# Patient Record
Sex: MALE | Race: WHITE | NOT HISPANIC OR LATINO | Employment: OTHER | URBAN - NONMETROPOLITAN AREA
[De-identification: names, ages, dates, MRNs, and addresses within clinical notes are randomized per-mention and may not be internally consistent; named-entity substitution may affect disease eponyms.]

---

## 2018-10-06 ENCOUNTER — HOSPITAL ENCOUNTER (EMERGENCY)
Facility: HOSPITAL | Age: 68
Discharge: HOME | End: 2018-10-06
Attending: EMERGENCY MEDICINE | Admitting: EMERGENCY MEDICINE

## 2018-10-06 VITALS
BODY MASS INDEX: 20.89 KG/M2 | OXYGEN SATURATION: 99 % | DIASTOLIC BLOOD PRESSURE: 93 MMHG | WEIGHT: 130 LBS | HEART RATE: 65 BPM | RESPIRATION RATE: 18 BRPM | HEIGHT: 66 IN | SYSTOLIC BLOOD PRESSURE: 195 MMHG | TEMPERATURE: 97.5 F

## 2018-10-06 DIAGNOSIS — K59.00 CONSTIPATION, UNSPECIFIED CONSTIPATION TYPE: ICD-10-CM

## 2018-10-06 DIAGNOSIS — K62.3 INCOMPLETE RECTAL PROLAPSE: Primary | ICD-10-CM

## 2018-10-06 LAB
ALBUMIN SERPL-MCNC: 4.3 G/DL (ref 3.4–4.8)
ALBUMIN/GLOB SERPL: 1.3 G/DL (ref 1.1–1.8)
ALP SERPL-CCNC: 80 U/L (ref 38–126)
ALT SERPL W P-5'-P-CCNC: 35 U/L (ref 21–72)
ANION GAP SERPL CALCULATED.3IONS-SCNC: 9 MMOL/L (ref 5–15)
AST SERPL-CCNC: 31 U/L (ref 17–59)
BASOPHILS # BLD AUTO: 0.01 10*3/MM3 (ref 0–0.2)
BASOPHILS NFR BLD AUTO: 0.1 % (ref 0–2)
BILIRUB SERPL-MCNC: 0.6 MG/DL (ref 0.2–1.3)
BUN BLD-MCNC: 23 MG/DL (ref 7–21)
BUN/CREAT SERPL: 17.4 (ref 7–25)
CALCIUM SPEC-SCNC: 9.1 MG/DL (ref 8.4–10.2)
CHLORIDE SERPL-SCNC: 104 MMOL/L (ref 95–110)
CO2 SERPL-SCNC: 25 MMOL/L (ref 22–31)
CREAT BLD-MCNC: 1.32 MG/DL (ref 0.7–1.3)
DEPRECATED RDW RBC AUTO: 48.9 FL (ref 35.1–43.9)
EOSINOPHIL # BLD AUTO: 0.1 10*3/MM3 (ref 0–0.7)
EOSINOPHIL NFR BLD AUTO: 1.3 % (ref 0–7)
ERYTHROCYTE [DISTWIDTH] IN BLOOD BY AUTOMATED COUNT: 14.7 % (ref 11.5–14.5)
GFR SERPL CREATININE-BSD FRML MDRD: 54 ML/MIN/1.73 (ref 42–98)
GLOBULIN UR ELPH-MCNC: 3.4 GM/DL (ref 2.3–3.5)
GLUCOSE BLD-MCNC: 64 MG/DL (ref 60–100)
HCT VFR BLD AUTO: 37.6 % (ref 39–49)
HGB BLD-MCNC: 13.3 G/DL (ref 13.7–17.3)
HOLD SPECIMEN: NORMAL
HOLD SPECIMEN: NORMAL
IMM GRANULOCYTES # BLD: 0.02 10*3/MM3 (ref 0–0.02)
IMM GRANULOCYTES NFR BLD: 0.3 % (ref 0–0.5)
INR PPP: 0.96 (ref 0.8–1.2)
LYMPHOCYTES # BLD AUTO: 1.3 10*3/MM3 (ref 0.6–4.2)
LYMPHOCYTES NFR BLD AUTO: 16.7 % (ref 10–50)
MCH RBC QN AUTO: 32.5 PG (ref 26.5–34)
MCHC RBC AUTO-ENTMCNC: 35.4 G/DL (ref 31.5–36.3)
MCV RBC AUTO: 91.9 FL (ref 80–98)
MONOCYTES # BLD AUTO: 0.63 10*3/MM3 (ref 0–0.9)
MONOCYTES NFR BLD AUTO: 8.1 % (ref 0–12)
NEUTROPHILS # BLD AUTO: 5.71 10*3/MM3 (ref 2–8.6)
NEUTROPHILS NFR BLD AUTO: 73.5 % (ref 37–80)
PLATELET # BLD AUTO: 175 10*3/MM3 (ref 150–450)
PMV BLD AUTO: 9.3 FL (ref 8–12)
POTASSIUM BLD-SCNC: 3.7 MMOL/L (ref 3.5–5.1)
PROT SERPL-MCNC: 7.7 G/DL (ref 6.3–8.6)
PROTHROMBIN TIME: 12.6 SECONDS (ref 11.1–15.3)
RBC # BLD AUTO: 4.09 10*6/MM3 (ref 4.37–5.74)
SODIUM BLD-SCNC: 138 MMOL/L (ref 137–145)
WBC NRBC COR # BLD: 7.77 10*3/MM3 (ref 3.2–9.8)
WHOLE BLOOD HOLD SPECIMEN: NORMAL
WHOLE BLOOD HOLD SPECIMEN: NORMAL

## 2018-10-06 PROCEDURE — 85610 PROTHROMBIN TIME: CPT | Performed by: PHYSICIAN ASSISTANT

## 2018-10-06 PROCEDURE — 80053 COMPREHEN METABOLIC PANEL: CPT | Performed by: PHYSICIAN ASSISTANT

## 2018-10-06 PROCEDURE — 85025 COMPLETE CBC W/AUTO DIFF WBC: CPT | Performed by: EMERGENCY MEDICINE

## 2018-10-06 PROCEDURE — 99284 EMERGENCY DEPT VISIT MOD MDM: CPT

## 2018-10-06 RX ORDER — PANTOPRAZOLE SODIUM 40 MG/10ML
80 INJECTION, POWDER, LYOPHILIZED, FOR SOLUTION INTRAVENOUS ONCE
Status: DISCONTINUED | OUTPATIENT
Start: 2018-10-06 | End: 2018-10-06

## 2018-10-06 RX ORDER — CHOLECALCIFEROL (VITAMIN D3) 125 MCG
500 CAPSULE ORAL DAILY
COMMUNITY

## 2018-10-06 RX ORDER — SODIUM CHLORIDE 0.9 % (FLUSH) 0.9 %
10 SYRINGE (ML) INJECTION AS NEEDED
Status: DISCONTINUED | OUTPATIENT
Start: 2018-10-06 | End: 2018-10-06 | Stop reason: HOSPADM

## 2018-10-06 RX ORDER — CLOPIDOGREL BISULFATE 75 MG/1
75 TABLET ORAL DAILY
COMMUNITY
End: 2021-01-11

## 2018-10-06 RX ORDER — ASPIRIN 81 MG/1
81 TABLET, CHEWABLE ORAL DAILY
Status: ON HOLD | COMMUNITY
End: 2020-09-30

## 2018-10-06 RX ORDER — TAMSULOSIN HYDROCHLORIDE 0.4 MG/1
1 CAPSULE ORAL DAILY
COMMUNITY
End: 2021-04-14 | Stop reason: SDUPTHER

## 2018-10-06 RX ORDER — AMLODIPINE BESYLATE 10 MG/1
10 TABLET ORAL DAILY
Status: ON HOLD | COMMUNITY
End: 2020-09-30

## 2018-10-06 RX ORDER — DOCUSATE SODIUM 100 MG/1
100 CAPSULE, LIQUID FILLED ORAL 2 TIMES DAILY
Qty: 60 CAPSULE | Refills: 0 | Status: SHIPPED | OUTPATIENT
Start: 2018-10-06 | End: 2018-11-05

## 2018-10-06 NOTE — ED PROVIDER NOTES
Subjective   Patient presents to emergency department for rectal bleeding starting this morning.  States blood is bright red and has noticed a large mass coming out of his anus.  Endorses constipation and recent straining more.  Denies any other symptoms.  He is taking plavix and aspirin.          History provided by:  Patient   used: No    Rectal Bleeding   Quality:  Bright red  Amount:  Moderate  Duration:  3 hours  Timing:  Constant  Chronicity:  New  Context: constipation and spontaneously    Context: not hemorrhoids    Associated symptoms: no abdominal pain, no dizziness, no epistaxis, no fever and no vomiting    Risk factors: no anticoagulant use, no hx of colorectal cancer, no hx of colorectal surgery and no NSAID use        Review of Systems   Constitutional: Negative for chills and fever.   HENT: Negative for nosebleeds.    Respiratory: Negative for shortness of breath.    Cardiovascular: Negative for chest pain.   Gastrointestinal: Positive for anal bleeding, constipation, hematochezia and rectal pain. Negative for abdominal pain, diarrhea, nausea and vomiting.   Genitourinary: Negative for dysuria and flank pain.   Skin: Negative for color change.   Allergic/Immunologic: Negative for immunocompromised state.   Neurological: Negative for dizziness and syncope.   Hematological: Does not bruise/bleed easily.       Past Medical History:   Diagnosis Date   • Hyperlipidemia    • Hypertension    • Stroke (CMS/HCC)        No Known Allergies    Past Surgical History:   Procedure Laterality Date   • KNEE SURGERY         History reviewed. No pertinent family history.    Social History     Social History   • Marital status:      Social History Main Topics   • Smoking status: Former Smoker   • Alcohol use No   • Drug use: No     Other Topics Concern   • Not on file           Objective      /98 (BP Location: Left arm, Patient Position: Standing)   Pulse 77   Temp 97.5 °F (36.4 °C)  "(Oral)   Resp 18   Ht 167.6 cm (66\")   Wt 59 kg (130 lb)   SpO2 98%   BMI 20.98 kg/m²     Physical Exam   Constitutional: He appears well-developed and well-nourished. No distress.   HENT:   Head: Normocephalic and atraumatic.   Eyes: Conjunctivae are normal.   Cardiovascular: Normal rate, regular rhythm and normal heart sounds.    Pulmonary/Chest: Effort normal and breath sounds normal. No respiratory distress. He has no wheezes.   Abdominal: Soft. Bowel sounds are normal. He exhibits no distension. There is no tenderness.   Genitourinary: Rectal exam shows external hemorrhoid, mass (rectal prolapse) and tenderness. Rectal exam shows no internal hemorrhoid, no fissure and anal tone normal.   Musculoskeletal: He exhibits no edema.   Neurological: He is alert.   Skin: Skin is warm. Capillary refill takes less than 2 seconds.   Psychiatric: He has a normal mood and affect. His behavior is normal. Thought content normal.   Nursing note and vitals reviewed.      Procedures           ED Course  ED Course as of Oct 06 1121   Sat Oct 06, 2018   1117 Examined patient with RN. Applied sugar and gentle pressure to rectal prolapse with tech in room.  Rectal prolapse spontaneously reduced. Advised patient to reduce straining and increase water/fiber intake.  Will give rx for colace.    [KODAK]      ED Course User Index  [KODAK] Martín Mujica PA-C      Results for orders placed or performed during the hospital encounter of 10/06/18   CBC Auto Differential   Result Value Ref Range    WBC 7.77 3.20 - 9.80 10*3/mm3    RBC 4.09 (L) 4.37 - 5.74 10*6/mm3    Hemoglobin 13.3 (L) 13.7 - 17.3 g/dL    Hematocrit 37.6 (L) 39.0 - 49.0 %    MCV 91.9 80.0 - 98.0 fL    MCH 32.5 26.5 - 34.0 pg    MCHC 35.4 31.5 - 36.3 g/dL    RDW 14.7 (H) 11.5 - 14.5 %    RDW-SD 48.9 (H) 35.1 - 43.9 fl    MPV 9.3 8.0 - 12.0 fL    Platelets 175 150 - 450 10*3/mm3    Neutrophil % 73.5 37.0 - 80.0 %    Lymphocyte % 16.7 10.0 - 50.0 %    Monocyte % 8.1 0.0 - " 12.0 %    Eosinophil % 1.3 0.0 - 7.0 %    Basophil % 0.1 0.0 - 2.0 %    Immature Grans % 0.3 0.0 - 0.5 %    Neutrophils, Absolute 5.71 2.00 - 8.60 10*3/mm3    Lymphocytes, Absolute 1.30 0.60 - 4.20 10*3/mm3    Monocytes, Absolute 0.63 0.00 - 0.90 10*3/mm3    Eosinophils, Absolute 0.10 0.00 - 0.70 10*3/mm3    Basophils, Absolute 0.01 0.00 - 0.20 10*3/mm3    Immature Grans, Absolute 0.02 0.00 - 0.02 10*3/mm3   Protime-INR   Result Value Ref Range    Protime 12.6 11.1 - 15.3 Seconds    INR 0.96 0.80 - 1.20   Comprehensive Metabolic Panel   Result Value Ref Range    Glucose 64 60 - 100 mg/dL    BUN 23 (H) 7 - 21 mg/dL    Creatinine 1.32 (H) 0.70 - 1.30 mg/dL    Sodium 138 137 - 145 mmol/L    Potassium 3.7 3.5 - 5.1 mmol/L    Chloride 104 95 - 110 mmol/L    CO2 25.0 22.0 - 31.0 mmol/L    Calcium 9.1 8.4 - 10.2 mg/dL    Total Protein 7.7 6.3 - 8.6 g/dL    Albumin 4.30 3.40 - 4.80 g/dL    ALT (SGPT) 35 21 - 72 U/L    AST (SGOT) 31 17 - 59 U/L    Alkaline Phosphatase 80 38 - 126 U/L    Total Bilirubin 0.6 0.2 - 1.3 mg/dL    eGFR Non African Amer 54 42 - 98 mL/min/1.73    Globulin 3.4 2.3 - 3.5 gm/dL    A/G Ratio 1.3 1.1 - 1.8 g/dL    BUN/Creatinine Ratio 17.4 7.0 - 25.0    Anion Gap 9.0 5.0 - 15.0 mmol/L                 MDM      Final diagnoses:   Incomplete rectal prolapse   Constipation, unspecified constipation type            Martín Mujica PA-C  10/06/18 1122

## 2018-12-07 RX ORDER — CARVEDILOL 3.12 MG/1
3.12 TABLET ORAL 2 TIMES DAILY WITH MEALS
Status: ON HOLD | COMMUNITY
End: 2020-09-30

## 2018-12-10 ENCOUNTER — HOSPITAL ENCOUNTER (OUTPATIENT)
Facility: HOSPITAL | Age: 68
Setting detail: HOSPITAL OUTPATIENT SURGERY
Discharge: HOME | End: 2018-12-10
Attending: INTERNAL MEDICINE | Admitting: INTERNAL MEDICINE

## 2018-12-10 ENCOUNTER — ANESTHESIA (OUTPATIENT)
Dept: GASTROENTEROLOGY | Facility: HOSPITAL | Age: 68
End: 2018-12-10

## 2018-12-10 ENCOUNTER — ANESTHESIA EVENT (OUTPATIENT)
Dept: GASTROENTEROLOGY | Facility: HOSPITAL | Age: 68
End: 2018-12-10

## 2018-12-10 VITALS
HEART RATE: 66 BPM | SYSTOLIC BLOOD PRESSURE: 126 MMHG | WEIGHT: 131 LBS | RESPIRATION RATE: 18 BRPM | BODY MASS INDEX: 21.05 KG/M2 | DIASTOLIC BLOOD PRESSURE: 75 MMHG | HEIGHT: 66 IN | TEMPERATURE: 98.7 F | OXYGEN SATURATION: 98 %

## 2018-12-10 PROCEDURE — 25010000002 PROPOFOL 10 MG/ML EMULSION: Performed by: NURSE ANESTHETIST, CERTIFIED REGISTERED

## 2018-12-10 RX ORDER — PROPOFOL 10 MG/ML
VIAL (ML) INTRAVENOUS AS NEEDED
Status: DISCONTINUED | OUTPATIENT
Start: 2018-12-10 | End: 2018-12-10 | Stop reason: SURG

## 2018-12-10 RX ORDER — LIDOCAINE HYDROCHLORIDE 20 MG/ML
INJECTION, SOLUTION INFILTRATION; PERINEURAL AS NEEDED
Status: DISCONTINUED | OUTPATIENT
Start: 2018-12-10 | End: 2018-12-10 | Stop reason: SURG

## 2018-12-10 RX ORDER — DEXTROSE AND SODIUM CHLORIDE 5; .45 G/100ML; G/100ML
30 INJECTION, SOLUTION INTRAVENOUS CONTINUOUS
Status: DISCONTINUED | OUTPATIENT
Start: 2018-12-10 | End: 2018-12-10 | Stop reason: HOSPADM

## 2018-12-10 RX ADMIN — PROPOFOL 800 MG: 10 INJECTION, EMULSION INTRAVENOUS at 13:20

## 2018-12-10 RX ADMIN — LIDOCAINE HYDROCHLORIDE 80 MG: 20 INJECTION, SOLUTION INFILTRATION; PERINEURAL at 13:20

## 2018-12-10 RX ADMIN — LIDOCAINE HYDROCHLORIDE 20 MG: 20 INJECTION, SOLUTION INFILTRATION; PERINEURAL at 13:25

## 2018-12-10 RX ADMIN — DEXTROSE AND SODIUM CHLORIDE 30 ML/HR: 5; 450 INJECTION, SOLUTION INTRAVENOUS at 12:36

## 2018-12-10 NOTE — ANESTHESIA PREPROCEDURE EVALUATION
Anesthesia Evaluation     NPO Solid Status: > 8 hours  NPO Liquid Status: > 6 hours           Airway   Mallampati: II  TM distance: >3 FB  Neck ROM: full  No difficulty expected  Dental    (+) lower dentures and upper dentures        Pulmonary - normal exam   (+) a smoker Former,   Cardiovascular     PT is on anticoagulation therapy  Patient on routine beta blocker and Beta blocker given within 24 hours of surgery  Rate: normal    (+) hypertension poorly controlled 2 medications or greater, hyperlipidemia,       Neuro/Psych  (+) CVA,     GI/Hepatic/Renal/Endo - negative ROS     Musculoskeletal (-) negative ROS    Abdominal  - normal exam   Substance History - negative use     OB/GYN negative ob/gyn ROS         Other                        Anesthesia Plan    ASA 3     MAC     intravenous induction   Anesthetic plan, all risks, benefits, and alternatives have been provided, discussed and informed consent has been obtained with: patient.

## 2018-12-10 NOTE — ANESTHESIA POSTPROCEDURE EVALUATION
Patient: Bar Crockett    Procedure Summary     Date:  12/10/18 Room / Location:  [facility] ENDOSCOPY 2 / [facility]    Anesthesia Start:  1308 Anesthesia Stop:  1333    Procedure:  COLONOSCOPY (N/A ) Diagnosis:       Change in bowel habits      Diverticulosis large intestine w/o perforation or abscess w/o bleeding      (Change in bowel habits [R19.4])    Surgeon:  Jay Wilson DO Provider:  Dorinda Agarwal CRNA    Anesthesia Type:  MAC ASA Status:  3          Anesthesia Type: MAC  Last vitals  BP   161/89 (12/10/18 1227)   Temp   97.9 °F (36.6 °C) (12/10/18 1227)   Pulse   73 (12/10/18 1227)   Resp   18 (12/10/18 1227)     SpO2   93 % (12/10/18 1227)     Post Anesthesia Care and Evaluation    Patient location during evaluation: bedside  Patient participation: complete - patient cannot participate  Level of consciousness: obtunded/minimal responses  Pain score: 0  Pain management: adequate  Airway patency: patent  Anesthetic complications: No anesthetic complications  PONV Status: none  Cardiovascular status: acceptable  Respiratory status: acceptable  Hydration status: acceptable

## 2018-12-10 NOTE — H&P
Era Valdivia DO,Psychiatric  Gastroenterology  Hepatology  Endoscopy  Board Certified in Internal Medicine and gastroenterology  [address], [address]  [place]. [address]  - [phone]   F - [phone]     GASTROENTEROLOGY HISTORY AND PHYSICAL  NOTE   ERA VALDIVIA DO.         SUBJECTIVE:   12/10/2018    Name: Bar Crockett  DOD: 1950      Chief Complaint:       Subjective : Rectal prolapse, changes in bowel habits, rectal bleeding    Patient is 68 y.o. male presents with desire for elective colonoscopy.      ROS/HISTORY/ CURRENT MEDICATIONS/OBJECTIVE/VS/PE:   Review of Systems:  All systems unremarkable unless specified below.  Constitutional   HENT  Eyes   Respiratory    Cardiovascular  Gastrointestinal   Endocrine  Genitourinary    Musculoskeletal   Skin  Allergic/Immunologic    Neurological    Hematological  Psychiatric/Behavioral    History:     Past Medical History:   Diagnosis Date   • Hyperlipidemia    • Hypertension    • Rectal abnormality     Rectum came out - er put back in.  9/18   • Stroke (CMS/HCC)      Past Surgical History:   Procedure Laterality Date   • KNEE SURGERY     • OTHER SURGICAL HISTORY      Loop recorder     History reviewed. No pertinent family history.  Social History     Tobacco Use   • Smoking status: Former Smoker   • Smokeless tobacco: Former User   Substance Use Topics   • Alcohol use: No   • Drug use: No     Medications Prior to Admission   Medication Sig Dispense Refill Last Dose   • amLODIPine (NORVASC) 10 MG tablet Take 10 mg by mouth Daily.   12/10/2018 at Unknown time   • carvedilol (COREG) 3.125 MG tablet Take 3.125 mg by mouth 2 (Two) Times a Day With Meals. 1/2 tab AM and 1/2 in PM.   12/10/2018 at Unknown time   • Cholecalciferol (VITAMIN D3) 5000 units capsule capsule Take 5,000 Units by mouth Daily.   12/9/2018 at Unknown time   • tamsulosin (FLOMAX) 0.4 MG capsule 24 hr capsule Take 1 capsule by mouth Every Night.   12/9/2018 at Unknown  time   • vitamin B-12 (CYANOCOBALAMIN) 500 MCG tablet Take 500 mcg by mouth Daily.   12/9/2018 at Unknown time   • aspirin 81 MG chewable tablet Chew 81 mg Daily.   12/6/2018   • clopidogrel (PLAVIX) 75 MG tablet Take 75 mg by mouth Daily.   12/6/2018     Allergies:  Patient has no known allergies.    I have reviewed the patients medical history, surgical history and family history in the available medical record system.     Current Medications:     Current Facility-Administered Medications   Medication Dose Route Frequency Provider Last Rate Last Dose   • dextrose 5 % and sodium chloride 0.45 % infusion  30 mL/hr Intravenous Continuous Jay Wilson DO 30 mL/hr at 12/10/18 1236 30 mL/hr at 12/10/18 1236       Objective     Physical Exam:   Temp:  [97.9 °F (36.6 °C)] 97.9 °F (36.6 °C)  Heart Rate:  [73] 73  Resp:  [18] 18  BP: (161)/(89) 161/89    Physical Exam:  General Appearance:    Alert, cooperative, in no acute distress   Head:    Normocephalic, without obvious abnormality, atraumatic   Eyes:            Lids and lashes normal, conjunctivae and sclerae normal, no   icterus, no pallor, corneas clear, PERRLA   Ears:    Ears appear intact with no abnormalities noted   Throat:   No oral lesions, no thrush, oral mucosa moist   Neck:   No adenopathy, supple, trachea midline, no thyromegaly, no     carotid bruit, no JVD   Back:     No kyphosis present, no scoliosis present, no skin lesions,       erythema or scars, no tenderness to percussion or                   palpation,   range of motion normal   Lungs:     Clear to auscultation,respirations regular, even and                   unlabored    Heart:    Regular rhythm and normal rate, normal S1 and S2, no            murmur, no gallop, no rub, no click   Breast Exam:    Deferred   Abdomen:     Normal bowel sounds, no masses, no organomegaly, soft        non-tender, non-distended, no guarding, no rebound                 tenderness   Genitalia:    Deferred    Extremities:   Moves all extremities well, no edema, no cyanosis, no              redness   Pulses:   Pulses palpable and equal bilaterally   Skin:   No bleeding, bruising or rash   Lymph nodes:   No palpable adenopathy   Neurologic:   Cranial nerves 2 - 12 grossly intact, sensation intact, DTR        present and equal bilaterally      Results Review:     Lab Results   Component Value Date    WBC 7.77 10/06/2018    HGB 13.3 (L) 10/06/2018    HCT 37.6 (L) 10/06/2018     10/06/2018             No results found for: LIPASE  Lab Results   Component Value Date    INR 0.96 10/06/2018          Radiology Review:  Imaging Results (last 72 hours)     ** No results found for the last 72 hours. **           I reviewed the patient's new clinical results.  I reviewed the patient's new imaging results and agree with the interpretation.     ASSESSMENT/PLAN:   ASSESSMENT:   1.  Changes in bowel habits  2.  Rectal prolapse, consider leading edge colorectal cancer    PLAN:   1.  Colonoscopy    Risk and benefits associated with the procedure are reviewed with the patient.  The patient wishes to proceed      Jay Wilson DO  12/10/18  1:16 PM

## 2020-09-30 ENCOUNTER — HOSPITAL ENCOUNTER (INPATIENT)
Facility: HOSPITAL | Age: 70
LOS: 5 days | Discharge: HOME | End: 2020-10-05
Attending: EMERGENCY MEDICINE | Admitting: HOSPITALIST

## 2020-09-30 ENCOUNTER — APPOINTMENT (OUTPATIENT)
Dept: CT IMAGING | Facility: HOSPITAL | Age: 70
End: 2020-09-30

## 2020-09-30 DIAGNOSIS — Z78.9 IMPAIRED MOBILITY AND ADLS: ICD-10-CM

## 2020-09-30 DIAGNOSIS — K57.32 SIGMOID DIVERTICULITIS: Primary | ICD-10-CM

## 2020-09-30 DIAGNOSIS — Z74.09 IMPAIRED MOBILITY AND ADLS: ICD-10-CM

## 2020-09-30 DIAGNOSIS — N17.9 ACUTE RENAL FAILURE, UNSPECIFIED ACUTE RENAL FAILURE TYPE (HCC): ICD-10-CM

## 2020-09-30 LAB
ALBUMIN SERPL-MCNC: 3.5 G/DL (ref 3.5–5.2)
ALBUMIN/GLOB SERPL: 1.1 G/DL
ALP SERPL-CCNC: 76 U/L (ref 39–117)
ALT SERPL W P-5'-P-CCNC: 15 U/L (ref 1–41)
ANION GAP SERPL CALCULATED.3IONS-SCNC: 16 MMOL/L (ref 5–15)
ANISOCYTOSIS BLD QL: ABNORMAL
AST SERPL-CCNC: 19 U/L (ref 1–40)
BACTERIA UR QL AUTO: ABNORMAL /HPF
BILIRUB SERPL-MCNC: 0.8 MG/DL (ref 0–1.2)
BILIRUB UR QL STRIP: NEGATIVE
BUN SERPL-MCNC: 46 MG/DL (ref 8–23)
BUN/CREAT SERPL: 23.4 (ref 7–25)
CALCIUM SPEC-SCNC: 8.7 MG/DL (ref 8.6–10.5)
CHLORIDE SERPL-SCNC: 96 MMOL/L (ref 98–107)
CLARITY UR: CLEAR
CO2 SERPL-SCNC: 22 MMOL/L (ref 22–29)
COLOR UR: YELLOW
CREAT SERPL-MCNC: 1.97 MG/DL (ref 0.76–1.27)
D-LACTATE SERPL-SCNC: 2 MMOL/L (ref 0.5–2)
DEPRECATED RDW RBC AUTO: 41.3 FL (ref 37–54)
ERYTHROCYTE [DISTWIDTH] IN BLOOD BY AUTOMATED COUNT: 12.3 % (ref 12.3–15.4)
GFR SERPL CREATININE-BSD FRML MDRD: 34 ML/MIN/1.73
GLOBULIN UR ELPH-MCNC: 3.2 GM/DL
GLUCOSE SERPL-MCNC: 127 MG/DL (ref 65–99)
GLUCOSE UR STRIP-MCNC: NEGATIVE MG/DL
HCT VFR BLD AUTO: 49.3 % (ref 37.5–51)
HGB BLD-MCNC: 17.7 G/DL (ref 13–17.7)
HGB UR QL STRIP.AUTO: NEGATIVE
HOLD SPECIMEN: NORMAL
HOLD SPECIMEN: NORMAL
HYALINE CASTS UR QL AUTO: ABNORMAL /LPF
KETONES UR QL STRIP: NEGATIVE
LEUKOCYTE ESTERASE UR QL STRIP.AUTO: NEGATIVE
LIPASE SERPL-CCNC: 21 U/L (ref 13–60)
LYMPHOCYTES # BLD MANUAL: 0 10*3/MM3 (ref 0.7–3.1)
LYMPHOCYTES NFR BLD MANUAL: 0 % (ref 19.6–45.3)
LYMPHOCYTES NFR BLD MANUAL: 5 % (ref 5–12)
MAGNESIUM SERPL-MCNC: 2.3 MG/DL (ref 1.6–2.4)
MCH RBC QN AUTO: 33 PG (ref 26.6–33)
MCHC RBC AUTO-ENTMCNC: 35.9 G/DL (ref 31.5–35.7)
MCV RBC AUTO: 92 FL (ref 79–97)
MONOCYTES # BLD AUTO: 1.81 10*3/MM3 (ref 0.1–0.9)
NEUTROPHILS # BLD AUTO: 34.3 10*3/MM3 (ref 1.7–7)
NEUTROPHILS NFR BLD MANUAL: 94 % (ref 42.7–76)
NEUTS BAND NFR BLD MANUAL: 1 % (ref 0–5)
NITRITE UR QL STRIP: NEGATIVE
PH UR STRIP.AUTO: 5.5 [PH] (ref 5–9)
PLATELET # BLD AUTO: 215 10*3/MM3 (ref 140–450)
PMV BLD AUTO: 10.4 FL (ref 6–12)
POTASSIUM SERPL-SCNC: 3.1 MMOL/L (ref 3.5–5.2)
PROT SERPL-MCNC: 6.7 G/DL (ref 6–8.5)
PROT UR QL STRIP: ABNORMAL
RBC # BLD AUTO: 5.36 10*6/MM3 (ref 4.14–5.8)
RBC # UR: ABNORMAL /HPF
REF LAB TEST METHOD: ABNORMAL
SMALL PLATELETS BLD QL SMEAR: ADEQUATE
SODIUM SERPL-SCNC: 134 MMOL/L (ref 136–145)
SP GR UR STRIP: 1.02 (ref 1–1.03)
SQUAMOUS #/AREA URNS HPF: ABNORMAL /HPF
UROBILINOGEN UR QL STRIP: ABNORMAL
WBC # BLD AUTO: 36.11 10*3/MM3 (ref 3.4–10.8)
WBC MORPH BLD: NORMAL
WBC UR QL AUTO: ABNORMAL /HPF
WHOLE BLOOD HOLD SPECIMEN: NORMAL
WHOLE BLOOD HOLD SPECIMEN: NORMAL

## 2020-09-30 PROCEDURE — 25010000002 PIPERACILLIN SOD-TAZOBACTAM PER 1 G: Performed by: EMERGENCY MEDICINE

## 2020-09-30 PROCEDURE — 99284 EMERGENCY DEPT VISIT MOD MDM: CPT

## 2020-09-30 PROCEDURE — 87077 CULTURE AEROBIC IDENTIFY: CPT | Performed by: EMERGENCY MEDICINE

## 2020-09-30 PROCEDURE — 87040 BLOOD CULTURE FOR BACTERIA: CPT | Performed by: EMERGENCY MEDICINE

## 2020-09-30 PROCEDURE — 85025 COMPLETE CBC W/AUTO DIFF WBC: CPT

## 2020-09-30 PROCEDURE — 80053 COMPREHEN METABOLIC PANEL: CPT | Performed by: EMERGENCY MEDICINE

## 2020-09-30 PROCEDURE — 81001 URINALYSIS AUTO W/SCOPE: CPT | Performed by: NURSE PRACTITIONER

## 2020-09-30 PROCEDURE — 83690 ASSAY OF LIPASE: CPT | Performed by: EMERGENCY MEDICINE

## 2020-09-30 PROCEDURE — 87186 SC STD MICRODIL/AGAR DIL: CPT | Performed by: EMERGENCY MEDICINE

## 2020-09-30 PROCEDURE — 87150 DNA/RNA AMPLIFIED PROBE: CPT | Performed by: EMERGENCY MEDICINE

## 2020-09-30 PROCEDURE — 74176 CT ABD & PELVIS W/O CONTRAST: CPT

## 2020-09-30 PROCEDURE — 25010000002 PIPERACILLIN SOD-TAZOBACTAM PER 1 G: Performed by: NURSE PRACTITIONER

## 2020-09-30 PROCEDURE — 85007 BL SMEAR W/DIFF WBC COUNT: CPT | Performed by: EMERGENCY MEDICINE

## 2020-09-30 PROCEDURE — 25010000002 HEPARIN (PORCINE) PER 1000 UNITS: Performed by: NURSE PRACTITIONER

## 2020-09-30 PROCEDURE — 83735 ASSAY OF MAGNESIUM: CPT | Performed by: EMERGENCY MEDICINE

## 2020-09-30 PROCEDURE — 94799 UNLISTED PULMONARY SVC/PX: CPT

## 2020-09-30 PROCEDURE — 83605 ASSAY OF LACTIC ACID: CPT | Performed by: EMERGENCY MEDICINE

## 2020-09-30 RX ORDER — ONDANSETRON 2 MG/ML
4 INJECTION INTRAMUSCULAR; INTRAVENOUS EVERY 6 HOURS PRN
Status: DISCONTINUED | OUTPATIENT
Start: 2020-09-30 | End: 2020-10-05 | Stop reason: HOSPADM

## 2020-09-30 RX ORDER — ERGOCALCIFEROL 1.25 MG/1
50000 CAPSULE ORAL WEEKLY
COMMUNITY

## 2020-09-30 RX ORDER — SODIUM CHLORIDE 0.9 % (FLUSH) 0.9 %
10 SYRINGE (ML) INJECTION AS NEEDED
Status: DISCONTINUED | OUTPATIENT
Start: 2020-09-30 | End: 2020-10-05 | Stop reason: HOSPADM

## 2020-09-30 RX ORDER — HEPARIN SODIUM 5000 [USP'U]/ML
5000 INJECTION, SOLUTION INTRAVENOUS; SUBCUTANEOUS EVERY 12 HOURS SCHEDULED
Status: DISCONTINUED | OUTPATIENT
Start: 2020-09-30 | End: 2020-10-05 | Stop reason: HOSPADM

## 2020-09-30 RX ORDER — SODIUM CHLORIDE 9 MG/ML
75 INJECTION, SOLUTION INTRAVENOUS CONTINUOUS
Status: DISCONTINUED | OUTPATIENT
Start: 2020-09-30 | End: 2020-10-05 | Stop reason: HOSPADM

## 2020-09-30 RX ORDER — ASCORBIC ACID 500 MG
500 TABLET ORAL DAILY
Status: DISCONTINUED | OUTPATIENT
Start: 2020-09-30 | End: 2020-10-05 | Stop reason: HOSPADM

## 2020-09-30 RX ORDER — ACETAMINOPHEN 325 MG/1
650 TABLET ORAL EVERY 4 HOURS PRN
Status: DISCONTINUED | OUTPATIENT
Start: 2020-09-30 | End: 2020-10-05 | Stop reason: HOSPADM

## 2020-09-30 RX ORDER — TAMSULOSIN HYDROCHLORIDE 0.4 MG/1
0.4 CAPSULE ORAL NIGHTLY
Status: DISCONTINUED | OUTPATIENT
Start: 2020-09-30 | End: 2020-10-05 | Stop reason: HOSPADM

## 2020-09-30 RX ORDER — HYDRALAZINE HYDROCHLORIDE 25 MG/1
25 TABLET, FILM COATED ORAL 3 TIMES DAILY
Status: DISCONTINUED | OUTPATIENT
Start: 2020-09-30 | End: 2020-10-05 | Stop reason: HOSPADM

## 2020-09-30 RX ORDER — POTASSIUM CHLORIDE 7.45 MG/ML
10 INJECTION INTRAVENOUS
Status: DISCONTINUED | OUTPATIENT
Start: 2020-09-30 | End: 2020-10-05 | Stop reason: HOSPADM

## 2020-09-30 RX ORDER — ACETAMINOPHEN 650 MG/1
650 SUPPOSITORY RECTAL EVERY 4 HOURS PRN
Status: DISCONTINUED | OUTPATIENT
Start: 2020-09-30 | End: 2020-10-05 | Stop reason: HOSPADM

## 2020-09-30 RX ORDER — CHOLECALCIFEROL (VITAMIN D3) 125 MCG
500 CAPSULE ORAL DAILY
Status: DISCONTINUED | OUTPATIENT
Start: 2020-09-30 | End: 2020-10-05 | Stop reason: HOSPADM

## 2020-09-30 RX ORDER — POTASSIUM CHLORIDE 750 MG/1
40 CAPSULE, EXTENDED RELEASE ORAL AS NEEDED
Status: DISCONTINUED | OUTPATIENT
Start: 2020-09-30 | End: 2020-10-05 | Stop reason: HOSPADM

## 2020-09-30 RX ORDER — SODIUM CHLORIDE 0.9 % (FLUSH) 0.9 %
10 SYRINGE (ML) INJECTION EVERY 12 HOURS SCHEDULED
Status: DISCONTINUED | OUTPATIENT
Start: 2020-09-30 | End: 2020-10-05 | Stop reason: HOSPADM

## 2020-09-30 RX ORDER — POTASSIUM CHLORIDE 1.5 G/1.77G
40 POWDER, FOR SOLUTION ORAL AS NEEDED
Status: DISCONTINUED | OUTPATIENT
Start: 2020-09-30 | End: 2020-10-05 | Stop reason: HOSPADM

## 2020-09-30 RX ORDER — ASCORBIC ACID 500 MG
500 TABLET ORAL DAILY
COMMUNITY

## 2020-09-30 RX ORDER — FAMOTIDINE 40 MG/1
40 TABLET, FILM COATED ORAL DAILY
Status: DISCONTINUED | OUTPATIENT
Start: 2020-09-30 | End: 2020-10-05 | Stop reason: HOSPADM

## 2020-09-30 RX ORDER — CHLORTHALIDONE 25 MG/1
50 TABLET ORAL 2 TIMES DAILY
Status: DISCONTINUED | OUTPATIENT
Start: 2020-09-30 | End: 2020-10-01

## 2020-09-30 RX ORDER — HYDRALAZINE HYDROCHLORIDE 25 MG/1
25 TABLET, FILM COATED ORAL 3 TIMES DAILY
COMMUNITY
End: 2021-01-11

## 2020-09-30 RX ORDER — MORPHINE SULFATE 2 MG/ML
1 INJECTION, SOLUTION INTRAMUSCULAR; INTRAVENOUS EVERY 4 HOURS PRN
Status: DISCONTINUED | OUTPATIENT
Start: 2020-09-30 | End: 2020-10-05 | Stop reason: HOSPADM

## 2020-09-30 RX ORDER — CHLORTHALIDONE 25 MG/1
50 TABLET ORAL 2 TIMES DAILY
COMMUNITY
End: 2020-10-05 | Stop reason: HOSPADM

## 2020-09-30 RX ORDER — ACETAMINOPHEN 160 MG/5ML
650 SOLUTION ORAL EVERY 4 HOURS PRN
Status: DISCONTINUED | OUTPATIENT
Start: 2020-09-30 | End: 2020-10-05 | Stop reason: HOSPADM

## 2020-09-30 RX ORDER — CLOPIDOGREL BISULFATE 75 MG/1
75 TABLET ORAL DAILY
Status: DISCONTINUED | OUTPATIENT
Start: 2020-09-30 | End: 2020-10-05 | Stop reason: HOSPADM

## 2020-09-30 RX ADMIN — PIPERACILLIN SODIUM AND TAZOBACTAM SODIUM 3.38 G: 3; .375 INJECTION, POWDER, LYOPHILIZED, FOR SOLUTION INTRAVENOUS at 21:54

## 2020-09-30 RX ADMIN — HEPARIN SODIUM 5000 UNITS: 5000 INJECTION INTRAVENOUS; SUBCUTANEOUS at 21:43

## 2020-09-30 RX ADMIN — CHLORTHALIDONE 50 MG: 25 TABLET ORAL at 21:43

## 2020-09-30 RX ADMIN — CYANOCOBALAMIN TAB 500 MCG 500 MCG: 500 TAB at 18:33

## 2020-09-30 RX ADMIN — OXYCODONE HYDROCHLORIDE AND ACETAMINOPHEN 500 MG: 500 TABLET ORAL at 18:33

## 2020-09-30 RX ADMIN — SODIUM CHLORIDE 125 ML/HR: 9 INJECTION, SOLUTION INTRAVENOUS at 17:19

## 2020-09-30 RX ADMIN — SODIUM CHLORIDE 1000 ML: 900 INJECTION, SOLUTION INTRAVENOUS at 13:23

## 2020-09-30 RX ADMIN — SODIUM CHLORIDE, PRESERVATIVE FREE 10 ML: 5 INJECTION INTRAVENOUS at 21:44

## 2020-09-30 RX ADMIN — HYDRALAZINE HYDROCHLORIDE 25 MG: 25 TABLET, FILM COATED ORAL at 21:43

## 2020-09-30 RX ADMIN — FAMOTIDINE 40 MG: 40 TABLET, FILM COATED ORAL at 18:33

## 2020-09-30 RX ADMIN — POTASSIUM CHLORIDE 40 MEQ: 10 CAPSULE, COATED, EXTENDED RELEASE ORAL at 21:54

## 2020-09-30 RX ADMIN — CLOPIDOGREL BISULFATE 75 MG: 75 TABLET ORAL at 18:33

## 2020-09-30 RX ADMIN — TAMSULOSIN HYDROCHLORIDE 0.4 MG: 0.4 CAPSULE ORAL at 21:44

## 2020-09-30 RX ADMIN — POTASSIUM CHLORIDE 40 MEQ: 10 CAPSULE, COATED, EXTENDED RELEASE ORAL at 18:33

## 2020-10-01 LAB
ADV 40+41 DNA STL QL NAA+NON-PROBE: NOT DETECTED
ANION GAP SERPL CALCULATED.3IONS-SCNC: 11 MMOL/L (ref 5–15)
ASTRO TYP 1-8 RNA STL QL NAA+NON-PROBE: NOT DETECTED
BACTERIA BLD CULT: NORMAL
BASOPHILS # BLD AUTO: 0.03 10*3/MM3 (ref 0–0.2)
BASOPHILS NFR BLD AUTO: 0.1 % (ref 0–1.5)
BUN SERPL-MCNC: 41 MG/DL (ref 8–23)
BUN/CREAT SERPL: 21.9 (ref 7–25)
C CAYETANENSIS DNA STL QL NAA+NON-PROBE: NOT DETECTED
C DIFF TOX GENS STL QL NAA+PROBE: NEGATIVE
CALCIUM SPEC-SCNC: 8 MG/DL (ref 8.6–10.5)
CAMPY SP DNA.DIARRHEA STL QL NAA+PROBE: NOT DETECTED
CHLORIDE SERPL-SCNC: 104 MMOL/L (ref 98–107)
CO2 SERPL-SCNC: 25 MMOL/L (ref 22–29)
CREAT SERPL-MCNC: 1.87 MG/DL (ref 0.76–1.27)
CRYPTOSP STL CULT: NOT DETECTED
DEPRECATED RDW RBC AUTO: 43.7 FL (ref 37–54)
E COLI DNA SPEC QL NAA+PROBE: NOT DETECTED
E HISTOLYT AG STL-ACNC: NOT DETECTED
EAEC PAA PLAS AGGR+AATA ST NAA+NON-PRB: NOT DETECTED
EC STX1 + STX2 GENES STL NAA+PROBE: NOT DETECTED
EOSINOPHIL # BLD AUTO: 0 10*3/MM3 (ref 0–0.4)
EOSINOPHIL NFR BLD AUTO: 0 % (ref 0.3–6.2)
EPEC EAE GENE STL QL NAA+NON-PROBE: NOT DETECTED
ERYTHROCYTE [DISTWIDTH] IN BLOOD BY AUTOMATED COUNT: 12.7 % (ref 12.3–15.4)
ETEC LTA+ST1A+ST1B TOX ST NAA+NON-PROBE: NOT DETECTED
G LAMBLIA DNA SPEC QL NAA+PROBE: NOT DETECTED
GFR SERPL CREATININE-BSD FRML MDRD: 36 ML/MIN/1.73
GLUCOSE SERPL-MCNC: 137 MG/DL (ref 65–99)
HCT VFR BLD AUTO: 44.1 % (ref 37.5–51)
HGB BLD-MCNC: 15.1 G/DL (ref 13–17.7)
IMM GRANULOCYTES # BLD AUTO: 0.11 10*3/MM3 (ref 0–0.05)
IMM GRANULOCYTES NFR BLD AUTO: 0.5 % (ref 0–0.5)
LYMPHOCYTES # BLD AUTO: 0.75 10*3/MM3 (ref 0.7–3.1)
LYMPHOCYTES NFR BLD AUTO: 3.6 % (ref 19.6–45.3)
MCH RBC QN AUTO: 32.5 PG (ref 26.6–33)
MCHC RBC AUTO-ENTMCNC: 34.2 G/DL (ref 31.5–35.7)
MCV RBC AUTO: 94.8 FL (ref 79–97)
MONOCYTES # BLD AUTO: 0.69 10*3/MM3 (ref 0.1–0.9)
MONOCYTES NFR BLD AUTO: 3.3 % (ref 5–12)
NEUTROPHILS NFR BLD AUTO: 19.37 10*3/MM3 (ref 1.7–7)
NEUTROPHILS NFR BLD AUTO: 92.5 % (ref 42.7–76)
NOROVIRUS GI+II RNA STL QL NAA+NON-PROBE: NOT DETECTED
NRBC BLD AUTO-RTO: 0 /100 WBC (ref 0–0.2)
P SHIGELLOIDES DNA STL QL NAA+PROBE: NOT DETECTED
PLATELET # BLD AUTO: 210 10*3/MM3 (ref 140–450)
PMV BLD AUTO: 10.7 FL (ref 6–12)
POTASSIUM SERPL-SCNC: 3.4 MMOL/L (ref 3.5–5.2)
POTASSIUM SERPL-SCNC: 3.5 MMOL/L (ref 3.5–5.2)
RBC # BLD AUTO: 4.65 10*6/MM3 (ref 4.14–5.8)
RV RNA STL NAA+PROBE: NOT DETECTED
SALMONELLA DNA SPEC QL NAA+PROBE: NOT DETECTED
SAPO I+II+IV+V RNA STL QL NAA+NON-PROBE: NOT DETECTED
SHIGELLA SP+EIEC IPAH STL QL NAA+PROBE: NOT DETECTED
SODIUM SERPL-SCNC: 140 MMOL/L (ref 136–145)
V CHOLERAE DNA SPEC QL NAA+PROBE: NOT DETECTED
VIBRIO DNA SPEC NAA+PROBE: NOT DETECTED
WBC # BLD AUTO: 20.95 10*3/MM3 (ref 3.4–10.8)
YERSINIA STL CULT: NOT DETECTED

## 2020-10-01 PROCEDURE — 85025 COMPLETE CBC W/AUTO DIFF WBC: CPT | Performed by: NURSE PRACTITIONER

## 2020-10-01 PROCEDURE — 80048 BASIC METABOLIC PNL TOTAL CA: CPT | Performed by: NURSE PRACTITIONER

## 2020-10-01 PROCEDURE — 25010000002 PIPERACILLIN SOD-TAZOBACTAM PER 1 G: Performed by: NURSE PRACTITIONER

## 2020-10-01 PROCEDURE — 84132 ASSAY OF SERUM POTASSIUM: CPT | Performed by: HOSPITALIST

## 2020-10-01 PROCEDURE — 0097U HC BIOFIRE FILMARRAY GI PANEL: CPT | Performed by: INTERNAL MEDICINE

## 2020-10-01 PROCEDURE — 25010000002 HEPARIN (PORCINE) PER 1000 UNITS: Performed by: NURSE PRACTITIONER

## 2020-10-01 PROCEDURE — 87493 C DIFF AMPLIFIED PROBE: CPT | Performed by: INTERNAL MEDICINE

## 2020-10-01 PROCEDURE — 97165 OT EVAL LOW COMPLEX 30 MIN: CPT

## 2020-10-01 PROCEDURE — 97161 PT EVAL LOW COMPLEX 20 MIN: CPT

## 2020-10-01 RX ADMIN — OXYCODONE HYDROCHLORIDE AND ACETAMINOPHEN 500 MG: 500 TABLET ORAL at 08:33

## 2020-10-01 RX ADMIN — TAMSULOSIN HYDROCHLORIDE 0.4 MG: 0.4 CAPSULE ORAL at 20:15

## 2020-10-01 RX ADMIN — HYDRALAZINE HYDROCHLORIDE 25 MG: 25 TABLET, FILM COATED ORAL at 20:15

## 2020-10-01 RX ADMIN — POTASSIUM CHLORIDE 40 MEQ: 1.5 POWDER, FOR SOLUTION ORAL at 20:15

## 2020-10-01 RX ADMIN — FAMOTIDINE 40 MG: 40 TABLET, FILM COATED ORAL at 08:33

## 2020-10-01 RX ADMIN — HEPARIN SODIUM 5000 UNITS: 5000 INJECTION INTRAVENOUS; SUBCUTANEOUS at 08:33

## 2020-10-01 RX ADMIN — PIPERACILLIN SODIUM AND TAZOBACTAM SODIUM 3.38 G: 3; .375 INJECTION, POWDER, LYOPHILIZED, FOR SOLUTION INTRAVENOUS at 21:18

## 2020-10-01 RX ADMIN — SODIUM CHLORIDE 125 ML/HR: 9 INJECTION, SOLUTION INTRAVENOUS at 05:15

## 2020-10-01 RX ADMIN — HYDRALAZINE HYDROCHLORIDE 25 MG: 25 TABLET, FILM COATED ORAL at 08:33

## 2020-10-01 RX ADMIN — CLOPIDOGREL BISULFATE 75 MG: 75 TABLET ORAL at 08:33

## 2020-10-01 RX ADMIN — SODIUM CHLORIDE 125 ML/HR: 9 INJECTION, SOLUTION INTRAVENOUS at 13:39

## 2020-10-01 RX ADMIN — PIPERACILLIN SODIUM AND TAZOBACTAM SODIUM 3.38 G: 3; .375 INJECTION, POWDER, LYOPHILIZED, FOR SOLUTION INTRAVENOUS at 05:10

## 2020-10-01 RX ADMIN — SODIUM CHLORIDE, PRESERVATIVE FREE 10 ML: 5 INJECTION INTRAVENOUS at 20:17

## 2020-10-01 RX ADMIN — CYANOCOBALAMIN TAB 500 MCG 500 MCG: 500 TAB at 08:33

## 2020-10-01 RX ADMIN — HYDRALAZINE HYDROCHLORIDE 25 MG: 25 TABLET, FILM COATED ORAL at 17:22

## 2020-10-01 RX ADMIN — PIPERACILLIN SODIUM AND TAZOBACTAM SODIUM 3.38 G: 3; .375 INJECTION, POWDER, LYOPHILIZED, FOR SOLUTION INTRAVENOUS at 13:20

## 2020-10-01 RX ADMIN — POTASSIUM CHLORIDE 40 MEQ: 10 CAPSULE, COATED, EXTENDED RELEASE ORAL at 09:00

## 2020-10-01 RX ADMIN — POTASSIUM CHLORIDE 40 MEQ: 1.5 POWDER, FOR SOLUTION ORAL at 02:32

## 2020-10-01 RX ADMIN — HEPARIN SODIUM 5000 UNITS: 5000 INJECTION INTRAVENOUS; SUBCUTANEOUS at 20:16

## 2020-10-01 RX ADMIN — POTASSIUM CHLORIDE 40 MEQ: 10 CAPSULE, COATED, EXTENDED RELEASE ORAL at 13:20

## 2020-10-01 RX ADMIN — SODIUM CHLORIDE 125 ML/HR: 9 INJECTION, SOLUTION INTRAVENOUS at 22:01

## 2020-10-01 NOTE — PLAN OF CARE
Problem: Adult Inpatient Plan of Care  Goal: Plan of Care Review  Flowsheets (Taken 10/1/2020 1040)  Plan of Care Reviewed With:   patient   spouse  Outcome Summary: OT disha completed this date, co-eval with PT. Pt was alert and fairly cooperative. Pt mod I with bed mobility and Indepenent with sit to stand t/f and mobility in room. Pt setup and independent with don and doff socks. No LOB with balance testing. Pt educated on benefit of using shower chair and somoene home for bathing. Pt declined the need for further skilled OT stating at PLOF and no current pain. OT will d/c at this time and RN notified.

## 2020-10-01 NOTE — PLAN OF CARE
Problem: Adult Inpatient Plan of Care  Goal: Plan of Care Review  Outcome: Ongoing, Progressing  Flowsheets  Taken 10/1/2020 1146  Plan of Care Reviewed With: patient  Outcome Summary:  Initial PT evaluation complete; co-evaluation with OT.  Patient is alert, fairly cooperative.  He demonstrates (I) with bed mobility, transfers and gait without an AD in his room.  Tinetti assessed: 28/28 or low fall risk; FWW not necessary.  Patient is at PLOF, low risk for falls and pain free.  No further PT indicated.  Skilled PT discharged, nurse notified.  Taken 10/1/2020 1039  Plan of Care Reviewed With:   patient   spouse

## 2020-10-01 NOTE — THERAPY DISCHARGE NOTE
Patient Name: Bar Crockett  : 1950    MRN: [id number]                              Today's Date: 10/1/2020       Admit Date: 2020    Visit Dx:     ICD-10-CM ICD-9-CM   1. Sigmoid diverticulitis  K57.32 562.11   2. Acute renal failure, unspecified acute renal failure type (CMS/HCC)  N17.9 584.9     Patient Active Problem List   Diagnosis   • Sigmoid diverticulitis     Past Medical History:   Diagnosis Date   • Hyperlipidemia    • Hypertension    • Rectal abnormality     Rectum came out - er put back in.     • Stroke (CMS/HCC)      Past Surgical History:   Procedure Laterality Date   • COLONOSCOPY N/A 12/10/2018    Procedure: COLONOSCOPY;  Surgeon: Jay Wilson DO;  Location: [facility] ENDOSCOPY;  Service: Gastroenterology   • KNEE SURGERY     • OTHER SURGICAL HISTORY      Loop recorder     General Information     Row Name 10/01/20 1039          Physical Therapy Time and Intention    Document Type  evaluation  -CZ     Mode of Treatment  co-treatment;physical therapy;occupational therapy  -CZ     Row Name 10/01/20 1039          General Information    Patient Profile Reviewed  yes  -CZ     Prior Level of Function  independent:;all household mobility;community mobility  -CZ     Existing Precautions/Restrictions  no known precautions/restrictions  -CZ     Barriers to Rehab  none identified  -CZ     Row Name 10/01/20 1039          Living Environment    Lives With  spouse;child(ramu), adult  -CZ     Row Name 10/01/20 1039          Home Main Entrance    Number of Stairs, Main Entrance  one  -CZ     Stair Railings, Main Entrance  none  -CZ     Row Name 10/01/20 1039          Stairs Within Home, Primary    Number of Stairs, Within Home, Primary  none  -CZ     Row Name 10/01/20 1039          Cognition    Orientation Status (Cognition)  oriented x 4  -CZ     Row Name 10/01/20 1039          Safety Issues, Functional Mobility    Comment, Safety Issues/Impairments (Mobility)  Glasses full time, hearing  deficit. Has tub/shower and walk-in, has tub bench, tall toilet, no grab bars, (I) without an AD, has SW. Wife cooks and cleans.  Patient (I) with dressin, grooming, bathing.  -CZ       User Key  (r) = Recorded By, (t) = Taken By, (c) = Cosigned By    Initials Name Provider Type    Ismael Ridley, PT Physical Therapist        Mobility     Row Name 10/01/20 1039          Bed Mobility    Bed Mobility  supine-sit-supine  -CZ     Supine-Sit-Supine [place] (Bed Mobility)  independent  -CZ     Row Name 10/01/20 1039          Sit-Stand Transfer    Sit-Stand [place] (Transfers)  independent  -     Row Name 10/01/20 1039          Gait/Stairs (Locomotion)    [place] Level (Gait)  independent  -     Distance in Feet (Gait)  10'x2 in room.  -CZ     Comment (Gait/Stairs)  Good gait mechanics, no LOB, no AD.  -CZ       User Key  (r) = Recorded By, (t) = Taken By, (c) = Cosigned By    Initials Name Provider Type    Ismael Ridley, PT Physical Therapist        Obj/Interventions     Row Name 10/01/20 1039          Range of Motion Comprehensive    General Range of Motion  bilateral lower extremity ROM WNL  -     Row Name 10/01/20 1039          Strength Comprehensive (MMT)    Comment, General Manual Muscle Testing (MMT) Assessment  BLEs: 4/5 grossly.  -     Row Name 10/01/20 1039          Sensory Assessment (Somatosensory)    Sensory Assessment (Somatosensory)  LE sensation intact  -       User Key  (r) = Recorded By, (t) = Taken By, (c) = Cosigned By    Initials Name Provider Type    Ismael Ridley, PT Physical Therapist        Goals/Plan    No documentation.       Clinical Impression     Row Name 10/01/20 1039          Pain    Additional Documentation  Pain Scale: Numbers Pre/Post-Treatment (Group)  -     Row Name 10/01/20 1039          Pain Scale: Numbers Pre/Post-Treatment    Pretreatment Pain Rating  0/10 - no pain  -     Posttreatment Pain Rating  0/10 - no pain  -     Row Name  10/01/20 1039          Plan of Care Review    Plan of Care Reviewed With  patient;spouse  -CZ     Row Name 10/01/20 1039          Therapy Assessment/Plan (PT)    Criteria for Skilled Interventions Met (PT)  no;no problems identified which require skilled intervention  -CZ     Row Name 10/01/20 1039          Vital Signs    Pre Systolic BP Rehab  145  -CZ     Pre Treatment Diastolic BP  88  -CZ     Pretreatment Heart Rate (beats/min)  63  -CZ     Posttreatment Heart Rate (beats/min)  --  -CZ     Pre SpO2 (%)  96  -CZ     O2 Delivery Pre Treatment  room air  -CZ     Pre Patient Position  Supine  -CZ     Row Name 10/01/20 1039          Positioning and Restraints    Pre-Treatment Position  in bed  -CZ     Post Treatment Position  bed  -CZ     In Bed  supine;call light within reach;with family/caregiver  -CZ       User Key  (r) = Recorded By, (t) = Taken By, (c) = Cosigned By    Initials Name Provider Type    CZ Ismael Morillo, PT Physical Therapist        Outcome Measures     Row Name 10/01/20 1039          How much help from another person do you currently need...    Turning from your back to your side while in flat bed without using bedrails?  4  -CZ     Moving from lying on back to sitting on the side of a flat bed without bedrails?  4  -CZ     Moving to and from a bed to a chair (including a wheelchair)?  4  -CZ     Standing up from a chair using your arms (e.g., wheelchair, bedside chair)?  4  -CZ     Climbing 3-5 steps with a railing?  4  -CZ     To walk in hospital room?  4  -CZ     AM-PAC 6 Clicks Score (PT)  24  -CZ     Row Name 10/01/20 1039          Tinetti Assessment    Tinetti Assessment  yes  -CZ     Sitting Balance  1  -CZ     Arises  2  -CZ     Attempts to Rise  2  -CZ     Immediate Standing Balance (first 5 sec)  2  -CZ     Standing Balance  2  -CZ     Sternal Nudge (feet close together)  2  -CZ     Eyes Closed (feet close together)  1  -CZ     Turning 360 Degrees- Steps  1  -CZ     Turning 360  "Degrees- Steadiness  1  -CZ     Sitting Down  2  -CZ     Tinetti Balance Score  16  -CZ     Gait Initiation (immediate after told \"go\")  1  -CZ     Step Length- Right Swing  1  -CZ     Step Length- Left Swing  1  -CZ     Foot Clearance- Right Foot  1  -CZ     Foot Clearance- Left Foot  1  -CZ     Step Symmetry  1  -CZ     Step Continuity  1  -CZ     Path (excursion)  2  -CZ     Trunk  2  -CZ     Base of Support  1  -CZ     Gait Score  12  -CZ     Tinetti Total Score  28  -CZ     Row Name 10/01/20 1039          Functional Assessment    Outcome Measure Options  AM-PAC 6 Clicks Basic Mobility (PT);Tinetti  -CZ       User Key  (r) = Recorded By, (t) = Taken By, (c) = Cosigned By    Initials Name Provider Type    CZ Ismael Morillo, PT Physical Therapist        Physical Therapy Education                 Title: PT OT SLP Therapies (Resolved)     Topic: Physical Therapy (Resolved)     Point: Precautions (Resolved)     Learning Progress Summary           Patient Acceptance, E, VU by  at 10/1/2020 1144    Comment: Tinetti assessed: 28/28 or low fall risk; FWW not necessary.                               User Key     Initials Effective Dates Name Provider Type Discipline     04/03/18 -  Ismael Morillo, PT Physical Therapist PT              PT Recommendation and Plan     Plan of Care Reviewed With: patient  Outcome Summary: Initial PT evaluation complete; co-evaluation with OT.  Patient is alert, fairly cooperative.  He demonstrates (I) with bed mobility, transfers and gait without an AD in his room.  Tinetti assessed: 28/28 or low fall risk; FWW not necessary.  Patient is at PLOF, low risk for falls and pain free.  No further PT indicated.  Skilled PT discharged, nurse notified.     Time Calculation:   PT Charges     Row Name 10/01/20 1149             Time Calculation    Start Time  1039  -CZ      Stop Time  1054  -CZ      Time Calculation (min)  15 min  -CZ      PT Received On  10/01/20  -      PT Goal Re-Cert Due " Date  10/01/20  -PAN        User Key  (r) = Recorded By, (t) = Taken By, (c) = Cosigned By    Initials Name Provider Type    Ismael Ridley, PT Physical Therapist        Therapy Charges for Today     Code Description Service Date Service Provider Modifiers Qty    [id number] HC PT EVAL LOW COMPLEXITY 1 10/1/2020 Ismael Morillo, PT GP 1          PT G-Codes  Outcome Measure Options: AM-PAC 6 Clicks Basic Mobility (PT), Tinetti  AM-PAC 6 Clicks Score (PT): 24  Tinetti Total Score: 28    PT Discharge Summary  Anticipated Discharge Disposition (PT): home  Reason for Discharge: Independent, At baseline function    Ismael Morillo, ISAIAS  10/1/2020

## 2020-10-01 NOTE — PROGRESS NOTES
[facility] Medicine Services  INPATIENT PROGRESS NOTE    Length of Stay: 1  Date of Admission: 9/30/2020  Primary Care Physician: Satnam Troncoso APRN    Subjective   Chief Complaint: No new complaints.    HPI: Patient is seen for follow-up.  He is a 70-year-old male with history of hypertension, dyslipidemia, previous CVA who was admitted for acute diverticulitis and acute kidney injury.  He is doing better, tolerating prescribed diet and denies abdominal pain, nausea or vomiting. Diarrhea persists.    Review of Systems   Constitutional: Positive for activity change and fatigue. Negative for appetite change, chills, diaphoresis and fever.   HENT: Negative for trouble swallowing and voice change.    Eyes: Negative for photophobia and visual disturbance.   Respiratory: Negative for cough, choking, chest tightness, shortness of breath, wheezing and stridor.    Cardiovascular: Negative for chest pain, palpitations and leg swelling.   Gastrointestinal: Positive for diarrhea. Negative for abdominal distention, abdominal pain, blood in stool, constipation, nausea and vomiting.   Endocrine: Negative for cold intolerance, heat intolerance, polydipsia, polyphagia and polyuria.   Genitourinary: Negative for decreased urine volume, difficulty urinating, dysuria, enuresis, flank pain, frequency, hematuria and urgency.   Musculoskeletal: Negative for arthralgias, gait problem, myalgias, neck pain and neck stiffness.   Skin: Negative for pallor, rash and wound.   Neurological: Negative for dizziness, tremors, seizures, syncope, facial asymmetry, speech difficulty, weakness, light-headedness, numbness and headaches.   Hematological: Does not bruise/bleed easily.   Psychiatric/Behavioral: Negative for agitation, behavioral problems and confusion.       Objective    Temp:  [96.2 °F (35.7 °C)-98 °F (36.7 °C)] 97 °F (36.1 °C)  Heart Rate:  [] 68  Resp:  [18-20] 18  BP:  (122-161)/(78-87) 147/78    Physical Exam  Vitals signs and nursing note reviewed.   Constitutional:       General: He is not in acute distress.     Appearance: He is well-developed. He is not diaphoretic.   HENT:      Head: Normocephalic and atraumatic.   Eyes:      General: No scleral icterus.     Pupils: Pupils are equal, round, and reactive to light.   Neck:      Musculoskeletal: Normal range of motion and neck supple.      Thyroid: No thyromegaly.      Vascular: No JVD.   Cardiovascular:      Rate and Rhythm: Normal rate and regular rhythm.      Heart sounds: Normal heart sounds. No murmur. No friction rub. No gallop.    Pulmonary:      Effort: Pulmonary effort is normal.      Breath sounds: Normal breath sounds. No wheezing or rales.   Chest:      Chest wall: No tenderness.   Abdominal:      General: Bowel sounds are normal. There is no distension.      Palpations: Abdomen is soft. There is no mass.      Tenderness: There is no abdominal tenderness. There is no guarding or rebound.   Musculoskeletal:         General: No tenderness or deformity.   Skin:     General: Skin is warm and dry.      Coloration: Skin is not pale.      Findings: No erythema or rash.   Neurological:      General: No focal deficit present.      Mental Status: He is alert and oriented to person, place, and time. Mental status is at baseline.      Cranial Nerves: No cranial nerve deficit.      Sensory: No sensory deficit.      Motor: No abnormal muscle tone.      Coordination: Coordination normal.      Deep Tendon Reflexes: Reflexes normal.   Psychiatric:         Mood and Affect: Mood normal.         Behavior: Behavior normal.         Thought Content: Thought content normal.         Judgment: Judgment normal.           Medication Review:    Current Facility-Administered Medications:   •  acetaminophen (TYLENOL) tablet 650 mg, 650 mg, Oral, Q4H PRN **OR** acetaminophen (TYLENOL) 160 MG/5ML solution 650 mg, 650 mg, Oral, Q4H PRN **OR**  acetaminophen (TYLENOL) suppository 650 mg, 650 mg, Rectal, Q4H PRN, Levill, Suzan G, APRN  •  clopidogrel (PLAVIX) tablet 75 mg, 75 mg, Oral, Daily, Levill, Suzan G, APRN, 75 mg at 10/01/20 0833  •  famotidine (PEPCID) tablet 40 mg, 40 mg, Oral, Daily, Levill, Suzan G, APRN, 40 mg at 10/01/20 0833  •  heparin (porcine) 5000 UNIT/ML injection 5,000 Units, 5,000 Units, Subcutaneous, Q12H, Levill, Suzan G, APRN, 5,000 Units at 10/01/20 0833  •  hydrALAZINE (APRESOLINE) tablet 25 mg, 25 mg, Oral, TID, Levill, Suzan G, APRN, 25 mg at 10/01/20 0833  •  morphine injection 1 mg, 1 mg, Intravenous, Q4H PRN, Levill, Suzan G, APRN  •  ondansetron (ZOFRAN) injection 4 mg, 4 mg, Intravenous, Q6H PRN, Levill, Suzan G, APRN  •  piperacillin-tazobactam (ZOSYN) 3.375 g/100 mL 0.9% NS IVPB (mbp), 3.375 g, Intravenous, Q8H, Levill, Suzan G, APRN, 3.375 g at 10/01/20 0510  •  potassium chloride (MICRO-K) CR capsule 40 mEq, 40 mEq, Oral, PRN, 40 mEq at 10/01/20 0900 **OR** potassium chloride (KLOR-CON) packet 40 mEq, 40 mEq, Oral, PRN, 40 mEq at 10/01/20 0232 **OR** potassium chloride 10 mEq in 100 mL IVPB, 10 mEq, Intravenous, Q1H PRN, Levill, Suzan G, APRN  •  sodium chloride 0.9 % flush 10 mL, 10 mL, Intravenous, Q12H, Levill, Suzan G, APRN, 10 mL at 09/30/20 1521  •  sodium chloride 0.9 % flush 10 mL, 10 mL, Intravenous, PRN, Levill, Suzan G, APRN  •  sodium chloride 0.9 % infusion, 125 mL/hr, Intravenous, Continuous, Levill, Suzan G, APRN, Last Rate: 125 mL/hr at 10/01/20 0515, 125 mL/hr at 10/01/20 0515  •  tamsulosin (FLOMAX) 24 hr capsule 0.4 mg, 0.4 mg, Oral, Nightly, Levill, Suzan G, APRN, 0.4 mg at 09/30/20 2144  •  vitamin B-12 (CYANOCOBALAMIN) tablet 500 mcg, 500 mcg, Oral, Daily, Levill, Suzan G, APRN, 500 mcg at 10/01/20 0833  •  vitamin C (ASCORBIC ACID) tablet 500 mg, 500 mg, Oral, Daily, Levill, Suzan G, APRN, 500 mg at 10/01/20 0833    Results Review:  I have reviewed the labs, radiology results, and diagnostic  studies.    Laboratory Data:   Results from last 7 days   Lab Units 10/01/20  0808 09/30/20  1240   SODIUM mmol/L 140 134*   POTASSIUM mmol/L 3.4* 3.1*   CHLORIDE mmol/L 104 96*   CO2 mmol/L 25.0 22.0   BUN mg/dL 41* 46*   CREATININE mg/dL 1.87* 1.97*   GLUCOSE mg/dL 137* 127*   CALCIUM mg/dL 8.0* 8.7   BILIRUBIN mg/dL  --  0.8   ALK PHOS U/L  --  76   ALT (SGPT) U/L  --  15   AST (SGOT) U/L  --  19   ANION GAP mmol/L 11.0 16.0*     Estimated Creatinine Clearance: 29.5 mL/min (A) (by C-G formula based on SCr of 1.87 mg/dL (H)).  Results from last 7 days   Lab Units 09/30/20  1240   MAGNESIUM mg/dL 2.3         Results from last 7 days   Lab Units 10/01/20  0808 09/30/20  1240   WBC 10*3/mm3 20.95* 36.11*   HEMOGLOBIN g/dL 15.1 17.7   HEMATOCRIT % 44.1 49.3   PLATELETS 10*3/mm3 210 215           Culture Data:   Blood Culture   Date Value Ref Range Status   09/30/2020 Abnormal Stain (C)  Preliminary     No results found for: URINECX  No results found for: RESPCX  No results found for: WOUNDCX  No results found for: STOOLCX  No components found for: BODYFLD    Radiology Data:   Imaging Results (Last 24 Hours)     Procedure Component Value Units Date/Time    CT Abdomen Pelvis Without Contrast [[id number]] Collected: 09/30/20 1320     Updated: 09/30/20 1413    Narrative:      EXAM: CT ABDOMEN PELVIS WITHOUT IV CONTRAST    COMPARISONS: None    INDICATION: LLQ PAIN    TECHNIQUE: Noncontrast CT images were obtained through the  abdomen and pelvis.  Coronal and sagittal reformats were  provided.      FINDINGS:  Note, the lack of intravenous contrast limits the evaluation of  the vasculature and viscera in the abdomen and pelvis.    Liver: Liver is normal in size and contour. No intrahepatic  ductal dilatation.  Spleen: Splenic microcalcifications consistent with prior  granulomatous disease.  Pancreas: Within normal limits.  Gallbladder: Within normal limits.    Adrenals: Within normal limits.  Kidneys: There is a 2 mm right  nephrolith. No hydronephrosis.  Ureters: Visualized portions are within normal limits.   Bladder: Within normal limits.   : Prostate and seminal vesicles are unremarkable.    Stomach: Small hiatal hernia.  Bowel: Small bowel is unremarkable. There are distal and sigmoid  diverticula with fat stranding seen about the descending and  sigmoid colon.  Appendix: Within normal limits.    Vasculature: There is infrarenal abdominal aorta measuring 3.2 x  3.1 cm (TV by AP). There is aortoiliac atherosclerosis. There is  hypodense crescentic soft atherosclerosis seen in the infrarenal  abdominal aorta.    Miscellaneous: No significant free fluid, focal fluid collection,  pneumoperitoneum, or bulky lymphadenopathy.    Musculoskeletal: No acute fracture or suspicious osseous lesion.  Minimal spondylosis and degenerative disc disease of the thoracic  and lumbosacral spine.    Lungs: There is bibasilar scarring and subsegmental atelectasis.  Appearance of emphysematous changes at the lung bases.      Impression:      Distal and sigmoid diverticulitis, uncomplicated.    Infrarenal abdominal aorta aneurysm measuring 3.2 x 3.1 cm with  extensive aortoiliac atherosclerosis. Recommend continued  surveillance by criteria.    Small hiatal hernia.    Nonobstructing right nephrolith.    Bibasilar emphysema.    Electronically signed by:  Kimberlee Schmitt MD  9/30/2020  2:12 PM CDT Workstation: [id number]          I have reviewed the patient's current medications.     Assessment/Plan     Hospital Problem List:  Active Problems:    Sigmoid diverticulitis    Acute diverticulitis: Improving.  Continue IV hydration, pain control and IV antibiotics.  1 out of 4 blood culture bottles is positive for gram-positive cocci in chains.  Sensitivities are pending.  Reactive leukocytosis is improving.  Diarrhea could be reactive.  However will check GI panel and C. difficile assay.    Hypokalemia: Continue potassium repletion protocol.    Acute  on chronic kidney disease stage II (likely prerenal): Patient's baseline creatinine is 1.3.  Creatinine is down to 1.87 today from a high of 1.97 on admission.  Continue IV hydration, discontinue chlorthalidone, monitor renal function and consult nephrologist if the need arises.    Hyponatremia: Resolved.    History of CVA with no residual deficits: Continue statin and antiplatelet therapy.    BPH: Continue Flomax.    Continue GI and DVT prophylaxis.    Deconditioning: Consult PT and OT.    Discharge Planning: In progress.    Michael Huston MD   10/01/20   11:10 CDT

## 2020-10-01 NOTE — PLAN OF CARE
Goal Outcome Evaluation:  Plan of Care Reviewed With: patient  Progress: improving  Outcome Summary: VSS, sleeping well, no new complaints, will cont to monitor

## 2020-10-01 NOTE — THERAPY DISCHARGE NOTE
Acute Care - Occupational Therapy Initial Evaluation/Discharge  [facility]     Patient Name: Bar Crockett  : 1950  MRN: [id number]  Today's Date: 10/1/2020     Date of Referral to OT: 20         Admit Date: 2020       ICD-10-CM ICD-9-CM   1. Sigmoid diverticulitis  K57.32 562.11   2. Acute renal failure, unspecified acute renal failure type (CMS/HCC)  N17.9 584.9   3. Impaired mobility and ADLs  Z74.09 V49.89    Z78.9      Patient Active Problem List   Diagnosis   • Sigmoid diverticulitis     Past Medical History:   Diagnosis Date   • Hyperlipidemia    • Hypertension    • Rectal abnormality     Rectum came out - er put back in.     • Stroke (CMS/HCC)      Past Surgical History:   Procedure Laterality Date   • COLONOSCOPY N/A 12/10/2018    Procedure: COLONOSCOPY;  Surgeon: Jay Wilson DO;  Location: [facility] ENDOSCOPY;  Service: Gastroenterology   • KNEE SURGERY     • OTHER SURGICAL HISTORY      Loop recorder          OT ASSESSMENT FLOWSHEET (last 12 hours)      OT Evaluation and Treatment     Row Name 10/01/20 1040                   OT Time and Intention    Document Type  evaluation  -        Mode of Treatment  co-treatment;occupational therapy;physical therapy  -        Total Minutes, Occupational Therapy  15  -        Patient Effort  fair  -           General Information    Patient Profile Reviewed  yes  -        General Observations of Patient  spouse came in during eval   -        Prior Level of Function  independent:;all household mobility;transfer;bed mobility;ADL's  -        Equipment Currently Used at Home  -- has built in shower chair   -        Barriers to Rehab  none identified  -           Living Environment    Current Living Arrangements  home/apartment/condo  -        Home Accessibility  stairs to enter home  -        Lives With  spouse;child(ramu), adult  -           Home Main Entrance    Number of Stairs, Main Entrance  one  -         Stair Railings, Main Entrance  none  -           Sensory    Additional Documentation  Sensory Assessment (Somatosensory) (Group);Sensory Interventions (Group);Vision Assessment/Intervention (Group);Hearing Assessment (Group)  -           Vision Assessment/Intervention    Visual Impairment/Limitations  corrective lenses full-time  -           Sensory Interventions    Comment, Sensory Intervention  BUE light touch appears [facility]           Cognition    Orientation Status (Cognition)  oriented x 4  -           Pain Scale: Numbers Pre/Post-Treatment    Pretreatment Pain Rating  0/10 - no pain  -        Posttreatment Pain Rating  0/10 - no pain  -           Range of Motion Comprehensive    Comment, General Range of Motion  BUE [facility]   -           Strength Comprehensive (MMT)    Comment, General Manual Muscle Testing (MMT) Assessment  E  grossly 4+/5; BUE grossly 4+/5   -           Bed Mobility    Bed Mobility  supine-sit-supine  -        Supine-Sit-Supine [place] (Bed Mobility)  modified independence  -           Functional Mobility    Functional Mobility- Comment  appears [facility]   -           Transfer Assessment/Treatment    Transfers  stand-sit transfer;sit-stand transfer  -           Transfers    Sit-Stand [place] (Transfers)  independent  -        Stand-Sit [place] (Transfers)  independent  -           Safety Issues, Functional Mobility    Comment, Safety Issues/Impairments (Mobility)  Glasses full time, hearing deficit. Has tub/shower and walk-in, has tub bench, tall toilet, no grab bars, (I) without an AD, has SW. Wife cooks and cleans. Patient (I) with dressin, grooming, bathing.  -           Balance    Comment, Balance  no LOB did well with balance setting.   -           Activities of Daily Living    BADL Assessment/Intervention  lower body dressing;bathing  -           Bathing Assessment/Intervention    Comment (Bathing)  educated on benefit of using shower  chair and having someone present for 1st shower.   -           Lower Body Dressing Assessment/Training    [place] Level (Lower Body Dressing)  doff;don;socks;set up;independent  -           Plan of Care Review    Plan of Care Reviewed With  patient;spouse  -           Vital Signs    Pre Systolic BP Rehab  145  -BH        Pre Treatment Diastolic BP  88  -        Pretreatment Heart Rate (beats/min)  63  -BH        Pre SpO2 (%)  96  -        O2 Delivery Pre Treatment  room air  -        Pre Patient Position  Supine  -           Positioning and Restraints    Pre-Treatment Position  in bed  -        Post Treatment Position  bed  -        In Bed  supine;call light within reach;with family/caregiver  -           Therapy Assessment/Plan (OT)    Date of Referral to OT  09/30/20  -        Patient/Family Therapy Goal Statement (OT)  to go home  -        Functional Level at Time of Evaluation (OT)  pt engaged with some tasks stated he is at PLOF and does not need therapy   -        Criteria for Skilled Therapeutic Interventions Met (OT)  patient/family refuse skilled intervention at this time;no problems identified which require skilled intervention  -        Therapy Frequency (OT)  evaluation only  -           Evaluation Complexity (OT)    Review Occupational Profile/Medical/Therapy History Complexity  moderate complexity  -        Assessment, Occupational Performance/Identification of Deficit Complexity  low complexity  -        Clinical Decision Making Complexity (OT)  low complexity  -        Overall Complexity of Evaluation (OT)  low complexity  -           Therapy Plan Review/Discharge Plan (OT)    Therapy Plan Review (OT)  evaluation/treatment results reviewed  -        Anticipated Discharge Disposition (OT)  home with assist  -          User Key  (r) = Recorded By, (t) = Taken By, (c) = Cosigned By    Initials Name Effective Dates     Johanna Holley, OTR/L 06/08/18 -            Occupational Therapy Education                 Title: PT OT SLP Therapies (Resolved)     Topic: Occupational Therapy (Resolved)     Point: ADL training (Resolved)     Description:   Instruct learner(s) on proper safety adaptation and remediation techniques during self care or transfers.   Instruct in proper use of assistive devices.              Learning Progress Summary           Patient Acceptance, MARIELA, VU by  at 10/1/2020 3386    Comment: Educated about OT. Educated on shower t/f safety.                               User Key     Initials Effective Dates Name Provider Type Discipline     06/08/18 -  Johanna Holley, OTR/L Occupational Therapist OT                OT Recommendation and Plan  Therapy Frequency (OT): evaluation only  Plan of Care Review  Plan of Care Reviewed With: patient, spouse  Outcome Summary: OT disha completed this date, co-eval with PT. Pt was alert and fairly cooperative. Pt mod I with bed mobility and Indepenent with sit to stand t/f and mobility in room. Pt setup and independent with don and doff socks. No LOB with balance testing. Pt educated on benefit of using shower chair and somoene home for bathing. Pt declined the need for further skilled OT stating at PLOF and no current pain. OT will d/c at this time and RN notified.  Plan of Care Reviewed With: patient, spouse  Outcome Summary: OT disha completed this date, co-eval with PT. Pt was alert and fairly cooperative. Pt mod I with bed mobility and Indepenent with sit to stand t/f and mobility in room. Pt setup and independent with don and doff socks. No LOB with balance testing. Pt educated on benefit of using shower chair and somoene home for bathing. Pt declined the need for further skilled OT stating at PLOF and no current pain. OT will d/c at this time and RN notified.         Outcome Measures     Row Name 10/01/20 1040             How much help from another is currently needed...    Putting on and taking off regular lower  body clothing?  3  -      Bathing (including washing, rinsing, and drying)  3  -      Toileting (which includes using toilet bed pan or urinal)  4  -      Putting on and taking off regular upper body clothing  4  -      Taking care of personal grooming (such as brushing teeth)  4  -BH      Eating meals  4  -      AM-PAC 6 Clicks Score (OT)  22  -         Functional Assessment    Outcome Measure Options  AM-PAC 6 Clicks Daily Activity (OT)  -        User Key  (r) = Recorded By, (t) = Taken By, (c) = Cosigned By    Initials Name Provider Type     Johanna Holley OTR/L Occupational Therapist          Time Calculation:   Time Calculation- OT     Row Name 10/01/20 1335             Time Calculation-     OT Start Time  1038  -      OT Stop Time  1054  -      OT Time Calculation (min)  16 min  -      OT Received On  10/01/20  -        User Key  (r) = Recorded By, (t) = Taken By, (c) = Cosigned By    Initials Name Provider Type     Johanna Holley OTR/L Occupational Therapist          Therapy Charges for Today     Code Description Service Date Service Provider Modifiers Qty    [id number]  OT EVAL LOW COMPLEXITY 1 10/1/2020 Johanna Holley OTR/L GO 1               OT Discharge Summary  Anticipated Discharge Disposition (OT): home with assist  Reason for Discharge: Patient/Caregiver request, At baseline function  Discharge Destination: other (comment)(currently still a patient)    NICHOLAS Sagastume/MARTELL  10/1/2020

## 2020-10-02 LAB
ANION GAP SERPL CALCULATED.3IONS-SCNC: 8 MMOL/L (ref 5–15)
BASOPHILS # BLD AUTO: 0.02 10*3/MM3 (ref 0–0.2)
BASOPHILS NFR BLD AUTO: 0.2 % (ref 0–1.5)
BUN SERPL-MCNC: 32 MG/DL (ref 8–23)
BUN/CREAT SERPL: 18.8 (ref 7–25)
CALCIUM SPEC-SCNC: 8.2 MG/DL (ref 8.6–10.5)
CHLORIDE SERPL-SCNC: 110 MMOL/L (ref 98–107)
CO2 SERPL-SCNC: 24 MMOL/L (ref 22–29)
CREAT SERPL-MCNC: 1.7 MG/DL (ref 0.76–1.27)
DEPRECATED RDW RBC AUTO: 43.5 FL (ref 37–54)
EOSINOPHIL # BLD AUTO: 0.01 10*3/MM3 (ref 0–0.4)
EOSINOPHIL NFR BLD AUTO: 0.1 % (ref 0.3–6.2)
ERYTHROCYTE [DISTWIDTH] IN BLOOD BY AUTOMATED COUNT: 12.7 % (ref 12.3–15.4)
GFR SERPL CREATININE-BSD FRML MDRD: 40 ML/MIN/1.73
GLUCOSE SERPL-MCNC: 129 MG/DL (ref 65–99)
HCT VFR BLD AUTO: 40.5 % (ref 37.5–51)
HGB BLD-MCNC: 14 G/DL (ref 13–17.7)
IMM GRANULOCYTES # BLD AUTO: 0.05 10*3/MM3 (ref 0–0.05)
IMM GRANULOCYTES NFR BLD AUTO: 0.5 % (ref 0–0.5)
LYMPHOCYTES # BLD AUTO: 0.66 10*3/MM3 (ref 0.7–3.1)
LYMPHOCYTES NFR BLD AUTO: 6.1 % (ref 19.6–45.3)
MCH RBC QN AUTO: 32.9 PG (ref 26.6–33)
MCHC RBC AUTO-ENTMCNC: 34.6 G/DL (ref 31.5–35.7)
MCV RBC AUTO: 95.3 FL (ref 79–97)
MONOCYTES # BLD AUTO: 0.49 10*3/MM3 (ref 0.1–0.9)
MONOCYTES NFR BLD AUTO: 4.5 % (ref 5–12)
NEUTROPHILS NFR BLD AUTO: 88.6 % (ref 42.7–76)
NEUTROPHILS NFR BLD AUTO: 9.57 10*3/MM3 (ref 1.7–7)
NRBC BLD AUTO-RTO: 0 /100 WBC (ref 0–0.2)
PLATELET # BLD AUTO: 194 10*3/MM3 (ref 140–450)
PMV BLD AUTO: 10.4 FL (ref 6–12)
POTASSIUM SERPL-SCNC: 3.5 MMOL/L (ref 3.5–5.2)
POTASSIUM SERPL-SCNC: 3.5 MMOL/L (ref 3.5–5.2)
POTASSIUM SERPL-SCNC: 4 MMOL/L (ref 3.5–5.2)
RBC # BLD AUTO: 4.25 10*6/MM3 (ref 4.14–5.8)
SODIUM SERPL-SCNC: 142 MMOL/L (ref 136–145)
WBC # BLD AUTO: 10.8 10*3/MM3 (ref 3.4–10.8)

## 2020-10-02 PROCEDURE — 84132 ASSAY OF SERUM POTASSIUM: CPT | Performed by: INTERNAL MEDICINE

## 2020-10-02 PROCEDURE — 80048 BASIC METABOLIC PNL TOTAL CA: CPT | Performed by: NURSE PRACTITIONER

## 2020-10-02 PROCEDURE — 25010000002 HEPARIN (PORCINE) PER 1000 UNITS: Performed by: NURSE PRACTITIONER

## 2020-10-02 PROCEDURE — 85025 COMPLETE CBC W/AUTO DIFF WBC: CPT | Performed by: NURSE PRACTITIONER

## 2020-10-02 PROCEDURE — 25010000002 PIPERACILLIN SOD-TAZOBACTAM PER 1 G: Performed by: NURSE PRACTITIONER

## 2020-10-02 PROCEDURE — 25010000002 PIPERACILLIN SOD-TAZOBACTAM PER 1 G: Performed by: INTERNAL MEDICINE

## 2020-10-02 RX ORDER — LOPERAMIDE HYDROCHLORIDE 2 MG/1
2 CAPSULE ORAL 3 TIMES DAILY PRN
Status: DISCONTINUED | OUTPATIENT
Start: 2020-10-02 | End: 2020-10-04

## 2020-10-02 RX ADMIN — CLOPIDOGREL BISULFATE 75 MG: 75 TABLET ORAL at 09:05

## 2020-10-02 RX ADMIN — SODIUM CHLORIDE 125 ML/HR: 9 INJECTION, SOLUTION INTRAVENOUS at 23:41

## 2020-10-02 RX ADMIN — PIPERACILLIN SODIUM AND TAZOBACTAM SODIUM 3.38 G: 3; .375 INJECTION, POWDER, LYOPHILIZED, FOR SOLUTION INTRAVENOUS at 21:18

## 2020-10-02 RX ADMIN — POTASSIUM CHLORIDE 40 MEQ: 10 CAPSULE, COATED, EXTENDED RELEASE ORAL at 09:08

## 2020-10-02 RX ADMIN — HYDRALAZINE HYDROCHLORIDE 25 MG: 25 TABLET, FILM COATED ORAL at 21:11

## 2020-10-02 RX ADMIN — LOPERAMIDE HYDROCHLORIDE 2 MG: 2 CAPSULE ORAL at 21:18

## 2020-10-02 RX ADMIN — HEPARIN SODIUM 5000 UNITS: 5000 INJECTION INTRAVENOUS; SUBCUTANEOUS at 21:12

## 2020-10-02 RX ADMIN — FAMOTIDINE 40 MG: 40 TABLET, FILM COATED ORAL at 09:05

## 2020-10-02 RX ADMIN — TAMSULOSIN HYDROCHLORIDE 0.4 MG: 0.4 CAPSULE ORAL at 21:11

## 2020-10-02 RX ADMIN — PIPERACILLIN SODIUM AND TAZOBACTAM SODIUM 3.38 G: 3; .375 INJECTION, POWDER, LYOPHILIZED, FOR SOLUTION INTRAVENOUS at 05:43

## 2020-10-02 RX ADMIN — OXYCODONE HYDROCHLORIDE AND ACETAMINOPHEN 500 MG: 500 TABLET ORAL at 09:05

## 2020-10-02 RX ADMIN — HYDRALAZINE HYDROCHLORIDE 25 MG: 25 TABLET, FILM COATED ORAL at 17:32

## 2020-10-02 RX ADMIN — CYANOCOBALAMIN TAB 500 MCG 500 MCG: 500 TAB at 09:05

## 2020-10-02 RX ADMIN — LOPERAMIDE HYDROCHLORIDE 2 MG: 2 CAPSULE ORAL at 10:07

## 2020-10-02 RX ADMIN — HYDRALAZINE HYDROCHLORIDE 25 MG: 25 TABLET, FILM COATED ORAL at 09:05

## 2020-10-02 RX ADMIN — PIPERACILLIN SODIUM AND TAZOBACTAM SODIUM 3.38 G: 3; .375 INJECTION, POWDER, LYOPHILIZED, FOR SOLUTION INTRAVENOUS at 13:25

## 2020-10-02 RX ADMIN — SODIUM CHLORIDE 125 ML/HR: 9 INJECTION, SOLUTION INTRAVENOUS at 13:29

## 2020-10-02 RX ADMIN — POTASSIUM CHLORIDE 40 MEQ: 1.5 POWDER, FOR SOLUTION ORAL at 04:51

## 2020-10-02 RX ADMIN — SODIUM CHLORIDE 125 ML/HR: 9 INJECTION, SOLUTION INTRAVENOUS at 05:58

## 2020-10-02 RX ADMIN — HEPARIN SODIUM 5000 UNITS: 5000 INJECTION INTRAVENOUS; SUBCUTANEOUS at 09:05

## 2020-10-02 NOTE — CONSULTS
"Adult Nutrition  Assessment    Patient Name:  Bar Crockett  YOB: 1950  MRN: [id number]  Admit Date:  9/30/2020    Assessment Date:  10/2/2020    Comments:  69yo male admit with LLQ pain and nausea/diarrhea x4 days- MD notes sigmoid diverticulitis w/ YOBANY. H/o stroke. Alb 3.5 on admit. Pt just advanced to GI soft cardiac diet.Pt states LLQ pain and nausea and profuse diarhea \"almost a week\" with decreased intake. -132#. Discussed fiber and diverticular disease per MD consult as well as increasing protein and calories d/t BMI on low side and acute malnutrition. Handout provided and diverticular disease, will attach information on increasing protein and calories to diet. Wife present for diet ed as well. Current wt 126#/BMI 20.3- noted -3.9% wt loss in past week, significant. Mild to moderate fat loss and muscle wasting present. Meets criteria for severe, acute malnutrition. Pt declined supplements but agreed to whole milk TID. RD to follow hospital course.      Reason for Assessment     Row Name 10/02/20 1216          Reason for Assessment    Reason For Assessment  physician consult     Diagnosis  gastrointestinal disease     Identified At Risk by Screening Criteria  need for education         Nutrition/Diet History     Row Name 10/02/20 1217          Nutrition/Diet History    Typical Food/Fluid Intake  Pt states nkfa, no c/s problems usually but notes he does not have dentures with him- and has only received liquids--does not want texture modification at this time. States he has had LLQ pain and nausea and profuse diarhea \"almost a week\" with decreased intake. -132#. Discussed fiber and diverticular diseas as well as increasing protein and calories d/t BMI on low side and acute malnutrition. Handout privuded and diverticular disease,will attach information on increasing protein and calories to diet. Wife present for diet ed as well.     Food Preferences  NO cottage cheese, likes " whole milk     Supplemental Drinks/Foods/Additives  declined     Factors Affecting Nutritional Intake  abdominal pain;diarrhea         Anthropometrics     Row Name 10/02/20 0500          Anthropometrics    Weight  57.3 kg (126 lb 4.8 oz)         Labs/Tests/Procedures/Meds     Row Name 10/02/20 1221          Labs/Procedures/Meds    Lab Results Reviewed  reviewed     Lab Results Comments  BUN 32H/Cr 1.7H, Alb 3.5        Diagnostic Tests/Procedures    Diagnostic Test/Procedure Reviewed  reviewed     Diagnostic Test/Procedures Comments  GI panel and C Diff pending        Medications    Pertinent Medications Reviewed  reviewed           Estimated/Assessed Needs     Row Name 10/02/20 1222          Calculation Measurements    Weight Used For Calculations  57.2 kg (126 lb)        Estimated/Assessed Needs    Additional Documentation  Calorie Requirements (Group);KCAL/KG (Group);Protein Requirements (Group);Fluid Requirements (Group)        Calorie Requirements    Estimated Calorie Requirement (kcal/day)  1700        KCAL/KG    KCAL/KG  25 Kcal/Kg (kcal);30 Kcal/Kg (kcal)     25 Kcal/Kg (kcal)  1428.825     30 Kcal/Kg (kcal)  1714.59        Protein Requirements    Weight Used For Protein Calculations  58.1 kg (128 lb)     Est Protein Requirement Amount (gms/kg)  1.0 gm protein     Estimated Protein Requirements (gms/day)  58.06        Fluid Requirements    Fluid Requirements (mL/day)  1500     RDA Method (mL)  1500         Nutrition Prescription Ordered     Row Name 10/02/20 1223          Nutrition Prescription PO    Current PO Diet  Regular     Common Modifiers  GI Soft/Nicholas;Cardiac             Malnutrition Severity Assessment     Row Name 10/02/20 1223          Malnutrition Severity Assessment    Malnutrition Type  Acute Disease or Injury - Related Malnutrition        Insufficient Energy Intake     Insufficient Energy Intake Findings  Severe     Insufficient Energy Intake   < or equal to 50% of est. energy requirement for  > or equal to 5d)        Unintentional Weight Loss     Unintentional Weight Loss Findings  Severe -3.9% in <1wk, significant---BMI 20.3     Unintentional Weight Loss   Weight loss greater than 2% in one week        Muscle Loss    Loss of Muscle Mass Findings  Moderate     Congregation Region  -- mild     Clavicle Bone Region  Moderate - some protrusion in females, visible in males     Acromion Bone Region  -- mild     Scapular Bone Region  None     Dorsal Hand Region  Moderate - slight depression     Patellar Region  Moderate - patella more prominent, less muscle definition around patella     Anterior Thigh Region  -- mild     Posterior Calf Region  -- mild        Fat Loss    Subcutaneous Fat Loss Findings  Mild     Orbital Region   -- mild     Upper Arm Region  -- mild     Thoracic & Lumbar Region  -- mild        Criteria Met (Must meet criteria for severity in at least 2 of these categories: M Wasting, Fat Loss, Fluid, Secondary Signs, Wt. Status, Intake)    Patient meets criteria for   Severe Malnutrition           Electronically signed by:  Noelle Koroma RD  10/02/20 12:31 CDT

## 2020-10-02 NOTE — PROGRESS NOTES
[facility] Medicine Services  INPATIENT PROGRESS NOTE    Length of Stay: 2  Date of Admission: 9/30/2020  Primary Care Physician: Satnam Troncoso APRN    Subjective   Chief Complaint: No new complaints.    HPI: Patient is seen for follow-up.  He is a 70-year-old male with history of hypertension, dyslipidemia, previous CVA who was admitted for acute diverticulitis and acute kidney injury.  He is doing better, tolerating prescribed diet and voices no new complaints. Diarrhea persists.    Review of Systems   Constitutional: Positive for activity change and fatigue. Negative for appetite change, chills, diaphoresis and fever.   HENT: Negative for trouble swallowing and voice change.    Eyes: Negative for photophobia and visual disturbance.   Respiratory: Negative for cough, choking, chest tightness, shortness of breath, wheezing and stridor.    Cardiovascular: Negative for chest pain, palpitations and leg swelling.   Gastrointestinal: Positive for diarrhea. Negative for abdominal distention, abdominal pain, blood in stool, constipation, nausea and vomiting.   Endocrine: Negative for cold intolerance, heat intolerance, polydipsia, polyphagia and polyuria.   Genitourinary: Negative for decreased urine volume, difficulty urinating, dysuria, enuresis, flank pain, frequency, hematuria and urgency.   Musculoskeletal: Negative for arthralgias, gait problem, myalgias, neck pain and neck stiffness.   Skin: Negative for pallor, rash and wound.   Neurological: Negative for dizziness, tremors, seizures, syncope, facial asymmetry, speech difficulty, weakness, light-headedness, numbness and headaches.   Hematological: Does not bruise/bleed easily.   Psychiatric/Behavioral: Negative for agitation, behavioral problems and confusion.       Objective    Temp:  [96.2 °F (35.7 °C)-98.6 °F (37 °C)] 98.3 °F (36.8 °C)  Heart Rate:  [70-80] 80  Resp:  [18-20] 20  BP: (126-148)/(69-84)  136/74    Physical Exam  Vitals signs and nursing note reviewed.   Constitutional:       General: He is not in acute distress.     Appearance: He is well-developed. He is not diaphoretic.   HENT:      Head: Normocephalic and atraumatic.   Eyes:      General: No scleral icterus.     Pupils: Pupils are equal, round, and reactive to light.   Neck:      Musculoskeletal: Normal range of motion and neck supple.      Thyroid: No thyromegaly.      Vascular: No JVD.   Cardiovascular:      Rate and Rhythm: Normal rate and regular rhythm.      Heart sounds: Normal heart sounds. No murmur. No friction rub. No gallop.    Pulmonary:      Effort: Pulmonary effort is normal.      Breath sounds: Normal breath sounds. No wheezing or rales.   Chest:      Chest wall: No tenderness.   Abdominal:      General: Bowel sounds are normal. There is no distension.      Palpations: Abdomen is soft. There is no mass.      Tenderness: There is no abdominal tenderness. There is no guarding or rebound.   Musculoskeletal:         General: No tenderness or deformity.   Skin:     General: Skin is warm and dry.      Coloration: Skin is not pale.      Findings: No erythema or rash.   Neurological:      General: No focal deficit present.      Mental Status: He is alert and oriented to person, place, and time. Mental status is at baseline.      Cranial Nerves: No cranial nerve deficit.      Sensory: No sensory deficit.      Motor: No abnormal muscle tone.      Coordination: Coordination normal.      Deep Tendon Reflexes: Reflexes normal.   Psychiatric:         Mood and Affect: Mood normal.         Behavior: Behavior normal.         Thought Content: Thought content normal.         Judgment: Judgment normal.           Medication Review:    Current Facility-Administered Medications:   •  acetaminophen (TYLENOL) tablet 650 mg, 650 mg, Oral, Q4H PRN **OR** acetaminophen (TYLENOL) 160 MG/5ML solution 650 mg, 650 mg, Oral, Q4H PRN **OR** acetaminophen (TYLENOL)  suppository 650 mg, 650 mg, Rectal, Q4H PRN, Levill, Suzan G, APRN  •  clopidogrel (PLAVIX) tablet 75 mg, 75 mg, Oral, Daily, Levill, Suzan G, APRN, 75 mg at 10/02/20 0905  •  famotidine (PEPCID) tablet 40 mg, 40 mg, Oral, Daily, Levill, Suzan G, APRN, 40 mg at 10/02/20 0905  •  heparin (porcine) 5000 UNIT/ML injection 5,000 Units, 5,000 Units, Subcutaneous, Q12H, Levill, Suzan G, APRN, 5,000 Units at 10/02/20 0905  •  hydrALAZINE (APRESOLINE) tablet 25 mg, 25 mg, Oral, TID, Levill, Suzan G, APRN, 25 mg at 10/02/20 0905  •  loperamide (IMODIUM) capsule 2 mg, 2 mg, Oral, TID PRN, iMchael Huston MD, 2 mg at 10/02/20 1007  •  morphine injection 1 mg, 1 mg, Intravenous, Q4H PRN, Levill, Suzan G, APRN  •  ondansetron (ZOFRAN) injection 4 mg, 4 mg, Intravenous, Q6H PRN, Levill, Suzan G, APRN  •  piperacillin-tazobactam (ZOSYN) 3.375 g/100 mL 0.9% NS IVPB (mbp), 3.375 g, Intravenous, Q8H, Michael Huston MD  •  potassium chloride (MICRO-K) CR capsule 40 mEq, 40 mEq, Oral, PRN, 40 mEq at 10/02/20 0908 **OR** potassium chloride (KLOR-CON) packet 40 mEq, 40 mEq, Oral, PRN, 40 mEq at 10/02/20 0451 **OR** potassium chloride 10 mEq in 100 mL IVPB, 10 mEq, Intravenous, Q1H PRN, Levill, Suzan G, APRN  •  sodium chloride 0.9 % flush 10 mL, 10 mL, Intravenous, Q12H, Levill, Suzan G, APRN, 10 mL at 10/01/20 2017  •  sodium chloride 0.9 % flush 10 mL, 10 mL, Intravenous, PRN, Levill, Suzan G, APRN  •  sodium chloride 0.9 % infusion, 125 mL/hr, Intravenous, Continuous, Levill, Suzan G, APRN, Last Rate: 125 mL/hr at 10/02/20 0558, 125 mL/hr at 10/02/20 0558  •  tamsulosin (FLOMAX) 24 hr capsule 0.4 mg, 0.4 mg, Oral, Nightly, Levill, Suzan G, APRN, 0.4 mg at 10/01/20 2015  •  vitamin B-12 (CYANOCOBALAMIN) tablet 500 mcg, 500 mcg, Oral, Daily, Levill, Suzan G, APRN, 500 mcg at 10/02/20 0905  •  vitamin C (ASCORBIC ACID) tablet 500 mg, 500 mg, Oral, Daily, Levill, Suzan G, APRN, 500 mg at 10/02/20 0905    Results Review:  I have  reviewed the labs, radiology results, and diagnostic studies.    Laboratory Data:   Results from last 7 days   Lab Units 10/02/20  0359 10/01/20  1717 10/01/20  0808 09/30/20  1240   SODIUM mmol/L 142  --  140 134*   POTASSIUM mmol/L 3.5  3.5 3.5 3.4* 3.1*   CHLORIDE mmol/L 110*  --  104 96*   CO2 mmol/L 24.0  --  25.0 22.0   BUN mg/dL 32*  --  41* 46*   CREATININE mg/dL 1.70*  --  1.87* 1.97*   GLUCOSE mg/dL 129*  --  137* 127*   CALCIUM mg/dL 8.2*  --  8.0* 8.7   BILIRUBIN mg/dL  --   --   --  0.8   ALK PHOS U/L  --   --   --  76   ALT (SGPT) U/L  --   --   --  15   AST (SGOT) U/L  --   --   --  19   ANION GAP mmol/L 8.0  --  11.0 16.0*     Estimated Creatinine Clearance: 31.8 mL/min (A) (by C-G formula based on SCr of 1.7 mg/dL (H)).  Results from last 7 days   Lab Units 09/30/20  1240   MAGNESIUM mg/dL 2.3         Results from last 7 days   Lab Units 10/02/20  0359 10/01/20  0808 09/30/20  1240   WBC 10*3/mm3 10.80 20.95* 36.11*   HEMOGLOBIN g/dL 14.0 15.1 17.7   HEMATOCRIT % 40.5 44.1 49.3   PLATELETS 10*3/mm3 194 210 215           Culture Data:   Blood Culture   Date Value Ref Range Status   09/30/2020 Gram Positive Cocci (C)  Preliminary   09/30/2020 No growth at 24 hours  Preliminary     No results found for: URINECX  No results found for: RESPCX  No results found for: WOUNDCX  No results found for: STOOLCX  No components found for: BODYFLD    Radiology Data:   Imaging Results (Last 24 Hours)     ** No results found for the last 24 hours. **          I have reviewed the patient's current medications.     Assessment/Plan     Hospital Problem List:  Active Problems:    Sigmoid diverticulitis    Acute diverticulitis: Improving.  Continue IV hydration, pain control and IV Zosyn.  1 out of 4 blood culture bottles is positive for gram-positive cocci in chains.  Reactive leukocytosis has resolved and GI panel/C. difficile assay were unremarkable.  Begin symptomatic treatment for diarrhea.    Hypokalemia: Continue  potassium repletion protocol.    Acute on chronic kidney disease stage II (likely prerenal): Patient's baseline creatinine is 1.3.  Creatinine is down to 1.70 today from a high of 1.97 on admission.  Continue IV hydration,  monitor renal function and consult nephrologist if the need arises.  Chlorthalidone remains on hold.    Hyponatremia: Resolved.    History of CVA with no residual deficits: Continue statin and antiplatelet therapy.    BPH: Continue Flomax.    Continue GI and DVT prophylaxis.    Deconditioning: Continue PT and OT.    Discharge Planning: In progress.    Michael Huston MD   10/02/20   10:17 CDT

## 2020-10-02 NOTE — PLAN OF CARE
Goal Outcome Evaluation:  Plan of Care Reviewed With: patient, spouse  Progress: improving  Outcome Summary: vss, potassium replacement given, recheck labs pending, pt given imodium, resting between care, will continue to monitor

## 2020-10-02 NOTE — PLAN OF CARE
"  Problem: Adult Inpatient Plan of Care  Goal: Plan of Care Review  Outcome: Ongoing, Progressing  Flowsheets (Taken 10/2/2020 1236)  Plan of Care Reviewed With:   patient   spouse    Alb 3.5. Pt just advanced to GI soft cardiac diet. Pt states LLQ pain and nausea and profuse diarhea \"almost a week\" with decreased intake. -132#.  Current wt 126#/BMI 20.3- noted -3.9% wt loss in past week, significant. Mild to moderate fat loss and muscle wasting present. Meets criteria for severe, acute malnutrition. Pt declined supplements but agreed to whole milk TID. RD following.  "

## 2020-10-02 NOTE — PLAN OF CARE
Goal Outcome Evaluation:  Plan of Care Reviewed With: patient  Progress: improving  Outcome Summary: VSS, correcting potassium of 3.5, sleeping well, stool more solid, will cont to monitor

## 2020-10-03 LAB
ANION GAP SERPL CALCULATED.3IONS-SCNC: 7 MMOL/L (ref 5–15)
BUN SERPL-MCNC: 21 MG/DL (ref 8–23)
BUN/CREAT SERPL: 14.6 (ref 7–25)
CALCIUM SPEC-SCNC: 7.7 MG/DL (ref 8.6–10.5)
CHLORIDE SERPL-SCNC: 112 MMOL/L (ref 98–107)
CO2 SERPL-SCNC: 22 MMOL/L (ref 22–29)
CREAT SERPL-MCNC: 1.44 MG/DL (ref 0.76–1.27)
GFR SERPL CREATININE-BSD FRML MDRD: 48 ML/MIN/1.73
GLUCOSE SERPL-MCNC: 99 MG/DL (ref 65–99)
POTASSIUM SERPL-SCNC: 3.3 MMOL/L (ref 3.5–5.2)
POTASSIUM SERPL-SCNC: 3.4 MMOL/L (ref 3.5–5.2)
SODIUM SERPL-SCNC: 141 MMOL/L (ref 136–145)

## 2020-10-03 PROCEDURE — 87040 BLOOD CULTURE FOR BACTERIA: CPT | Performed by: INTERNAL MEDICINE

## 2020-10-03 PROCEDURE — 80048 BASIC METABOLIC PNL TOTAL CA: CPT | Performed by: NURSE PRACTITIONER

## 2020-10-03 PROCEDURE — 25010000002 HEPARIN (PORCINE) PER 1000 UNITS: Performed by: NURSE PRACTITIONER

## 2020-10-03 PROCEDURE — 84132 ASSAY OF SERUM POTASSIUM: CPT | Performed by: INTERNAL MEDICINE

## 2020-10-03 PROCEDURE — 25010000002 PIPERACILLIN SOD-TAZOBACTAM PER 1 G: Performed by: INTERNAL MEDICINE

## 2020-10-03 RX ADMIN — FAMOTIDINE 40 MG: 40 TABLET, FILM COATED ORAL at 09:51

## 2020-10-03 RX ADMIN — HYDRALAZINE HYDROCHLORIDE 25 MG: 25 TABLET, FILM COATED ORAL at 20:29

## 2020-10-03 RX ADMIN — HEPARIN SODIUM 5000 UNITS: 5000 INJECTION INTRAVENOUS; SUBCUTANEOUS at 20:30

## 2020-10-03 RX ADMIN — TAMSULOSIN HYDROCHLORIDE 0.4 MG: 0.4 CAPSULE ORAL at 20:29

## 2020-10-03 RX ADMIN — LOPERAMIDE HYDROCHLORIDE 2 MG: 2 CAPSULE ORAL at 20:30

## 2020-10-03 RX ADMIN — HYDRALAZINE HYDROCHLORIDE 25 MG: 25 TABLET, FILM COATED ORAL at 09:51

## 2020-10-03 RX ADMIN — CLOPIDOGREL BISULFATE 75 MG: 75 TABLET ORAL at 09:51

## 2020-10-03 RX ADMIN — POTASSIUM CHLORIDE 40 MEQ: 10 CAPSULE, COATED, EXTENDED RELEASE ORAL at 21:31

## 2020-10-03 RX ADMIN — OXYCODONE HYDROCHLORIDE AND ACETAMINOPHEN 500 MG: 500 TABLET ORAL at 09:51

## 2020-10-03 RX ADMIN — SODIUM CHLORIDE 75 ML/HR: 9 INJECTION, SOLUTION INTRAVENOUS at 22:28

## 2020-10-03 RX ADMIN — PIPERACILLIN SODIUM AND TAZOBACTAM SODIUM 3.38 G: 3; .375 INJECTION, POWDER, LYOPHILIZED, FOR SOLUTION INTRAVENOUS at 16:52

## 2020-10-03 RX ADMIN — POTASSIUM CHLORIDE 40 MEQ: 10 CAPSULE, COATED, EXTENDED RELEASE ORAL at 10:42

## 2020-10-03 RX ADMIN — HYDRALAZINE HYDROCHLORIDE 25 MG: 25 TABLET, FILM COATED ORAL at 16:52

## 2020-10-03 RX ADMIN — HEPARIN SODIUM 5000 UNITS: 5000 INJECTION INTRAVENOUS; SUBCUTANEOUS at 09:51

## 2020-10-03 RX ADMIN — PIPERACILLIN SODIUM AND TAZOBACTAM SODIUM 3.38 G: 3; .375 INJECTION, POWDER, LYOPHILIZED, FOR SOLUTION INTRAVENOUS at 04:31

## 2020-10-03 RX ADMIN — CYANOCOBALAMIN TAB 500 MCG 500 MCG: 500 TAB at 09:51

## 2020-10-03 NOTE — PLAN OF CARE
Goal Outcome Evaluation:  Plan of Care Reviewed With: patient  Progress: improving  Outcome Summary: vss, no c/o pain, pt has amulated in hallway, possible discharge tomorrow, will continue to monitor

## 2020-10-03 NOTE — PLAN OF CARE
Goal Outcome Evaluation:  Plan of Care Reviewed With: patient  Progress: no change  Outcome Summary: patient has had several bowel movements overnight. imodium given. v/s stable. no complaints of pain. will continue to monitor pt

## 2020-10-03 NOTE — PROGRESS NOTES
[facility] Medicine Services  INPATIENT PROGRESS NOTE    Length of Stay: 3  Date of Admission: 9/30/2020  Primary Care Physician: Satnam Troncoso APRN    Subjective   Chief Complaint: No new complaints.    HPI: Patient is seen for follow-up.  He is a 70-year-old male with history of hypertension, dyslipidemia, previous CVA who was admitted for acute diverticulitis and acute kidney injury.  He is doing better, tolerating prescribed diet and voices no new complaints. Diarrhea has resolved and patient is less deconditioned.      Review of Systems   Constitutional: Positive for fatigue. Negative for activity change, appetite change, chills, diaphoresis and fever.   HENT: Negative for trouble swallowing and voice change.    Eyes: Negative for photophobia and visual disturbance.   Respiratory: Negative for cough, choking, chest tightness, shortness of breath, wheezing and stridor.    Cardiovascular: Negative for chest pain, palpitations and leg swelling.   Gastrointestinal: Negative for abdominal distention, abdominal pain, blood in stool, constipation, diarrhea, nausea and vomiting.   Endocrine: Negative for cold intolerance, heat intolerance, polydipsia, polyphagia and polyuria.   Genitourinary: Negative for decreased urine volume, difficulty urinating, dysuria, enuresis, flank pain, frequency, hematuria and urgency.   Musculoskeletal: Negative for arthralgias, gait problem, myalgias, neck pain and neck stiffness.   Skin: Negative for pallor, rash and wound.   Neurological: Negative for dizziness, tremors, seizures, syncope, facial asymmetry, speech difficulty, weakness, light-headedness, numbness and headaches.   Hematological: Does not bruise/bleed easily.   Psychiatric/Behavioral: Negative for agitation, behavioral problems and confusion.       Objective    Temp:  [96.2 °F (35.7 °C)-97.3 °F (36.3 °C)] 97.1 °F (36.2 °C)  Heart Rate:  [54-64] 61  Resp:  [18] 18  BP:  (115-142)/(66-74) 122/66    Physical Exam  Vitals signs and nursing note reviewed.   Constitutional:       General: He is not in acute distress.     Appearance: He is well-developed. He is not diaphoretic.   HENT:      Head: Normocephalic and atraumatic.   Eyes:      General: No scleral icterus.     Pupils: Pupils are equal, round, and reactive to light.   Neck:      Musculoskeletal: Normal range of motion and neck supple.      Thyroid: No thyromegaly.      Vascular: No JVD.   Cardiovascular:      Rate and Rhythm: Normal rate and regular rhythm.      Heart sounds: Normal heart sounds. No murmur. No friction rub. No gallop.    Pulmonary:      Effort: Pulmonary effort is normal.      Breath sounds: Normal breath sounds. No wheezing or rales.   Chest:      Chest wall: No tenderness.   Abdominal:      General: Bowel sounds are normal. There is no distension.      Palpations: Abdomen is soft. There is no mass.      Tenderness: There is no abdominal tenderness. There is no guarding or rebound.   Musculoskeletal:         General: No tenderness or deformity.   Skin:     General: Skin is warm and dry.      Coloration: Skin is not pale.      Findings: No erythema or rash.   Neurological:      General: No focal deficit present.      Mental Status: He is alert and oriented to person, place, and time. Mental status is at baseline.      Cranial Nerves: No cranial nerve deficit.      Sensory: No sensory deficit.      Motor: No abnormal muscle tone.      Coordination: Coordination normal.      Deep Tendon Reflexes: Reflexes normal.   Psychiatric:         Mood and Affect: Mood normal.         Behavior: Behavior normal.         Thought Content: Thought content normal.         Judgment: Judgment normal.           Medication Review:    Current Facility-Administered Medications:   •  acetaminophen (TYLENOL) tablet 650 mg, 650 mg, Oral, Q4H PRN **OR** acetaminophen (TYLENOL) 160 MG/5ML solution 650 mg, 650 mg, Oral, Q4H PRN **OR**  acetaminophen (TYLENOL) suppository 650 mg, 650 mg, Rectal, Q4H PRN, Levill, Suzan G, APRN  •  clopidogrel (PLAVIX) tablet 75 mg, 75 mg, Oral, Daily, Levill, Suzan G, APRN, 75 mg at 10/03/20 0951  •  famotidine (PEPCID) tablet 40 mg, 40 mg, Oral, Daily, Levill, Suzan G, APRN, 40 mg at 10/03/20 0951  •  heparin (porcine) 5000 UNIT/ML injection 5,000 Units, 5,000 Units, Subcutaneous, Q12H, Levill, Suzan G, APRN, 5,000 Units at 10/03/20 0951  •  hydrALAZINE (APRESOLINE) tablet 25 mg, 25 mg, Oral, TID, Levill, Suzan G, APRN, 25 mg at 10/03/20 0951  •  loperamide (IMODIUM) capsule 2 mg, 2 mg, Oral, TID PRN, Michael Huston MD, 2 mg at 10/02/20 2118  •  morphine injection 1 mg, 1 mg, Intravenous, Q4H PRN, Levill, Suzan G, APRN  •  ondansetron (ZOFRAN) injection 4 mg, 4 mg, Intravenous, Q6H PRN, Levill, Suzan G, APRN  •  piperacillin-tazobactam (ZOSYN) 3.375 g/100 mL 0.9% NS IVPB (mbp), 3.375 g, Intravenous, Q8H, Michael Huston MD, 3.375 g at 10/03/20 0431  •  potassium chloride (MICRO-K) CR capsule 40 mEq, 40 mEq, Oral, PRN, 40 mEq at 10/02/20 0908 **OR** potassium chloride (KLOR-CON) packet 40 mEq, 40 mEq, Oral, PRN, 40 mEq at 10/02/20 0451 **OR** potassium chloride 10 mEq in 100 mL IVPB, 10 mEq, Intravenous, Q1H PRN, Levill, Suzan G, APRN  •  sodium chloride 0.9 % flush 10 mL, 10 mL, Intravenous, Q12H, Levill, Suzan G, APRN, 10 mL at 10/01/20 2017  •  sodium chloride 0.9 % flush 10 mL, 10 mL, Intravenous, PRN, Levill, Suzan G, APRN  •  sodium chloride 0.9 % infusion, 100 mL/hr, Intravenous, Continuous, Michael Huston MD, Last Rate: 100 mL/hr at 10/03/20 0952, 100 mL/hr at 10/03/20 0952  •  tamsulosin (FLOMAX) 24 hr capsule 0.4 mg, 0.4 mg, Oral, Nightly, Levill, Suzan G, APRN, 0.4 mg at 10/02/20 2111  •  vitamin B-12 (CYANOCOBALAMIN) tablet 500 mcg, 500 mcg, Oral, Daily, Levill, Suzan G, APRN, 500 mcg at 10/03/20 0951  •  vitamin C (ASCORBIC ACID) tablet 500 mg, 500 mg, Oral, Daily, Levill, Suzan G, APRN,  500 mg at 10/03/20 0951    Results Review:  I have reviewed the labs, radiology results, and diagnostic studies.    Laboratory Data:   Results from last 7 days   Lab Units 10/03/20  0554 10/02/20  1325 10/02/20  0359  10/01/20  0808 09/30/20  1240   SODIUM mmol/L 141  --  142  --  140 134*   POTASSIUM mmol/L 3.4* 4.0 3.5  3.5   < > 3.4* 3.1*   CHLORIDE mmol/L 112*  --  110*  --  104 96*   CO2 mmol/L 22.0  --  24.0  --  25.0 22.0   BUN mg/dL 21  --  32*  --  41* 46*   CREATININE mg/dL 1.44*  --  1.70*  --  1.87* 1.97*   GLUCOSE mg/dL 99  --  129*  --  137* 127*   CALCIUM mg/dL 7.7*  --  8.2*  --  8.0* 8.7   BILIRUBIN mg/dL  --   --   --   --   --  0.8   ALK PHOS U/L  --   --   --   --   --  76   ALT (SGPT) U/L  --   --   --   --   --  15   AST (SGOT) U/L  --   --   --   --   --  19   ANION GAP mmol/L 7.0  --  8.0  --  11.0 16.0*    < > = values in this interval not displayed.     Estimated Creatinine Clearance: 39.4 mL/min (A) (by C-G formula based on SCr of 1.44 mg/dL (H)).  Results from last 7 days   Lab Units 09/30/20  1240   MAGNESIUM mg/dL 2.3         Results from last 7 days   Lab Units 10/02/20  0359 10/01/20  0808 09/30/20  1240   WBC 10*3/mm3 10.80 20.95* 36.11*   HEMOGLOBIN g/dL 14.0 15.1 17.7   HEMATOCRIT % 40.5 44.1 49.3   PLATELETS 10*3/mm3 194 210 215           Culture Data:   Blood Culture   Date Value Ref Range Status   09/30/2020 Enterococcus raffinosus (C)  Preliminary     Comment:     Infectious disease consultation is highly recommended.   09/30/2020 No growth at 2 days  Preliminary     No results found for: URINECX  No results found for: RESPCX  No results found for: WOUNDCX  No results found for: STOOLCX  No components found for: BODYFLD    Radiology Data:   Imaging Results (Last 24 Hours)     ** No results found for the last 24 hours. **          I have reviewed the patient's current medications.     Assessment/Plan     Hospital Problem List:  Active Problems:    Sigmoid  diverticulitis    Acute diverticulitis: Much improved.  Continue IV hydration, pain control and IV Zosyn.  1 out of 4 blood culture bottles is positive for enterococcus raffinosus and sensitivities are pending.  Reactive leukocytosis has resolved and GI panel/C. difficile assay were unremarkable.  Repeat blood cultures today.      Hypokalemia: Continue potassium repletion protocol.    Acute on chronic kidney disease stage II (likely prerenal): Patient's baseline creatinine is 1.3.  Creatinine is down to 1.44 today from a high of 1.97 on admission.  Continue IV hydration,  monitor renal function and consult nephrologist if the need arises.  Chlorthalidone remains on hold.    Hyponatremia: Resolved.    History of CVA with no residual deficits: Continue statin and antiplatelet therapy.    BPH: Continue Flomax.    Continue GI and DVT prophylaxis.    Deconditioning: Continue PT and OT.    Discharge Planning: In progress.    Michael Huston MD   10/03/20   10:36 CDT

## 2020-10-04 LAB
ANION GAP SERPL CALCULATED.3IONS-SCNC: 10 MMOL/L (ref 5–15)
BACTERIA SPEC AEROBE CULT: ABNORMAL
BUN SERPL-MCNC: 15 MG/DL (ref 8–23)
BUN/CREAT SERPL: 10.9 (ref 7–25)
CALCIUM SPEC-SCNC: 8.1 MG/DL (ref 8.6–10.5)
CHLORIDE SERPL-SCNC: 110 MMOL/L (ref 98–107)
CO2 SERPL-SCNC: 20 MMOL/L (ref 22–29)
CREAT SERPL-MCNC: 1.38 MG/DL (ref 0.76–1.27)
GFR SERPL CREATININE-BSD FRML MDRD: 51 ML/MIN/1.73
GLUCOSE SERPL-MCNC: 130 MG/DL (ref 65–99)
GRAM STN SPEC: ABNORMAL
MAGNESIUM SERPL-MCNC: 1.7 MG/DL (ref 1.6–2.4)
POTASSIUM SERPL-SCNC: 3.9 MMOL/L (ref 3.5–5.2)
POTASSIUM SERPL-SCNC: 3.9 MMOL/L (ref 3.5–5.2)
SODIUM SERPL-SCNC: 140 MMOL/L (ref 136–145)
WHOLE BLOOD HOLD SPECIMEN: NORMAL

## 2020-10-04 PROCEDURE — 80048 BASIC METABOLIC PNL TOTAL CA: CPT | Performed by: NURSE PRACTITIONER

## 2020-10-04 PROCEDURE — 83735 ASSAY OF MAGNESIUM: CPT | Performed by: NURSE PRACTITIONER

## 2020-10-04 PROCEDURE — 84132 ASSAY OF SERUM POTASSIUM: CPT | Performed by: NURSE PRACTITIONER

## 2020-10-04 PROCEDURE — 25010000002 PIPERACILLIN SOD-TAZOBACTAM PER 1 G: Performed by: INTERNAL MEDICINE

## 2020-10-04 PROCEDURE — 25010000002 HEPARIN (PORCINE) PER 1000 UNITS: Performed by: NURSE PRACTITIONER

## 2020-10-04 RX ORDER — DICYCLOMINE HCL 20 MG
20 TABLET ORAL 3 TIMES DAILY
Status: DISCONTINUED | OUTPATIENT
Start: 2020-10-04 | End: 2020-10-05 | Stop reason: HOSPADM

## 2020-10-04 RX ADMIN — FAMOTIDINE 40 MG: 40 TABLET, FILM COATED ORAL at 09:05

## 2020-10-04 RX ADMIN — SODIUM CHLORIDE, PRESERVATIVE FREE 10 ML: 5 INJECTION INTRAVENOUS at 20:17

## 2020-10-04 RX ADMIN — HEPARIN SODIUM 5000 UNITS: 5000 INJECTION INTRAVENOUS; SUBCUTANEOUS at 20:14

## 2020-10-04 RX ADMIN — OXYCODONE HYDROCHLORIDE AND ACETAMINOPHEN 500 MG: 500 TABLET ORAL at 09:05

## 2020-10-04 RX ADMIN — PIPERACILLIN SODIUM AND TAZOBACTAM SODIUM 3.38 G: 3; .375 INJECTION, POWDER, LYOPHILIZED, FOR SOLUTION INTRAVENOUS at 00:54

## 2020-10-04 RX ADMIN — MAGNESIUM HYDROXIDE 10 ML: 2400 SUSPENSION ORAL at 09:05

## 2020-10-04 RX ADMIN — HYDRALAZINE HYDROCHLORIDE 25 MG: 25 TABLET, FILM COATED ORAL at 15:45

## 2020-10-04 RX ADMIN — POTASSIUM CHLORIDE 40 MEQ: 10 CAPSULE, COATED, EXTENDED RELEASE ORAL at 01:43

## 2020-10-04 RX ADMIN — HEPARIN SODIUM 5000 UNITS: 5000 INJECTION INTRAVENOUS; SUBCUTANEOUS at 09:05

## 2020-10-04 RX ADMIN — PIPERACILLIN SODIUM AND TAZOBACTAM SODIUM 3.38 G: 3; .375 INJECTION, POWDER, LYOPHILIZED, FOR SOLUTION INTRAVENOUS at 09:36

## 2020-10-04 RX ADMIN — HYDRALAZINE HYDROCHLORIDE 25 MG: 25 TABLET, FILM COATED ORAL at 20:14

## 2020-10-04 RX ADMIN — CLOPIDOGREL BISULFATE 75 MG: 75 TABLET ORAL at 09:05

## 2020-10-04 RX ADMIN — TAMSULOSIN HYDROCHLORIDE 0.4 MG: 0.4 CAPSULE ORAL at 20:14

## 2020-10-04 RX ADMIN — CYANOCOBALAMIN TAB 500 MCG 500 MCG: 500 TAB at 09:05

## 2020-10-04 RX ADMIN — HYDRALAZINE HYDROCHLORIDE 25 MG: 25 TABLET, FILM COATED ORAL at 09:05

## 2020-10-04 RX ADMIN — DICYCLOMINE HYDROCHLORIDE 20 MG: 20 TABLET ORAL at 15:45

## 2020-10-04 RX ADMIN — PIPERACILLIN SODIUM AND TAZOBACTAM SODIUM 3.38 G: 3; .375 INJECTION, POWDER, LYOPHILIZED, FOR SOLUTION INTRAVENOUS at 15:44

## 2020-10-04 RX ADMIN — DICYCLOMINE HYDROCHLORIDE 20 MG: 20 TABLET ORAL at 21:38

## 2020-10-04 NOTE — PROGRESS NOTES
[facility] Medicine Services  INPATIENT PROGRESS NOTE    Length of Stay: 4  Date of Admission: 9/30/2020  Primary Care Physician: Satnam Troncoso APRN    Subjective   Chief Complaint: Abdominal bloating.    HPI: Patient is seen for follow-up.  He is a 70-year-old male with history of hypertension, dyslipidemia, previous CVA who was admitted for acute diverticulitis and acute kidney injury.  He is doing better, tolerating prescribed diet and complains of indigestion/bloating.  He is requesting Rolaids to help with the bloating and states that Tums does not provide him any relief.      Review of Systems   Constitutional: Negative for activity change, appetite change, chills, diaphoresis, fatigue and fever.   HENT: Negative for trouble swallowing and voice change.    Eyes: Negative for photophobia and visual disturbance.   Respiratory: Negative for cough, choking, chest tightness, shortness of breath, wheezing and stridor.    Cardiovascular: Negative for chest pain, palpitations and leg swelling.   Gastrointestinal: Negative for abdominal distention, abdominal pain, blood in stool, constipation, diarrhea, nausea and vomiting.        Abdominal bloating   Endocrine: Negative for cold intolerance, heat intolerance, polydipsia, polyphagia and polyuria.   Genitourinary: Negative for decreased urine volume, difficulty urinating, dysuria, enuresis, flank pain, frequency, hematuria and urgency.   Musculoskeletal: Negative for arthralgias, gait problem, myalgias, neck pain and neck stiffness.   Skin: Negative for pallor, rash and wound.   Neurological: Negative for dizziness, tremors, seizures, syncope, facial asymmetry, speech difficulty, weakness, light-headedness, numbness and headaches.   Hematological: Does not bruise/bleed easily.   Psychiatric/Behavioral: Negative for agitation, behavioral problems and confusion.       Objective    Temp:  [96.3 °F (35.7 °C)-97.7 °F (36.5 °C)]  97.2 °F (36.2 °C)  Heart Rate:  [56-72] 72  Resp:  [18] 18  BP: (142-170)/(74-86) 170/86    Physical Exam  Vitals signs and nursing note reviewed.   Constitutional:       General: He is not in acute distress.     Appearance: He is well-developed. He is not diaphoretic.   HENT:      Head: Normocephalic and atraumatic.   Eyes:      General: No scleral icterus.     Pupils: Pupils are equal, round, and reactive to light.   Neck:      Musculoskeletal: Normal range of motion and neck supple.      Thyroid: No thyromegaly.      Vascular: No JVD.   Cardiovascular:      Rate and Rhythm: Normal rate and regular rhythm.      Heart sounds: Normal heart sounds. No murmur. No friction rub. No gallop.    Pulmonary:      Effort: Pulmonary effort is normal.      Breath sounds: Normal breath sounds. No wheezing or rales.   Chest:      Chest wall: No tenderness.   Abdominal:      General: Bowel sounds are normal. There is no distension.      Palpations: Abdomen is soft. There is no mass.      Tenderness: There is no abdominal tenderness. There is no guarding or rebound.   Musculoskeletal:         General: No tenderness or deformity.   Skin:     General: Skin is warm and dry.      Coloration: Skin is not pale.      Findings: No erythema or rash.   Neurological:      General: No focal deficit present.      Mental Status: He is alert and oriented to person, place, and time. Mental status is at baseline.      Cranial Nerves: No cranial nerve deficit.      Sensory: No sensory deficit.      Motor: No abnormal muscle tone.      Coordination: Coordination normal.      Deep Tendon Reflexes: Reflexes normal.   Psychiatric:         Mood and Affect: Mood normal.         Behavior: Behavior normal.         Thought Content: Thought content normal.         Judgment: Judgment normal.           Medication Review:    Current Facility-Administered Medications:   •  acetaminophen (TYLENOL) tablet 650 mg, 650 mg, Oral, Q4H PRN **OR** acetaminophen (TYLENOL)  160 MG/5ML solution 650 mg, 650 mg, Oral, Q4H PRN **OR** acetaminophen (TYLENOL) suppository 650 mg, 650 mg, Rectal, Q4H PRN, Levill, Suzan G, APRN  •  clopidogrel (PLAVIX) tablet 75 mg, 75 mg, Oral, Daily, Levill, Suzan G, APRN, 75 mg at 10/04/20 0905  •  famotidine (PEPCID) tablet 40 mg, 40 mg, Oral, Daily, Levill, Suzan G, APRN, 40 mg at 10/04/20 0905  •  heparin (porcine) 5000 UNIT/ML injection 5,000 Units, 5,000 Units, Subcutaneous, Q12H, Levill, Suzan G, APRN, 5,000 Units at 10/04/20 0905  •  hydrALAZINE (APRESOLINE) tablet 25 mg, 25 mg, Oral, TID, Levill, Suzan G, APRN, 25 mg at 10/04/20 0905  •  morphine injection 1 mg, 1 mg, Intravenous, Q4H PRN, Levill, Suzan G, APRN  •  ondansetron (ZOFRAN) injection 4 mg, 4 mg, Intravenous, Q6H PRN, Levill, Suzan G, APRN  •  piperacillin-tazobactam (ZOSYN) 3.375 g/100 mL 0.9% NS IVPB (mbp), 3.375 g, Intravenous, Q8H, Michael Huston MD, 3.375 g at 10/04/20 0936  •  potassium chloride (MICRO-K) CR capsule 40 mEq, 40 mEq, Oral, PRN, 40 mEq at 10/04/20 0143 **OR** potassium chloride (KLOR-CON) packet 40 mEq, 40 mEq, Oral, PRN, 40 mEq at 10/02/20 0451 **OR** potassium chloride 10 mEq in 100 mL IVPB, 10 mEq, Intravenous, Q1H PRN, Levill, Suzan G, APRN  •  sodium chloride 0.9 % flush 10 mL, 10 mL, Intravenous, Q12H, Levill, Suzan G, APRN, 10 mL at 10/01/20 2017  •  sodium chloride 0.9 % flush 10 mL, 10 mL, Intravenous, PRN, Levill, Suzan G, APRN  •  sodium chloride 0.9 % infusion, 75 mL/hr, Intravenous, Continuous, Michael Huston MD, Last Rate: 75 mL/hr at 10/04/20 0054, 75 mL/hr at 10/04/20 0054  •  tamsulosin (FLOMAX) 24 hr capsule 0.4 mg, 0.4 mg, Oral, Nightly, Levill, Suzan G, APRN, 0.4 mg at 10/03/20 2029  •  vitamin B-12 (CYANOCOBALAMIN) tablet 500 mcg, 500 mcg, Oral, Daily, Levill, Suzan G, APRN, 500 mcg at 10/04/20 0905  •  vitamin C (ASCORBIC ACID) tablet 500 mg, 500 mg, Oral, Daily, Levill, Suzan G, APRN, 500 mg at 10/04/20 0905    Results Review:  I have  reviewed the labs, radiology results, and diagnostic studies.    Laboratory Data:   Results from last 7 days   Lab Units 10/04/20  0529 10/03/20  1849 10/03/20  0554  10/02/20  0359  09/30/20  1240   SODIUM mmol/L 140  --  141  --  142   < > 134*   POTASSIUM mmol/L 3.9  3.9 3.3* 3.4*   < > 3.5  3.5   < > 3.1*   CHLORIDE mmol/L 110*  --  112*  --  110*   < > 96*   CO2 mmol/L 20.0*  --  22.0  --  24.0   < > 22.0   BUN mg/dL 15  --  21  --  32*   < > 46*   CREATININE mg/dL 1.38*  --  1.44*  --  1.70*   < > 1.97*   GLUCOSE mg/dL 130*  --  99  --  129*   < > 127*   CALCIUM mg/dL 8.1*  --  7.7*  --  8.2*   < > 8.7   BILIRUBIN mg/dL  --   --   --   --   --   --  0.8   ALK PHOS U/L  --   --   --   --   --   --  76   ALT (SGPT) U/L  --   --   --   --   --   --  15   AST (SGOT) U/L  --   --   --   --   --   --  19   ANION GAP mmol/L 10.0  --  7.0  --  8.0   < > 16.0*    < > = values in this interval not displayed.     Estimated Creatinine Clearance: 40.4 mL/min (A) (by C-G formula based on SCr of 1.38 mg/dL (H)).  Results from last 7 days   Lab Units 10/04/20  0529 09/30/20  1240   MAGNESIUM mg/dL 1.7 2.3         Results from last 7 days   Lab Units 10/02/20  0359 10/01/20  0808 09/30/20  1240   WBC 10*3/mm3 10.80 20.95* 36.11*   HEMOGLOBIN g/dL 14.0 15.1 17.7   HEMATOCRIT % 40.5 44.1 49.3   PLATELETS 10*3/mm3 194 210 215           Culture Data:   No results found for: BLOODCX  No results found for: URINECX  No results found for: RESPCX  No results found for: WOUNDCX  No results found for: STOOLCX  No components found for: BODYFLD    Radiology Data:   Imaging Results (Last 24 Hours)     ** No results found for the last 24 hours. **          I have reviewed the patient's current medications.     Assessment/Plan     Hospital Problem List:  Active Problems:    Sigmoid diverticulitis    Acute diverticulitis: Much improved.  Continue IV hydration, pain control and IV Zosyn.  1 out of 4 blood culture bottles is positive for  enterococcus raffinosus.  Did check with pharmacy and it was thought that this could be a contaminant.  Repeat blood culture results are pending and reactive leukocytosis has resolved. GI panel/C. difficile assay were unremarkable.   He will be treated symptomatically for bloating.    Hypokalemia: Resolved.      Acute on chronic kidney disease stage II (likely prerenal): Patient's baseline creatinine is 1.3.  Creatinine is down to 1.38 today from a high of 1.97 on admission.  Continue IV hydration,  monitor renal function and consult nephrologist if the need arises.  Chlorthalidone remains on hold.    Hyponatremia: Resolved.    History of CVA with no residual deficits: Continue statin and antiplatelet therapy.    BPH: Continue Flomax.    Continue GI and DVT prophylaxis.    Deconditioning: Continue PT and OT.    Discharge Planning: Likely discharge in [facility].      Michael Huston MD   10/04/20   11:29 CDT

## 2020-10-04 NOTE — PLAN OF CARE
Goal Outcome Evaluation:  Plan of Care Reviewed With: patient  Progress: improving  Outcome Summary: vss, no c/o pain, diarrhea this after noon-started on bentyl, anticipate discharge tomorrow, will continue to monitor.

## 2020-10-04 NOTE — PLAN OF CARE
Goal Outcome Evaluation:  Plan of Care Reviewed With: patient  Progress: improving  Outcome Summary: patient is doing better, voices no new complaints. Diarrhea has resolved. replaced potassium per protocol. v/s stable. will continue to monitor pt

## 2020-10-05 VITALS
DIASTOLIC BLOOD PRESSURE: 72 MMHG | HEART RATE: 52 BPM | TEMPERATURE: 97.5 F | RESPIRATION RATE: 18 BRPM | SYSTOLIC BLOOD PRESSURE: 131 MMHG | WEIGHT: 130.3 LBS | HEIGHT: 66 IN | BODY MASS INDEX: 20.94 KG/M2 | OXYGEN SATURATION: 97 %

## 2020-10-05 LAB
ANION GAP SERPL CALCULATED.3IONS-SCNC: 7 MMOL/L (ref 5–15)
BACTERIA SPEC AEROBE CULT: NORMAL
BUN SERPL-MCNC: 13 MG/DL (ref 8–23)
BUN/CREAT SERPL: 8.7 (ref 7–25)
CALCIUM SPEC-SCNC: 7.8 MG/DL (ref 8.6–10.5)
CHLORIDE SERPL-SCNC: 107 MMOL/L (ref 98–107)
CO2 SERPL-SCNC: 23 MMOL/L (ref 22–29)
CREAT SERPL-MCNC: 1.5 MG/DL (ref 0.76–1.27)
GFR SERPL CREATININE-BSD FRML MDRD: 46 ML/MIN/1.73
GLUCOSE SERPL-MCNC: 157 MG/DL (ref 65–99)
POTASSIUM SERPL-SCNC: 3.3 MMOL/L (ref 3.5–5.2)
POTASSIUM SERPL-SCNC: 3.6 MMOL/L (ref 3.5–5.2)
SODIUM SERPL-SCNC: 137 MMOL/L (ref 136–145)

## 2020-10-05 PROCEDURE — 84132 ASSAY OF SERUM POTASSIUM: CPT | Performed by: NURSE PRACTITIONER

## 2020-10-05 PROCEDURE — 25010000002 HEPARIN (PORCINE) PER 1000 UNITS: Performed by: NURSE PRACTITIONER

## 2020-10-05 PROCEDURE — 25010000002 PIPERACILLIN SOD-TAZOBACTAM PER 1 G: Performed by: INTERNAL MEDICINE

## 2020-10-05 PROCEDURE — 80048 BASIC METABOLIC PNL TOTAL CA: CPT | Performed by: NURSE PRACTITIONER

## 2020-10-05 RX ORDER — FAMOTIDINE 40 MG/1
40 TABLET, FILM COATED ORAL DAILY
Qty: 30 TABLET | Refills: 1 | Status: SHIPPED | OUTPATIENT
Start: 2020-10-06 | End: 2020-11-03 | Stop reason: HOSPADM

## 2020-10-05 RX ORDER — AMOXICILLIN AND CLAVULANATE POTASSIUM 875; 125 MG/1; MG/1
1 TABLET, FILM COATED ORAL 2 TIMES DAILY
Qty: 6 TABLET | Refills: 0 | Status: SHIPPED | OUTPATIENT
Start: 2020-10-05 | End: 2020-10-08

## 2020-10-05 RX ADMIN — FAMOTIDINE 40 MG: 40 TABLET, FILM COATED ORAL at 09:02

## 2020-10-05 RX ADMIN — PIPERACILLIN SODIUM AND TAZOBACTAM SODIUM 3.38 G: 3; .375 INJECTION, POWDER, LYOPHILIZED, FOR SOLUTION INTRAVENOUS at 09:08

## 2020-10-05 RX ADMIN — DICYCLOMINE HYDROCHLORIDE 20 MG: 20 TABLET ORAL at 09:05

## 2020-10-05 RX ADMIN — OXYCODONE HYDROCHLORIDE AND ACETAMINOPHEN 500 MG: 500 TABLET ORAL at 09:02

## 2020-10-05 RX ADMIN — POTASSIUM CHLORIDE 40 MEQ: 10 CAPSULE, COATED, EXTENDED RELEASE ORAL at 09:02

## 2020-10-05 RX ADMIN — SODIUM CHLORIDE 75 ML/HR: 9 INJECTION, SOLUTION INTRAVENOUS at 00:06

## 2020-10-05 RX ADMIN — HYDRALAZINE HYDROCHLORIDE 25 MG: 25 TABLET, FILM COATED ORAL at 09:02

## 2020-10-05 RX ADMIN — PIPERACILLIN SODIUM AND TAZOBACTAM SODIUM 3.38 G: 3; .375 INJECTION, POWDER, LYOPHILIZED, FOR SOLUTION INTRAVENOUS at 00:07

## 2020-10-05 RX ADMIN — HEPARIN SODIUM 5000 UNITS: 5000 INJECTION INTRAVENOUS; SUBCUTANEOUS at 09:03

## 2020-10-05 RX ADMIN — CYANOCOBALAMIN TAB 500 MCG 500 MCG: 500 TAB at 09:02

## 2020-10-05 RX ADMIN — CLOPIDOGREL BISULFATE 75 MG: 75 TABLET ORAL at 09:02

## 2020-10-05 NOTE — PLAN OF CARE
Goal Outcome Evaluation:  Plan of Care Reviewed With: patient  Progress: improving  Outcome Summary: patient has had several small bowel movements overnight. v/s has been stable. no complaints of pain. will continue to monitor pt

## 2020-10-05 NOTE — DISCHARGE SUMMARY
[facility] Medicine Services  DISCHARGE SUMMARY       Date of Admission: 9/30/2020  Date of Discharge:  10/5/2020  Primary Care Physician: Satnam Troncoso APRN    Presenting Problem/History of Present Illness:  Sigmoid diverticulitis [K57.32]  Acute renal failure, unspecified acute renal failure type (CMS/[facility]) [N17.9]       Final Discharge Diagnoses:  Active Hospital Problems    Diagnosis   • Sigmoid diverticulitis   Acute on chronic kidney injury  Hyponatremia  Hypokalemia    Consults:   Consults     No orders found from 9/1/2020 to 10/1/2020.          Procedures Performed: None.                Pertinent Test Results:   Lab Results (last 7 days)     Procedure Component Value Units Date/Time    Basic Metabolic Panel [[id number]]  (Abnormal) Collected: 10/05/20 0504    Specimen: Blood Updated: 10/05/20 0618     Glucose 157 mg/dL      BUN 13 mg/dL      Creatinine 1.50 mg/dL      Sodium 137 mmol/L      Potassium 3.3 mmol/L      Chloride 107 mmol/L      CO2 23.0 mmol/L      Calcium 7.8 mg/dL      eGFR Non African Amer 46 mL/min/1.73      BUN/Creatinine Ratio 8.7     Anion Gap 7.0 mmol/L     Narrative:      GFR Normal >60  Chronic Kidney Disease <60  Kidney Failure <15      Blood Culture - Blood, Arm, Right [[id number]] Collected: 09/30/20 1322    Specimen: Blood from Arm, Right Updated: 10/04/20 1345     Blood Culture No growth at 4 days    Blood Culture - Blood, Arm, Left [[id number]] Collected: 10/03/20 1120    Specimen: Blood from Arm, Left Updated: 10/04/20 1145     Blood Culture No growth at 24 hours    Extra Tubes [[id number]] Collected: 10/04/20 0529    Specimen: Blood, Venous Line Updated: 10/04/20 0630    Narrative:      The following orders were created for panel order Extra Tubes.  Procedure                               Abnormality         Status                     ---------                               -----------         ------                     Lavender  Top[[id number]]                                     Final result                 Please view results for these tests on the individual orders.    Lavender Top [[id number]] Collected: 10/04/20 0529    Specimen: Blood Updated: 10/04/20 0630     Extra Tube hold for add-on     Comment: Auto resulted       Magnesium [[id number]]  (Normal) Collected: 10/04/20 0529    Specimen: Blood Updated: 10/04/20 0611     Magnesium 1.7 mg/dL     Basic Metabolic Panel [[id number]]  (Abnormal) Collected: 10/04/20 0529    Specimen: Blood Updated: 10/04/20 0600     Glucose 130 mg/dL      BUN 15 mg/dL      Creatinine 1.38 mg/dL      Sodium 140 mmol/L      Potassium 3.9 mmol/L      Chloride 110 mmol/L      CO2 20.0 mmol/L      Calcium 8.1 mg/dL      eGFR Non African Amer 51 mL/min/1.73      BUN/Creatinine Ratio 10.9     Anion Gap 10.0 mmol/L     Narrative:      GFR Normal >60  Chronic Kidney Disease <60  Kidney Failure <15      Potassium [[id number]]  (Normal) Collected: 10/04/20 0529    Specimen: Blood Updated: 10/04/20 0600     Potassium 3.9 mmol/L     Blood Culture - Blood, Arm, Left [[id number]]  (Abnormal)  (Susceptibility) Collected: 09/30/20 1344    Specimen: Blood from Arm, Left Updated: 10/04/20 0506     Blood Culture Enterococcus raffinosus     Comment: Infectious disease consultation is highly recommended.        Gram Stain Anaerobic Bottle Gram positive cocci in chains     Comment: 1 positive of 4 drawn for gram positive cocci in chains       Narrative:           Susceptibility      Enterococcus raffinosus     CRYSTAL     Ampicillin Resistant     Gentamicin High Level Synergy Susceptible     Vancomycin Susceptible                    Potassium [[id number]]  (Abnormal) Collected: 10/03/20 1849    Specimen: Blood Updated: 10/03/20 1906     Potassium 3.3 mmol/L     Basic Metabolic Panel [[id number]]  (Abnormal) Collected: 10/03/20 0554    Specimen: Blood Updated: 10/03/20 0656     Glucose 99 mg/dL      BUN 21 mg/dL      Creatinine 1.44 mg/dL         Sodium 141 mmol/L      Potassium 3.4 mmol/L      Chloride 112 mmol/L      CO2 22.0 mmol/L      Calcium 7.7 mg/dL      eGFR Non African Amer 48 mL/min/1.73      BUN/Creatinine Ratio 14.6     Anion Gap 7.0 mmol/L     Narrative:      GFR Normal >60  Chronic Kidney Disease <60  Kidney Failure <15      Potassium [[id number]]  (Normal) Collected: 10/02/20 1325    Specimen: Blood Updated: 10/02/20 1345     Potassium 4.0 mmol/L     Basic Metabolic Panel [[id number]]  (Abnormal) Collected: 10/02/20 0359    Specimen: Blood Updated: 10/02/20 0423     Glucose 129 mg/dL      BUN 32 mg/dL      Creatinine 1.70 mg/dL      Sodium 142 mmol/L      Potassium 3.5 mmol/L      Chloride 110 mmol/L      CO2 24.0 mmol/L      Calcium 8.2 mg/dL      eGFR Non African Amer 40 mL/min/1.73      BUN/Creatinine Ratio 18.8     Anion Gap 8.0 mmol/L     Narrative:      GFR Normal >60  Chronic Kidney Disease <60  Kidney Failure <15      Potassium [[id number]]  (Normal) Collected: 10/02/20 0359    Specimen: Blood Updated: 10/02/20 0423     Potassium 3.5 mmol/L     CBC & Differential [[id number]]  (Abnormal) Collected: 10/02/20 0359    Specimen: Blood Updated: 10/02/20 0405    Narrative:      The following orders were created for panel order CBC & Differential.  Procedure                               Abnormality         Status                     ---------                               -----------         ------                     CBC Auto Differential[[id number]]        Abnormal            Final result                 Please view results for these tests on the individual orders.    CBC Auto Differential [[id number]]  (Abnormal) Collected: 10/02/20 0359    Specimen: Blood Updated: 10/02/20 0405     WBC 10.80 10*3/mm3      RBC 4.25 10*6/mm3      Hemoglobin 14.0 g/dL      Hematocrit 40.5 %      MCV 95.3 fL      MCH 32.9 pg      MCHC 34.6 g/dL      RDW 12.7 %      RDW-SD 43.5 fl      MPV 10.4 fL      Platelets 194 10*3/mm3      Neutrophil % 88.6 %       Lymphocyte % 6.1 %      Monocyte % 4.5 %      Eosinophil % 0.1 %      Basophil % 0.2 %      Immature Grans % 0.5 %      Neutrophils, Absolute 9.57 10*3/mm3      Lymphocytes, Absolute 0.66 10*3/mm3      Monocytes, Absolute 0.49 10*3/mm3      Eosinophils, Absolute 0.01 10*3/mm3      Basophils, Absolute 0.02 10*3/mm3      Immature Grans, Absolute 0.05 10*3/mm3      nRBC 0.0 /100 WBC     Potassium [[id number]]  (Normal) Collected: 10/01/20 1717    Specimen: Blood Updated: 10/01/20 1739     Potassium 3.5 mmol/L     Gastrointestinal Panel, PCR - Stool, Per Rectum [[id number]]  (Normal) Collected: 10/01/20 1555    Specimen: Stool from Per Rectum Updated: 10/01/20 1722     Campylobacter Not Detected     Plesiomonas shigelloides Not Detected     Salmonella Not Detected     Vibrio Not Detected     Vibrio cholerae Not Detected     Yersinia enterocolitica Not Detected     Enteroaggregative E. coli (EAEC) Not Detected     Enteropathogenic E. coli (EPEC) Not Detected     Enterotoxigenic E. coli (ETEC) lt/st Not Detected     Shiga-like toxin-producing E. coli (STEC) stx1/stx2 Not Detected     E. coli O157 Not Detected     Shigella/Enteroinvasive E. coli (EIEC) Not Detected     Cryptosporidium Not Detected     Cyclospora cayetanensis Not Detected     Entamoeba histolytica Not Detected     Giardia lamblia Not Detected     Adenovirus F40/41 Not Detected     Astrovirus Not Detected     Norovirus GI/GII Not Detected     Rotavirus A Not Detected     Sapovirus (I, II, IV or V) Not Detected    Narrative:      Testing performed by multiplex PCR system.    Clostridium Difficile Toxin - Stool, Per Rectum [[id number]]  (Normal) Collected: 10/01/20 1555    Specimen: Stool from Per Rectum Updated: 10/01/20 1657    Narrative:      The following orders were created for panel order Clostridium Difficile Toxin - Stool, Per Rectum.  Procedure                               Abnormality         Status                     ---------                                -----------         ------                     Clostridium Difficile To...[[id number]]  Normal              Final result                 Please view results for these tests on the individual orders.    Clostridium Difficile Toxin, PCR - Stool, Per Rectum [[id number]]  (Normal) Collected: 10/01/20 1555    Specimen: Stool from Per Rectum Updated: 10/01/20 1657     C. Difficile Toxins by PCR Negative    Narrative:      Performed by real-time polymerase chain reaction (qPCR).    Blood Culture ID, PCR - Blood, Arm, Left [[id number]]  (Normal) Collected: 09/30/20 1344    Specimen: Blood from Arm, Left Updated: 10/01/20 1100     BCID, PCR No organism detected by BCID PCR.    Basic Metabolic Panel [[id number]]  (Abnormal) Collected: 10/01/20 0808    Specimen: Blood Updated: 10/01/20 0845     Glucose 137 mg/dL      BUN 41 mg/dL      Creatinine 1.87 mg/dL      Sodium 140 mmol/L      Potassium 3.4 mmol/L      Chloride 104 mmol/L      CO2 25.0 mmol/L      Calcium 8.0 mg/dL      eGFR Non African Amer 36 mL/min/1.73      BUN/Creatinine Ratio 21.9     Anion Gap 11.0 mmol/L     Narrative:      GFR Normal >60  Chronic Kidney Disease <60  Kidney Failure <15      CBC & Differential [[id number]]  (Abnormal) Collected: 10/01/20 0808    Specimen: Blood Updated: 10/01/20 0845    Narrative:      The following orders were created for panel order CBC & Differential.  Procedure                               Abnormality         Status                     ---------                               -----------         ------                     CBC Auto Differential[[id number]]        Abnormal            Final result                 Please view results for these tests on the individual orders.    CBC Auto Differential [[id number]]  (Abnormal) Collected: 10/01/20 0808    Specimen: Blood Updated: 10/01/20 0845     WBC 20.95 10*3/mm3      RBC 4.65 10*6/mm3      Hemoglobin 15.1 g/dL      Hematocrit 44.1 %      MCV 94.8 fL      MCH 32.5 pg      MCHC 34.2  g/dL      RDW 12.7 %      RDW-SD 43.7 fl      MPV 10.7 fL      Platelets 210 10*3/mm3      Neutrophil % 92.5 %      Lymphocyte % 3.6 %      Monocyte % 3.3 %      Eosinophil % 0.0 %      Basophil % 0.1 %      Immature Grans % 0.5 %      Neutrophils, Absolute 19.37 10*3/mm3      Lymphocytes, Absolute 0.75 10*3/mm3      Monocytes, Absolute 0.69 10*3/mm3      Eosinophils, Absolute 0.00 10*3/mm3      Basophils, Absolute 0.03 10*3/mm3      Immature Grans, Absolute 0.11 10*3/mm3      nRBC 0.0 /100 WBC     Urinalysis With Microscopic If Indicated (No Culture) - Urine, Clean Catch [[id number]]  (Abnormal) Collected: 09/30/20 1834    Specimen: Urine, Clean Catch Updated: 09/30/20 1849     Color, UA Yellow     Appearance, UA Clear     pH, UA 5.5     Specific Gravity, UA 1.022     Glucose, UA Negative     Ketones, UA Negative     Bilirubin, UA Negative     Blood, UA Negative     Protein, UA 30 mg/dL (1+)     Leuk Esterase, UA Negative     Nitrite, UA Negative     Urobilinogen, UA 0.2 E.U./dL    Urinalysis, Microscopic Only - Urine, Clean Catch [[id number]]  (Abnormal) Collected: 09/30/20 1834    Specimen: Urine, Clean Catch Updated: 09/30/20 1849     RBC, UA 3-5 /HPF      WBC, UA 0-2 /HPF      Bacteria, UA None Seen /HPF      Squamous Epithelial Cells, UA None Seen /HPF      Hyaline Casts, UA 0-2 /LPF      Methodology Automated Microscopy    Lactic Acid, Plasma [[id number]]  (Normal) Collected: 09/30/20 1322    Specimen: Blood Updated: 09/30/20 1350     Lactate 2.0 mmol/L     [place] Draw [[id number]] Collected: 09/30/20 1239    Specimen: Blood Updated: 09/30/20 1345    Narrative:      The following orders were created for panel order [place] Draw.  Procedure                               Abnormality         Status                     ---------                               -----------         ------                     Light Blue Top[[id number]]                                   Final result               Green Top (Gel)[[id number]]                                   Final result               Lavender Top[[id number]]                                     Final result               Gold Top - SST[[id number]]                                   Final result                 Please view results for these tests on the individual orders.    Light Blue Top [[id number]] Collected: 09/30/20 1239    Specimen: Blood Updated: 09/30/20 1345     Extra Tube hold for add-on     Comment: Auto resulted       Green Top (Gel) [[id number]] Collected: 09/30/20 1240    Specimen: Blood Updated: 09/30/20 1345     Extra Tube Hold for add-ons.     Comment: Auto resulted.       Lavender Top [[id number]] Collected: 09/30/20 1240    Specimen: Blood Updated: 09/30/20 1345     Extra Tube hold for add-on     Comment: Auto resulted       Gold Top - SST [[id number]] Collected: 09/30/20 1239    Specimen: Blood Updated: 09/30/20 1345     Extra Tube Hold for add-ons.     Comment: Auto resulted.       Comprehensive Metabolic Panel [[id number]]  (Abnormal) Collected: 09/30/20 1240    Specimen: Blood Updated: 09/30/20 1314     Glucose 127 mg/dL      BUN 46 mg/dL      Creatinine 1.97 mg/dL      Sodium 134 mmol/L      Potassium 3.1 mmol/L      Chloride 96 mmol/L      CO2 22.0 mmol/L      Calcium 8.7 mg/dL      Total Protein 6.7 g/dL      Albumin 3.50 g/dL      ALT (SGPT) 15 U/L      AST (SGOT) 19 U/L      Alkaline Phosphatase 76 U/L      Total Bilirubin 0.8 mg/dL      eGFR Non African Amer 34 mL/min/1.73      Globulin 3.2 gm/dL      A/G Ratio 1.1 g/dL      BUN/Creatinine Ratio 23.4     Anion Gap 16.0 mmol/L     Narrative:      GFR Normal >60  Chronic Kidney Disease <60  Kidney Failure <15      Lipase [[id number]]  (Normal) Collected: 09/30/20 1240    Specimen: Blood Updated: 09/30/20 1314     Lipase 21 U/L     Magnesium [[id number]]  (Normal) Collected: 09/30/20 1240    Specimen: Blood Updated: 09/30/20 1314     Magnesium 2.3 mg/dL     Manual Differential [[id number]]  (Abnormal) Collected: 09/30/20  1240    Specimen: Blood Updated: 09/30/20 1306     Neutrophil % 94.0 %      Lymphocyte % 0.0 %      Monocyte % 5.0 %      Bands %  1.0 %      Neutrophils Absolute 34.30 10*3/mm3      Lymphocytes Absolute 0.00 10*3/mm3      Monocytes Absolute 1.81 10*3/mm3      Anisocytosis Slight/1+     WBC Morphology Normal     Platelet Estimate Adequate    CBC & Differential [[id number]]  (Abnormal) Collected: 09/30/20 1240    Specimen: Blood Updated: 09/30/20 1249    Narrative:      The following orders were created for panel order CBC & Differential.  Procedure                               Abnormality         Status                     ---------                               -----------         ------                     CBC Auto Differential[[id number]]        Abnormal            Final result                 Please view results for these tests on the individual orders.    CBC Auto Differential [[id number]]  (Abnormal) Collected: 09/30/20 1240    Specimen: Blood Updated: 09/30/20 1249     WBC 36.11 10*3/mm3      RBC 5.36 10*6/mm3      Hemoglobin 17.7 g/dL      Hematocrit 49.3 %      MCV 92.0 fL      MCH 33.0 pg      MCHC 35.9 g/dL      RDW 12.3 %      RDW-SD 41.3 fl      MPV 10.4 fL      Platelets 215 10*3/mm3         Imaging Results (Last 7 Days)     Procedure Component Value Units Date/Time    CT Abdomen Pelvis Without Contrast [[id number]] Collected: 09/30/20 1320     Updated: 09/30/20 1413    Narrative:      EXAM: CT ABDOMEN PELVIS WITHOUT IV CONTRAST    COMPARISONS: None    INDICATION: LLQ PAIN    TECHNIQUE: Noncontrast CT images were obtained through the  abdomen and pelvis.  Coronal and sagittal reformats were  provided.      FINDINGS:  Note, the lack of intravenous contrast limits the evaluation of  the vasculature and viscera in the abdomen and pelvis.    Liver: Liver is normal in size and contour. No intrahepatic  ductal dilatation.  Spleen: Splenic microcalcifications consistent with prior  granulomatous  disease.  Pancreas: Within normal limits.  Gallbladder: Within normal limits.    Adrenals: Within normal limits.  Kidneys: There is a 2 mm right nephrolith. No hydronephrosis.  Ureters: Visualized portions are within normal limits.   Bladder: Within normal limits.   : Prostate and seminal vesicles are unremarkable.    Stomach: Small hiatal hernia.  Bowel: Small bowel is unremarkable. There are distal and sigmoid  diverticula with fat stranding seen about the descending and  sigmoid colon.  Appendix: Within normal limits.    Vasculature: There is infrarenal abdominal aorta measuring 3.2 x  3.1 cm (TV by AP). There is aortoiliac atherosclerosis. There is  hypodense crescentic soft atherosclerosis seen in the infrarenal  abdominal aorta.    Miscellaneous: No significant free fluid, focal fluid collection,  pneumoperitoneum, or bulky lymphadenopathy.    Musculoskeletal: No acute fracture or suspicious osseous lesion.  Minimal spondylosis and degenerative disc disease of the thoracic  and lumbosacral spine.    Lungs: There is bibasilar scarring and subsegmental atelectasis.  Appearance of emphysematous changes at the lung bases.      Impression:      Distal and sigmoid diverticulitis, uncomplicated.    Infrarenal abdominal aorta aneurysm measuring 3.2 x 3.1 cm with  extensive aortoiliac atherosclerosis. Recommend continued  surveillance by criteria.    Small hiatal hernia.    Nonobstructing right nephrolith.    Bibasilar emphysema.    Electronically signed by:  Kimberlee Schmitt MD  9/30/2020  2:12 PM CDT Workstation: [id number]            Chief Complaint on Day of Discharge: None.    Hospital Course:  Patient was admitted and managed as follows:    Acute diverticulitis: He was started on IV antibiotics, IV hydration and pain control. 1 out of 4 blood culture bottles was positive for enterococcus raffinosus.  Did check with pharmacy and it was thought that this could be a contaminant.  Repeat blood culture result  "showed no growth.  Reactive leukocytosis did resolve and GI panel/C. difficile assay were unremarkable.  Patient was asymptomatic and was able to tolerate regular diet before discharge.  Will be discharged with a few more days of oral antibiotics.     Hypokalemia: Resolved with potassium repletion.       Acute on chronic kidney disease stage II (likely prerenal): Patient's baseline creatinine is 1.3.  Creatinine was much improved was down to 1.3-1.5 (from a high of 1.97 on admission) following IV hydration. Chlorthalidone was discontinued secondary to borderline blood pressure/acute kidney injury.       Hyponatremia: Resolved with isotonic saline.    He was continued on his outpatient medications for BPH and CVA.      Condition on Discharge: Stable and improved.    Physical Exam on Discharge:  /74 (BP Location: Right arm, Patient Position: Lying)   Pulse 63   Temp 97.1 °F (36.2 °C) (Oral)   Resp 18   Ht 167.6 cm (66\")   Wt 59.1 kg (130 lb 4.8 oz)   SpO2 97%   BMI 21.03 kg/m²   Physical Exam  Vitals signs and nursing note reviewed.   Constitutional:       General: He is not in acute distress.     Appearance: He is well-developed. He is not diaphoretic.   HENT:      Head: Normocephalic and atraumatic.   Eyes:      General: No scleral icterus.     Pupils: Pupils are equal, round, and reactive to light.   Neck:      Musculoskeletal: Normal range of motion and neck supple.      Thyroid: No thyromegaly.      Vascular: No JVD.   Cardiovascular:      Rate and Rhythm: Normal rate and regular rhythm.      Heart sounds: Normal heart sounds. No murmur. No friction rub. No gallop.    Pulmonary:      Effort: Pulmonary effort is normal.      Breath sounds: Normal breath sounds. No wheezing or rales.   Chest:      Chest wall: No tenderness.   Abdominal:      General: Bowel sounds are normal. There is no distension.      Palpations: Abdomen is soft. There is no mass.      Tenderness: There is no abdominal tenderness. " There is no guarding or rebound.   Musculoskeletal:         General: No tenderness or deformity.   Skin:     General: Skin is warm and dry.      Coloration: Skin is not pale.      Findings: No erythema or rash.   Neurological:      General: No focal deficit present.      Mental Status: He is alert and oriented to person, place, and time. Mental status is at baseline.      Cranial Nerves: No cranial nerve deficit.      Sensory: No sensory deficit.      Motor: No weakness or abnormal muscle tone.      Coordination: Coordination normal.      Gait: Gait normal.      Deep Tendon Reflexes: Reflexes normal.   Psychiatric:         Behavior: Behavior normal.         Thought Content: Thought content normal.         Judgment: Judgment normal.           Discharge Disposition:  Home or Self Care    Discharge Medications:     Discharge Medications      New Medications      Instructions Start Date   amoxicillin-clavulanate 875-125 MG per tablet  Commonly known as: Augmentin   1 tablet, Oral, 2 Times Daily      famotidine 40 MG tablet  Commonly known as: PEPCID   40 mg, Oral, Daily   Start Date: October 6, 2020        Continue These Medications      Instructions Start Date   clopidogrel 75 MG tablet  Commonly known as: PLAVIX   75 mg, Oral, Daily      hydrALAZINE 25 MG tablet  Commonly known as: APRESOLINE   25 mg, Oral, 3 Times Daily      tamsulosin 0.4 MG capsule 24 hr capsule  Commonly known as: FLOMAX   1 capsule, Oral, Nightly      vitamin B-12 500 MCG tablet  Commonly known as: CYANOCOBALAMIN   500 mcg, Oral, Daily      vitamin C 500 MG tablet  Commonly known as: ASCORBIC ACID   500 mg, Oral, Daily      vitamin D 1.25 MG (26735 UT) capsule capsule  Commonly known as: ERGOCALCIFEROL   50,000 Units, Oral, Weekly         Stop These Medications    chlorthalidone 25 MG tablet  Commonly known as: HYGROTON            Discharge Diet: Cardiac.    Activity at Discharge: As tolerated.    Discharge Care Plan/Instructions: Patient has  been advised to take his medications as prescribed and to return to emergency room in the event of any worsening symptoms.    Follow-up Appointments:   Follow-up with primary care provider in 1 week.  Test Results Pending at Discharge:   Pending Labs     Order Current Status    Blood Culture - Blood, Arm, Left Preliminary result    Blood Culture - Blood, Arm, Right Preliminary result          Michael Huston MD  10/05/20  09:29 CDT    Time: 35 minutes

## 2020-10-06 ENCOUNTER — READMISSION MANAGEMENT (OUTPATIENT)
Dept: CALL CENTER | Facility: HOSPITAL | Age: 70
End: 2020-10-06

## 2020-10-06 NOTE — OUTREACH NOTE
Prep Survey      Responses   [facility] facility patient discharged from?  [place]   Is LACE score < 7 ?  No   Eligibility  Readm Mgmt   Discharge diagnosis  Sigmoid diverticulitis   Does the patient have one of the following disease processes/diagnoses(primary or secondary)?  Other   Does the patient have Home health ordered?  No   Is there a DME ordered?  No   Prep survey completed?  Yes          Tawnya Philippe RN

## 2020-10-08 ENCOUNTER — READMISSION MANAGEMENT (OUTPATIENT)
Dept: CALL CENTER | Facility: HOSPITAL | Age: 70
End: 2020-10-08

## 2020-10-08 LAB — BACTERIA SPEC AEROBE CULT: NORMAL

## 2020-10-08 NOTE — OUTREACH NOTE
Medical Week 1 Survey      Responses   [facility] patient discharged from?  [place]   Does the patient have one of the following disease processes/diagnoses(primary or secondary)?  Other   Week 1 attempt successful?  No   Call start time  [id number]   Unsuccessful attempts  Attempt 1          Geovanna Pretty RN

## 2020-10-12 ENCOUNTER — READMISSION MANAGEMENT (OUTPATIENT)
Dept: CALL CENTER | Facility: HOSPITAL | Age: 70
End: 2020-10-12

## 2020-10-12 NOTE — OUTREACH NOTE
Medical Week 1 Survey      Responses   [facility] patient discharged from?  [place]   Does the patient have one of the following disease processes/diagnoses(primary or secondary)?  Other   Week 1 attempt successful?  No   Unsuccessful attempts  Attempt 2          Tawnya Philippe RN

## 2020-10-14 ENCOUNTER — READMISSION MANAGEMENT (OUTPATIENT)
Dept: CALL CENTER | Facility: HOSPITAL | Age: 70
End: 2020-10-14

## 2020-10-14 ENCOUNTER — APPOINTMENT (OUTPATIENT)
Dept: GENERAL RADIOLOGY | Facility: HOSPITAL | Age: 70
End: 2020-10-14

## 2020-10-14 ENCOUNTER — APPOINTMENT (OUTPATIENT)
Dept: CT IMAGING | Facility: HOSPITAL | Age: 70
End: 2020-10-14

## 2020-10-14 ENCOUNTER — HOSPITAL ENCOUNTER (INPATIENT)
Facility: HOSPITAL | Age: 70
LOS: 20 days | Discharge: NURSING, REHAB OR LONG-TERM CARE | End: 2020-11-03
Attending: EMERGENCY MEDICINE | Admitting: HOSPITALIST

## 2020-10-14 DIAGNOSIS — A04.72 C. DIFFICILE COLITIS: ICD-10-CM

## 2020-10-14 DIAGNOSIS — Z78.9 IMPAIRED MOBILITY AND ADLS: ICD-10-CM

## 2020-10-14 DIAGNOSIS — A41.9 SEPSIS WITHOUT ACUTE ORGAN DYSFUNCTION, DUE TO UNSPECIFIED ORGANISM (HCC): ICD-10-CM

## 2020-10-14 DIAGNOSIS — K51.00 PANCOLITIS (HCC): ICD-10-CM

## 2020-10-14 DIAGNOSIS — A49.8 CLOSTRIDIUM DIFFICILE INFECTION: ICD-10-CM

## 2020-10-14 DIAGNOSIS — Z78.9 IMPAIRED MOBILITY AND ACTIVITIES OF DAILY LIVING: ICD-10-CM

## 2020-10-14 DIAGNOSIS — Z74.09 IMPAIRED PHYSICAL MOBILITY: ICD-10-CM

## 2020-10-14 DIAGNOSIS — Z74.09 IMPAIRED MOBILITY AND ACTIVITIES OF DAILY LIVING: ICD-10-CM

## 2020-10-14 DIAGNOSIS — K52.9 COLITIS: Primary | ICD-10-CM

## 2020-10-14 DIAGNOSIS — Z74.09 IMPAIRED MOBILITY AND ADLS: ICD-10-CM

## 2020-10-14 DIAGNOSIS — N18.9 CHRONIC KIDNEY DISEASE, UNSPECIFIED CKD STAGE: ICD-10-CM

## 2020-10-14 LAB
ABO GROUP BLD: NORMAL
ADV 40+41 DNA STL QL NAA+NON-PROBE: NOT DETECTED
ALBUMIN SERPL-MCNC: 3.2 G/DL (ref 3.5–5.2)
ALBUMIN/GLOB SERPL: 1.2 G/DL
ALP SERPL-CCNC: 65 U/L (ref 39–117)
ALT SERPL W P-5'-P-CCNC: 16 U/L (ref 1–41)
AMYLASE SERPL-CCNC: 57 U/L (ref 28–100)
ANION GAP SERPL CALCULATED.3IONS-SCNC: 12 MMOL/L (ref 5–15)
AST SERPL-CCNC: 18 U/L (ref 1–40)
ASTRO TYP 1-8 RNA STL QL NAA+NON-PROBE: NOT DETECTED
BACTERIA UR QL AUTO: ABNORMAL /HPF
BILIRUB SERPL-MCNC: 0.9 MG/DL (ref 0–1.2)
BILIRUB UR QL STRIP: NEGATIVE
BLD GP AB SCN SERPL QL: NEGATIVE
BUN SERPL-MCNC: 17 MG/DL (ref 8–23)
BUN/CREAT SERPL: 11.4 (ref 7–25)
C CAYETANENSIS DNA STL QL NAA+NON-PROBE: NOT DETECTED
C DIFF TOX GENS STL QL NAA+PROBE: POSITIVE
CALCIUM SPEC-SCNC: 8.6 MG/DL (ref 8.6–10.5)
CAMPY SP DNA.DIARRHEA STL QL NAA+PROBE: NOT DETECTED
CHLORIDE SERPL-SCNC: 104 MMOL/L (ref 98–107)
CLARITY UR: CLEAR
CO2 SERPL-SCNC: 23 MMOL/L (ref 22–29)
COLOR UR: YELLOW
CREAT SERPL-MCNC: 1.49 MG/DL (ref 0.76–1.27)
CRYPTOSP STL CULT: NOT DETECTED
D-LACTATE SERPL-SCNC: 2.7 MMOL/L (ref 0.5–2)
D-LACTATE SERPL-SCNC: 3 MMOL/L (ref 0.5–2)
DEPRECATED RDW RBC AUTO: 44.1 FL (ref 37–54)
E COLI DNA SPEC QL NAA+PROBE: NOT DETECTED
E HISTOLYT AG STL-ACNC: NOT DETECTED
EAEC PAA PLAS AGGR+AATA ST NAA+NON-PRB: NOT DETECTED
EC STX1 + STX2 GENES STL NAA+PROBE: NOT DETECTED
EPEC EAE GENE STL QL NAA+NON-PROBE: NOT DETECTED
ERYTHROCYTE [DISTWIDTH] IN BLOOD BY AUTOMATED COUNT: 12.6 % (ref 12.3–15.4)
ETEC LTA+ST1A+ST1B TOX ST NAA+NON-PROBE: NOT DETECTED
G LAMBLIA DNA SPEC QL NAA+PROBE: NOT DETECTED
GFR SERPL CREATININE-BSD FRML MDRD: 46 ML/MIN/1.73
GLOBULIN UR ELPH-MCNC: 2.7 GM/DL
GLUCOSE SERPL-MCNC: 143 MG/DL (ref 65–99)
GLUCOSE UR STRIP-MCNC: NEGATIVE MG/DL
GRAN CASTS URNS QL MICRO: ABNORMAL /LPF
HCT VFR BLD AUTO: 45.9 % (ref 37.5–51)
HGB BLD-MCNC: 15.8 G/DL (ref 13–17.7)
HGB UR QL STRIP.AUTO: NEGATIVE
HOLD SPECIMEN: NORMAL
HOLD SPECIMEN: NORMAL
HYALINE CASTS UR QL AUTO: ABNORMAL /LPF
KETONES UR QL STRIP: ABNORMAL
LACTATE HOLD SPECIMEN: NORMAL
LEUKOCYTE ESTERASE UR QL STRIP.AUTO: NEGATIVE
LIPASE SERPL-CCNC: 10 U/L (ref 13–60)
LYMPHOCYTES # BLD MANUAL: 0.32 10*3/MM3 (ref 0.7–3.1)
LYMPHOCYTES NFR BLD MANUAL: 1 % (ref 19.6–45.3)
LYMPHOCYTES NFR BLD MANUAL: 5 % (ref 5–12)
MCH RBC QN AUTO: 32.9 PG (ref 26.6–33)
MCHC RBC AUTO-ENTMCNC: 34.4 G/DL (ref 31.5–35.7)
MCV RBC AUTO: 95.6 FL (ref 79–97)
MONOCYTES # BLD AUTO: 1.61 10*3/MM3 (ref 0.1–0.9)
MUCOUS THREADS URNS QL MICRO: ABNORMAL /HPF
NEUTROPHILS # BLD AUTO: 29.54 10*3/MM3 (ref 1.7–7)
NEUTROPHILS NFR BLD MANUAL: 90 % (ref 42.7–76)
NEUTS BAND NFR BLD MANUAL: 2 % (ref 0–5)
NEUTS HYPERSEG # BLD: ABNORMAL 10*3/UL
NEUTS VAC BLD QL SMEAR: ABNORMAL
NITRITE UR QL STRIP: NEGATIVE
NOROVIRUS GI+II RNA STL QL NAA+NON-PROBE: NOT DETECTED
P SHIGELLOIDES DNA STL QL NAA+PROBE: NOT DETECTED
PH UR STRIP.AUTO: <=5 [PH] (ref 5–9)
PLAT MORPH BLD: NORMAL
PLATELET # BLD AUTO: 350 10*3/MM3 (ref 140–450)
PMV BLD AUTO: 9.6 FL (ref 6–12)
POTASSIUM SERPL-SCNC: 3.5 MMOL/L (ref 3.5–5.2)
PROT SERPL-MCNC: 5.9 G/DL (ref 6–8.5)
PROT UR QL STRIP: ABNORMAL
RBC # BLD AUTO: 4.8 10*6/MM3 (ref 4.14–5.8)
RBC # UR: ABNORMAL /HPF
RBC MORPH BLD: NORMAL
REF LAB TEST METHOD: ABNORMAL
RH BLD: POSITIVE
RV RNA STL NAA+PROBE: NOT DETECTED
SALMONELLA DNA SPEC QL NAA+PROBE: NOT DETECTED
SAPO I+II+IV+V RNA STL QL NAA+NON-PROBE: NOT DETECTED
SHIGELLA SP+EIEC IPAH STL QL NAA+PROBE: NOT DETECTED
SODIUM SERPL-SCNC: 139 MMOL/L (ref 136–145)
SP GR UR STRIP: 1.06 (ref 1–1.03)
SQUAMOUS #/AREA URNS HPF: ABNORMAL /HPF
T&S EXPIRATION DATE: NORMAL
TOXIC GRANULATION: ABNORMAL
UROBILINOGEN UR QL STRIP: ABNORMAL
V CHOLERAE DNA SPEC QL NAA+PROBE: NOT DETECTED
VARIANT LYMPHS NFR BLD MANUAL: 2 % (ref 0–5)
VIBRIO DNA SPEC NAA+PROBE: NOT DETECTED
WBC # BLD AUTO: 32.11 10*3/MM3 (ref 3.4–10.8)
WBC UR QL AUTO: ABNORMAL /HPF
WHOLE BLOOD HOLD SPECIMEN: NORMAL
WHOLE BLOOD HOLD SPECIMEN: NORMAL
YERSINIA STL CULT: NOT DETECTED

## 2020-10-14 PROCEDURE — 74177 CT ABD & PELVIS W/CONTRAST: CPT

## 2020-10-14 PROCEDURE — 71045 X-RAY EXAM CHEST 1 VIEW: CPT

## 2020-10-14 PROCEDURE — 25010000002 IOPAMIDOL 61 % SOLUTION: Performed by: EMERGENCY MEDICINE

## 2020-10-14 PROCEDURE — 86901 BLOOD TYPING SEROLOGIC RH(D): CPT | Performed by: EMERGENCY MEDICINE

## 2020-10-14 PROCEDURE — 99220 PR INITIAL OBSERVATION CARE/DAY 70 MINUTES: CPT | Performed by: STUDENT IN AN ORGANIZED HEALTH CARE EDUCATION/TRAINING PROGRAM

## 2020-10-14 PROCEDURE — 25010000002 PIPERACILLIN SOD-TAZOBACTAM PER 1 G: Performed by: EMERGENCY MEDICINE

## 2020-10-14 PROCEDURE — 85007 BL SMEAR W/DIFF WBC COUNT: CPT | Performed by: EMERGENCY MEDICINE

## 2020-10-14 PROCEDURE — 86900 BLOOD TYPING SEROLOGIC ABO: CPT | Performed by: EMERGENCY MEDICINE

## 2020-10-14 PROCEDURE — G0378 HOSPITAL OBSERVATION PER HR: HCPCS

## 2020-10-14 PROCEDURE — 25010000002 ONDANSETRON PER 1 MG: Performed by: EMERGENCY MEDICINE

## 2020-10-14 PROCEDURE — 86850 RBC ANTIBODY SCREEN: CPT | Performed by: EMERGENCY MEDICINE

## 2020-10-14 PROCEDURE — 99284 EMERGENCY DEPT VISIT MOD MDM: CPT

## 2020-10-14 PROCEDURE — 85025 COMPLETE CBC W/AUTO DIFF WBC: CPT | Performed by: EMERGENCY MEDICINE

## 2020-10-14 PROCEDURE — 82150 ASSAY OF AMYLASE: CPT | Performed by: EMERGENCY MEDICINE

## 2020-10-14 PROCEDURE — 87040 BLOOD CULTURE FOR BACTERIA: CPT | Performed by: EMERGENCY MEDICINE

## 2020-10-14 PROCEDURE — 83690 ASSAY OF LIPASE: CPT | Performed by: EMERGENCY MEDICINE

## 2020-10-14 PROCEDURE — 0 DIATRIZOATE MEGLUMINE & SODIUM PER 1 ML: Performed by: EMERGENCY MEDICINE

## 2020-10-14 PROCEDURE — 80053 COMPREHEN METABOLIC PANEL: CPT | Performed by: EMERGENCY MEDICINE

## 2020-10-14 PROCEDURE — 36415 COLL VENOUS BLD VENIPUNCTURE: CPT | Performed by: EMERGENCY MEDICINE

## 2020-10-14 PROCEDURE — 0097U HC BIOFIRE FILMARRAY GI PANEL: CPT | Performed by: EMERGENCY MEDICINE

## 2020-10-14 PROCEDURE — 87493 C DIFF AMPLIFIED PROBE: CPT | Performed by: EMERGENCY MEDICINE

## 2020-10-14 PROCEDURE — 99222 1ST HOSP IP/OBS MODERATE 55: CPT | Performed by: INTERNAL MEDICINE

## 2020-10-14 PROCEDURE — 93005 ELECTROCARDIOGRAM TRACING: CPT | Performed by: EMERGENCY MEDICINE

## 2020-10-14 PROCEDURE — 25010000002 PIPERACILLIN SOD-TAZOBACTAM PER 1 G: Performed by: STUDENT IN AN ORGANIZED HEALTH CARE EDUCATION/TRAINING PROGRAM

## 2020-10-14 PROCEDURE — 83605 ASSAY OF LACTIC ACID: CPT | Performed by: EMERGENCY MEDICINE

## 2020-10-14 PROCEDURE — 93010 ELECTROCARDIOGRAM REPORT: CPT | Performed by: INTERNAL MEDICINE

## 2020-10-14 PROCEDURE — 25010000002 ENOXAPARIN PER 10 MG: Performed by: STUDENT IN AN ORGANIZED HEALTH CARE EDUCATION/TRAINING PROGRAM

## 2020-10-14 PROCEDURE — 81001 URINALYSIS AUTO W/SCOPE: CPT | Performed by: EMERGENCY MEDICINE

## 2020-10-14 RX ORDER — CLOPIDOGREL BISULFATE 75 MG/1
75 TABLET ORAL DAILY
Status: DISCONTINUED | OUTPATIENT
Start: 2020-10-14 | End: 2020-10-19

## 2020-10-14 RX ORDER — FAMOTIDINE 40 MG/1
40 TABLET, FILM COATED ORAL DAILY
Status: DISCONTINUED | OUTPATIENT
Start: 2020-10-14 | End: 2020-10-15

## 2020-10-14 RX ORDER — MORPHINE SULFATE 2 MG/ML
2 INJECTION, SOLUTION INTRAMUSCULAR; INTRAVENOUS ONCE
Status: DISCONTINUED | OUTPATIENT
Start: 2020-10-14 | End: 2020-10-23

## 2020-10-14 RX ORDER — NALOXONE HCL 0.4 MG/ML
0.4 VIAL (ML) INJECTION
Status: DISCONTINUED | OUTPATIENT
Start: 2020-10-14 | End: 2020-10-26 | Stop reason: SDUPTHER

## 2020-10-14 RX ORDER — MORPHINE SULFATE 2 MG/ML
1 INJECTION, SOLUTION INTRAMUSCULAR; INTRAVENOUS EVERY 4 HOURS PRN
Status: DISPENSED | OUTPATIENT
Start: 2020-10-14 | End: 2020-10-21

## 2020-10-14 RX ORDER — DICYCLOMINE HCL 20 MG
20 TABLET ORAL 3 TIMES DAILY
COMMUNITY
End: 2021-01-11

## 2020-10-14 RX ORDER — CHLORTHALIDONE 50 MG/1
1 TABLET ORAL
Status: ON HOLD | COMMUNITY
Start: 2020-07-10 | End: 2020-10-14

## 2020-10-14 RX ORDER — SODIUM CHLORIDE 0.9 % (FLUSH) 0.9 %
10 SYRINGE (ML) INJECTION AS NEEDED
Status: DISCONTINUED | OUTPATIENT
Start: 2020-10-14 | End: 2020-11-03 | Stop reason: HOSPADM

## 2020-10-14 RX ORDER — SODIUM CHLORIDE 0.9 % (FLUSH) 0.9 %
10 SYRINGE (ML) INJECTION EVERY 12 HOURS SCHEDULED
Status: DISCONTINUED | OUTPATIENT
Start: 2020-10-14 | End: 2020-10-26

## 2020-10-14 RX ORDER — ACETAMINOPHEN 325 MG/1
650 TABLET ORAL EVERY 4 HOURS PRN
Status: DISCONTINUED | OUTPATIENT
Start: 2020-10-14 | End: 2020-10-20

## 2020-10-14 RX ORDER — SODIUM CHLORIDE 9 MG/ML
125 INJECTION, SOLUTION INTRAVENOUS CONTINUOUS
Status: DISCONTINUED | OUTPATIENT
Start: 2020-10-14 | End: 2020-10-14

## 2020-10-14 RX ORDER — TAMSULOSIN HYDROCHLORIDE 0.4 MG/1
0.4 CAPSULE ORAL NIGHTLY
Status: DISCONTINUED | OUTPATIENT
Start: 2020-10-14 | End: 2020-10-20

## 2020-10-14 RX ORDER — ACETAMINOPHEN 160 MG/5ML
650 SOLUTION ORAL EVERY 4 HOURS PRN
Status: DISCONTINUED | OUTPATIENT
Start: 2020-10-14 | End: 2020-10-14 | Stop reason: SDUPTHER

## 2020-10-14 RX ORDER — PROMETHAZINE HYDROCHLORIDE 12.5 MG/1
12.5 SUPPOSITORY RECTAL EVERY 6 HOURS PRN
Status: DISCONTINUED | OUTPATIENT
Start: 2020-10-14 | End: 2020-10-26

## 2020-10-14 RX ORDER — ACETAMINOPHEN 650 MG/1
650 SUPPOSITORY RECTAL EVERY 4 HOURS PRN
Status: DISCONTINUED | OUTPATIENT
Start: 2020-10-14 | End: 2020-10-20

## 2020-10-14 RX ORDER — SODIUM CHLORIDE 0.9 % (FLUSH) 0.9 %
10 SYRINGE (ML) INJECTION AS NEEDED
Status: DISCONTINUED | OUTPATIENT
Start: 2020-10-14 | End: 2020-10-26

## 2020-10-14 RX ORDER — CHOLECALCIFEROL (VITAMIN D3) 125 MCG
500 CAPSULE ORAL DAILY
Status: DISCONTINUED | OUTPATIENT
Start: 2020-10-14 | End: 2020-10-20

## 2020-10-14 RX ORDER — SODIUM CHLORIDE 9 MG/ML
150 INJECTION, SOLUTION INTRAVENOUS CONTINUOUS
Status: DISCONTINUED | OUTPATIENT
Start: 2020-10-14 | End: 2020-10-17

## 2020-10-14 RX ORDER — DICYCLOMINE HCL 20 MG
20 TABLET ORAL 3 TIMES DAILY
Status: DISCONTINUED | OUTPATIENT
Start: 2020-10-14 | End: 2020-10-19

## 2020-10-14 RX ORDER — HYDRALAZINE HYDROCHLORIDE 25 MG/1
25 TABLET, FILM COATED ORAL 3 TIMES DAILY
Status: DISCONTINUED | OUTPATIENT
Start: 2020-10-14 | End: 2020-10-19

## 2020-10-14 RX ORDER — ASCORBIC ACID 500 MG
500 TABLET ORAL DAILY
Status: DISCONTINUED | OUTPATIENT
Start: 2020-10-15 | End: 2020-10-20

## 2020-10-14 RX ORDER — PROMETHAZINE HYDROCHLORIDE 12.5 MG/1
12.5 TABLET ORAL EVERY 6 HOURS PRN
Status: DISCONTINUED | OUTPATIENT
Start: 2020-10-14 | End: 2020-10-26

## 2020-10-14 RX ORDER — ONDANSETRON 2 MG/ML
4 INJECTION INTRAMUSCULAR; INTRAVENOUS ONCE
Status: COMPLETED | OUTPATIENT
Start: 2020-10-14 | End: 2020-10-14

## 2020-10-14 RX ADMIN — HYDRALAZINE HYDROCHLORIDE 25 MG: 25 TABLET, FILM COATED ORAL at 20:35

## 2020-10-14 RX ADMIN — TAMSULOSIN HYDROCHLORIDE 0.4 MG: 0.4 CAPSULE ORAL at 20:35

## 2020-10-14 RX ADMIN — CLOPIDOGREL BISULFATE 75 MG: 75 TABLET ORAL at 20:35

## 2020-10-14 RX ADMIN — IOPAMIDOL 85 ML: 612 INJECTION, SOLUTION INTRAVENOUS at 15:55

## 2020-10-14 RX ADMIN — PIPERACILLIN SODIUM AND TAZOBACTAM SODIUM 3.38 G: 3; .375 INJECTION, POWDER, LYOPHILIZED, FOR SOLUTION INTRAVENOUS at 22:23

## 2020-10-14 RX ADMIN — SODIUM CHLORIDE 125 ML/HR: 900 INJECTION, SOLUTION INTRAVENOUS at 14:34

## 2020-10-14 RX ADMIN — Medication 500 MG: at 19:02

## 2020-10-14 RX ADMIN — DIATRIZOATE MEGLUMINE AND DIATRIZOATE SODIUM 10 ML: 660; 100 LIQUID ORAL; RECTAL at 15:54

## 2020-10-14 RX ADMIN — ENOXAPARIN SODIUM 40 MG: 40 INJECTION SUBCUTANEOUS at 20:36

## 2020-10-14 RX ADMIN — ONDANSETRON HYDROCHLORIDE 4 MG: 2 INJECTION, SOLUTION INTRAMUSCULAR; INTRAVENOUS at 14:34

## 2020-10-14 RX ADMIN — Medication 125 MG: at 22:23

## 2020-10-14 RX ADMIN — SODIUM CHLORIDE 150 ML/HR: 9 INJECTION, SOLUTION INTRAVENOUS at 19:54

## 2020-10-14 RX ADMIN — DICYCLOMINE HYDROCHLORIDE 20 MG: 20 TABLET ORAL at 20:35

## 2020-10-14 RX ADMIN — SODIUM CHLORIDE 500 ML: 9 INJECTION, SOLUTION INTRAVENOUS at 14:19

## 2020-10-14 RX ADMIN — CYANOCOBALAMIN TAB 500 MCG 500 MCG: 500 TAB at 20:35

## 2020-10-14 RX ADMIN — FAMOTIDINE 40 MG: 40 TABLET, FILM COATED ORAL at 20:35

## 2020-10-14 RX ADMIN — SODIUM CHLORIDE 1000 ML: 900 INJECTION, SOLUTION INTRAVENOUS at 16:16

## 2020-10-14 RX ADMIN — SODIUM CHLORIDE, PRESERVATIVE FREE 10 ML: 5 INJECTION INTRAVENOUS at 20:35

## 2020-10-14 NOTE — OUTREACH NOTE
Medical Week 1 Survey      Responses   [facility] patient discharged from?  [place]   Does the patient have one of the following disease processes/diagnoses(primary or secondary)?  Other   Week 1 attempt successful?  No   Revoke  Readmitted          Kiara Lozada RN

## 2020-10-15 PROBLEM — E87.6 HYPOKALEMIA: Status: ACTIVE | Noted: 2020-10-15

## 2020-10-15 LAB
ALBUMIN SERPL-MCNC: 2.4 G/DL (ref 3.5–5.2)
ANION GAP SERPL CALCULATED.3IONS-SCNC: 11 MMOL/L (ref 5–15)
BASOPHILS # BLD AUTO: 0.04 10*3/MM3 (ref 0–0.2)
BASOPHILS NFR BLD AUTO: 0.2 % (ref 0–1.5)
BUN SERPL-MCNC: 17 MG/DL (ref 8–23)
BUN/CREAT SERPL: 12.8 (ref 7–25)
CALCIUM SPEC-SCNC: 7.5 MG/DL (ref 8.6–10.5)
CHLORIDE SERPL-SCNC: 109 MMOL/L (ref 98–107)
CO2 SERPL-SCNC: 21 MMOL/L (ref 22–29)
CREAT SERPL-MCNC: 1.33 MG/DL (ref 0.76–1.27)
DEPRECATED RDW RBC AUTO: 45.7 FL (ref 37–54)
EOSINOPHIL # BLD AUTO: 0 10*3/MM3 (ref 0–0.4)
EOSINOPHIL NFR BLD AUTO: 0 % (ref 0.3–6.2)
ERYTHROCYTE [DISTWIDTH] IN BLOOD BY AUTOMATED COUNT: 13 % (ref 12.3–15.4)
GFR SERPL CREATININE-BSD FRML MDRD: 53 ML/MIN/1.73
GLUCOSE SERPL-MCNC: 121 MG/DL (ref 65–99)
HCT VFR BLD AUTO: 45 % (ref 37.5–51)
HGB BLD-MCNC: 15.3 G/DL (ref 13–17.7)
IMM GRANULOCYTES # BLD AUTO: 0.44 10*3/MM3 (ref 0–0.05)
IMM GRANULOCYTES NFR BLD AUTO: 2 % (ref 0–0.5)
LYMPHOCYTES # BLD AUTO: 0.47 10*3/MM3 (ref 0.7–3.1)
LYMPHOCYTES NFR BLD AUTO: 2.2 % (ref 19.6–45.3)
Lab: NORMAL
MCH RBC QN AUTO: 32.6 PG (ref 26.6–33)
MCHC RBC AUTO-ENTMCNC: 34 G/DL (ref 31.5–35.7)
MCV RBC AUTO: 95.7 FL (ref 79–97)
MONOCYTES # BLD AUTO: 1.42 10*3/MM3 (ref 0.1–0.9)
MONOCYTES NFR BLD AUTO: 6.5 % (ref 5–12)
NEUTROPHILS NFR BLD AUTO: 19.41 10*3/MM3 (ref 1.7–7)
NEUTROPHILS NFR BLD AUTO: 89.1 % (ref 42.7–76)
NRBC BLD AUTO-RTO: 0 /100 WBC (ref 0–0.2)
PLATELET # BLD AUTO: 300 10*3/MM3 (ref 140–450)
PMV BLD AUTO: 10.1 FL (ref 6–12)
POTASSIUM SERPL-SCNC: 3.1 MMOL/L (ref 3.5–5.2)
POTASSIUM SERPL-SCNC: 3.6 MMOL/L (ref 3.5–5.2)
RBC # BLD AUTO: 4.7 10*6/MM3 (ref 4.14–5.8)
SODIUM SERPL-SCNC: 141 MMOL/L (ref 136–145)
WBC # BLD AUTO: 21.78 10*3/MM3 (ref 3.4–10.8)

## 2020-10-15 PROCEDURE — 84132 ASSAY OF SERUM POTASSIUM: CPT | Performed by: STUDENT IN AN ORGANIZED HEALTH CARE EDUCATION/TRAINING PROGRAM

## 2020-10-15 PROCEDURE — 99232 SBSQ HOSP IP/OBS MODERATE 35: CPT | Performed by: INTERNAL MEDICINE

## 2020-10-15 PROCEDURE — 25010000002 MORPHINE PER 10 MG: Performed by: STUDENT IN AN ORGANIZED HEALTH CARE EDUCATION/TRAINING PROGRAM

## 2020-10-15 PROCEDURE — 63710000001 PROMETHAZINE PER 12.5 MG: Performed by: STUDENT IN AN ORGANIZED HEALTH CARE EDUCATION/TRAINING PROGRAM

## 2020-10-15 PROCEDURE — 82040 ASSAY OF SERUM ALBUMIN: CPT | Performed by: STUDENT IN AN ORGANIZED HEALTH CARE EDUCATION/TRAINING PROGRAM

## 2020-10-15 PROCEDURE — 99233 SBSQ HOSP IP/OBS HIGH 50: CPT | Performed by: STUDENT IN AN ORGANIZED HEALTH CARE EDUCATION/TRAINING PROGRAM

## 2020-10-15 PROCEDURE — 25010000002 PIPERACILLIN SOD-TAZOBACTAM PER 1 G: Performed by: STUDENT IN AN ORGANIZED HEALTH CARE EDUCATION/TRAINING PROGRAM

## 2020-10-15 PROCEDURE — 25010000002 ENOXAPARIN PER 10 MG: Performed by: STUDENT IN AN ORGANIZED HEALTH CARE EDUCATION/TRAINING PROGRAM

## 2020-10-15 PROCEDURE — 85025 COMPLETE CBC W/AUTO DIFF WBC: CPT | Performed by: STUDENT IN AN ORGANIZED HEALTH CARE EDUCATION/TRAINING PROGRAM

## 2020-10-15 PROCEDURE — 97161 PT EVAL LOW COMPLEX 20 MIN: CPT

## 2020-10-15 PROCEDURE — 80048 BASIC METABOLIC PNL TOTAL CA: CPT | Performed by: STUDENT IN AN ORGANIZED HEALTH CARE EDUCATION/TRAINING PROGRAM

## 2020-10-15 PROCEDURE — 97165 OT EVAL LOW COMPLEX 30 MIN: CPT

## 2020-10-15 RX ORDER — FAMOTIDINE 20 MG/1
20 TABLET, FILM COATED ORAL DAILY
Status: DISCONTINUED | OUTPATIENT
Start: 2020-10-16 | End: 2020-10-20

## 2020-10-15 RX ORDER — POTASSIUM CHLORIDE 750 MG/1
40 CAPSULE, EXTENDED RELEASE ORAL DAILY
Status: DISCONTINUED | OUTPATIENT
Start: 2020-10-15 | End: 2020-10-20

## 2020-10-15 RX ADMIN — DICYCLOMINE HYDROCHLORIDE 20 MG: 20 TABLET ORAL at 16:38

## 2020-10-15 RX ADMIN — SODIUM CHLORIDE, PRESERVATIVE FREE 10 ML: 5 INJECTION INTRAVENOUS at 21:32

## 2020-10-15 RX ADMIN — PROMETHAZINE HYDROCHLORIDE 12.5 MG: 12.5 TABLET ORAL at 05:32

## 2020-10-15 RX ADMIN — PIPERACILLIN SODIUM AND TAZOBACTAM SODIUM 3.38 G: 3; .375 INJECTION, POWDER, LYOPHILIZED, FOR SOLUTION INTRAVENOUS at 05:32

## 2020-10-15 RX ADMIN — DICYCLOMINE HYDROCHLORIDE 20 MG: 20 TABLET ORAL at 08:39

## 2020-10-15 RX ADMIN — CLOPIDOGREL BISULFATE 75 MG: 75 TABLET ORAL at 08:38

## 2020-10-15 RX ADMIN — SODIUM CHLORIDE, PRESERVATIVE FREE 10 ML: 5 INJECTION INTRAVENOUS at 08:39

## 2020-10-15 RX ADMIN — HYDRALAZINE HYDROCHLORIDE 25 MG: 25 TABLET, FILM COATED ORAL at 21:32

## 2020-10-15 RX ADMIN — Medication 125 MG: at 17:54

## 2020-10-15 RX ADMIN — HYDRALAZINE HYDROCHLORIDE 25 MG: 25 TABLET, FILM COATED ORAL at 16:38

## 2020-10-15 RX ADMIN — SODIUM CHLORIDE 150 ML/HR: 9 INJECTION, SOLUTION INTRAVENOUS at 08:40

## 2020-10-15 RX ADMIN — Medication 125 MG: at 05:32

## 2020-10-15 RX ADMIN — ACETAMINOPHEN 650 MG: 325 TABLET, FILM COATED ORAL at 01:40

## 2020-10-15 RX ADMIN — PROMETHAZINE HYDROCHLORIDE 12.5 MG: 12.5 TABLET ORAL at 21:49

## 2020-10-15 RX ADMIN — TAMSULOSIN HYDROCHLORIDE 0.4 MG: 0.4 CAPSULE ORAL at 21:32

## 2020-10-15 RX ADMIN — CYANOCOBALAMIN TAB 500 MCG 500 MCG: 500 TAB at 08:39

## 2020-10-15 RX ADMIN — FAMOTIDINE 40 MG: 40 TABLET, FILM COATED ORAL at 08:38

## 2020-10-15 RX ADMIN — METRONIDAZOLE 500 MG: 500 INJECTION, SOLUTION INTRAVENOUS at 12:44

## 2020-10-15 RX ADMIN — ENOXAPARIN SODIUM 40 MG: 40 INJECTION SUBCUTANEOUS at 21:31

## 2020-10-15 RX ADMIN — OXYCODONE HYDROCHLORIDE AND ACETAMINOPHEN 500 MG: 500 TABLET ORAL at 08:39

## 2020-10-15 RX ADMIN — HYDRALAZINE HYDROCHLORIDE 25 MG: 25 TABLET, FILM COATED ORAL at 08:38

## 2020-10-15 RX ADMIN — DICYCLOMINE HYDROCHLORIDE 20 MG: 20 TABLET ORAL at 21:32

## 2020-10-15 RX ADMIN — POTASSIUM CHLORIDE 40 MEQ: 10 CAPSULE, COATED, EXTENDED RELEASE ORAL at 08:40

## 2020-10-15 RX ADMIN — Medication 125 MG: at 12:44

## 2020-10-15 RX ADMIN — MORPHINE SULFATE 1 MG: 2 INJECTION, SOLUTION INTRAMUSCULAR; INTRAVENOUS at 21:49

## 2020-10-16 PROBLEM — E43 SEVERE MALNUTRITION (HCC): Status: ACTIVE | Noted: 2020-10-16

## 2020-10-16 LAB
ANION GAP SERPL CALCULATED.3IONS-SCNC: 11 MMOL/L (ref 5–15)
BASOPHILS # BLD AUTO: 0.08 10*3/MM3 (ref 0–0.2)
BASOPHILS NFR BLD AUTO: 0.4 % (ref 0–1.5)
BUN SERPL-MCNC: 24 MG/DL (ref 8–23)
BUN/CREAT SERPL: 16.7 (ref 7–25)
CALCIUM SPEC-SCNC: 7.9 MG/DL (ref 8.6–10.5)
CHLORIDE SERPL-SCNC: 115 MMOL/L (ref 98–107)
CO2 SERPL-SCNC: 15 MMOL/L (ref 22–29)
CREAT SERPL-MCNC: 1.44 MG/DL (ref 0.76–1.27)
D-LACTATE SERPL-SCNC: 2.2 MMOL/L (ref 0.5–2)
DEPRECATED RDW RBC AUTO: 47.1 FL (ref 37–54)
EOSINOPHIL # BLD AUTO: 0 10*3/MM3 (ref 0–0.4)
EOSINOPHIL NFR BLD AUTO: 0 % (ref 0.3–6.2)
ERYTHROCYTE [DISTWIDTH] IN BLOOD BY AUTOMATED COUNT: 13.2 % (ref 12.3–15.4)
GFR SERPL CREATININE-BSD FRML MDRD: 48 ML/MIN/1.73
GLUCOSE SERPL-MCNC: 135 MG/DL (ref 65–99)
HCT VFR BLD AUTO: 52.5 % (ref 37.5–51)
HGB BLD-MCNC: 18 G/DL (ref 13–17.7)
HOLD SPECIMEN: NORMAL
IMM GRANULOCYTES # BLD AUTO: 0.21 10*3/MM3 (ref 0–0.05)
IMM GRANULOCYTES NFR BLD AUTO: 1 % (ref 0–0.5)
LYMPHOCYTES # BLD AUTO: 0.69 10*3/MM3 (ref 0.7–3.1)
LYMPHOCYTES NFR BLD AUTO: 3.2 % (ref 19.6–45.3)
MCH RBC QN AUTO: 32.8 PG (ref 26.6–33)
MCHC RBC AUTO-ENTMCNC: 34.3 G/DL (ref 31.5–35.7)
MCV RBC AUTO: 95.6 FL (ref 79–97)
MONOCYTES # BLD AUTO: 1.28 10*3/MM3 (ref 0.1–0.9)
MONOCYTES NFR BLD AUTO: 6 % (ref 5–12)
NEUTROPHILS NFR BLD AUTO: 19.13 10*3/MM3 (ref 1.7–7)
NEUTROPHILS NFR BLD AUTO: 89.4 % (ref 42.7–76)
NRBC BLD AUTO-RTO: 0 /100 WBC (ref 0–0.2)
PLATELET # BLD AUTO: 319 10*3/MM3 (ref 140–450)
PMV BLD AUTO: 10 FL (ref 6–12)
POTASSIUM SERPL-SCNC: 3.9 MMOL/L (ref 3.5–5.2)
RBC # BLD AUTO: 5.49 10*6/MM3 (ref 4.14–5.8)
SODIUM SERPL-SCNC: 141 MMOL/L (ref 136–145)
WBC # BLD AUTO: 21.39 10*3/MM3 (ref 3.4–10.8)

## 2020-10-16 PROCEDURE — 80048 BASIC METABOLIC PNL TOTAL CA: CPT | Performed by: STUDENT IN AN ORGANIZED HEALTH CARE EDUCATION/TRAINING PROGRAM

## 2020-10-16 PROCEDURE — 85025 COMPLETE CBC W/AUTO DIFF WBC: CPT | Performed by: STUDENT IN AN ORGANIZED HEALTH CARE EDUCATION/TRAINING PROGRAM

## 2020-10-16 PROCEDURE — 25010000002 MORPHINE PER 10 MG: Performed by: STUDENT IN AN ORGANIZED HEALTH CARE EDUCATION/TRAINING PROGRAM

## 2020-10-16 PROCEDURE — 25010000002 ENOXAPARIN PER 10 MG: Performed by: STUDENT IN AN ORGANIZED HEALTH CARE EDUCATION/TRAINING PROGRAM

## 2020-10-16 PROCEDURE — 63710000001 PROMETHAZINE PER 12.5 MG: Performed by: STUDENT IN AN ORGANIZED HEALTH CARE EDUCATION/TRAINING PROGRAM

## 2020-10-16 PROCEDURE — 83605 ASSAY OF LACTIC ACID: CPT | Performed by: STUDENT IN AN ORGANIZED HEALTH CARE EDUCATION/TRAINING PROGRAM

## 2020-10-16 PROCEDURE — 99232 SBSQ HOSP IP/OBS MODERATE 35: CPT | Performed by: INTERNAL MEDICINE

## 2020-10-16 RX ADMIN — HYDRALAZINE HYDROCHLORIDE 25 MG: 25 TABLET, FILM COATED ORAL at 08:02

## 2020-10-16 RX ADMIN — DICYCLOMINE HYDROCHLORIDE 20 MG: 20 TABLET ORAL at 08:02

## 2020-10-16 RX ADMIN — PROMETHAZINE HYDROCHLORIDE 12.5 MG: 12.5 TABLET ORAL at 18:10

## 2020-10-16 RX ADMIN — Medication 125 MG: at 11:32

## 2020-10-16 RX ADMIN — CLOPIDOGREL BISULFATE 75 MG: 75 TABLET ORAL at 08:01

## 2020-10-16 RX ADMIN — TAMSULOSIN HYDROCHLORIDE 0.4 MG: 0.4 CAPSULE ORAL at 20:36

## 2020-10-16 RX ADMIN — DICYCLOMINE HYDROCHLORIDE 20 MG: 20 TABLET ORAL at 15:08

## 2020-10-16 RX ADMIN — Medication 125 MG: at 17:19

## 2020-10-16 RX ADMIN — POTASSIUM CHLORIDE 40 MEQ: 10 CAPSULE, COATED, EXTENDED RELEASE ORAL at 08:01

## 2020-10-16 RX ADMIN — CYANOCOBALAMIN TAB 500 MCG 500 MCG: 500 TAB at 08:02

## 2020-10-16 RX ADMIN — Medication 125 MG: at 00:30

## 2020-10-16 RX ADMIN — DICYCLOMINE HYDROCHLORIDE 20 MG: 20 TABLET ORAL at 20:36

## 2020-10-16 RX ADMIN — ENOXAPARIN SODIUM 40 MG: 40 INJECTION SUBCUTANEOUS at 20:36

## 2020-10-16 RX ADMIN — Medication 125 MG: at 06:05

## 2020-10-16 RX ADMIN — OXYCODONE HYDROCHLORIDE AND ACETAMINOPHEN 500 MG: 500 TABLET ORAL at 08:02

## 2020-10-16 RX ADMIN — HYDRALAZINE HYDROCHLORIDE 25 MG: 25 TABLET, FILM COATED ORAL at 15:08

## 2020-10-16 RX ADMIN — SODIUM BICARBONATE 50 MEQ: 84 INJECTION INTRAVENOUS at 13:38

## 2020-10-16 RX ADMIN — SODIUM CHLORIDE 150 ML/HR: 9 INJECTION, SOLUTION INTRAVENOUS at 14:29

## 2020-10-16 RX ADMIN — HYDRALAZINE HYDROCHLORIDE 25 MG: 25 TABLET, FILM COATED ORAL at 20:36

## 2020-10-16 RX ADMIN — SODIUM CHLORIDE 150 ML/HR: 9 INJECTION, SOLUTION INTRAVENOUS at 08:00

## 2020-10-16 RX ADMIN — MORPHINE SULFATE 1 MG: 2 INJECTION, SOLUTION INTRAMUSCULAR; INTRAVENOUS at 03:13

## 2020-10-16 RX ADMIN — FAMOTIDINE 20 MG: 20 TABLET, FILM COATED ORAL at 08:02

## 2020-10-16 RX ADMIN — SODIUM CHLORIDE 150 ML/HR: 9 INJECTION, SOLUTION INTRAVENOUS at 00:14

## 2020-10-17 ENCOUNTER — APPOINTMENT (OUTPATIENT)
Dept: ULTRASOUND IMAGING | Facility: HOSPITAL | Age: 70
End: 2020-10-17

## 2020-10-17 ENCOUNTER — APPOINTMENT (OUTPATIENT)
Dept: CT IMAGING | Facility: HOSPITAL | Age: 70
End: 2020-10-17

## 2020-10-17 PROBLEM — K64.9 HEMORRHOIDS: Status: ACTIVE | Noted: 2020-10-17

## 2020-10-17 LAB
ANION GAP SERPL CALCULATED.3IONS-SCNC: 13 MMOL/L (ref 5–15)
BACTERIA UR QL AUTO: ABNORMAL /HPF
BASOPHILS # BLD AUTO: 0.07 10*3/MM3 (ref 0–0.2)
BASOPHILS NFR BLD AUTO: 0.4 % (ref 0–1.5)
BILIRUB UR QL STRIP: NEGATIVE
BUN SERPL-MCNC: 32 MG/DL (ref 8–23)
BUN/CREAT SERPL: 19.3 (ref 7–25)
CALCIUM SPEC-SCNC: 7.6 MG/DL (ref 8.6–10.5)
CHLORIDE SERPL-SCNC: 112 MMOL/L (ref 98–107)
CLARITY UR: ABNORMAL
CO2 SERPL-SCNC: 17 MMOL/L (ref 22–29)
COLOR UR: ABNORMAL
CREAT SERPL-MCNC: 1.66 MG/DL (ref 0.76–1.27)
CREAT UR-MCNC: 279.2 MG/DL
CREAT UR-MCNC: 279.2 MG/DL
DEPRECATED RDW RBC AUTO: 50.1 FL (ref 37–54)
EOSINOPHIL # BLD AUTO: 0 10*3/MM3 (ref 0–0.4)
EOSINOPHIL NFR BLD AUTO: 0 % (ref 0.3–6.2)
ERYTHROCYTE [DISTWIDTH] IN BLOOD BY AUTOMATED COUNT: 13.6 % (ref 12.3–15.4)
GFR SERPL CREATININE-BSD FRML MDRD: 41 ML/MIN/1.73
GLUCOSE SERPL-MCNC: 127 MG/DL (ref 65–99)
GLUCOSE UR STRIP-MCNC: NEGATIVE MG/DL
HANSEL STAIN: NEGATIVE
HCT VFR BLD AUTO: 54.4 % (ref 37.5–51)
HGB BLD-MCNC: 18 G/DL (ref 13–17.7)
HGB UR QL STRIP.AUTO: NEGATIVE
HYALINE CASTS UR QL AUTO: ABNORMAL /LPF
IMM GRANULOCYTES # BLD AUTO: 0.25 10*3/MM3 (ref 0–0.05)
IMM GRANULOCYTES NFR BLD AUTO: 1.3 % (ref 0–0.5)
KETONES UR QL STRIP: NEGATIVE
LEUKOCYTE ESTERASE UR QL STRIP.AUTO: NEGATIVE
LYMPHOCYTES # BLD AUTO: 0.87 10*3/MM3 (ref 0.7–3.1)
LYMPHOCYTES NFR BLD AUTO: 4.4 % (ref 19.6–45.3)
MAGNESIUM SERPL-MCNC: 2 MG/DL (ref 1.6–2.4)
MCH RBC QN AUTO: 32.4 PG (ref 26.6–33)
MCHC RBC AUTO-ENTMCNC: 33.1 G/DL (ref 31.5–35.7)
MCV RBC AUTO: 98 FL (ref 79–97)
MONOCYTES # BLD AUTO: 1.16 10*3/MM3 (ref 0.1–0.9)
MONOCYTES NFR BLD AUTO: 5.9 % (ref 5–12)
NEUTROPHILS NFR BLD AUTO: 17.34 10*3/MM3 (ref 1.7–7)
NEUTROPHILS NFR BLD AUTO: 88 % (ref 42.7–76)
NITRITE UR QL STRIP: NEGATIVE
NRBC BLD AUTO-RTO: 0 /100 WBC (ref 0–0.2)
PH UR STRIP.AUTO: 6 [PH] (ref 5–9)
PLATELET # BLD AUTO: 351 10*3/MM3 (ref 140–450)
PMV BLD AUTO: 10.1 FL (ref 6–12)
POTASSIUM SERPL-SCNC: 4.3 MMOL/L (ref 3.5–5.2)
PROT UR QL STRIP: ABNORMAL
PROT UR-MCNC: 106 MG/DL
PROT/CREAT UR: 379.7 MG/G CREA (ref 0–200)
RBC # BLD AUTO: 5.55 10*6/MM3 (ref 4.14–5.8)
RBC # UR: ABNORMAL /HPF
REF LAB TEST METHOD: ABNORMAL
SODIUM SERPL-SCNC: 142 MMOL/L (ref 136–145)
SODIUM UR-SCNC: <20 MMOL/L
SP GR UR STRIP: 1.03 (ref 1–1.03)
SQUAMOUS #/AREA URNS HPF: ABNORMAL /HPF
UROBILINOGEN UR QL STRIP: ABNORMAL
WBC # BLD AUTO: 19.69 10*3/MM3 (ref 3.4–10.8)
WBC UR QL AUTO: ABNORMAL /HPF

## 2020-10-17 PROCEDURE — 87205 SMEAR GRAM STAIN: CPT | Performed by: NURSE PRACTITIONER

## 2020-10-17 PROCEDURE — 63710000001 PROMETHAZINE PER 12.5 MG: Performed by: STUDENT IN AN ORGANIZED HEALTH CARE EDUCATION/TRAINING PROGRAM

## 2020-10-17 PROCEDURE — 85025 COMPLETE CBC W/AUTO DIFF WBC: CPT | Performed by: STUDENT IN AN ORGANIZED HEALTH CARE EDUCATION/TRAINING PROGRAM

## 2020-10-17 PROCEDURE — 25010000002 ENOXAPARIN PER 10 MG: Performed by: STUDENT IN AN ORGANIZED HEALTH CARE EDUCATION/TRAINING PROGRAM

## 2020-10-17 PROCEDURE — 83735 ASSAY OF MAGNESIUM: CPT | Performed by: NURSE PRACTITIONER

## 2020-10-17 PROCEDURE — 82570 ASSAY OF URINE CREATININE: CPT | Performed by: NURSE PRACTITIONER

## 2020-10-17 PROCEDURE — 74176 CT ABD & PELVIS W/O CONTRAST: CPT

## 2020-10-17 PROCEDURE — 63710000001 PROCHLORPERAZINE MALEATE PER 10 MG: Performed by: STUDENT IN AN ORGANIZED HEALTH CARE EDUCATION/TRAINING PROGRAM

## 2020-10-17 PROCEDURE — 80048 BASIC METABOLIC PNL TOTAL CA: CPT | Performed by: STUDENT IN AN ORGANIZED HEALTH CARE EDUCATION/TRAINING PROGRAM

## 2020-10-17 PROCEDURE — 84300 ASSAY OF URINE SODIUM: CPT | Performed by: NURSE PRACTITIONER

## 2020-10-17 PROCEDURE — 84156 ASSAY OF PROTEIN URINE: CPT | Performed by: NURSE PRACTITIONER

## 2020-10-17 PROCEDURE — 81001 URINALYSIS AUTO W/SCOPE: CPT | Performed by: NURSE PRACTITIONER

## 2020-10-17 PROCEDURE — 99232 SBSQ HOSP IP/OBS MODERATE 35: CPT | Performed by: INTERNAL MEDICINE

## 2020-10-17 PROCEDURE — 76775 US EXAM ABDO BACK WALL LIM: CPT

## 2020-10-17 RX ORDER — HYDROCORTISONE ACETATE PRAMOXINE HCL 1; 1 G/100G; G/100G
CREAM TOPICAL 2 TIMES DAILY
Status: DISPENSED | OUTPATIENT
Start: 2020-10-17 | End: 2020-10-19

## 2020-10-17 RX ORDER — PROCHLORPERAZINE MALEATE 10 MG
10 TABLET ORAL ONCE
Status: COMPLETED | OUTPATIENT
Start: 2020-10-17 | End: 2020-10-17

## 2020-10-17 RX ORDER — L. ACIDOPHILUS/L.BULGARICUS 1MM CELL
1 TABLET ORAL EVERY 12 HOURS SCHEDULED
Status: DISCONTINUED | OUTPATIENT
Start: 2020-10-17 | End: 2020-10-26

## 2020-10-17 RX ADMIN — HYDRALAZINE HYDROCHLORIDE 25 MG: 25 TABLET, FILM COATED ORAL at 20:31

## 2020-10-17 RX ADMIN — Medication 125 MG: at 05:25

## 2020-10-17 RX ADMIN — PROCHLORPERAZINE MALEATE 10 MG: 10 TABLET ORAL at 04:33

## 2020-10-17 RX ADMIN — CYANOCOBALAMIN TAB 500 MCG 500 MCG: 500 TAB at 08:35

## 2020-10-17 RX ADMIN — POTASSIUM CHLORIDE 40 MEQ: 10 CAPSULE, COATED, EXTENDED RELEASE ORAL at 08:35

## 2020-10-17 RX ADMIN — METRONIDAZOLE 500 MG: 500 INJECTION, SOLUTION INTRAVENOUS at 20:20

## 2020-10-17 RX ADMIN — Medication 1 TABLET: at 13:12

## 2020-10-17 RX ADMIN — DICYCLOMINE HYDROCHLORIDE 20 MG: 20 TABLET ORAL at 20:31

## 2020-10-17 RX ADMIN — Medication 125 MG: at 13:12

## 2020-10-17 RX ADMIN — OXYCODONE HYDROCHLORIDE AND ACETAMINOPHEN 500 MG: 500 TABLET ORAL at 08:35

## 2020-10-17 RX ADMIN — HYDRALAZINE HYDROCHLORIDE 25 MG: 25 TABLET, FILM COATED ORAL at 08:35

## 2020-10-17 RX ADMIN — FAMOTIDINE 20 MG: 20 TABLET, FILM COATED ORAL at 08:35

## 2020-10-17 RX ADMIN — ENOXAPARIN SODIUM 40 MG: 40 INJECTION SUBCUTANEOUS at 20:31

## 2020-10-17 RX ADMIN — MINERAL OIL, PETROLATUM, PHENYLEPHRINE HCL: 2.5; 140; 749 OINTMENT TOPICAL at 08:37

## 2020-10-17 RX ADMIN — DICYCLOMINE HYDROCHLORIDE 20 MG: 20 TABLET ORAL at 08:35

## 2020-10-17 RX ADMIN — Medication 1 TABLET: at 20:30

## 2020-10-17 RX ADMIN — CLOPIDOGREL BISULFATE 75 MG: 75 TABLET ORAL at 08:35

## 2020-10-17 RX ADMIN — SODIUM CHLORIDE, PRESERVATIVE FREE 10 ML: 5 INJECTION INTRAVENOUS at 20:33

## 2020-10-17 RX ADMIN — PROMETHAZINE HYDROCHLORIDE 12.5 MG: 12.5 TABLET ORAL at 01:58

## 2020-10-17 RX ADMIN — SODIUM BICARBONATE 75 MEQ: 84 INJECTION, SOLUTION INTRAVENOUS at 13:11

## 2020-10-17 RX ADMIN — Medication 125 MG: at 00:22

## 2020-10-17 RX ADMIN — METRONIDAZOLE 500 MG: 500 INJECTION, SOLUTION INTRAVENOUS at 13:13

## 2020-10-17 RX ADMIN — SODIUM CHLORIDE 150 ML/HR: 9 INJECTION, SOLUTION INTRAVENOUS at 05:25

## 2020-10-17 RX ADMIN — HYDRALAZINE HYDROCHLORIDE 25 MG: 25 TABLET, FILM COATED ORAL at 15:00

## 2020-10-17 RX ADMIN — DICYCLOMINE HYDROCHLORIDE 20 MG: 20 TABLET ORAL at 15:00

## 2020-10-17 RX ADMIN — Medication 125 MG: at 17:10

## 2020-10-17 RX ADMIN — TAMSULOSIN HYDROCHLORIDE 0.4 MG: 0.4 CAPSULE ORAL at 20:31

## 2020-10-17 RX ADMIN — HYDROCORTISONE ACETATE PRAMOXINE HCL: 1; 1 CREAM TOPICAL at 20:32

## 2020-10-18 PROBLEM — R00.0 TACHYCARDIA: Status: ACTIVE | Noted: 2020-10-18

## 2020-10-18 PROBLEM — M25.472 EDEMA OF BOTH ANKLES: Status: ACTIVE | Noted: 2020-10-18

## 2020-10-18 PROBLEM — M25.471 EDEMA OF BOTH ANKLES: Status: ACTIVE | Noted: 2020-10-18

## 2020-10-18 LAB
ANION GAP SERPL CALCULATED.3IONS-SCNC: 13 MMOL/L (ref 5–15)
BASOPHILS # BLD AUTO: 0.14 10*3/MM3 (ref 0–0.2)
BASOPHILS NFR BLD AUTO: 0.6 % (ref 0–1.5)
BUN SERPL-MCNC: 43 MG/DL (ref 8–23)
BUN/CREAT SERPL: 24.7 (ref 7–25)
CALCIUM SPEC-SCNC: 7.6 MG/DL (ref 8.6–10.5)
CHLORIDE SERPL-SCNC: 115 MMOL/L (ref 98–107)
CO2 SERPL-SCNC: 16 MMOL/L (ref 22–29)
CREAT SERPL-MCNC: 1.74 MG/DL (ref 0.76–1.27)
DEPRECATED RDW RBC AUTO: 49.1 FL (ref 37–54)
EOSINOPHIL # BLD AUTO: 0 10*3/MM3 (ref 0–0.4)
EOSINOPHIL NFR BLD AUTO: 0 % (ref 0.3–6.2)
ERYTHROCYTE [DISTWIDTH] IN BLOOD BY AUTOMATED COUNT: 13.6 % (ref 12.3–15.4)
GFR SERPL CREATININE-BSD FRML MDRD: 39 ML/MIN/1.73
GLUCOSE SERPL-MCNC: 120 MG/DL (ref 65–99)
HCT VFR BLD AUTO: 55.6 % (ref 37.5–51)
HGB BLD-MCNC: 18.4 G/DL (ref 13–17.7)
IMM GRANULOCYTES # BLD AUTO: 0.87 10*3/MM3 (ref 0–0.05)
IMM GRANULOCYTES NFR BLD AUTO: 4 % (ref 0–0.5)
LYMPHOCYTES # BLD AUTO: 1.19 10*3/MM3 (ref 0.7–3.1)
LYMPHOCYTES NFR BLD AUTO: 5.5 % (ref 19.6–45.3)
MCH RBC QN AUTO: 32.4 PG (ref 26.6–33)
MCHC RBC AUTO-ENTMCNC: 33.1 G/DL (ref 31.5–35.7)
MCV RBC AUTO: 97.9 FL (ref 79–97)
MONOCYTES # BLD AUTO: 1.29 10*3/MM3 (ref 0.1–0.9)
MONOCYTES NFR BLD AUTO: 5.9 % (ref 5–12)
NEUTROPHILS NFR BLD AUTO: 18.26 10*3/MM3 (ref 1.7–7)
NEUTROPHILS NFR BLD AUTO: 84 % (ref 42.7–76)
NRBC BLD AUTO-RTO: 0 /100 WBC (ref 0–0.2)
PLATELET # BLD AUTO: 373 10*3/MM3 (ref 140–450)
PMV BLD AUTO: 10.3 FL (ref 6–12)
POTASSIUM SERPL-SCNC: 4.4 MMOL/L (ref 3.5–5.2)
RBC # BLD AUTO: 5.68 10*6/MM3 (ref 4.14–5.8)
SODIUM SERPL-SCNC: 144 MMOL/L (ref 136–145)
URATE SERPL-MCNC: 7 MG/DL (ref 3.4–7)
WBC # BLD AUTO: 21.75 10*3/MM3 (ref 3.4–10.8)

## 2020-10-18 PROCEDURE — 97110 THERAPEUTIC EXERCISES: CPT

## 2020-10-18 PROCEDURE — 25010000002 FUROSEMIDE PER 20 MG: Performed by: NURSE PRACTITIONER

## 2020-10-18 PROCEDURE — 85025 COMPLETE CBC W/AUTO DIFF WBC: CPT | Performed by: STUDENT IN AN ORGANIZED HEALTH CARE EDUCATION/TRAINING PROGRAM

## 2020-10-18 PROCEDURE — 93005 ELECTROCARDIOGRAM TRACING: CPT | Performed by: STUDENT IN AN ORGANIZED HEALTH CARE EDUCATION/TRAINING PROGRAM

## 2020-10-18 PROCEDURE — 99232 SBSQ HOSP IP/OBS MODERATE 35: CPT | Performed by: INTERNAL MEDICINE

## 2020-10-18 PROCEDURE — 80048 BASIC METABOLIC PNL TOTAL CA: CPT | Performed by: STUDENT IN AN ORGANIZED HEALTH CARE EDUCATION/TRAINING PROGRAM

## 2020-10-18 PROCEDURE — 25010000002 ENOXAPARIN PER 10 MG: Performed by: STUDENT IN AN ORGANIZED HEALTH CARE EDUCATION/TRAINING PROGRAM

## 2020-10-18 PROCEDURE — 93010 ELECTROCARDIOGRAM REPORT: CPT | Performed by: INTERNAL MEDICINE

## 2020-10-18 PROCEDURE — 84550 ASSAY OF BLOOD/URIC ACID: CPT | Performed by: NURSE PRACTITIONER

## 2020-10-18 RX ORDER — FUROSEMIDE 10 MG/ML
40 INJECTION INTRAMUSCULAR; INTRAVENOUS ONCE
Status: COMPLETED | OUTPATIENT
Start: 2020-10-18 | End: 2020-10-18

## 2020-10-18 RX ADMIN — ENOXAPARIN SODIUM 40 MG: 40 INJECTION SUBCUTANEOUS at 20:39

## 2020-10-18 RX ADMIN — FUROSEMIDE 40 MG: 10 INJECTION, SOLUTION INTRAMUSCULAR; INTRAVENOUS at 12:27

## 2020-10-18 RX ADMIN — Medication 125 MG: at 12:25

## 2020-10-18 RX ADMIN — METRONIDAZOLE 500 MG: 500 INJECTION, SOLUTION INTRAVENOUS at 12:26

## 2020-10-18 RX ADMIN — HYDRALAZINE HYDROCHLORIDE 25 MG: 25 TABLET, FILM COATED ORAL at 08:39

## 2020-10-18 RX ADMIN — HYDROCORTISONE ACETATE PRAMOXINE HCL: 1; 1 CREAM TOPICAL at 20:44

## 2020-10-18 RX ADMIN — SODIUM BICARBONATE 150 MEQ: 84 INJECTION, SOLUTION INTRAVENOUS at 23:07

## 2020-10-18 RX ADMIN — SODIUM BICARBONATE 150 MEQ: 84 INJECTION, SOLUTION INTRAVENOUS at 12:24

## 2020-10-18 RX ADMIN — FAMOTIDINE 20 MG: 20 TABLET, FILM COATED ORAL at 08:37

## 2020-10-18 RX ADMIN — Medication 125 MG: at 00:38

## 2020-10-18 RX ADMIN — HYDRALAZINE HYDROCHLORIDE 25 MG: 25 TABLET, FILM COATED ORAL at 20:39

## 2020-10-18 RX ADMIN — Medication 125 MG: at 23:07

## 2020-10-18 RX ADMIN — OXYCODONE HYDROCHLORIDE AND ACETAMINOPHEN 500 MG: 500 TABLET ORAL at 08:39

## 2020-10-18 RX ADMIN — METRONIDAZOLE 500 MG: 500 INJECTION, SOLUTION INTRAVENOUS at 20:39

## 2020-10-18 RX ADMIN — HYDROCORTISONE ACETATE PRAMOXINE HCL: 1; 1 CREAM TOPICAL at 08:39

## 2020-10-18 RX ADMIN — POTASSIUM CHLORIDE 40 MEQ: 10 CAPSULE, COATED, EXTENDED RELEASE ORAL at 08:37

## 2020-10-18 RX ADMIN — SODIUM CHLORIDE, PRESERVATIVE FREE 10 ML: 5 INJECTION INTRAVENOUS at 20:43

## 2020-10-18 RX ADMIN — HYDRALAZINE HYDROCHLORIDE 25 MG: 25 TABLET, FILM COATED ORAL at 15:07

## 2020-10-18 RX ADMIN — Medication 1 TABLET: at 08:37

## 2020-10-18 RX ADMIN — CYANOCOBALAMIN TAB 500 MCG 500 MCG: 500 TAB at 08:37

## 2020-10-18 RX ADMIN — Medication 125 MG: at 05:52

## 2020-10-18 RX ADMIN — Medication 1 TABLET: at 20:39

## 2020-10-18 RX ADMIN — CLOPIDOGREL BISULFATE 75 MG: 75 TABLET ORAL at 08:37

## 2020-10-18 RX ADMIN — DICYCLOMINE HYDROCHLORIDE 20 MG: 20 TABLET ORAL at 20:39

## 2020-10-18 RX ADMIN — SODIUM BICARBONATE 75 MEQ: 84 INJECTION, SOLUTION INTRAVENOUS at 02:22

## 2020-10-18 RX ADMIN — DICYCLOMINE HYDROCHLORIDE 20 MG: 20 TABLET ORAL at 08:37

## 2020-10-18 RX ADMIN — METRONIDAZOLE 500 MG: 500 INJECTION, SOLUTION INTRAVENOUS at 05:52

## 2020-10-18 RX ADMIN — TAMSULOSIN HYDROCHLORIDE 0.4 MG: 0.4 CAPSULE ORAL at 20:39

## 2020-10-18 RX ADMIN — DICYCLOMINE HYDROCHLORIDE 20 MG: 20 TABLET ORAL at 15:07

## 2020-10-18 RX ADMIN — Medication 125 MG: at 17:08

## 2020-10-19 LAB
ANION GAP SERPL CALCULATED.3IONS-SCNC: 10 MMOL/L (ref 5–15)
BACTERIA SPEC AEROBE CULT: NORMAL
BACTERIA SPEC AEROBE CULT: NORMAL
BASOPHILS # BLD AUTO: 0.15 10*3/MM3 (ref 0–0.2)
BASOPHILS NFR BLD AUTO: 0.8 % (ref 0–1.5)
BUN SERPL-MCNC: 55 MG/DL (ref 8–23)
BUN/CREAT SERPL: 26.2 (ref 7–25)
CALCIUM SPEC-SCNC: 7.8 MG/DL (ref 8.6–10.5)
CHLORIDE SERPL-SCNC: 109 MMOL/L (ref 98–107)
CO2 SERPL-SCNC: 25 MMOL/L (ref 22–29)
CREAT SERPL-MCNC: 2.1 MG/DL (ref 0.76–1.27)
DEPRECATED RDW RBC AUTO: 47.6 FL (ref 37–54)
EOSINOPHIL # BLD AUTO: 0 10*3/MM3 (ref 0–0.4)
EOSINOPHIL NFR BLD AUTO: 0 % (ref 0.3–6.2)
ERYTHROCYTE [DISTWIDTH] IN BLOOD BY AUTOMATED COUNT: 13.6 % (ref 12.3–15.4)
GFR SERPL CREATININE-BSD FRML MDRD: 31 ML/MIN/1.73
GLUCOSE BLDC GLUCOMTR-MCNC: 107 MG/DL (ref 70–130)
GLUCOSE SERPL-MCNC: 111 MG/DL (ref 65–99)
HCT VFR BLD AUTO: 53.1 % (ref 37.5–51)
HGB BLD-MCNC: 18 G/DL (ref 13–17.7)
IMM GRANULOCYTES # BLD AUTO: 1.1 10*3/MM3 (ref 0–0.05)
IMM GRANULOCYTES NFR BLD AUTO: 6.2 % (ref 0–0.5)
LYMPHOCYTES # BLD AUTO: 1.5 10*3/MM3 (ref 0.7–3.1)
LYMPHOCYTES # BLD MANUAL: 1.43 10*3/MM3 (ref 0.7–3.1)
LYMPHOCYTES NFR BLD AUTO: 8.4 % (ref 19.6–45.3)
LYMPHOCYTES NFR BLD MANUAL: 5 % (ref 5–12)
LYMPHOCYTES NFR BLD MANUAL: 8 % (ref 19.6–45.3)
MCH RBC QN AUTO: 32.1 PG (ref 26.6–33)
MCHC RBC AUTO-ENTMCNC: 33.9 G/DL (ref 31.5–35.7)
MCV RBC AUTO: 94.7 FL (ref 79–97)
METAMYELOCYTES NFR BLD MANUAL: 1 % (ref 0–0)
MONOCYTES # BLD AUTO: 0.89 10*3/MM3 (ref 0.1–0.9)
MONOCYTES # BLD AUTO: 1.42 10*3/MM3 (ref 0.1–0.9)
MONOCYTES NFR BLD AUTO: 7.9 % (ref 5–12)
MYELOCYTES NFR BLD MANUAL: 2 % (ref 0–0)
NEUTROPHILS # BLD AUTO: 14.84 10*3/MM3 (ref 1.7–7)
NEUTROPHILS NFR BLD AUTO: 13.71 10*3/MM3 (ref 1.7–7)
NEUTROPHILS NFR BLD AUTO: 76.7 % (ref 42.7–76)
NEUTROPHILS NFR BLD MANUAL: 83 % (ref 42.7–76)
NRBC BLD AUTO-RTO: 0 /100 WBC (ref 0–0.2)
PLATELET # BLD AUTO: 371 10*3/MM3 (ref 140–450)
PMV BLD AUTO: 10.2 FL (ref 6–12)
POTASSIUM SERPL-SCNC: 3.9 MMOL/L (ref 3.5–5.2)
RBC # BLD AUTO: 5.61 10*6/MM3 (ref 4.14–5.8)
RBC MORPH BLD: NORMAL
SMALL PLATELETS BLD QL SMEAR: ADEQUATE
SODIUM SERPL-SCNC: 144 MMOL/L (ref 136–145)
VARIANT LYMPHS NFR BLD MANUAL: 1 % (ref 0–5)
WBC # BLD AUTO: 17.88 10*3/MM3 (ref 3.4–10.8)
WBC MORPH BLD: NORMAL

## 2020-10-19 PROCEDURE — 82962 GLUCOSE BLOOD TEST: CPT

## 2020-10-19 PROCEDURE — 85007 BL SMEAR W/DIFF WBC COUNT: CPT | Performed by: STUDENT IN AN ORGANIZED HEALTH CARE EDUCATION/TRAINING PROGRAM

## 2020-10-19 PROCEDURE — 80048 BASIC METABOLIC PNL TOTAL CA: CPT | Performed by: STUDENT IN AN ORGANIZED HEALTH CARE EDUCATION/TRAINING PROGRAM

## 2020-10-19 PROCEDURE — 85025 COMPLETE CBC W/AUTO DIFF WBC: CPT | Performed by: STUDENT IN AN ORGANIZED HEALTH CARE EDUCATION/TRAINING PROGRAM

## 2020-10-19 PROCEDURE — 25010000002 MORPHINE PER 10 MG: Performed by: STUDENT IN AN ORGANIZED HEALTH CARE EDUCATION/TRAINING PROGRAM

## 2020-10-19 RX ORDER — METRONIDAZOLE 500 MG/1
500 TABLET ORAL EVERY 8 HOURS SCHEDULED
Status: DISCONTINUED | OUTPATIENT
Start: 2020-10-19 | End: 2020-10-19

## 2020-10-19 RX ORDER — SODIUM CHLORIDE 9 MG/ML
60 INJECTION, SOLUTION INTRAVENOUS CONTINUOUS
Status: DISCONTINUED | OUTPATIENT
Start: 2020-10-19 | End: 2020-10-22

## 2020-10-19 RX ADMIN — Medication 1 TABLET: at 20:53

## 2020-10-19 RX ADMIN — MORPHINE SULFATE 1 MG: 2 INJECTION, SOLUTION INTRAMUSCULAR; INTRAVENOUS at 22:23

## 2020-10-19 RX ADMIN — SODIUM BICARBONATE 150 MEQ: 84 INJECTION, SOLUTION INTRAVENOUS at 08:39

## 2020-10-19 RX ADMIN — DICYCLOMINE HYDROCHLORIDE 20 MG: 20 TABLET ORAL at 08:38

## 2020-10-19 RX ADMIN — SODIUM CHLORIDE, PRESERVATIVE FREE 10 ML: 5 INJECTION INTRAVENOUS at 08:33

## 2020-10-19 RX ADMIN — FAMOTIDINE 20 MG: 20 TABLET, FILM COATED ORAL at 08:33

## 2020-10-19 RX ADMIN — TAMSULOSIN HYDROCHLORIDE 0.4 MG: 0.4 CAPSULE ORAL at 20:53

## 2020-10-19 RX ADMIN — Medication 125 MG: at 06:02

## 2020-10-19 RX ADMIN — CLOPIDOGREL BISULFATE 75 MG: 75 TABLET ORAL at 08:33

## 2020-10-19 RX ADMIN — METRONIDAZOLE 500 MG: 500 INJECTION, SOLUTION INTRAVENOUS at 06:02

## 2020-10-19 RX ADMIN — CYANOCOBALAMIN TAB 500 MCG 500 MCG: 500 TAB at 08:38

## 2020-10-19 RX ADMIN — METRONIDAZOLE 500 MG: 500 INJECTION, SOLUTION INTRAVENOUS at 18:59

## 2020-10-19 RX ADMIN — SODIUM CHLORIDE 150 ML/HR: 900 INJECTION, SOLUTION INTRAVENOUS at 18:59

## 2020-10-19 RX ADMIN — SODIUM CHLORIDE 1000 ML: 9 INJECTION, SOLUTION INTRAVENOUS at 17:40

## 2020-10-19 RX ADMIN — SODIUM CHLORIDE, PRESERVATIVE FREE 10 ML: 5 INJECTION INTRAVENOUS at 20:53

## 2020-10-19 RX ADMIN — SODIUM CHLORIDE 1000 ML: 9 INJECTION, SOLUTION INTRAVENOUS at 20:53

## 2020-10-19 RX ADMIN — METRONIDAZOLE 500 MG: 500 INJECTION, SOLUTION INTRAVENOUS at 12:00

## 2020-10-19 RX ADMIN — OXYCODONE HYDROCHLORIDE AND ACETAMINOPHEN 500 MG: 500 TABLET ORAL at 08:33

## 2020-10-19 RX ADMIN — POTASSIUM CHLORIDE 40 MEQ: 10 CAPSULE, COATED, EXTENDED RELEASE ORAL at 08:39

## 2020-10-19 RX ADMIN — Medication 1 TABLET: at 08:37

## 2020-10-19 RX ADMIN — HYDRALAZINE HYDROCHLORIDE 25 MG: 25 TABLET, FILM COATED ORAL at 08:33

## 2020-10-20 ENCOUNTER — APPOINTMENT (OUTPATIENT)
Dept: GENERAL RADIOLOGY | Facility: HOSPITAL | Age: 70
End: 2020-10-20

## 2020-10-20 ENCOUNTER — APPOINTMENT (OUTPATIENT)
Dept: ULTRASOUND IMAGING | Facility: HOSPITAL | Age: 70
End: 2020-10-20

## 2020-10-20 LAB
ALBUMIN SERPL-MCNC: 2.4 G/DL (ref 3.5–5.2)
ALBUMIN/GLOB SERPL: 1.1 G/DL
ALP SERPL-CCNC: 62 U/L (ref 39–117)
ALT SERPL W P-5'-P-CCNC: 18 U/L (ref 1–41)
ANION GAP SERPL CALCULATED.3IONS-SCNC: 9 MMOL/L (ref 5–15)
ANION GAP SERPL CALCULATED.3IONS-SCNC: 9 MMOL/L (ref 5–15)
AST SERPL-CCNC: 43 U/L (ref 1–40)
BASOPHILS # BLD AUTO: 0.02 10*3/MM3 (ref 0–0.2)
BASOPHILS NFR BLD AUTO: 0.1 % (ref 0–1.5)
BILIRUB SERPL-MCNC: 0.4 MG/DL (ref 0–1.2)
BUN SERPL-MCNC: 52 MG/DL (ref 8–23)
BUN SERPL-MCNC: 54 MG/DL (ref 8–23)
BUN/CREAT SERPL: 28.9 (ref 7–25)
BUN/CREAT SERPL: 29.2 (ref 7–25)
CALCIUM SPEC-SCNC: 7.2 MG/DL (ref 8.6–10.5)
CALCIUM SPEC-SCNC: 7.6 MG/DL (ref 8.6–10.5)
CHLORIDE SERPL-SCNC: 111 MMOL/L (ref 98–107)
CHLORIDE SERPL-SCNC: 112 MMOL/L (ref 98–107)
CO2 SERPL-SCNC: 23 MMOL/L (ref 22–29)
CO2 SERPL-SCNC: 24 MMOL/L (ref 22–29)
CREAT SERPL-MCNC: 1.8 MG/DL (ref 0.76–1.27)
CREAT SERPL-MCNC: 1.85 MG/DL (ref 0.76–1.27)
DEPRECATED RDW RBC AUTO: 47.7 FL (ref 37–54)
EOSINOPHIL # BLD AUTO: 0.01 10*3/MM3 (ref 0–0.4)
EOSINOPHIL NFR BLD AUTO: 0.1 % (ref 0.3–6.2)
ERYTHROCYTE [DISTWIDTH] IN BLOOD BY AUTOMATED COUNT: 13.5 % (ref 12.3–15.4)
GFR SERPL CREATININE-BSD FRML MDRD: 36 ML/MIN/1.73
GFR SERPL CREATININE-BSD FRML MDRD: 37 ML/MIN/1.73
GLOBULIN UR ELPH-MCNC: 2.1 GM/DL
GLUCOSE SERPL-MCNC: 102 MG/DL (ref 65–99)
GLUCOSE SERPL-MCNC: 122 MG/DL (ref 65–99)
HCT VFR BLD AUTO: 51.3 % (ref 37.5–51)
HGB BLD-MCNC: 17.3 G/DL (ref 13–17.7)
IMM GRANULOCYTES # BLD AUTO: 0.9 10*3/MM3 (ref 0–0.05)
IMM GRANULOCYTES NFR BLD AUTO: 6.4 % (ref 0–0.5)
LYMPHOCYTES # BLD AUTO: 1.1 10*3/MM3 (ref 0.7–3.1)
LYMPHOCYTES NFR BLD AUTO: 7.9 % (ref 19.6–45.3)
MCH RBC QN AUTO: 32.3 PG (ref 26.6–33)
MCHC RBC AUTO-ENTMCNC: 33.7 G/DL (ref 31.5–35.7)
MCV RBC AUTO: 95.7 FL (ref 79–97)
MONOCYTES # BLD AUTO: 1.13 10*3/MM3 (ref 0.1–0.9)
MONOCYTES NFR BLD AUTO: 8.1 % (ref 5–12)
NEUTROPHILS NFR BLD AUTO: 10.81 10*3/MM3 (ref 1.7–7)
NEUTROPHILS NFR BLD AUTO: 77.4 % (ref 42.7–76)
NRBC BLD AUTO-RTO: 0 /100 WBC (ref 0–0.2)
PLATELET # BLD AUTO: 307 10*3/MM3 (ref 140–450)
PMV BLD AUTO: 10.4 FL (ref 6–12)
POTASSIUM SERPL-SCNC: 3.7 MMOL/L (ref 3.5–5.2)
POTASSIUM SERPL-SCNC: 4 MMOL/L (ref 3.5–5.2)
PROT SERPL-MCNC: 4.5 G/DL (ref 6–8.5)
RBC # BLD AUTO: 5.36 10*6/MM3 (ref 4.14–5.8)
SODIUM SERPL-SCNC: 143 MMOL/L (ref 136–145)
SODIUM SERPL-SCNC: 145 MMOL/L (ref 136–145)
WBC # BLD AUTO: 13.97 10*3/MM3 (ref 3.4–10.8)

## 2020-10-20 PROCEDURE — 85025 COMPLETE CBC W/AUTO DIFF WBC: CPT | Performed by: STUDENT IN AN ORGANIZED HEALTH CARE EDUCATION/TRAINING PROGRAM

## 2020-10-20 PROCEDURE — 25010000002 MORPHINE PER 10 MG: Performed by: STUDENT IN AN ORGANIZED HEALTH CARE EDUCATION/TRAINING PROGRAM

## 2020-10-20 PROCEDURE — 74022 RADEX COMPL AQT ABD SERIES: CPT

## 2020-10-20 PROCEDURE — P9047 ALBUMIN (HUMAN), 25%, 50ML: HCPCS | Performed by: INTERNAL MEDICINE

## 2020-10-20 PROCEDURE — 99222 1ST HOSP IP/OBS MODERATE 55: CPT | Performed by: SURGERY

## 2020-10-20 PROCEDURE — 87635 SARS-COV-2 COVID-19 AMP PRB: CPT | Performed by: INTERNAL MEDICINE

## 2020-10-20 PROCEDURE — 80053 COMPREHEN METABOLIC PANEL: CPT | Performed by: STUDENT IN AN ORGANIZED HEALTH CARE EDUCATION/TRAINING PROGRAM

## 2020-10-20 PROCEDURE — 25010000002 ALBUMIN HUMAN 25% PER 50 ML: Performed by: INTERNAL MEDICINE

## 2020-10-20 PROCEDURE — 76775 US EXAM ABDO BACK WALL LIM: CPT

## 2020-10-20 RX ORDER — ALBUMIN (HUMAN) 12.5 G/50ML
25 SOLUTION INTRAVENOUS 3 TIMES DAILY
Status: DISCONTINUED | OUTPATIENT
Start: 2020-10-20 | End: 2020-10-23

## 2020-10-20 RX ORDER — PANTOPRAZOLE SODIUM 40 MG/10ML
40 INJECTION, POWDER, LYOPHILIZED, FOR SOLUTION INTRAVENOUS
Status: DISCONTINUED | OUTPATIENT
Start: 2020-10-20 | End: 2020-10-26

## 2020-10-20 RX ADMIN — Medication 500 MG: at 18:50

## 2020-10-20 RX ADMIN — SODIUM CHLORIDE, PRESERVATIVE FREE 10 ML: 5 INJECTION INTRAVENOUS at 04:17

## 2020-10-20 RX ADMIN — METRONIDAZOLE 500 MG: 500 INJECTION, SOLUTION INTRAVENOUS at 22:16

## 2020-10-20 RX ADMIN — PANTOPRAZOLE SODIUM 40 MG: 40 INJECTION, POWDER, FOR SOLUTION INTRAVENOUS at 08:13

## 2020-10-20 RX ADMIN — Medication 500 MG: at 06:35

## 2020-10-20 RX ADMIN — MORPHINE SULFATE 1 MG: 2 INJECTION, SOLUTION INTRAMUSCULAR; INTRAVENOUS at 10:00

## 2020-10-20 RX ADMIN — SODIUM CHLORIDE 150 ML/HR: 900 INJECTION, SOLUTION INTRAVENOUS at 22:43

## 2020-10-20 RX ADMIN — METRONIDAZOLE 500 MG: 500 INJECTION, SOLUTION INTRAVENOUS at 06:36

## 2020-10-20 RX ADMIN — SODIUM CHLORIDE 150 ML/HR: 900 INJECTION, SOLUTION INTRAVENOUS at 04:53

## 2020-10-20 RX ADMIN — Medication 500 MG: at 01:00

## 2020-10-20 RX ADMIN — ALBUMIN (HUMAN) 25 G: 0.25 INJECTION, SOLUTION INTRAVENOUS at 20:29

## 2020-10-20 RX ADMIN — MORPHINE SULFATE 1 MG: 2 INJECTION, SOLUTION INTRAMUSCULAR; INTRAVENOUS at 04:16

## 2020-10-20 RX ADMIN — SODIUM CHLORIDE 150 ML/HR: 900 INJECTION, SOLUTION INTRAVENOUS at 16:28

## 2020-10-20 RX ADMIN — METRONIDAZOLE 500 MG: 500 INJECTION, SOLUTION INTRAVENOUS at 00:08

## 2020-10-20 RX ADMIN — Medication 500 MG: at 16:25

## 2020-10-20 RX ADMIN — MORPHINE SULFATE 1 MG: 2 INJECTION, SOLUTION INTRAMUSCULAR; INTRAVENOUS at 20:28

## 2020-10-20 RX ADMIN — SODIUM CHLORIDE, PRESERVATIVE FREE 10 ML: 5 INJECTION INTRAVENOUS at 06:36

## 2020-10-20 RX ADMIN — SODIUM CHLORIDE, PRESERVATIVE FREE 10 ML: 5 INJECTION INTRAVENOUS at 20:29

## 2020-10-20 RX ADMIN — Medication 1 TABLET: at 21:05

## 2020-10-20 RX ADMIN — SODIUM CHLORIDE 1000 ML: 9 INJECTION, SOLUTION INTRAVENOUS at 08:13

## 2020-10-20 RX ADMIN — METRONIDAZOLE 500 MG: 500 INJECTION, SOLUTION INTRAVENOUS at 16:27

## 2020-10-21 PROBLEM — K52.9 COLITIS: Status: ACTIVE | Noted: 2020-10-14

## 2020-10-21 LAB
ALBUMIN SERPL-MCNC: 2.7 G/DL (ref 3.5–5.2)
ALBUMIN/GLOB SERPL: 1.8 G/DL
ALP SERPL-CCNC: 50 U/L (ref 39–117)
ALT SERPL W P-5'-P-CCNC: 15 U/L (ref 1–41)
ANION GAP SERPL CALCULATED.3IONS-SCNC: 11 MMOL/L (ref 5–15)
AST SERPL-CCNC: 38 U/L (ref 1–40)
BASOPHILS # BLD AUTO: 0.09 10*3/MM3 (ref 0–0.2)
BASOPHILS NFR BLD AUTO: 0.7 % (ref 0–1.5)
BILIRUB SERPL-MCNC: 0.4 MG/DL (ref 0–1.2)
BUN SERPL-MCNC: 51 MG/DL (ref 8–23)
BUN/CREAT SERPL: 29.7 (ref 7–25)
CALCIUM SPEC-SCNC: 7.4 MG/DL (ref 8.6–10.5)
CHLORIDE SERPL-SCNC: 113 MMOL/L (ref 98–107)
CO2 SERPL-SCNC: 21 MMOL/L (ref 22–29)
CREAT SERPL-MCNC: 1.72 MG/DL (ref 0.76–1.27)
DEPRECATED RDW RBC AUTO: 47.7 FL (ref 37–54)
EOSINOPHIL # BLD AUTO: 0.03 10*3/MM3 (ref 0–0.4)
EOSINOPHIL NFR BLD AUTO: 0.2 % (ref 0.3–6.2)
ERYTHROCYTE [DISTWIDTH] IN BLOOD BY AUTOMATED COUNT: 13.4 % (ref 12.3–15.4)
GFR SERPL CREATININE-BSD FRML MDRD: 39 ML/MIN/1.73
GLOBULIN UR ELPH-MCNC: 1.5 GM/DL
GLUCOSE SERPL-MCNC: 91 MG/DL (ref 65–99)
HCT VFR BLD AUTO: 45.6 % (ref 37.5–51)
HGB BLD-MCNC: 15.3 G/DL (ref 13–17.7)
IMM GRANULOCYTES # BLD AUTO: 1.12 10*3/MM3 (ref 0–0.05)
IMM GRANULOCYTES NFR BLD AUTO: 8.6 % (ref 0–0.5)
LYMPHOCYTES # BLD AUTO: 1.05 10*3/MM3 (ref 0.7–3.1)
LYMPHOCYTES NFR BLD AUTO: 8 % (ref 19.6–45.3)
MCH RBC QN AUTO: 32.6 PG (ref 26.6–33)
MCHC RBC AUTO-ENTMCNC: 33.6 G/DL (ref 31.5–35.7)
MCV RBC AUTO: 97.2 FL (ref 79–97)
MONOCYTES # BLD AUTO: 0.89 10*3/MM3 (ref 0.1–0.9)
MONOCYTES NFR BLD AUTO: 6.8 % (ref 5–12)
NEUTROPHILS NFR BLD AUTO: 75.7 % (ref 42.7–76)
NEUTROPHILS NFR BLD AUTO: 9.87 10*3/MM3 (ref 1.7–7)
NRBC BLD AUTO-RTO: 0 /100 WBC (ref 0–0.2)
PLATELET # BLD AUTO: 234 10*3/MM3 (ref 140–450)
PMV BLD AUTO: 10.3 FL (ref 6–12)
POTASSIUM SERPL-SCNC: 3.8 MMOL/L (ref 3.5–5.2)
PROT SERPL-MCNC: 4.2 G/DL (ref 6–8.5)
RBC # BLD AUTO: 4.69 10*6/MM3 (ref 4.14–5.8)
SARS-COV-2 N GENE RESP QL NAA+PROBE: NOT DETECTED
SODIUM SERPL-SCNC: 145 MMOL/L (ref 136–145)
WBC # BLD AUTO: 13.05 10*3/MM3 (ref 3.4–10.8)

## 2020-10-21 PROCEDURE — 25010000002 MORPHINE PER 10 MG: Performed by: STUDENT IN AN ORGANIZED HEALTH CARE EDUCATION/TRAINING PROGRAM

## 2020-10-21 PROCEDURE — 80053 COMPREHEN METABOLIC PANEL: CPT | Performed by: STUDENT IN AN ORGANIZED HEALTH CARE EDUCATION/TRAINING PROGRAM

## 2020-10-21 PROCEDURE — 25010000002 ALBUMIN HUMAN 25% PER 50 ML: Performed by: INTERNAL MEDICINE

## 2020-10-21 PROCEDURE — 85025 COMPLETE CBC W/AUTO DIFF WBC: CPT | Performed by: STUDENT IN AN ORGANIZED HEALTH CARE EDUCATION/TRAINING PROGRAM

## 2020-10-21 PROCEDURE — 25010000002 MORPHINE PER 10 MG: Performed by: SURGERY

## 2020-10-21 PROCEDURE — 99233 SBSQ HOSP IP/OBS HIGH 50: CPT | Performed by: STUDENT IN AN ORGANIZED HEALTH CARE EDUCATION/TRAINING PROGRAM

## 2020-10-21 PROCEDURE — 25010000002 FUROSEMIDE PER 20 MG: Performed by: INTERNAL MEDICINE

## 2020-10-21 PROCEDURE — P9047 ALBUMIN (HUMAN), 25%, 50ML: HCPCS | Performed by: INTERNAL MEDICINE

## 2020-10-21 RX ORDER — LABETALOL HYDROCHLORIDE 5 MG/ML
10 INJECTION, SOLUTION INTRAVENOUS EVERY 6 HOURS PRN
Status: DISCONTINUED | OUTPATIENT
Start: 2020-10-21 | End: 2020-10-26

## 2020-10-21 RX ORDER — MORPHINE SULFATE 2 MG/ML
1 INJECTION, SOLUTION INTRAMUSCULAR; INTRAVENOUS EVERY 4 HOURS PRN
Status: DISCONTINUED | OUTPATIENT
Start: 2020-10-21 | End: 2020-10-26

## 2020-10-21 RX ORDER — FUROSEMIDE 10 MG/ML
40 INJECTION INTRAMUSCULAR; INTRAVENOUS ONCE
Status: COMPLETED | OUTPATIENT
Start: 2020-10-21 | End: 2020-10-21

## 2020-10-21 RX ADMIN — Medication 1 TABLET: at 08:26

## 2020-10-21 RX ADMIN — SODIUM CHLORIDE 150 ML/HR: 900 INJECTION, SOLUTION INTRAVENOUS at 05:35

## 2020-10-21 RX ADMIN — Medication 500 MG: at 13:51

## 2020-10-21 RX ADMIN — MORPHINE SULFATE 1 MG: 2 INJECTION, SOLUTION INTRAMUSCULAR; INTRAVENOUS at 09:23

## 2020-10-21 RX ADMIN — SODIUM CHLORIDE 60 ML/HR: 900 INJECTION, SOLUTION INTRAVENOUS at 21:50

## 2020-10-21 RX ADMIN — FUROSEMIDE 40 MG: 10 INJECTION, SOLUTION INTRAMUSCULAR; INTRAVENOUS at 10:13

## 2020-10-21 RX ADMIN — SODIUM CHLORIDE, PRESERVATIVE FREE 10 ML: 5 INJECTION INTRAVENOUS at 20:32

## 2020-10-21 RX ADMIN — ALBUMIN (HUMAN) 25 G: 0.25 INJECTION, SOLUTION INTRAVENOUS at 16:35

## 2020-10-21 RX ADMIN — MORPHINE SULFATE 1 MG: 2 INJECTION, SOLUTION INTRAMUSCULAR; INTRAVENOUS at 00:34

## 2020-10-21 RX ADMIN — Medication 500 MG: at 06:10

## 2020-10-21 RX ADMIN — Medication 500 MG: at 00:37

## 2020-10-21 RX ADMIN — METRONIDAZOLE 500 MG: 500 INJECTION, SOLUTION INTRAVENOUS at 22:49

## 2020-10-21 RX ADMIN — Medication 500 MG: at 18:02

## 2020-10-21 RX ADMIN — PANTOPRAZOLE SODIUM 40 MG: 40 INJECTION, POWDER, FOR SOLUTION INTRAVENOUS at 05:35

## 2020-10-21 RX ADMIN — METRONIDAZOLE 500 MG: 500 INJECTION, SOLUTION INTRAVENOUS at 15:36

## 2020-10-21 RX ADMIN — ALBUMIN (HUMAN) 25 G: 0.25 INJECTION, SOLUTION INTRAVENOUS at 08:27

## 2020-10-21 RX ADMIN — MORPHINE SULFATE 1 MG: 2 INJECTION, SOLUTION INTRAMUSCULAR; INTRAVENOUS at 13:51

## 2020-10-21 RX ADMIN — METRONIDAZOLE 500 MG: 500 INJECTION, SOLUTION INTRAVENOUS at 06:10

## 2020-10-21 RX ADMIN — Medication 1 TABLET: at 20:32

## 2020-10-21 RX ADMIN — MORPHINE SULFATE 1 MG: 2 INJECTION, SOLUTION INTRAMUSCULAR; INTRAVENOUS at 04:23

## 2020-10-21 RX ADMIN — MORPHINE SULFATE 1 MG: 2 INJECTION, SOLUTION INTRAMUSCULAR; INTRAVENOUS at 22:49

## 2020-10-21 RX ADMIN — ALBUMIN (HUMAN) 25 G: 0.25 INJECTION, SOLUTION INTRAVENOUS at 20:32

## 2020-10-22 PROBLEM — A04.72 C. DIFFICILE COLITIS: Status: ACTIVE | Noted: 2020-10-14

## 2020-10-22 PROBLEM — N17.9 AKI (ACUTE KIDNEY INJURY) (HCC): Status: ACTIVE | Noted: 2020-10-22

## 2020-10-22 LAB
ALBUMIN SERPL-MCNC: 3 G/DL (ref 3.5–5.2)
ALBUMIN/GLOB SERPL: 2.1 G/DL
ALP SERPL-CCNC: 40 U/L (ref 39–117)
ALT SERPL W P-5'-P-CCNC: 11 U/L (ref 1–41)
ANION GAP SERPL CALCULATED.3IONS-SCNC: 10 MMOL/L (ref 5–15)
AST SERPL-CCNC: 33 U/L (ref 1–40)
BASOPHILS # BLD AUTO: 0.01 10*3/MM3 (ref 0–0.2)
BASOPHILS NFR BLD AUTO: 0.1 % (ref 0–1.5)
BILIRUB SERPL-MCNC: 0.5 MG/DL (ref 0–1.2)
BUN SERPL-MCNC: 38 MG/DL (ref 8–23)
BUN/CREAT SERPL: 23.3 (ref 7–25)
CALCIUM SPEC-SCNC: 7.9 MG/DL (ref 8.6–10.5)
CHLORIDE SERPL-SCNC: 115 MMOL/L (ref 98–107)
CO2 SERPL-SCNC: 24 MMOL/L (ref 22–29)
CREAT SERPL-MCNC: 1.63 MG/DL (ref 0.76–1.27)
DEPRECATED RDW RBC AUTO: 45.8 FL (ref 37–54)
EOSINOPHIL # BLD AUTO: 0.13 10*3/MM3 (ref 0–0.4)
EOSINOPHIL NFR BLD AUTO: 1 % (ref 0.3–6.2)
ERYTHROCYTE [DISTWIDTH] IN BLOOD BY AUTOMATED COUNT: 13.3 % (ref 12.3–15.4)
GFR SERPL CREATININE-BSD FRML MDRD: 42 ML/MIN/1.73
GLOBULIN UR ELPH-MCNC: 1.4 GM/DL
GLUCOSE SERPL-MCNC: 99 MG/DL (ref 65–99)
HCT VFR BLD AUTO: 44.5 % (ref 37.5–51)
HGB BLD-MCNC: 15 G/DL (ref 13–17.7)
IMM GRANULOCYTES # BLD AUTO: 0.89 10*3/MM3 (ref 0–0.05)
IMM GRANULOCYTES NFR BLD AUTO: 6.5 % (ref 0–0.5)
LYMPHOCYTES # BLD AUTO: 1.08 10*3/MM3 (ref 0.7–3.1)
LYMPHOCYTES NFR BLD AUTO: 7.9 % (ref 19.6–45.3)
MAGNESIUM SERPL-MCNC: 2.1 MG/DL (ref 1.6–2.4)
MCH RBC QN AUTO: 32.1 PG (ref 26.6–33)
MCHC RBC AUTO-ENTMCNC: 33.7 G/DL (ref 31.5–35.7)
MCV RBC AUTO: 95.1 FL (ref 79–97)
MONOCYTES # BLD AUTO: 0.73 10*3/MM3 (ref 0.1–0.9)
MONOCYTES NFR BLD AUTO: 5.4 % (ref 5–12)
NEUTROPHILS NFR BLD AUTO: 10.76 10*3/MM3 (ref 1.7–7)
NEUTROPHILS NFR BLD AUTO: 79.1 % (ref 42.7–76)
NRBC BLD AUTO-RTO: 0 /100 WBC (ref 0–0.2)
PLATELET # BLD AUTO: 187 10*3/MM3 (ref 140–450)
PMV BLD AUTO: 10.4 FL (ref 6–12)
POTASSIUM SERPL-SCNC: 3 MMOL/L (ref 3.5–5.2)
POTASSIUM SERPL-SCNC: 3.1 MMOL/L (ref 3.5–5.2)
PROT SERPL-MCNC: 4.4 G/DL (ref 6–8.5)
RBC # BLD AUTO: 4.68 10*6/MM3 (ref 4.14–5.8)
SODIUM SERPL-SCNC: 149 MMOL/L (ref 136–145)
WBC # BLD AUTO: 13.6 10*3/MM3 (ref 3.4–10.8)
WHOLE BLOOD HOLD SPECIMEN: NORMAL

## 2020-10-22 PROCEDURE — 25010000003 POTASSIUM CHLORIDE 10 MEQ/100ML SOLUTION: Performed by: INTERNAL MEDICINE

## 2020-10-22 PROCEDURE — 80053 COMPREHEN METABOLIC PANEL: CPT | Performed by: STUDENT IN AN ORGANIZED HEALTH CARE EDUCATION/TRAINING PROGRAM

## 2020-10-22 PROCEDURE — 94799 UNLISTED PULMONARY SVC/PX: CPT

## 2020-10-22 PROCEDURE — 25010000002 MORPHINE PER 10 MG: Performed by: SURGERY

## 2020-10-22 PROCEDURE — 85025 COMPLETE CBC W/AUTO DIFF WBC: CPT | Performed by: STUDENT IN AN ORGANIZED HEALTH CARE EDUCATION/TRAINING PROGRAM

## 2020-10-22 PROCEDURE — 25010000002 MORPHINE PER 10 MG: Performed by: STUDENT IN AN ORGANIZED HEALTH CARE EDUCATION/TRAINING PROGRAM

## 2020-10-22 PROCEDURE — 84132 ASSAY OF SERUM POTASSIUM: CPT | Performed by: INTERNAL MEDICINE

## 2020-10-22 PROCEDURE — 25010000002 POTASSIUM CHLORIDE PER 2 MEQ OF POTASSIUM: Performed by: INTERNAL MEDICINE

## 2020-10-22 PROCEDURE — 83735 ASSAY OF MAGNESIUM: CPT | Performed by: INTERNAL MEDICINE

## 2020-10-22 PROCEDURE — 25010000002 ALBUMIN HUMAN 25% PER 50 ML: Performed by: INTERNAL MEDICINE

## 2020-10-22 PROCEDURE — 25010000002 FUROSEMIDE PER 20 MG: Performed by: INTERNAL MEDICINE

## 2020-10-22 PROCEDURE — P9047 ALBUMIN (HUMAN), 25%, 50ML: HCPCS | Performed by: INTERNAL MEDICINE

## 2020-10-22 PROCEDURE — 99232 SBSQ HOSP IP/OBS MODERATE 35: CPT | Performed by: STUDENT IN AN ORGANIZED HEALTH CARE EDUCATION/TRAINING PROGRAM

## 2020-10-22 RX ORDER — POTASSIUM CHLORIDE 7.45 MG/ML
10 INJECTION INTRAVENOUS
Status: COMPLETED | OUTPATIENT
Start: 2020-10-22 | End: 2020-10-22

## 2020-10-22 RX ORDER — IPRATROPIUM BROMIDE AND ALBUTEROL SULFATE 2.5; .5 MG/3ML; MG/3ML
3 SOLUTION RESPIRATORY (INHALATION) EVERY 4 HOURS PRN
Status: DISCONTINUED | OUTPATIENT
Start: 2020-10-22 | End: 2020-10-26

## 2020-10-22 RX ORDER — FUROSEMIDE 10 MG/ML
40 INJECTION INTRAMUSCULAR; INTRAVENOUS ONCE
Status: COMPLETED | OUTPATIENT
Start: 2020-10-22 | End: 2020-10-22

## 2020-10-22 RX ORDER — POTASSIUM CHLORIDE 7.45 MG/ML
10 INJECTION INTRAVENOUS
Status: DISPENSED | OUTPATIENT
Start: 2020-10-22 | End: 2020-10-22

## 2020-10-22 RX ADMIN — FUROSEMIDE 40 MG: 10 INJECTION, SOLUTION INTRAMUSCULAR; INTRAVENOUS at 12:11

## 2020-10-22 RX ADMIN — SODIUM CHLORIDE, PRESERVATIVE FREE 10 ML: 5 INJECTION INTRAVENOUS at 22:27

## 2020-10-22 RX ADMIN — POTASSIUM CHLORIDE 10 MEQ: 7.46 INJECTION, SOLUTION INTRAVENOUS at 13:15

## 2020-10-22 RX ADMIN — Medication 1 TABLET: at 08:26

## 2020-10-22 RX ADMIN — Medication 500 MG: at 05:59

## 2020-10-22 RX ADMIN — POTASSIUM CHLORIDE 10 MEQ: 7.46 INJECTION, SOLUTION INTRAVENOUS at 22:27

## 2020-10-22 RX ADMIN — METRONIDAZOLE 500 MG: 500 INJECTION, SOLUTION INTRAVENOUS at 13:30

## 2020-10-22 RX ADMIN — SODIUM CHLORIDE, PRESERVATIVE FREE 10 ML: 5 INJECTION INTRAVENOUS at 08:26

## 2020-10-22 RX ADMIN — ALBUMIN (HUMAN) 25 G: 0.25 INJECTION, SOLUTION INTRAVENOUS at 08:26

## 2020-10-22 RX ADMIN — METRONIDAZOLE 500 MG: 500 INJECTION, SOLUTION INTRAVENOUS at 05:59

## 2020-10-22 RX ADMIN — Medication 500 MG: at 12:12

## 2020-10-22 RX ADMIN — MORPHINE SULFATE 1 MG: 2 INJECTION, SOLUTION INTRAMUSCULAR; INTRAVENOUS at 08:33

## 2020-10-22 RX ADMIN — PANTOPRAZOLE SODIUM 40 MG: 40 INJECTION, POWDER, FOR SOLUTION INTRAVENOUS at 05:59

## 2020-10-22 RX ADMIN — SODIUM CHLORIDE, PRESERVATIVE FREE 10 ML: 5 INJECTION INTRAVENOUS at 12:12

## 2020-10-22 RX ADMIN — POTASSIUM CHLORIDE 10 MEQ: 7.46 INJECTION, SOLUTION INTRAVENOUS at 15:40

## 2020-10-22 RX ADMIN — Medication 500 MG: at 01:02

## 2020-10-22 RX ADMIN — Medication 500 MG: at 17:38

## 2020-10-22 RX ADMIN — POTASSIUM CHLORIDE: 2 INJECTION, SOLUTION, CONCENTRATE INTRAVENOUS at 12:11

## 2020-10-22 RX ADMIN — POTASSIUM CHLORIDE 10 MEQ: 7.46 INJECTION, SOLUTION INTRAVENOUS at 18:44

## 2020-10-22 RX ADMIN — ALBUMIN (HUMAN) 25 G: 0.25 INJECTION, SOLUTION INTRAVENOUS at 17:37

## 2020-10-22 RX ADMIN — ALBUMIN (HUMAN) 25 G: 0.25 INJECTION, SOLUTION INTRAVENOUS at 23:34

## 2020-10-22 RX ADMIN — MORPHINE SULFATE 1 MG: 2 INJECTION, SOLUTION INTRAMUSCULAR; INTRAVENOUS at 04:39

## 2020-10-22 RX ADMIN — POTASSIUM CHLORIDE 10 MEQ: 7.46 INJECTION, SOLUTION INTRAVENOUS at 14:17

## 2020-10-22 RX ADMIN — Medication 1 TABLET: at 20:41

## 2020-10-22 RX ADMIN — POTASSIUM CHLORIDE 10 MEQ: 7.46 INJECTION, SOLUTION INTRAVENOUS at 12:11

## 2020-10-23 ENCOUNTER — ANESTHESIA EVENT (OUTPATIENT)
Dept: GASTROENTEROLOGY | Facility: HOSPITAL | Age: 70
End: 2020-10-23

## 2020-10-23 ENCOUNTER — ANESTHESIA EVENT (OUTPATIENT)
Dept: PERIOP | Facility: HOSPITAL | Age: 70
End: 2020-10-23

## 2020-10-23 ENCOUNTER — ANESTHESIA (OUTPATIENT)
Dept: GASTROENTEROLOGY | Facility: HOSPITAL | Age: 70
End: 2020-10-23

## 2020-10-23 PROBLEM — R45.851 SUICIDAL IDEATION: Status: ACTIVE | Noted: 2020-10-23

## 2020-10-23 LAB
ALBUMIN SERPL-MCNC: 3.7 G/DL (ref 3.5–5.2)
ALBUMIN/GLOB SERPL: 3.1 G/DL
ALP SERPL-CCNC: 42 U/L (ref 39–117)
ALT SERPL W P-5'-P-CCNC: 11 U/L (ref 1–41)
ANION GAP SERPL CALCULATED.3IONS-SCNC: 10 MMOL/L (ref 5–15)
AST SERPL-CCNC: 29 U/L (ref 1–40)
BASOPHILS # BLD AUTO: 0.04 10*3/MM3 (ref 0–0.2)
BASOPHILS NFR BLD AUTO: 0.3 % (ref 0–1.5)
BILIRUB SERPL-MCNC: 0.6 MG/DL (ref 0–1.2)
BUN SERPL-MCNC: 31 MG/DL (ref 8–23)
BUN/CREAT SERPL: 20.1 (ref 7–25)
CALCIUM SPEC-SCNC: 8.5 MG/DL (ref 8.6–10.5)
CHLORIDE SERPL-SCNC: 110 MMOL/L (ref 98–107)
CO2 SERPL-SCNC: 26 MMOL/L (ref 22–29)
CREAT SERPL-MCNC: 1.54 MG/DL (ref 0.76–1.27)
DEPRECATED RDW RBC AUTO: 46.5 FL (ref 37–54)
EOSINOPHIL # BLD AUTO: 0.07 10*3/MM3 (ref 0–0.4)
EOSINOPHIL NFR BLD AUTO: 0.5 % (ref 0.3–6.2)
ERYTHROCYTE [DISTWIDTH] IN BLOOD BY AUTOMATED COUNT: 13.2 % (ref 12.3–15.4)
GFR SERPL CREATININE-BSD FRML MDRD: 45 ML/MIN/1.73
GLOBULIN UR ELPH-MCNC: 1.2 GM/DL
GLUCOSE SERPL-MCNC: 105 MG/DL (ref 65–99)
HCT VFR BLD AUTO: 40.9 % (ref 37.5–51)
HGB BLD-MCNC: 13.9 G/DL (ref 13–17.7)
IMM GRANULOCYTES # BLD AUTO: 0.64 10*3/MM3 (ref 0–0.05)
IMM GRANULOCYTES NFR BLD AUTO: 4.3 % (ref 0–0.5)
LYMPHOCYTES # BLD AUTO: 0.97 10*3/MM3 (ref 0.7–3.1)
LYMPHOCYTES NFR BLD AUTO: 6.4 % (ref 19.6–45.3)
MAGNESIUM SERPL-MCNC: 1.9 MG/DL (ref 1.6–2.4)
MCH RBC QN AUTO: 32.3 PG (ref 26.6–33)
MCHC RBC AUTO-ENTMCNC: 34 G/DL (ref 31.5–35.7)
MCV RBC AUTO: 94.9 FL (ref 79–97)
MONOCYTES # BLD AUTO: 0.66 10*3/MM3 (ref 0.1–0.9)
MONOCYTES NFR BLD AUTO: 4.4 % (ref 5–12)
NEUTROPHILS NFR BLD AUTO: 12.66 10*3/MM3 (ref 1.7–7)
NEUTROPHILS NFR BLD AUTO: 84.1 % (ref 42.7–76)
NRBC BLD AUTO-RTO: 0 /100 WBC (ref 0–0.2)
PHOSPHATE SERPL-MCNC: 2 MG/DL (ref 2.5–4.5)
PLATELET # BLD AUTO: 169 10*3/MM3 (ref 140–450)
PMV BLD AUTO: 10.8 FL (ref 6–12)
POTASSIUM SERPL-SCNC: 3 MMOL/L (ref 3.5–5.2)
POTASSIUM SERPL-SCNC: 4.2 MMOL/L (ref 3.5–5.2)
PROT SERPL-MCNC: 4.9 G/DL (ref 6–8.5)
RBC # BLD AUTO: 4.31 10*6/MM3 (ref 4.14–5.8)
SODIUM SERPL-SCNC: 146 MMOL/L (ref 136–145)
WBC # BLD AUTO: 15.04 10*3/MM3 (ref 3.4–10.8)

## 2020-10-23 PROCEDURE — 87635 SARS-COV-2 COVID-19 AMP PRB: CPT | Performed by: INTERNAL MEDICINE

## 2020-10-23 PROCEDURE — 85025 COMPLETE CBC W/AUTO DIFF WBC: CPT | Performed by: STUDENT IN AN ORGANIZED HEALTH CARE EDUCATION/TRAINING PROGRAM

## 2020-10-23 PROCEDURE — 25010000002 MAGNESIUM SULFATE 2 GM/50ML SOLUTION: Performed by: STUDENT IN AN ORGANIZED HEALTH CARE EDUCATION/TRAINING PROGRAM

## 2020-10-23 PROCEDURE — P9047 ALBUMIN (HUMAN), 25%, 50ML: HCPCS | Performed by: INTERNAL MEDICINE

## 2020-10-23 PROCEDURE — 84100 ASSAY OF PHOSPHORUS: CPT | Performed by: INTERNAL MEDICINE

## 2020-10-23 PROCEDURE — 84132 ASSAY OF SERUM POTASSIUM: CPT | Performed by: INTERNAL MEDICINE

## 2020-10-23 PROCEDURE — 83735 ASSAY OF MAGNESIUM: CPT | Performed by: INTERNAL MEDICINE

## 2020-10-23 PROCEDURE — 80053 COMPREHEN METABOLIC PANEL: CPT | Performed by: STUDENT IN AN ORGANIZED HEALTH CARE EDUCATION/TRAINING PROGRAM

## 2020-10-23 PROCEDURE — 25010000002 PROPOFOL 10 MG/ML EMULSION: Performed by: NURSE ANESTHETIST, CERTIFIED REGISTERED

## 2020-10-23 PROCEDURE — 0DJD8ZZ INSPECTION OF LOWER INTESTINAL TRACT, VIA NATURAL OR ARTIFICIAL OPENING ENDOSCOPIC: ICD-10-PCS | Performed by: INTERNAL MEDICINE

## 2020-10-23 PROCEDURE — 25010000003 POTASSIUM CHLORIDE 10 MEQ/100ML SOLUTION: Performed by: STUDENT IN AN ORGANIZED HEALTH CARE EDUCATION/TRAINING PROGRAM

## 2020-10-23 PROCEDURE — 99233 SBSQ HOSP IP/OBS HIGH 50: CPT | Performed by: STUDENT IN AN ORGANIZED HEALTH CARE EDUCATION/TRAINING PROGRAM

## 2020-10-23 PROCEDURE — 25010000002 ALBUMIN HUMAN 25% PER 50 ML: Performed by: INTERNAL MEDICINE

## 2020-10-23 PROCEDURE — 25010000002 POTASSIUM CHLORIDE PER 2 MEQ OF POTASSIUM: Performed by: INTERNAL MEDICINE

## 2020-10-23 PROCEDURE — 25010000003 POTASSIUM CHLORIDE 10 MEQ/100ML SOLUTION: Performed by: INTERNAL MEDICINE

## 2020-10-23 PROCEDURE — 3E0H8GC INTRODUCTION OF OTHER THERAPEUTIC SUBSTANCE INTO LOWER GI, VIA NATURAL OR ARTIFICIAL OPENING ENDOSCOPIC: ICD-10-PCS | Performed by: INTERNAL MEDICINE

## 2020-10-23 RX ORDER — PROMETHAZINE HYDROCHLORIDE 25 MG/1
25 SUPPOSITORY RECTAL ONCE AS NEEDED
Status: DISCONTINUED | OUTPATIENT
Start: 2020-10-23 | End: 2020-10-23 | Stop reason: HOSPADM

## 2020-10-23 RX ORDER — MAGNESIUM SULFATE HEPTAHYDRATE 40 MG/ML
2 INJECTION, SOLUTION INTRAVENOUS ONCE
Status: COMPLETED | OUTPATIENT
Start: 2020-10-23 | End: 2020-10-23

## 2020-10-23 RX ORDER — LIDOCAINE HYDROCHLORIDE 20 MG/ML
INJECTION, SOLUTION EPIDURAL; INFILTRATION; INTRACAUDAL; PERINEURAL AS NEEDED
Status: DISCONTINUED | OUTPATIENT
Start: 2020-10-23 | End: 2020-10-23 | Stop reason: SURG

## 2020-10-23 RX ORDER — LOPERAMIDE HYDROCHLORIDE 2 MG/1
4 CAPSULE ORAL ONCE
Status: DISCONTINUED | OUTPATIENT
Start: 2020-10-23 | End: 2020-10-23

## 2020-10-23 RX ORDER — SODIUM CHLORIDE 9 MG/ML
INJECTION, SOLUTION INTRAVENOUS CONTINUOUS PRN
Status: DISCONTINUED | OUTPATIENT
Start: 2020-10-23 | End: 2020-10-23 | Stop reason: SURG

## 2020-10-23 RX ORDER — LOPERAMIDE HYDROCHLORIDE 2 MG/1
4 CAPSULE ORAL ONCE
Status: DISCONTINUED | OUTPATIENT
Start: 2020-10-25 | End: 2020-10-25

## 2020-10-23 RX ORDER — POTASSIUM CHLORIDE 7.45 MG/ML
10 INJECTION INTRAVENOUS
Status: DISCONTINUED | OUTPATIENT
Start: 2020-10-23 | End: 2020-10-26

## 2020-10-23 RX ORDER — PROMETHAZINE HYDROCHLORIDE 25 MG/1
25 TABLET ORAL ONCE AS NEEDED
Status: DISCONTINUED | OUTPATIENT
Start: 2020-10-23 | End: 2020-10-23 | Stop reason: HOSPADM

## 2020-10-23 RX ORDER — ONDANSETRON 2 MG/ML
4 INJECTION INTRAMUSCULAR; INTRAVENOUS ONCE AS NEEDED
Status: DISCONTINUED | OUTPATIENT
Start: 2020-10-23 | End: 2020-10-23 | Stop reason: HOSPADM

## 2020-10-23 RX ORDER — POTASSIUM CHLORIDE 7.45 MG/ML
10 INJECTION INTRAVENOUS
Status: COMPLETED | OUTPATIENT
Start: 2020-10-23 | End: 2020-10-23

## 2020-10-23 RX ORDER — ALBUMIN (HUMAN) 12.5 G/50ML
25 SOLUTION INTRAVENOUS 2 TIMES DAILY
Status: DISCONTINUED | OUTPATIENT
Start: 2020-10-23 | End: 2020-10-25

## 2020-10-23 RX ORDER — POTASSIUM CHLORIDE 750 MG/1
40 CAPSULE, EXTENDED RELEASE ORAL AS NEEDED
Status: DISCONTINUED | OUTPATIENT
Start: 2020-10-23 | End: 2020-10-26

## 2020-10-23 RX ORDER — PROPOFOL 10 MG/ML
VIAL (ML) INTRAVENOUS AS NEEDED
Status: DISCONTINUED | OUTPATIENT
Start: 2020-10-23 | End: 2020-10-23 | Stop reason: SURG

## 2020-10-23 RX ORDER — POTASSIUM CHLORIDE 1.5 G/1.77G
40 POWDER, FOR SOLUTION ORAL AS NEEDED
Status: DISCONTINUED | OUTPATIENT
Start: 2020-10-23 | End: 2020-10-26

## 2020-10-23 RX ADMIN — LIDOCAINE HYDROCHLORIDE 30 MG: 20 INJECTION, SOLUTION EPIDURAL; INFILTRATION; INTRACAUDAL; PERINEURAL at 15:25

## 2020-10-23 RX ADMIN — POTASSIUM CHLORIDE 10 MEQ: 7.46 INJECTION, SOLUTION INTRAVENOUS at 09:44

## 2020-10-23 RX ADMIN — POTASSIUM CHLORIDE: 2 INJECTION, SOLUTION, CONCENTRATE INTRAVENOUS at 09:31

## 2020-10-23 RX ADMIN — PROPOFOL 50 MG: 10 INJECTION, EMULSION INTRAVENOUS at 15:28

## 2020-10-23 RX ADMIN — PROPOFOL 50 MG: 10 INJECTION, EMULSION INTRAVENOUS at 15:32

## 2020-10-23 RX ADMIN — Medication 1 TABLET: at 09:31

## 2020-10-23 RX ADMIN — POTASSIUM CHLORIDE 10 MEQ: 7.46 INJECTION, SOLUTION INTRAVENOUS at 15:45

## 2020-10-23 RX ADMIN — POTASSIUM PHOSPHATE, MONOBASIC AND POTASSIUM PHOSPHATE, DIBASIC 21 MMOL: 224; 236 INJECTION, SOLUTION, CONCENTRATE INTRAVENOUS at 11:01

## 2020-10-23 RX ADMIN — SODIUM CHLORIDE, PRESERVATIVE FREE 10 ML: 5 INJECTION INTRAVENOUS at 21:00

## 2020-10-23 RX ADMIN — METRONIDAZOLE 500 MG: 500 INJECTION, SOLUTION INTRAVENOUS at 06:46

## 2020-10-23 RX ADMIN — Medication 500 MG: at 17:32

## 2020-10-23 RX ADMIN — Medication 1 TABLET: at 21:00

## 2020-10-23 RX ADMIN — PANTOPRAZOLE SODIUM 40 MG: 40 INJECTION, POWDER, FOR SOLUTION INTRAVENOUS at 06:25

## 2020-10-23 RX ADMIN — POTASSIUM CHLORIDE 10 MEQ: 7.46 INJECTION, SOLUTION INTRAVENOUS at 17:33

## 2020-10-23 RX ADMIN — ALBUMIN (HUMAN) 25 G: 0.25 INJECTION, SOLUTION INTRAVENOUS at 09:31

## 2020-10-23 RX ADMIN — POTASSIUM CHLORIDE 10 MEQ: 7.46 INJECTION, SOLUTION INTRAVENOUS at 11:45

## 2020-10-23 RX ADMIN — POTASSIUM CHLORIDE 10 MEQ: 7.46 INJECTION, SOLUTION INTRAVENOUS at 06:43

## 2020-10-23 RX ADMIN — PROPOFOL 50 MG: 10 INJECTION, EMULSION INTRAVENOUS at 15:24

## 2020-10-23 RX ADMIN — POTASSIUM CHLORIDE 10 MEQ: 7.46 INJECTION, SOLUTION INTRAVENOUS at 12:41

## 2020-10-23 RX ADMIN — SODIUM CHLORIDE: 9 INJECTION, SOLUTION INTRAVENOUS at 15:22

## 2020-10-23 RX ADMIN — POTASSIUM CHLORIDE 10 MEQ: 7.46 INJECTION, SOLUTION INTRAVENOUS at 13:45

## 2020-10-23 RX ADMIN — POTASSIUM CHLORIDE 10 MEQ: 7.46 INJECTION, SOLUTION INTRAVENOUS at 10:45

## 2020-10-23 RX ADMIN — MAGNESIUM SULFATE IN WATER 2 G: 40 INJECTION, SOLUTION INTRAVENOUS at 09:32

## 2020-10-23 RX ADMIN — METRONIDAZOLE 500 MG: 500 INJECTION, SOLUTION INTRAVENOUS at 00:14

## 2020-10-23 RX ADMIN — Medication 500 MG: at 12:51

## 2020-10-23 RX ADMIN — ALBUMIN (HUMAN) 25 G: 0.25 INJECTION, SOLUTION INTRAVENOUS at 21:00

## 2020-10-23 RX ADMIN — METRONIDAZOLE 500 MG: 500 INJECTION, SOLUTION INTRAVENOUS at 17:32

## 2020-10-23 RX ADMIN — POTASSIUM CHLORIDE 10 MEQ: 7.46 INJECTION, SOLUTION INTRAVENOUS at 14:45

## 2020-10-23 NOTE — ANESTHESIA POSTPROCEDURE EVALUATION
Patient: Bar Crockett    Procedure Summary     Date: 10/23/20 Room / Location: [facility] ENDOSCOPY 3 / [facility] ENDOSCOPY    Anesthesia Start: 1522 Anesthesia Stop: 1553    Procedure: COLONOSCOPY WITH ENDOSCOPIC FECAL MICROBIOTA TRANSPLANT (N/A ) Diagnosis:       C. difficile colitis      Diverticulosis large intestine w/o perforation or abscess w/o bleeding      (C. difficile colitis [A04.72])    Surgeon: Jay Wilson DO Provider:     Anesthesia Type: MAC ASA Status: 3          Anesthesia Type: MAC    Vitals  No vitals data found for the desired time range.          Post Anesthesia Care and Evaluation    Patient location during evaluation: PHASE II  Patient participation: complete - patient participated  Level of consciousness: awake and alert  Pain score: 1  Pain management: adequate  Airway patency: patent  Anesthetic complications: No anesthetic complications  PONV Status: none  Cardiovascular status: acceptable  Respiratory status: acceptable  Hydration status: acceptable  Post Neuraxial Block status: Motor and sensory function returned to baseline

## 2020-10-23 NOTE — ANESTHESIA PREPROCEDURE EVALUATION
Anesthesia Evaluation     Patient summary reviewed and Nursing notes reviewed                Airway   No difficulty expected  Dental      Pulmonary - negative pulmonary ROS and normal exam   Cardiovascular - normal exam    (+) hypertension, hyperlipidemia,       Neuro/Psych  GI/Hepatic/Renal/Endo    (+)  GERD,  renal disease,     Musculoskeletal (-) negative ROS    Abdominal   (+) obese,    Substance History - negative use     OB/GYN negative ob/gyn ROS         Other - negative ROS                       Anesthesia Plan    ASA 3     MAC     intravenous induction     Anesthetic plan, all risks, benefits, and alternatives have been provided, discussed and informed consent has been obtained with: patient.    Plan discussed with CRNA.

## 2020-10-24 LAB
ALBUMIN SERPL-MCNC: 3.6 G/DL (ref 3.5–5.2)
ALBUMIN/GLOB SERPL: 3.3 G/DL
ALP SERPL-CCNC: 38 U/L (ref 39–117)
ALT SERPL W P-5'-P-CCNC: 9 U/L (ref 1–41)
ANION GAP SERPL CALCULATED.3IONS-SCNC: 11 MMOL/L (ref 5–15)
ANISOCYTOSIS BLD QL: NORMAL
AST SERPL-CCNC: 27 U/L (ref 1–40)
BILIRUB SERPL-MCNC: 0.7 MG/DL (ref 0–1.2)
BUN SERPL-MCNC: 26 MG/DL (ref 8–23)
BUN/CREAT SERPL: 18.4 (ref 7–25)
CALCIUM SPEC-SCNC: 8.2 MG/DL (ref 8.6–10.5)
CHLORIDE SERPL-SCNC: 110 MMOL/L (ref 98–107)
CO2 SERPL-SCNC: 24 MMOL/L (ref 22–29)
CREAT SERPL-MCNC: 1.41 MG/DL (ref 0.76–1.27)
DEPRECATED RDW RBC AUTO: 49 FL (ref 37–54)
ERYTHROCYTE [DISTWIDTH] IN BLOOD BY AUTOMATED COUNT: 13.6 % (ref 12.3–15.4)
GFR SERPL CREATININE-BSD FRML MDRD: 49 ML/MIN/1.73
GLOBULIN UR ELPH-MCNC: 1.1 GM/DL
GLUCOSE SERPL-MCNC: 92 MG/DL (ref 65–99)
HCT VFR BLD AUTO: 42.1 % (ref 37.5–51)
HGB BLD-MCNC: 14.2 G/DL (ref 13–17.7)
LARGE PLATELETS: NORMAL
MCH RBC QN AUTO: 32.8 PG (ref 26.6–33)
MCHC RBC AUTO-ENTMCNC: 33.7 G/DL (ref 31.5–35.7)
MCV RBC AUTO: 97.2 FL (ref 79–97)
PLATELET # BLD AUTO: 151 10*3/MM3 (ref 140–450)
PMV BLD AUTO: 11.5 FL (ref 6–12)
POTASSIUM SERPL-SCNC: 3.6 MMOL/L (ref 3.5–5.2)
POTASSIUM SERPL-SCNC: 3.6 MMOL/L (ref 3.5–5.2)
POTASSIUM SERPL-SCNC: 3.7 MMOL/L (ref 3.5–5.2)
PROT SERPL-MCNC: 4.7 G/DL (ref 6–8.5)
RBC # BLD AUTO: 4.33 10*6/MM3 (ref 4.14–5.8)
SARS-COV-2 N GENE RESP QL NAA+PROBE: NOT DETECTED
SMALL PLATELETS BLD QL SMEAR: ADEQUATE
SODIUM SERPL-SCNC: 145 MMOL/L (ref 136–145)
WBC # BLD AUTO: 21.12 10*3/MM3 (ref 3.4–10.8)
WBC MORPH BLD: NORMAL

## 2020-10-24 PROCEDURE — 85007 BL SMEAR W/DIFF WBC COUNT: CPT | Performed by: STUDENT IN AN ORGANIZED HEALTH CARE EDUCATION/TRAINING PROGRAM

## 2020-10-24 PROCEDURE — 0DJD8ZZ INSPECTION OF LOWER INTESTINAL TRACT, VIA NATURAL OR ARTIFICIAL OPENING ENDOSCOPIC: ICD-10-PCS | Performed by: INTERNAL MEDICINE

## 2020-10-24 PROCEDURE — 25010000002 POTASSIUM CHLORIDE PER 2 MEQ OF POTASSIUM: Performed by: INTERNAL MEDICINE

## 2020-10-24 PROCEDURE — 99232 SBSQ HOSP IP/OBS MODERATE 35: CPT | Performed by: STUDENT IN AN ORGANIZED HEALTH CARE EDUCATION/TRAINING PROGRAM

## 2020-10-24 PROCEDURE — 84132 ASSAY OF SERUM POTASSIUM: CPT | Performed by: STUDENT IN AN ORGANIZED HEALTH CARE EDUCATION/TRAINING PROGRAM

## 2020-10-24 PROCEDURE — 85025 COMPLETE CBC W/AUTO DIFF WBC: CPT | Performed by: STUDENT IN AN ORGANIZED HEALTH CARE EDUCATION/TRAINING PROGRAM

## 2020-10-24 PROCEDURE — P9047 ALBUMIN (HUMAN), 25%, 50ML: HCPCS | Performed by: INTERNAL MEDICINE

## 2020-10-24 PROCEDURE — 25010000003 POTASSIUM CHLORIDE 10 MEQ/100ML SOLUTION: Performed by: STUDENT IN AN ORGANIZED HEALTH CARE EDUCATION/TRAINING PROGRAM

## 2020-10-24 PROCEDURE — 3E0H8GC INTRODUCTION OF OTHER THERAPEUTIC SUBSTANCE INTO LOWER GI, VIA NATURAL OR ARTIFICIAL OPENING ENDOSCOPIC: ICD-10-PCS | Performed by: INTERNAL MEDICINE

## 2020-10-24 PROCEDURE — 80053 COMPREHEN METABOLIC PANEL: CPT | Performed by: STUDENT IN AN ORGANIZED HEALTH CARE EDUCATION/TRAINING PROGRAM

## 2020-10-24 PROCEDURE — 25010000002 FUROSEMIDE PER 20 MG: Performed by: INTERNAL MEDICINE

## 2020-10-24 PROCEDURE — 25010000002 ALBUMIN HUMAN 25% PER 50 ML: Performed by: INTERNAL MEDICINE

## 2020-10-24 PROCEDURE — 99222 1ST HOSP IP/OBS MODERATE 55: CPT | Performed by: PSYCHIATRY & NEUROLOGY

## 2020-10-24 RX ORDER — FUROSEMIDE 10 MG/ML
40 INJECTION INTRAMUSCULAR; INTRAVENOUS 2 TIMES DAILY
Status: DISCONTINUED | OUTPATIENT
Start: 2020-10-24 | End: 2020-10-26

## 2020-10-24 RX ADMIN — Medication 500 MG: at 23:19

## 2020-10-24 RX ADMIN — METRONIDAZOLE 500 MG: 500 INJECTION, SOLUTION INTRAVENOUS at 13:34

## 2020-10-24 RX ADMIN — ALBUMIN (HUMAN) 25 G: 0.25 INJECTION, SOLUTION INTRAVENOUS at 20:36

## 2020-10-24 RX ADMIN — Medication 500 MG: at 17:33

## 2020-10-24 RX ADMIN — METRONIDAZOLE 500 MG: 500 INJECTION, SOLUTION INTRAVENOUS at 06:26

## 2020-10-24 RX ADMIN — Medication 1 TABLET: at 08:34

## 2020-10-24 RX ADMIN — POTASSIUM CHLORIDE 10 MEQ: 7.46 INJECTION, SOLUTION INTRAVENOUS at 07:01

## 2020-10-24 RX ADMIN — METRONIDAZOLE 500 MG: 500 INJECTION, SOLUTION INTRAVENOUS at 23:19

## 2020-10-24 RX ADMIN — ALBUMIN (HUMAN) 25 G: 0.25 INJECTION, SOLUTION INTRAVENOUS at 08:34

## 2020-10-24 RX ADMIN — SODIUM CHLORIDE, PRESERVATIVE FREE 10 ML: 5 INJECTION INTRAVENOUS at 20:36

## 2020-10-24 RX ADMIN — POTASSIUM CHLORIDE: 2 INJECTION, SOLUTION, CONCENTRATE INTRAVENOUS at 11:26

## 2020-10-24 RX ADMIN — Medication 1 TABLET: at 20:36

## 2020-10-24 RX ADMIN — METRONIDAZOLE 500 MG: 500 INJECTION, SOLUTION INTRAVENOUS at 01:32

## 2020-10-24 RX ADMIN — Medication 500 MG: at 00:10

## 2020-10-24 RX ADMIN — PANTOPRAZOLE SODIUM 40 MG: 40 INJECTION, POWDER, FOR SOLUTION INTRAVENOUS at 05:31

## 2020-10-24 RX ADMIN — Medication 500 MG: at 11:26

## 2020-10-24 RX ADMIN — POTASSIUM CHLORIDE 10 MEQ: 7.46 INJECTION, SOLUTION INTRAVENOUS at 05:38

## 2020-10-24 RX ADMIN — POTASSIUM CHLORIDE 10 MEQ: 7.46 INJECTION, SOLUTION INTRAVENOUS at 04:40

## 2020-10-24 RX ADMIN — POTASSIUM CHLORIDE 10 MEQ: 7.46 INJECTION, SOLUTION INTRAVENOUS at 03:32

## 2020-10-24 RX ADMIN — FUROSEMIDE 40 MG: 10 INJECTION, SOLUTION INTRAMUSCULAR; INTRAVENOUS at 11:26

## 2020-10-24 RX ADMIN — FUROSEMIDE 40 MG: 10 INJECTION, SOLUTION INTRAMUSCULAR; INTRAVENOUS at 20:36

## 2020-10-24 RX ADMIN — Medication 500 MG: at 05:31

## 2020-10-24 NOTE — ANESTHESIA PREPROCEDURE EVALUATION
Anesthesia Evaluation     NPO Solid Status: > 8 hours  NPO Liquid Status: > 8 hours           Airway   Mallampati: II  TM distance: >3 FB  Neck ROM: full  No difficulty expected  Dental    (+) edentulous    Pulmonary     breath sounds clear to auscultation  (+) a smoker Former, COPD, decreased breath sounds,   Cardiovascular   Exercise tolerance: poor (<4 METS)    ECG reviewed  PT is on anticoagulation therapy  Rhythm: regular  Rate: normal    (+) hypertension poorly controlled, hyperlipidemia,     ROS comment: Sinus tachycardia  Nonspecific ST abnormality  Abnormal ECG  When compared with ECG of 14-OCT-2020 14:21:01  Vent. rate has increased  [facility]    Neuro/Psych  (+) TIA, CVA, psychiatric history (suicidal ideation but mood improving),     GI/Hepatic/Renal/Endo    (+)  GERD,  renal disease (improving) ARF and CRI,     ROS Comment: Nonspecific bowel gas pattern. Nondilated loops of  large and small bowel. Loops of bowel are slightly less dilated than on prior exam October 17, 2020. Interval development of small bilateral pleural effusions and basilar infiltrative changes, pneumonitis or atelectasis.    Musculoskeletal     (+) arthralgias,   Abdominal   (+) scaphoid   Substance History   (+) alcohol use,      OB/GYN          Other   arthritis (knee),      ROS/Med Hx Other: C Diff. On ABx and stool transplant 2 days ago    pancolitis and diverticulitis    WBCs 21k                Anesthesia Plan    ASA 3     MAC   total IV anesthesia  intravenous induction     Anesthetic plan, all risks, benefits, and alternatives have been provided, discussed and informed consent has been obtained with: patient.

## 2020-10-25 ENCOUNTER — ANESTHESIA (OUTPATIENT)
Dept: PERIOP | Facility: HOSPITAL | Age: 70
End: 2020-10-25

## 2020-10-25 ENCOUNTER — APPOINTMENT (OUTPATIENT)
Dept: ULTRASOUND IMAGING | Facility: HOSPITAL | Age: 70
End: 2020-10-25

## 2020-10-25 ENCOUNTER — APPOINTMENT (OUTPATIENT)
Dept: GENERAL RADIOLOGY | Facility: HOSPITAL | Age: 70
End: 2020-10-25

## 2020-10-25 ENCOUNTER — APPOINTMENT (OUTPATIENT)
Dept: INTERVENTIONAL RADIOLOGY/VASCULAR | Facility: HOSPITAL | Age: 70
End: 2020-10-25

## 2020-10-25 LAB
ALBUMIN SERPL-MCNC: 3.6 G/DL (ref 3.5–5.2)
ALBUMIN/GLOB SERPL: 2.6 G/DL
ALP SERPL-CCNC: 46 U/L (ref 39–117)
ALT SERPL W P-5'-P-CCNC: 10 U/L (ref 1–41)
ANION GAP SERPL CALCULATED.3IONS-SCNC: 11 MMOL/L (ref 5–15)
AST SERPL-CCNC: 23 U/L (ref 1–40)
BASOPHILS # BLD AUTO: 0.07 10*3/MM3 (ref 0–0.2)
BASOPHILS NFR BLD AUTO: 0.4 % (ref 0–1.5)
BILIRUB SERPL-MCNC: 0.7 MG/DL (ref 0–1.2)
BUN SERPL-MCNC: 24 MG/DL (ref 8–23)
BUN/CREAT SERPL: 15.9 (ref 7–25)
CA-I BLD-MCNC: 4.28 MG/DL (ref 4.6–5.6)
CALCIUM SPEC-SCNC: 8.6 MG/DL (ref 8.6–10.5)
CHLORIDE SERPL-SCNC: 107 MMOL/L (ref 98–107)
CHOLEST SERPL-MCNC: 55 MG/DL (ref 0–200)
CO2 SERPL-SCNC: 27 MMOL/L (ref 22–29)
CREAT SERPL-MCNC: 1.51 MG/DL (ref 0.76–1.27)
CRP SERPL-MCNC: 3.32 MG/DL (ref 0–0.5)
DEPRECATED RDW RBC AUTO: 47.4 FL (ref 37–54)
EOSINOPHIL # BLD AUTO: 0.05 10*3/MM3 (ref 0–0.4)
EOSINOPHIL NFR BLD AUTO: 0.3 % (ref 0.3–6.2)
ERYTHROCYTE [DISTWIDTH] IN BLOOD BY AUTOMATED COUNT: 13.6 % (ref 12.3–15.4)
GFR SERPL CREATININE-BSD FRML MDRD: 46 ML/MIN/1.73
GLOBULIN UR ELPH-MCNC: 1.4 GM/DL
GLUCOSE BLDC GLUCOMTR-MCNC: 142 MG/DL (ref 70–130)
GLUCOSE SERPL-MCNC: 106 MG/DL (ref 65–99)
HCT VFR BLD AUTO: 43.9 % (ref 37.5–51)
HGB BLD-MCNC: 14.8 G/DL (ref 13–17.7)
IMM GRANULOCYTES # BLD AUTO: 0.19 10*3/MM3 (ref 0–0.05)
IMM GRANULOCYTES NFR BLD AUTO: 1.1 % (ref 0–0.5)
LYMPHOCYTES # BLD AUTO: 0.86 10*3/MM3 (ref 0.7–3.1)
LYMPHOCYTES NFR BLD AUTO: 5 % (ref 19.6–45.3)
MAGNESIUM SERPL-MCNC: 1.9 MG/DL (ref 1.6–2.4)
MCH RBC QN AUTO: 32.5 PG (ref 26.6–33)
MCHC RBC AUTO-ENTMCNC: 33.7 G/DL (ref 31.5–35.7)
MCV RBC AUTO: 96.5 FL (ref 79–97)
MONOCYTES # BLD AUTO: 0.67 10*3/MM3 (ref 0.1–0.9)
MONOCYTES NFR BLD AUTO: 3.9 % (ref 5–12)
NEUTROPHILS NFR BLD AUTO: 15.31 10*3/MM3 (ref 1.7–7)
NEUTROPHILS NFR BLD AUTO: 89.3 % (ref 42.7–76)
NRBC BLD AUTO-RTO: 0 /100 WBC (ref 0–0.2)
PHOSPHATE SERPL-MCNC: 2.3 MG/DL (ref 2.5–4.5)
PLATELET # BLD AUTO: 144 10*3/MM3 (ref 140–450)
PMV BLD AUTO: 11.2 FL (ref 6–12)
POTASSIUM SERPL-SCNC: 3 MMOL/L (ref 3.5–5.2)
POTASSIUM SERPL-SCNC: 3.5 MMOL/L (ref 3.5–5.2)
PREALB SERPL-MCNC: 11.9 MG/DL (ref 20–40)
PROT SERPL-MCNC: 5 G/DL (ref 6–8.5)
RBC # BLD AUTO: 4.55 10*6/MM3 (ref 4.14–5.8)
SODIUM SERPL-SCNC: 145 MMOL/L (ref 136–145)
TRIGL SERPL-MCNC: 98 MG/DL (ref 0–150)
WBC # BLD AUTO: 17.15 10*3/MM3 (ref 3.4–10.8)

## 2020-10-25 PROCEDURE — 99232 SBSQ HOSP IP/OBS MODERATE 35: CPT | Performed by: STUDENT IN AN ORGANIZED HEALTH CARE EDUCATION/TRAINING PROGRAM

## 2020-10-25 PROCEDURE — 84100 ASSAY OF PHOSPHORUS: CPT | Performed by: STUDENT IN AN ORGANIZED HEALTH CARE EDUCATION/TRAINING PROGRAM

## 2020-10-25 PROCEDURE — 82962 GLUCOSE BLOOD TEST: CPT

## 2020-10-25 PROCEDURE — 25010000002 PROPOFOL 10 MG/ML EMULSION: Performed by: NURSE ANESTHETIST, CERTIFIED REGISTERED

## 2020-10-25 PROCEDURE — 99232 SBSQ HOSP IP/OBS MODERATE 35: CPT | Performed by: PSYCHIATRY & NEUROLOGY

## 2020-10-25 PROCEDURE — 25010000002 POTASSIUM CHLORIDE PER 2 MEQ OF POTASSIUM: Performed by: INTERNAL MEDICINE

## 2020-10-25 PROCEDURE — 25010000002 MAGNESIUM SULFATE PER 500 MG OF MAGNESIUM: Performed by: STUDENT IN AN ORGANIZED HEALTH CARE EDUCATION/TRAINING PROGRAM

## 2020-10-25 PROCEDURE — P9047 ALBUMIN (HUMAN), 25%, 50ML: HCPCS | Performed by: INTERNAL MEDICINE

## 2020-10-25 PROCEDURE — 84478 ASSAY OF TRIGLYCERIDES: CPT | Performed by: STUDENT IN AN ORGANIZED HEALTH CARE EDUCATION/TRAINING PROGRAM

## 2020-10-25 PROCEDURE — 80053 COMPREHEN METABOLIC PANEL: CPT | Performed by: STUDENT IN AN ORGANIZED HEALTH CARE EDUCATION/TRAINING PROGRAM

## 2020-10-25 PROCEDURE — 83735 ASSAY OF MAGNESIUM: CPT | Performed by: STUDENT IN AN ORGANIZED HEALTH CARE EDUCATION/TRAINING PROGRAM

## 2020-10-25 PROCEDURE — 82465 ASSAY BLD/SERUM CHOLESTEROL: CPT | Performed by: STUDENT IN AN ORGANIZED HEALTH CARE EDUCATION/TRAINING PROGRAM

## 2020-10-25 PROCEDURE — 25010000002 FUROSEMIDE PER 20 MG: Performed by: STUDENT IN AN ORGANIZED HEALTH CARE EDUCATION/TRAINING PROGRAM

## 2020-10-25 PROCEDURE — 85025 COMPLETE CBC W/AUTO DIFF WBC: CPT | Performed by: STUDENT IN AN ORGANIZED HEALTH CARE EDUCATION/TRAINING PROGRAM

## 2020-10-25 PROCEDURE — 25010000002 CALCIUM GLUCONATE PER 10 ML: Performed by: STUDENT IN AN ORGANIZED HEALTH CARE EDUCATION/TRAINING PROGRAM

## 2020-10-25 PROCEDURE — 25010000002 ALBUMIN HUMAN 25% PER 50 ML: Performed by: INTERNAL MEDICINE

## 2020-10-25 PROCEDURE — 25010000002 POTASSIUM CHLORIDE PER 2 MEQ OF POTASSIUM: Performed by: STUDENT IN AN ORGANIZED HEALTH CARE EDUCATION/TRAINING PROGRAM

## 2020-10-25 PROCEDURE — C1751 CATH, INF, PER/CENT/MIDLINE: HCPCS

## 2020-10-25 PROCEDURE — 25010000003 POTASSIUM CHLORIDE 10 MEQ/100ML SOLUTION: Performed by: STUDENT IN AN ORGANIZED HEALTH CARE EDUCATION/TRAINING PROGRAM

## 2020-10-25 PROCEDURE — 82330 ASSAY OF CALCIUM: CPT | Performed by: STUDENT IN AN ORGANIZED HEALTH CARE EDUCATION/TRAINING PROGRAM

## 2020-10-25 PROCEDURE — 86140 C-REACTIVE PROTEIN: CPT | Performed by: STUDENT IN AN ORGANIZED HEALTH CARE EDUCATION/TRAINING PROGRAM

## 2020-10-25 PROCEDURE — 25010000002 FUROSEMIDE PER 20 MG: Performed by: INTERNAL MEDICINE

## 2020-10-25 PROCEDURE — 84134 ASSAY OF PREALBUMIN: CPT | Performed by: STUDENT IN AN ORGANIZED HEALTH CARE EDUCATION/TRAINING PROGRAM

## 2020-10-25 PROCEDURE — 84132 ASSAY OF SERUM POTASSIUM: CPT | Performed by: STUDENT IN AN ORGANIZED HEALTH CARE EDUCATION/TRAINING PROGRAM

## 2020-10-25 PROCEDURE — 02HV33Z INSERTION OF INFUSION DEVICE INTO SUPERIOR VENA CAVA, PERCUTANEOUS APPROACH: ICD-10-PCS | Performed by: FAMILY MEDICINE

## 2020-10-25 PROCEDURE — 25010000003 POTASSIUM CHLORIDE 10 MEQ/100ML SOLUTION: Performed by: INTERNAL MEDICINE

## 2020-10-25 RX ORDER — SODIUM CHLORIDE 9 MG/ML
INJECTION, SOLUTION INTRAVENOUS CONTINUOUS PRN
Status: DISCONTINUED | OUTPATIENT
Start: 2020-10-25 | End: 2020-10-25 | Stop reason: SURG

## 2020-10-25 RX ORDER — SPIRONOLACTONE 25 MG/1
25 TABLET ORAL DAILY
Status: DISCONTINUED | OUTPATIENT
Start: 2020-10-25 | End: 2020-10-26

## 2020-10-25 RX ORDER — PROPOFOL 10 MG/ML
VIAL (ML) INTRAVENOUS AS NEEDED
Status: DISCONTINUED | OUTPATIENT
Start: 2020-10-25 | End: 2020-10-25 | Stop reason: SURG

## 2020-10-25 RX ORDER — POTASSIUM CHLORIDE 7.45 MG/ML
10 INJECTION INTRAVENOUS
Status: COMPLETED | OUTPATIENT
Start: 2020-10-25 | End: 2020-10-25

## 2020-10-25 RX ORDER — POTASSIUM CHLORIDE 7.45 MG/ML
10 INJECTION INTRAVENOUS
Status: DISCONTINUED | OUTPATIENT
Start: 2020-10-25 | End: 2020-10-25

## 2020-10-25 RX ORDER — LOPERAMIDE HYDROCHLORIDE 2 MG/1
4 CAPSULE ORAL ONCE
Status: COMPLETED | OUTPATIENT
Start: 2020-10-25 | End: 2020-10-25

## 2020-10-25 RX ORDER — LIDOCAINE HYDROCHLORIDE 20 MG/ML
INJECTION, SOLUTION INTRAVENOUS AS NEEDED
Status: DISCONTINUED | OUTPATIENT
Start: 2020-10-25 | End: 2020-10-25 | Stop reason: SURG

## 2020-10-25 RX ADMIN — PROPOFOL 20 MG: 10 INJECTION, EMULSION INTRAVENOUS at 07:17

## 2020-10-25 RX ADMIN — FUROSEMIDE 40 MG: 10 INJECTION, SOLUTION INTRAMUSCULAR; INTRAVENOUS at 08:59

## 2020-10-25 RX ADMIN — I.V. FAT EMULSION 12 G: 20 EMULSION INTRAVENOUS at 12:00

## 2020-10-25 RX ADMIN — SODIUM CHLORIDE: 900 INJECTION, SOLUTION INTRAVENOUS at 07:02

## 2020-10-25 RX ADMIN — POTASSIUM CHLORIDE: 2 INJECTION, SOLUTION, CONCENTRATE INTRAVENOUS at 11:47

## 2020-10-25 RX ADMIN — I.V. FAT EMULSION 12 G: 20 EMULSION INTRAVENOUS at 17:08

## 2020-10-25 RX ADMIN — METRONIDAZOLE 500 MG: 500 INJECTION, SOLUTION INTRAVENOUS at 05:58

## 2020-10-25 RX ADMIN — PROPOFOL 60 MG: 10 INJECTION, EMULSION INTRAVENOUS at 07:14

## 2020-10-25 RX ADMIN — ALBUMIN (HUMAN) 25 G: 0.25 INJECTION, SOLUTION INTRAVENOUS at 08:59

## 2020-10-25 RX ADMIN — PANTOPRAZOLE SODIUM 40 MG: 40 INJECTION, POWDER, FOR SOLUTION INTRAVENOUS at 05:40

## 2020-10-25 RX ADMIN — POTASSIUM CHLORIDE: 149 INJECTION, SOLUTION, CONCENTRATE INTRAVENOUS at 14:22

## 2020-10-25 RX ADMIN — LIDOCAINE HYDROCHLORIDE 80 MG: 20 INJECTION, SOLUTION INTRAVENOUS at 07:14

## 2020-10-25 RX ADMIN — LOPERAMIDE HYDROCHLORIDE 4 MG: 2 CAPSULE ORAL at 08:08

## 2020-10-25 RX ADMIN — POTASSIUM CHLORIDE 10 MEQ: 7.46 INJECTION, SOLUTION INTRAVENOUS at 12:00

## 2020-10-25 RX ADMIN — POTASSIUM CHLORIDE 10 MEQ: 7.46 INJECTION, SOLUTION INTRAVENOUS at 11:00

## 2020-10-25 RX ADMIN — Medication 500 MG: at 18:42

## 2020-10-25 RX ADMIN — PROPOFOL 20 MG: 10 INJECTION, EMULSION INTRAVENOUS at 07:25

## 2020-10-25 RX ADMIN — Medication 1 TABLET: at 19:50

## 2020-10-25 RX ADMIN — POTASSIUM CHLORIDE 10 MEQ: 7.46 INJECTION, SOLUTION INTRAVENOUS at 15:54

## 2020-10-25 RX ADMIN — POTASSIUM CHLORIDE 10 MEQ: 7.46 INJECTION, SOLUTION INTRAVENOUS at 14:00

## 2020-10-25 RX ADMIN — PROPOFOL 20 MG: 10 INJECTION, EMULSION INTRAVENOUS at 07:20

## 2020-10-25 RX ADMIN — SPIRONOLACTONE 25 MG: 25 TABLET ORAL at 13:37

## 2020-10-25 RX ADMIN — POTASSIUM CHLORIDE 10 MEQ: 7.46 INJECTION, SOLUTION INTRAVENOUS at 10:00

## 2020-10-25 RX ADMIN — METRONIDAZOLE 500 MG: 500 INJECTION, SOLUTION INTRAVENOUS at 18:42

## 2020-10-25 RX ADMIN — LOPERAMIDE HYDROCHLORIDE 4 MG: 2 CAPSULE ORAL at 05:09

## 2020-10-25 RX ADMIN — POTASSIUM CHLORIDE 10 MEQ: 7.46 INJECTION, SOLUTION INTRAVENOUS at 13:00

## 2020-10-25 RX ADMIN — POTASSIUM PHOSPHATE, MONOBASIC AND POTASSIUM PHOSPHATE, DIBASIC 21 MMOL: 224; 236 INJECTION, SOLUTION, CONCENTRATE INTRAVENOUS at 13:38

## 2020-10-25 RX ADMIN — PROPOFOL 20 MG: 10 INJECTION, EMULSION INTRAVENOUS at 07:23

## 2020-10-25 RX ADMIN — FUROSEMIDE 40 MG: 10 INJECTION, SOLUTION INTRAMUSCULAR; INTRAVENOUS at 19:50

## 2020-10-25 NOTE — ANESTHESIA POSTPROCEDURE EVALUATION
Patient: Bar Crockett    Procedure Summary     Date: 10/25/20 Room / Location: [facility] OR 08 / [facility] OR    Anesthesia Start: 0655 Anesthesia Stop: 0728    Procedure: COLONOSCOPY WITH ENDOSCOPIC FECAL MICROBIOTA TRANSPLANT (Left ) Diagnosis:       Colitis      Pancolitis (CMS/HCC)      (Colitis [K52.9])      (Pancolitis (CMS/HCC) [K51.00])    Surgeon: Jay Wilson DO Provider: John Paul Delgado MD    Anesthesia Type: MAC ASA Status: 3          Anesthesia Type: MAC    Vitals  No vitals data found for the desired time range.          Post Anesthesia Care and Evaluation    Patient location during evaluation: bedside  Patient participation: waiting for patient participation  Level of consciousness: responsive to verbal stimuli  Pain management: adequate  Airway patency: patent  Anesthetic complications: No anesthetic complications  PONV Status: none  Cardiovascular status: acceptable  Respiratory status: acceptable  Hydration status: acceptable

## 2020-10-26 ENCOUNTER — ANESTHESIA (OUTPATIENT)
Dept: PERIOP | Facility: HOSPITAL | Age: 70
End: 2020-10-26

## 2020-10-26 ENCOUNTER — APPOINTMENT (OUTPATIENT)
Dept: GENERAL RADIOLOGY | Facility: HOSPITAL | Age: 70
End: 2020-10-26

## 2020-10-26 ENCOUNTER — ANESTHESIA EVENT (OUTPATIENT)
Dept: PERIOP | Facility: HOSPITAL | Age: 70
End: 2020-10-26

## 2020-10-26 PROBLEM — K52.9 COLITIS: Status: RESOLVED | Noted: 2020-10-14 | Resolved: 2020-10-26

## 2020-10-26 LAB
ABO GROUP BLD: NORMAL
ABO GROUP BLD: NORMAL
ALBUMIN SERPL-MCNC: 3.3 G/DL (ref 3.5–5.2)
ALBUMIN/GLOB SERPL: 2.2 G/DL
ALP SERPL-CCNC: 56 U/L (ref 39–117)
ALT SERPL W P-5'-P-CCNC: 9 U/L (ref 1–41)
ANION GAP SERPL CALCULATED.3IONS-SCNC: 12 MMOL/L (ref 5–15)
ANION GAP SERPL CALCULATED.3IONS-SCNC: 9 MMOL/L (ref 5–15)
ARTERIAL PATENCY WRIST A: ABNORMAL
AST SERPL-CCNC: 22 U/L (ref 1–40)
ATMOSPHERIC PRESS: 752 MMHG
BASE EXCESS BLDA CALC-SCNC: 1.8 MMOL/L (ref 0–2)
BASOPHILS # BLD AUTO: 0.09 10*3/MM3 (ref 0–0.2)
BASOPHILS # BLD AUTO: 0.11 10*3/MM3 (ref 0–0.2)
BASOPHILS NFR BLD AUTO: 0.4 % (ref 0–1.5)
BASOPHILS NFR BLD AUTO: 0.5 % (ref 0–1.5)
BDY SITE: ABNORMAL
BILIRUB SERPL-MCNC: 0.7 MG/DL (ref 0–1.2)
BLD GP AB SCN SERPL QL: NEGATIVE
BLD GP AB SCN SERPL QL: NEGATIVE
BUN SERPL-MCNC: 24 MG/DL (ref 8–23)
BUN SERPL-MCNC: 25 MG/DL (ref 8–23)
BUN/CREAT SERPL: 14.4 (ref 7–25)
BUN/CREAT SERPL: 16.1 (ref 7–25)
CA-I BLD-MCNC: 4.12 MG/DL (ref 4.6–5.6)
CALCIUM SPEC-SCNC: 7.5 MG/DL (ref 8.6–10.5)
CALCIUM SPEC-SCNC: 8.4 MG/DL (ref 8.6–10.5)
CHLORIDE SERPL-SCNC: 101 MMOL/L (ref 98–107)
CHLORIDE SERPL-SCNC: 104 MMOL/L (ref 98–107)
CO2 SERPL-SCNC: 25 MMOL/L (ref 22–29)
CO2 SERPL-SCNC: 28 MMOL/L (ref 22–29)
CREAT SERPL-MCNC: 1.55 MG/DL (ref 0.76–1.27)
CREAT SERPL-MCNC: 1.67 MG/DL (ref 0.76–1.27)
DEPRECATED RDW RBC AUTO: 48.6 FL (ref 37–54)
DEPRECATED RDW RBC AUTO: 48.8 FL (ref 37–54)
EOSINOPHIL # BLD AUTO: 0.02 10*3/MM3 (ref 0–0.4)
EOSINOPHIL # BLD AUTO: 0.04 10*3/MM3 (ref 0–0.4)
EOSINOPHIL NFR BLD AUTO: 0.1 % (ref 0.3–6.2)
EOSINOPHIL NFR BLD AUTO: 0.2 % (ref 0.3–6.2)
ERYTHROCYTE [DISTWIDTH] IN BLOOD BY AUTOMATED COUNT: 13.8 % (ref 12.3–15.4)
ERYTHROCYTE [DISTWIDTH] IN BLOOD BY AUTOMATED COUNT: 13.9 % (ref 12.3–15.4)
GFR SERPL CREATININE-BSD FRML MDRD: 41 ML/MIN/1.73
GFR SERPL CREATININE-BSD FRML MDRD: 44 ML/MIN/1.73
GLOBULIN UR ELPH-MCNC: 1.5 GM/DL
GLUCOSE BLDC GLUCOMTR-MCNC: 170 MG/DL (ref 70–130)
GLUCOSE BLDC GLUCOMTR-MCNC: 261 MG/DL (ref 70–130)
GLUCOSE SERPL-MCNC: 164 MG/DL (ref 65–99)
GLUCOSE SERPL-MCNC: 217 MG/DL (ref 65–99)
HCO3 BLDA-SCNC: 27.3 MMOL/L (ref 20–26)
HCT VFR BLD AUTO: 43.9 % (ref 37.5–51)
HCT VFR BLD AUTO: 44.6 % (ref 37.5–51)
HGB BLD-MCNC: 14.7 G/DL (ref 13–17.7)
HGB BLD-MCNC: 15 G/DL (ref 13–17.7)
IMM GRANULOCYTES # BLD AUTO: 0.23 10*3/MM3 (ref 0–0.05)
IMM GRANULOCYTES # BLD AUTO: 0.23 10*3/MM3 (ref 0–0.05)
IMM GRANULOCYTES NFR BLD AUTO: 0.9 % (ref 0–0.5)
IMM GRANULOCYTES NFR BLD AUTO: 1 % (ref 0–0.5)
INHALED O2 CONCENTRATION: 50 %
LYMPHOCYTES # BLD AUTO: 0.59 10*3/MM3 (ref 0.7–3.1)
LYMPHOCYTES # BLD AUTO: 0.89 10*3/MM3 (ref 0.7–3.1)
LYMPHOCYTES NFR BLD AUTO: 2.5 % (ref 19.6–45.3)
LYMPHOCYTES NFR BLD AUTO: 3.6 % (ref 19.6–45.3)
Lab: ABNORMAL
Lab: NORMAL
Lab: NORMAL
MAGNESIUM SERPL-MCNC: 1.7 MG/DL (ref 1.6–2.4)
MAGNESIUM SERPL-MCNC: 1.7 MG/DL (ref 1.6–2.4)
MCH RBC QN AUTO: 32.3 PG (ref 26.6–33)
MCH RBC QN AUTO: 32.4 PG (ref 26.6–33)
MCHC RBC AUTO-ENTMCNC: 33.5 G/DL (ref 31.5–35.7)
MCHC RBC AUTO-ENTMCNC: 33.6 G/DL (ref 31.5–35.7)
MCV RBC AUTO: 96.1 FL (ref 79–97)
MCV RBC AUTO: 96.7 FL (ref 79–97)
MODALITY: ABNORMAL
MONOCYTES # BLD AUTO: 0.75 10*3/MM3 (ref 0.1–0.9)
MONOCYTES # BLD AUTO: 1.07 10*3/MM3 (ref 0.1–0.9)
MONOCYTES NFR BLD AUTO: 3.1 % (ref 5–12)
MONOCYTES NFR BLD AUTO: 4.4 % (ref 5–12)
NEUTROPHILS NFR BLD AUTO: 22.27 10*3/MM3 (ref 1.7–7)
NEUTROPHILS NFR BLD AUTO: 22.33 10*3/MM3 (ref 1.7–7)
NEUTROPHILS NFR BLD AUTO: 90.5 % (ref 42.7–76)
NEUTROPHILS NFR BLD AUTO: 92.8 % (ref 42.7–76)
NRBC BLD AUTO-RTO: 0 /100 WBC (ref 0–0.2)
NRBC BLD AUTO-RTO: 0 /100 WBC (ref 0–0.2)
PAW @ PEAK INSP FLOW SETTING VENT: 18 CMH2O
PCO2 BLDA: 44.7 MM HG (ref 35–45)
PEEP RESPIRATORY: 5 CM[H2O]
PH BLDA: 7.39 PH UNITS (ref 7.35–7.45)
PHOSPHATE SERPL-MCNC: 3 MG/DL (ref 2.5–4.5)
PHOSPHATE SERPL-MCNC: 3.4 MG/DL (ref 2.5–4.5)
PLATELET # BLD AUTO: 108 10*3/MM3 (ref 140–450)
PLATELET # BLD AUTO: 128 10*3/MM3 (ref 140–450)
PMV BLD AUTO: 11.2 FL (ref 6–12)
PMV BLD AUTO: 11.4 FL (ref 6–12)
PO2 BLDA: 130 MM HG (ref 83–108)
POTASSIUM SERPL-SCNC: 3.4 MMOL/L (ref 3.5–5.2)
POTASSIUM SERPL-SCNC: 3.5 MMOL/L (ref 3.5–5.2)
PROT SERPL-MCNC: 4.8 G/DL (ref 6–8.5)
RBC # BLD AUTO: 4.54 10*6/MM3 (ref 4.14–5.8)
RBC # BLD AUTO: 4.64 10*6/MM3 (ref 4.14–5.8)
RH BLD: POSITIVE
RH BLD: POSITIVE
SAO2 % BLDCOA: 99 % (ref 94–99)
SET MECH RESP RATE: 14
SODIUM SERPL-SCNC: 138 MMOL/L (ref 136–145)
SODIUM SERPL-SCNC: 141 MMOL/L (ref 136–145)
T&S EXPIRATION DATE: NORMAL
T&S EXPIRATION DATE: NORMAL
VENTILATOR MODE: AC
VT ON VENT VENT: 500 ML
WBC # BLD AUTO: 24.03 10*3/MM3 (ref 3.4–10.8)
WBC # BLD AUTO: 24.59 10*3/MM3 (ref 3.4–10.8)

## 2020-10-26 PROCEDURE — 25010000002 HYDROMORPHONE PER 4 MG: Performed by: SURGERY

## 2020-10-26 PROCEDURE — 0DHA0UZ INSERTION OF FEEDING DEVICE INTO JEJUNUM, OPEN APPROACH: ICD-10-PCS | Performed by: SURGERY

## 2020-10-26 PROCEDURE — 84100 ASSAY OF PHOSPHORUS: CPT | Performed by: STUDENT IN AN ORGANIZED HEALTH CARE EDUCATION/TRAINING PROGRAM

## 2020-10-26 PROCEDURE — 25010000002 FUROSEMIDE PER 20 MG: Performed by: STUDENT IN AN ORGANIZED HEALTH CARE EDUCATION/TRAINING PROGRAM

## 2020-10-26 PROCEDURE — 25010000002 CALCIUM GLUCONATE PER 10 ML: Performed by: SURGERY

## 2020-10-26 PROCEDURE — 25010000002 FENTANYL CITRATE (PF) 100 MCG/2ML SOLUTION: Performed by: NURSE ANESTHETIST, CERTIFIED REGISTERED

## 2020-10-26 PROCEDURE — 25010000002 CEFOXITIN: Performed by: SURGERY

## 2020-10-26 PROCEDURE — 94760 N-INVAS EAR/PLS OXIMETRY 1: CPT

## 2020-10-26 PROCEDURE — 87015 SPECIMEN INFECT AGNT CONCNTJ: CPT | Performed by: SURGERY

## 2020-10-26 PROCEDURE — 0DH60UZ INSERTION OF FEEDING DEVICE INTO STOMACH, OPEN APPROACH: ICD-10-PCS | Performed by: SURGERY

## 2020-10-26 PROCEDURE — 87205 SMEAR GRAM STAIN: CPT | Performed by: SURGERY

## 2020-10-26 PROCEDURE — 82330 ASSAY OF CALCIUM: CPT | Performed by: STUDENT IN AN ORGANIZED HEALTH CARE EDUCATION/TRAINING PROGRAM

## 2020-10-26 PROCEDURE — 82962 GLUCOSE BLOOD TEST: CPT

## 2020-10-26 PROCEDURE — 86850 RBC ANTIBODY SCREEN: CPT | Performed by: SURGERY

## 2020-10-26 PROCEDURE — 85025 COMPLETE CBC W/AUTO DIFF WBC: CPT | Performed by: STUDENT IN AN ORGANIZED HEALTH CARE EDUCATION/TRAINING PROGRAM

## 2020-10-26 PROCEDURE — 87070 CULTURE OTHR SPECIMN AEROBIC: CPT | Performed by: SURGERY

## 2020-10-26 PROCEDURE — 25010000002 MAGNESIUM SULFATE PER 500 MG OF MAGNESIUM: Performed by: SURGERY

## 2020-10-26 PROCEDURE — 83735 ASSAY OF MAGNESIUM: CPT | Performed by: STUDENT IN AN ORGANIZED HEALTH CARE EDUCATION/TRAINING PROGRAM

## 2020-10-26 PROCEDURE — 94799 UNLISTED PULMONARY SVC/PX: CPT

## 2020-10-26 PROCEDURE — 88307 TISSUE EXAM BY PATHOLOGIST: CPT

## 2020-10-26 PROCEDURE — 86901 BLOOD TYPING SEROLOGIC RH(D): CPT | Performed by: SURGERY

## 2020-10-26 PROCEDURE — 0DTE0ZZ RESECTION OF LARGE INTESTINE, OPEN APPROACH: ICD-10-PCS | Performed by: SURGERY

## 2020-10-26 PROCEDURE — 25010000002 PROPOFOL 10 MG/ML EMULSION: Performed by: SURGERY

## 2020-10-26 PROCEDURE — 99232 SBSQ HOSP IP/OBS MODERATE 35: CPT | Performed by: STUDENT IN AN ORGANIZED HEALTH CARE EDUCATION/TRAINING PROGRAM

## 2020-10-26 PROCEDURE — 83735 ASSAY OF MAGNESIUM: CPT | Performed by: SURGERY

## 2020-10-26 PROCEDURE — 82803 BLOOD GASES ANY COMBINATION: CPT

## 2020-10-26 PROCEDURE — 25010000002 SUCCINYLCHOLINE PER 20 MG: Performed by: NURSE ANESTHETIST, CERTIFIED REGISTERED

## 2020-10-26 PROCEDURE — 25010000002 HEPARIN (PORCINE) PER 1000 UNITS: Performed by: SURGERY

## 2020-10-26 PROCEDURE — 94003 VENT MGMT INPAT SUBQ DAY: CPT

## 2020-10-26 PROCEDURE — 71045 X-RAY EXAM CHEST 1 VIEW: CPT

## 2020-10-26 PROCEDURE — 25010000002 PROPOFOL 10 MG/ML EMULSION: Performed by: NURSE ANESTHETIST, CERTIFIED REGISTERED

## 2020-10-26 PROCEDURE — 25010000003 POTASSIUM CHLORIDE 10 MEQ/100ML SOLUTION: Performed by: STUDENT IN AN ORGANIZED HEALTH CARE EDUCATION/TRAINING PROGRAM

## 2020-10-26 PROCEDURE — 25010000002 HYDROMORPHONE 1 MG/ML SOLUTION: Performed by: SURGERY

## 2020-10-26 PROCEDURE — 25010000002 PHENYLEPHRINE PER 1 ML: Performed by: NURSE ANESTHETIST, CERTIFIED REGISTERED

## 2020-10-26 PROCEDURE — C2627 CATH, SUPRAPUBIC/CYSTOSCOPIC: HCPCS | Performed by: SURGERY

## 2020-10-26 PROCEDURE — 25010000002 POTASSIUM CHLORIDE PER 2 MEQ OF POTASSIUM: Performed by: STUDENT IN AN ORGANIZED HEALTH CARE EDUCATION/TRAINING PROGRAM

## 2020-10-26 PROCEDURE — 84100 ASSAY OF PHOSPHORUS: CPT | Performed by: SURGERY

## 2020-10-26 PROCEDURE — 80053 COMPREHEN METABOLIC PANEL: CPT | Performed by: STUDENT IN AN ORGANIZED HEALTH CARE EDUCATION/TRAINING PROGRAM

## 2020-10-26 PROCEDURE — 63710000001 INSULIN ASPART PER 5 UNITS: Performed by: SURGERY

## 2020-10-26 PROCEDURE — 25010000002 POTASSIUM CHLORIDE PER 2 MEQ OF POTASSIUM: Performed by: SURGERY

## 2020-10-26 PROCEDURE — 86900 BLOOD TYPING SEROLOGIC ABO: CPT | Performed by: SURGERY

## 2020-10-26 PROCEDURE — 94002 VENT MGMT INPAT INIT DAY: CPT

## 2020-10-26 PROCEDURE — 25010000002 ONDANSETRON PER 1 MG: Performed by: NURSE ANESTHETIST, CERTIFIED REGISTERED

## 2020-10-26 PROCEDURE — 44150 REMOVAL OF COLON: CPT | Performed by: SURGERY

## 2020-10-26 PROCEDURE — 0D1B0Z4 BYPASS ILEUM TO CUTANEOUS, OPEN APPROACH: ICD-10-PCS | Performed by: SURGERY

## 2020-10-26 PROCEDURE — 86923 COMPATIBILITY TEST ELECTRIC: CPT

## 2020-10-26 PROCEDURE — 25010000003 POTASSIUM CHLORIDE 10 MEQ/100ML SOLUTION: Performed by: INTERNAL MEDICINE

## 2020-10-26 PROCEDURE — 25010000002 MIDAZOLAM PER 1 MG: Performed by: NURSE ANESTHETIST, CERTIFIED REGISTERED

## 2020-10-26 PROCEDURE — 85025 COMPLETE CBC W/AUTO DIFF WBC: CPT | Performed by: SURGERY

## 2020-10-26 DEVICE — PROXIMATE RELOADABLE LINEAR CUTTER WITH SAFETY LOCK-OUT, 75MM
Type: IMPLANTABLE DEVICE | Site: ABDOMEN | Status: FUNCTIONAL
Brand: PROXIMATE

## 2020-10-26 DEVICE — PROXIMATE LINEAR CUTTER RELOAD, BLUE, 75MM
Type: IMPLANTABLE DEVICE | Site: ABDOMEN | Status: FUNCTIONAL
Brand: PROXIMATE

## 2020-10-26 DEVICE — PROXIMATE RELOADABLE LINEAR STAPLER
Type: IMPLANTABLE DEVICE | Site: ABDOMEN | Status: FUNCTIONAL
Brand: PROXIMATE

## 2020-10-26 RX ORDER — PROPOFOL 10 MG/ML
VIAL (ML) INTRAVENOUS AS NEEDED
Status: DISCONTINUED | OUTPATIENT
Start: 2020-10-26 | End: 2020-10-26 | Stop reason: SURG

## 2020-10-26 RX ORDER — NALOXONE HCL 0.4 MG/ML
0.1 VIAL (ML) INJECTION
Status: DISCONTINUED | OUTPATIENT
Start: 2020-10-26 | End: 2020-10-29

## 2020-10-26 RX ORDER — SODIUM CHLORIDE 0.9 % (FLUSH) 0.9 %
3 SYRINGE (ML) INJECTION EVERY 12 HOURS SCHEDULED
Status: DISCONTINUED | OUTPATIENT
Start: 2020-10-26 | End: 2020-10-26 | Stop reason: HOSPADM

## 2020-10-26 RX ORDER — ONDANSETRON 2 MG/ML
4 INJECTION INTRAMUSCULAR; INTRAVENOUS ONCE AS NEEDED
Status: DISCONTINUED | OUTPATIENT
Start: 2020-10-26 | End: 2020-10-26

## 2020-10-26 RX ORDER — SODIUM CHLORIDE 0.9 % (FLUSH) 0.9 %
10 SYRINGE (ML) INJECTION AS NEEDED
Status: DISCONTINUED | OUTPATIENT
Start: 2020-10-26 | End: 2020-10-26 | Stop reason: HOSPADM

## 2020-10-26 RX ORDER — LIDOCAINE HYDROCHLORIDE 20 MG/ML
INJECTION, SOLUTION INFILTRATION; PERINEURAL AS NEEDED
Status: DISCONTINUED | OUTPATIENT
Start: 2020-10-26 | End: 2020-10-26 | Stop reason: SURG

## 2020-10-26 RX ORDER — SUCCINYLCHOLINE CHLORIDE 20 MG/ML
INJECTION INTRAMUSCULAR; INTRAVENOUS AS NEEDED
Status: DISCONTINUED | OUTPATIENT
Start: 2020-10-26 | End: 2020-10-26 | Stop reason: SURG

## 2020-10-26 RX ORDER — MIDAZOLAM HYDROCHLORIDE 1 MG/ML
INJECTION INTRAMUSCULAR; INTRAVENOUS AS NEEDED
Status: DISCONTINUED | OUTPATIENT
Start: 2020-10-26 | End: 2020-10-26 | Stop reason: SURG

## 2020-10-26 RX ORDER — FENTANYL CITRATE 50 UG/ML
INJECTION, SOLUTION INTRAMUSCULAR; INTRAVENOUS AS NEEDED
Status: DISCONTINUED | OUTPATIENT
Start: 2020-10-26 | End: 2020-10-26 | Stop reason: SURG

## 2020-10-26 RX ORDER — ENALAPRILAT 2.5 MG/2ML
0.62 INJECTION INTRAVENOUS EVERY 6 HOURS PRN
Status: DISCONTINUED | OUTPATIENT
Start: 2020-10-26 | End: 2020-11-03 | Stop reason: HOSPADM

## 2020-10-26 RX ORDER — EPHEDRINE SULFATE 50 MG/ML
5 INJECTION, SOLUTION INTRAVENOUS ONCE AS NEEDED
Status: DISCONTINUED | OUTPATIENT
Start: 2020-10-26 | End: 2020-10-26

## 2020-10-26 RX ORDER — PROMETHAZINE HYDROCHLORIDE 25 MG/1
25 SUPPOSITORY RECTAL ONCE AS NEEDED
Status: DISCONTINUED | OUTPATIENT
Start: 2020-10-26 | End: 2020-10-26

## 2020-10-26 RX ORDER — SODIUM CHLORIDE 0.9 % (FLUSH) 0.9 %
3 SYRINGE (ML) INJECTION EVERY 12 HOURS SCHEDULED
Status: DISCONTINUED | OUTPATIENT
Start: 2020-10-26 | End: 2020-10-27

## 2020-10-26 RX ORDER — SODIUM CHLORIDE, SODIUM LACTATE, POTASSIUM CHLORIDE, CALCIUM CHLORIDE 600; 310; 30; 20 MG/100ML; MG/100ML; MG/100ML; MG/100ML
100 INJECTION, SOLUTION INTRAVENOUS CONTINUOUS
Status: DISCONTINUED | OUTPATIENT
Start: 2020-10-26 | End: 2020-10-26 | Stop reason: SDUPTHER

## 2020-10-26 RX ORDER — SODIUM CHLORIDE, SODIUM GLUCONATE, SODIUM ACETATE, POTASSIUM CHLORIDE, AND MAGNESIUM CHLORIDE 526; 502; 368; 37; 30 MG/100ML; MG/100ML; MG/100ML; MG/100ML; MG/100ML
INJECTION, SOLUTION INTRAVENOUS CONTINUOUS PRN
Status: DISCONTINUED | OUTPATIENT
Start: 2020-10-26 | End: 2020-10-26 | Stop reason: SURG

## 2020-10-26 RX ORDER — HYDROMORPHONE HCL IN 0.9% NACL 0.2 MG/ML
PLASTIC BAG, INJECTION (ML) INTRAVENOUS CONTINUOUS
Status: DISCONTINUED | OUTPATIENT
Start: 2020-10-26 | End: 2020-10-27

## 2020-10-26 RX ORDER — SODIUM CHLORIDE 0.9 % (FLUSH) 0.9 %
10 SYRINGE (ML) INJECTION EVERY 12 HOURS SCHEDULED
Status: DISCONTINUED | OUTPATIENT
Start: 2020-10-26 | End: 2020-11-03 | Stop reason: HOSPADM

## 2020-10-26 RX ORDER — SODIUM CHLORIDE 0.9 % (FLUSH) 0.9 %
10 SYRINGE (ML) INJECTION AS NEEDED
Status: DISCONTINUED | OUTPATIENT
Start: 2020-10-26 | End: 2020-10-27

## 2020-10-26 RX ORDER — VECURONIUM BROMIDE 20 MG/20ML
INJECTION, POWDER, LYOPHILIZED, FOR SOLUTION INTRAVENOUS AS NEEDED
Status: DISCONTINUED | OUTPATIENT
Start: 2020-10-26 | End: 2020-10-26 | Stop reason: SURG

## 2020-10-26 RX ORDER — DIPHENHYDRAMINE HYDROCHLORIDE 50 MG/ML
12.5 INJECTION INTRAMUSCULAR; INTRAVENOUS
Status: DISCONTINUED | OUTPATIENT
Start: 2020-10-26 | End: 2020-10-26

## 2020-10-26 RX ORDER — ONDANSETRON 2 MG/ML
4 INJECTION INTRAMUSCULAR; INTRAVENOUS EVERY 6 HOURS PRN
Status: DISCONTINUED | OUTPATIENT
Start: 2020-10-26 | End: 2020-10-26 | Stop reason: SDUPTHER

## 2020-10-26 RX ORDER — ALBUTEROL SULFATE 2.5 MG/3ML
2.5 SOLUTION RESPIRATORY (INHALATION)
Status: DISCONTINUED | OUTPATIENT
Start: 2020-10-26 | End: 2020-11-03

## 2020-10-26 RX ORDER — SODIUM CHLORIDE 0.9 % (FLUSH) 0.9 %
10 SYRINGE (ML) INJECTION AS NEEDED
Status: DISCONTINUED | OUTPATIENT
Start: 2020-10-26 | End: 2020-11-03 | Stop reason: HOSPADM

## 2020-10-26 RX ORDER — HEPARIN SODIUM 5000 [USP'U]/ML
5000 INJECTION, SOLUTION INTRAVENOUS; SUBCUTANEOUS EVERY 8 HOURS SCHEDULED
Status: DISCONTINUED | OUTPATIENT
Start: 2020-10-26 | End: 2020-11-03 | Stop reason: HOSPADM

## 2020-10-26 RX ORDER — NALOXONE HCL 0.4 MG/ML
0.4 VIAL (ML) INJECTION
Status: DISCONTINUED | OUTPATIENT
Start: 2020-10-26 | End: 2020-11-03 | Stop reason: HOSPADM

## 2020-10-26 RX ORDER — ALBUTEROL SULFATE 2.5 MG/3ML
2.5 SOLUTION RESPIRATORY (INHALATION) ONCE AS NEEDED
Status: DISCONTINUED | OUTPATIENT
Start: 2020-10-26 | End: 2020-10-26

## 2020-10-26 RX ORDER — POTASSIUM CHLORIDE 7.45 MG/ML
10 INJECTION INTRAVENOUS
Status: COMPLETED | OUTPATIENT
Start: 2020-10-26 | End: 2020-10-26

## 2020-10-26 RX ORDER — NALOXONE HCL 0.4 MG/ML
0.4 VIAL (ML) INJECTION AS NEEDED
Status: DISCONTINUED | OUTPATIENT
Start: 2020-10-26 | End: 2020-10-26

## 2020-10-26 RX ORDER — ONDANSETRON 2 MG/ML
INJECTION INTRAMUSCULAR; INTRAVENOUS AS NEEDED
Status: DISCONTINUED | OUTPATIENT
Start: 2020-10-26 | End: 2020-10-26 | Stop reason: SURG

## 2020-10-26 RX ORDER — ONDANSETRON 2 MG/ML
4 INJECTION INTRAMUSCULAR; INTRAVENOUS EVERY 6 HOURS PRN
Status: DISCONTINUED | OUTPATIENT
Start: 2020-10-26 | End: 2020-11-03 | Stop reason: HOSPADM

## 2020-10-26 RX ORDER — ACETAMINOPHEN 325 MG/1
650 TABLET ORAL ONCE AS NEEDED
Status: DISCONTINUED | OUTPATIENT
Start: 2020-10-26 | End: 2020-10-26

## 2020-10-26 RX ORDER — CHLORHEXIDINE GLUCONATE 0.12 MG/ML
15 RINSE ORAL EVERY 12 HOURS SCHEDULED
Status: DISCONTINUED | OUTPATIENT
Start: 2020-10-26 | End: 2020-10-27

## 2020-10-26 RX ORDER — ACETAMINOPHEN 650 MG/1
650 SUPPOSITORY RECTAL ONCE AS NEEDED
Status: DISCONTINUED | OUTPATIENT
Start: 2020-10-26 | End: 2020-10-26

## 2020-10-26 RX ORDER — PROMETHAZINE HYDROCHLORIDE 25 MG/1
25 TABLET ORAL ONCE AS NEEDED
Status: DISCONTINUED | OUTPATIENT
Start: 2020-10-26 | End: 2020-10-26

## 2020-10-26 RX ORDER — LABETALOL HYDROCHLORIDE 5 MG/ML
5 INJECTION, SOLUTION INTRAVENOUS
Status: DISCONTINUED | OUTPATIENT
Start: 2020-10-26 | End: 2020-10-26

## 2020-10-26 RX ORDER — NOREPINEPHRINE BIT/0.9 % NACL 8 MG/250ML
.02-.3 INFUSION BOTTLE (ML) INTRAVENOUS
Status: DISCONTINUED | OUTPATIENT
Start: 2020-10-26 | End: 2020-10-27

## 2020-10-26 RX ORDER — FAMOTIDINE 10 MG/ML
20 INJECTION, SOLUTION INTRAVENOUS 2 TIMES DAILY
Status: DISCONTINUED | OUTPATIENT
Start: 2020-10-26 | End: 2020-10-27

## 2020-10-26 RX ORDER — SODIUM CHLORIDE, SODIUM LACTATE, POTASSIUM CHLORIDE, CALCIUM CHLORIDE 600; 310; 30; 20 MG/100ML; MG/100ML; MG/100ML; MG/100ML
50 INJECTION, SOLUTION INTRAVENOUS CONTINUOUS
Status: DISCONTINUED | OUTPATIENT
Start: 2020-10-26 | End: 2020-10-29

## 2020-10-26 RX ORDER — FLUMAZENIL 0.1 MG/ML
0.2 INJECTION INTRAVENOUS AS NEEDED
Status: DISCONTINUED | OUTPATIENT
Start: 2020-10-26 | End: 2020-10-26

## 2020-10-26 RX ADMIN — FENTANYL CITRATE 25 MCG: 50 INJECTION, SOLUTION INTRAMUSCULAR; INTRAVENOUS at 13:55

## 2020-10-26 RX ADMIN — POTASSIUM CHLORIDE 10 MEQ: 7.46 INJECTION, SOLUTION INTRAVENOUS at 06:39

## 2020-10-26 RX ADMIN — HYDROMORPHONE HYDROCHLORIDE 1 MG: 1 INJECTION, SOLUTION INTRAMUSCULAR; INTRAVENOUS; SUBCUTANEOUS at 17:33

## 2020-10-26 RX ADMIN — SODIUM CHLORIDE, PRESERVATIVE FREE 10 ML: 5 INJECTION INTRAVENOUS at 20:08

## 2020-10-26 RX ADMIN — SODIUM CHLORIDE, POTASSIUM CHLORIDE, SODIUM LACTATE AND CALCIUM CHLORIDE: 600; 310; 30; 20 INJECTION, SOLUTION INTRAVENOUS at 12:11

## 2020-10-26 RX ADMIN — PHENYLEPHRINE HYDROCHLORIDE 100 MCG: 10 INJECTION INTRAVENOUS at 12:46

## 2020-10-26 RX ADMIN — SUCCINYLCHOLINE CHLORIDE 80 MG: 20 INJECTION, SOLUTION INTRAMUSCULAR; INTRAVENOUS at 12:30

## 2020-10-26 RX ADMIN — SODIUM CHLORIDE, PRESERVATIVE FREE 10 ML: 5 INJECTION INTRAVENOUS at 08:53

## 2020-10-26 RX ADMIN — FUROSEMIDE 40 MG: 10 INJECTION, SOLUTION INTRAMUSCULAR; INTRAVENOUS at 08:53

## 2020-10-26 RX ADMIN — PROPOFOL 5 MCG/KG/MIN: 10 INJECTION, EMULSION INTRAVENOUS at 17:33

## 2020-10-26 RX ADMIN — CEFOXITIN 2 G: 2 INJECTION, POWDER, FOR SOLUTION INTRAVENOUS at 12:27

## 2020-10-26 RX ADMIN — POTASSIUM CHLORIDE 10 MEQ: 7.46 INJECTION, SOLUTION INTRAVENOUS at 20:29

## 2020-10-26 RX ADMIN — HEPARIN SODIUM 5000 UNITS: 5000 INJECTION INTRAVENOUS; SUBCUTANEOUS at 21:25

## 2020-10-26 RX ADMIN — SODIUM CHLORIDE, POTASSIUM CHLORIDE, SODIUM LACTATE AND CALCIUM CHLORIDE 100 ML/HR: 600; 310; 30; 20 INJECTION, SOLUTION INTRAVENOUS at 17:32

## 2020-10-26 RX ADMIN — ONDANSETRON 4 MG: 2 INJECTION INTRAMUSCULAR; INTRAVENOUS at 14:40

## 2020-10-26 RX ADMIN — LIDOCAINE HYDROCHLORIDE 40 MG: 20 INJECTION, SOLUTION INFILTRATION; PERINEURAL at 12:30

## 2020-10-26 RX ADMIN — SODIUM CHLORIDE, SODIUM GLUCONATE, SODIUM ACETATE, POTASSIUM CHLORIDE, AND MAGNESIUM CHLORIDE: 526; 502; 368; 37; 30 INJECTION, SOLUTION INTRAVENOUS at 14:34

## 2020-10-26 RX ADMIN — FENTANYL CITRATE 25 MCG: 50 INJECTION, SOLUTION INTRAMUSCULAR; INTRAVENOUS at 12:30

## 2020-10-26 RX ADMIN — PHENYLEPHRINE HYDROCHLORIDE 100 MCG: 10 INJECTION INTRAVENOUS at 12:57

## 2020-10-26 RX ADMIN — PROPOFOL 15 MCG/KG/MIN: 10 INJECTION, EMULSION INTRAVENOUS at 21:50

## 2020-10-26 RX ADMIN — MIDAZOLAM HYDROCHLORIDE 2 MG: 2 INJECTION, SOLUTION INTRAMUSCULAR; INTRAVENOUS at 12:15

## 2020-10-26 RX ADMIN — INSULIN ASPART 2 UNITS: 100 INJECTION, SOLUTION INTRAVENOUS; SUBCUTANEOUS at 22:02

## 2020-10-26 RX ADMIN — PHENYLEPHRINE HYDROCHLORIDE 100 MCG: 10 INJECTION INTRAVENOUS at 13:20

## 2020-10-26 RX ADMIN — MIDAZOLAM HYDROCHLORIDE 2 MG: 2 INJECTION, SOLUTION INTRAMUSCULAR; INTRAVENOUS at 15:18

## 2020-10-26 RX ADMIN — VECURONIUM BROMIDE 1 MG: 1 INJECTION, POWDER, LYOPHILIZED, FOR SOLUTION INTRAVENOUS at 12:34

## 2020-10-26 RX ADMIN — PROPOFOL 50 MG: 10 INJECTION, EMULSION INTRAVENOUS at 12:30

## 2020-10-26 RX ADMIN — PHENYLEPHRINE HYDROCHLORIDE 100 MCG: 10 INJECTION INTRAVENOUS at 12:41

## 2020-10-26 RX ADMIN — PHENYLEPHRINE HYDROCHLORIDE 100 MCG: 10 INJECTION INTRAVENOUS at 12:56

## 2020-10-26 RX ADMIN — POTASSIUM CHLORIDE 10 MEQ: 7.46 INJECTION, SOLUTION INTRAVENOUS at 21:51

## 2020-10-26 RX ADMIN — FAMOTIDINE 20 MG: 10 INJECTION INTRAVENOUS at 20:23

## 2020-10-26 RX ADMIN — POTASSIUM CHLORIDE: 149 INJECTION, SOLUTION, CONCENTRATE INTRAVENOUS at 10:03

## 2020-10-26 RX ADMIN — NOREPINEPHRINE BITARTRATE 0.02 MCG/KG/MIN: 1 INJECTION INTRAVENOUS at 13:23

## 2020-10-26 RX ADMIN — VECURONIUM BROMIDE 4 MG: 1 INJECTION, POWDER, LYOPHILIZED, FOR SOLUTION INTRAVENOUS at 12:45

## 2020-10-26 RX ADMIN — VECURONIUM BROMIDE 5 MG: 1 INJECTION, POWDER, LYOPHILIZED, FOR SOLUTION INTRAVENOUS at 13:25

## 2020-10-26 RX ADMIN — SODIUM CHLORIDE, PRESERVATIVE FREE 3 ML: 5 INJECTION INTRAVENOUS at 20:08

## 2020-10-26 RX ADMIN — HYDROMORPHONE HYDROCHLORIDE: 2 INJECTION INTRAMUSCULAR; INTRAVENOUS; SUBCUTANEOUS at 18:15

## 2020-10-26 RX ADMIN — SODIUM CHLORIDE, SODIUM GLUCONATE, SODIUM ACETATE, POTASSIUM CHLORIDE, AND MAGNESIUM CHLORIDE: 526; 502; 368; 37; 30 INJECTION, SOLUTION INTRAVENOUS at 12:30

## 2020-10-26 RX ADMIN — FENTANYL CITRATE 25 MCG: 50 INJECTION, SOLUTION INTRAMUSCULAR; INTRAVENOUS at 13:52

## 2020-10-26 RX ADMIN — POTASSIUM CHLORIDE 10 MEQ: 7.46 INJECTION, SOLUTION INTRAVENOUS at 04:49

## 2020-10-26 RX ADMIN — PROPOFOL 20 MG: 10 INJECTION, EMULSION INTRAVENOUS at 13:09

## 2020-10-26 RX ADMIN — CALCIUM GLUCONATE: 94 INJECTION, SOLUTION INTRAVENOUS at 19:52

## 2020-10-26 RX ADMIN — POTASSIUM CHLORIDE 10 MEQ: 7.46 INJECTION, SOLUTION INTRAVENOUS at 19:27

## 2020-10-26 RX ADMIN — PHENYLEPHRINE HYDROCHLORIDE 100 MCG: 10 INJECTION INTRAVENOUS at 12:36

## 2020-10-26 RX ADMIN — CHLORHEXIDINE GLUCONATE 0.12% ORAL RINSE 15 ML: 1.2 LIQUID ORAL at 20:23

## 2020-10-26 RX ADMIN — I.V. FAT EMULSION 12 G: 20 EMULSION INTRAVENOUS at 20:04

## 2020-10-26 RX ADMIN — FENTANYL CITRATE 25 MCG: 50 INJECTION, SOLUTION INTRAMUSCULAR; INTRAVENOUS at 14:11

## 2020-10-26 RX ADMIN — PHENYLEPHRINE HYDROCHLORIDE 100 MCG: 10 INJECTION INTRAVENOUS at 12:51

## 2020-10-26 RX ADMIN — POTASSIUM CHLORIDE 10 MEQ: 7.46 INJECTION, SOLUTION INTRAVENOUS at 03:43

## 2020-10-26 RX ADMIN — PANTOPRAZOLE SODIUM 40 MG: 40 INJECTION, POWDER, FOR SOLUTION INTRAVENOUS at 05:37

## 2020-10-26 RX ADMIN — POTASSIUM CHLORIDE 10 MEQ: 7.46 INJECTION, SOLUTION INTRAVENOUS at 05:39
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
## 2020-10-26 NOTE — ANESTHESIA POSTPROCEDURE EVALUATION
Patient: Bar Crockett    Procedure Summary     Date: 10/26/20 Room / Location: [facility] OR 09 / [facility] OR    Anesthesia Start: 1217 Anesthesia Stop: 1547    Procedure: SUBTOTAL COLECTOMY,  ILEOSTOMY, PLACEMENT OF GASTROSTOMY AND JEJEUNOSTOMY TUBES (N/A Abdomen) Diagnosis:       Colitis      (Colitis [K52.9])    Surgeon: Nahum Herzog MD Provider: Bon Morriosn MD    Anesthesia Type: general ASA Status: 4          Anesthesia Type: general    Vitals  No vitals data found for the desired time range.          Post Anesthesia Care and Evaluation    Patient location during evaluation: ICU  Patient participation: complete - patient cannot participate  Post-procedure mental status: anesthesia not reversed,   Pain score: 3  Pain management: adequate  Airway patency: patent  Anesthetic complications: No anesthetic complications  PONV Status: none  Cardiovascular status: acceptable and stable  Respiratory status: ventilator, intubated and ETT  Hydration status: acceptable

## 2020-10-26 NOTE — ANESTHESIA PROCEDURE NOTES
Airway  Urgency: elective    Date/Time: 10/26/2020 12:35 PM  Airway not difficult    General Information and Staff    Patient location during procedure: OR  CRNA: Jodi Bill CRNA    Indications and Patient Condition  Indications for airway management: airway protection    Preoxygenated: yes  MILS not maintained throughout  Mask difficulty assessment: 1 - vent by mask    Final Airway Details  Final airway type: endotracheal airway      Successful airway: ETT  Cuffed: yes   Successful intubation technique: direct laryngoscopy  Facilitating devices/methods: intubating stylet  Endotracheal tube insertion site: oral  Blade: Bk  Blade size: 3  ETT size (mm): 8.0  Cormack-Lehane Classification: grade I - full view of glottis  Placement verified by: chest auscultation and capnometry   Measured from: lips  ETT/EBT  to lips (cm): 21  Number of attempts at approach: 2  Assessment: lips, teeth, and gum same as pre-op and atraumatic intubation

## 2020-10-26 NOTE — ANESTHESIA PROCEDURE NOTES
Arterial Line      Patient reassessed immediately prior to procedure    Patient location during procedure: OR  Start time: 10/26/2020 12:45 PM  Stop Time:10/26/2020 12:51 PM       Line placed for hemodynamic monitoring, ABGs/Labs/ISTAT and MD/Surgeon request.  Performed By   CRNA: Jodi Bill CRNA  Preanesthetic Checklist  Completed: patient identified, site marked, surgical consent, pre-op evaluation, timeout performed, IV checked, risks and benefits discussed and monitors and equipment checked  Arterial Line Prep   Sterile Tech: mask and gloves  Prep: ChloraPrep  Patient monitoring: blood pressure monitoring, continuous pulse oximetry and EKG  Arterial Line Procedure   Laterality:left  Location:  radial artery  Catheter size: 20 G   Guidance: ultrasound guided  PROCEDURE NOTE/ULTRASOUND INTERPRETATION.  Using ultrasound guidance the potential vascular sites for insertion of the catheter were visualized to determine the patency of the vessel to be used for vascular access.  After selecting the appropriate site for insertion, the needle was visualized under ultrasound being inserted into the radial artery, followed by ultrasound confirmation of wire and catheter placement. There were no abnormalities seen on ultrasound; an image was taken; and the patient tolerated the procedure with no complications.   Number of attempts: 2  Successful placement: yes  Post Assessment   Dressing Type: occlusive dressing applied and secured with tape.   Complications no  Circ/Move/Sens Assessment: normal and unchanged.   Patient Tolerance: patient tolerated the procedure well with no apparent complications             DNR/DNI  Hospice consult placed for information  Comfort feeds if possible  Ongoing medical management for now

## 2020-10-27 LAB
ALBUMIN SERPL-MCNC: 2 G/DL (ref 3.5–5.2)
ALBUMIN/GLOB SERPL: 1.4 G/DL
ALP SERPL-CCNC: 55 U/L (ref 39–117)
ALT SERPL W P-5'-P-CCNC: 7 U/L (ref 1–41)
ANION GAP SERPL CALCULATED.3IONS-SCNC: 9 MMOL/L (ref 5–15)
ARTERIAL PATENCY WRIST A: ABNORMAL
ARTERIAL PATENCY WRIST A: ABNORMAL
AST SERPL-CCNC: 19 U/L (ref 1–40)
ATMOSPHERIC PRESS: 752 MMHG
ATMOSPHERIC PRESS: 753 MMHG
BASE EXCESS BLDA CALC-SCNC: 2.5 MMOL/L (ref 0–2)
BASE EXCESS BLDA CALC-SCNC: 2.9 MMOL/L (ref 0–2)
BASOPHILS # BLD AUTO: 0.03 10*3/MM3 (ref 0–0.2)
BASOPHILS NFR BLD AUTO: 0.2 % (ref 0–1.5)
BDY SITE: ABNORMAL
BDY SITE: ABNORMAL
BILIRUB SERPL-MCNC: 0.3 MG/DL (ref 0–1.2)
BUN SERPL-MCNC: 26 MG/DL (ref 8–23)
BUN/CREAT SERPL: 18.3 (ref 7–25)
CALCIUM SPEC-SCNC: 6.3 MG/DL (ref 8.6–10.5)
CHLORIDE SERPL-SCNC: 110 MMOL/L (ref 98–107)
CO2 SERPL-SCNC: 23 MMOL/L (ref 22–29)
CREAT SERPL-MCNC: 1.42 MG/DL (ref 0.76–1.27)
DEPRECATED RDW RBC AUTO: 50.4 FL (ref 37–54)
EOSINOPHIL # BLD AUTO: 0.04 10*3/MM3 (ref 0–0.4)
EOSINOPHIL NFR BLD AUTO: 0.3 % (ref 0.3–6.2)
ERYTHROCYTE [DISTWIDTH] IN BLOOD BY AUTOMATED COUNT: 14 % (ref 12.3–15.4)
GFR SERPL CREATININE-BSD FRML MDRD: 49 ML/MIN/1.73
GLOBULIN UR ELPH-MCNC: 1.4 GM/DL
GLUCOSE BLDC GLUCOMTR-MCNC: 108 MG/DL (ref 70–130)
GLUCOSE BLDC GLUCOMTR-MCNC: 120 MG/DL (ref 70–130)
GLUCOSE BLDC GLUCOMTR-MCNC: 135 MG/DL (ref 70–130)
GLUCOSE BLDC GLUCOMTR-MCNC: 135 MG/DL (ref 70–130)
GLUCOSE SERPL-MCNC: 154 MG/DL (ref 65–99)
HCO3 BLDA-SCNC: 27.7 MMOL/L (ref 20–26)
HCO3 BLDA-SCNC: 28 MMOL/L (ref 20–26)
HCT VFR BLD AUTO: 32.4 % (ref 37.5–51)
HGB BLD-MCNC: 10.5 G/DL (ref 13–17.7)
IMM GRANULOCYTES # BLD AUTO: 0.14 10*3/MM3 (ref 0–0.05)
IMM GRANULOCYTES NFR BLD AUTO: 1 % (ref 0–0.5)
INHALED O2 CONCENTRATION: 30 %
INHALED O2 CONCENTRATION: 30 %
LYMPHOCYTES # BLD AUTO: 0.84 10*3/MM3 (ref 0.7–3.1)
LYMPHOCYTES NFR BLD AUTO: 6.3 % (ref 19.6–45.3)
Lab: ABNORMAL
Lab: ABNORMAL
MAGNESIUM SERPL-MCNC: 1.6 MG/DL (ref 1.6–2.4)
MCH RBC QN AUTO: 32 PG (ref 26.6–33)
MCHC RBC AUTO-ENTMCNC: 32.4 G/DL (ref 31.5–35.7)
MCV RBC AUTO: 98.8 FL (ref 79–97)
MODALITY: ABNORMAL
MODALITY: ABNORMAL
MONOCYTES # BLD AUTO: 0.66 10*3/MM3 (ref 0.1–0.9)
MONOCYTES NFR BLD AUTO: 4.9 % (ref 5–12)
NEUTROPHILS NFR BLD AUTO: 11.65 10*3/MM3 (ref 1.7–7)
NEUTROPHILS NFR BLD AUTO: 87.3 % (ref 42.7–76)
NRBC BLD AUTO-RTO: 0 /100 WBC (ref 0–0.2)
PCO2 BLDA: 44.1 MM HG (ref 35–45)
PCO2 BLDA: 44.3 MM HG (ref 35–45)
PEEP RESPIRATORY: 5 CM[H2O]
PEEP RESPIRATORY: 5 CM[H2O]
PH BLDA: 7.41 PH UNITS (ref 7.35–7.45)
PH BLDA: 7.41 PH UNITS (ref 7.35–7.45)
PHOSPHATE SERPL-MCNC: 3.5 MG/DL (ref 2.5–4.5)
PLATELET # BLD AUTO: 82 10*3/MM3 (ref 140–450)
PMV BLD AUTO: 11.9 FL (ref 6–12)
PO2 BLDA: 85.1 MM HG (ref 83–108)
PO2 BLDA: 89.5 MM HG (ref 83–108)
POTASSIUM SERPL-SCNC: 3.3 MMOL/L (ref 3.5–5.2)
PROT SERPL-MCNC: 3.4 G/DL (ref 6–8.5)
PSV: 8 CMH2O
RBC # BLD AUTO: 3.28 10*6/MM3 (ref 4.14–5.8)
SAO2 % BLDCOA: 97 % (ref 94–99)
SAO2 % BLDCOA: 97.3 % (ref 94–99)
SET MECH RESP RATE: 14
SODIUM SERPL-SCNC: 142 MMOL/L (ref 136–145)
VENTILATOR MODE: ABNORMAL
VENTILATOR MODE: AC
VT ON VENT VENT: 500 ML
WBC # BLD AUTO: 13.36 10*3/MM3 (ref 3.4–10.8)

## 2020-10-27 PROCEDURE — 99232 SBSQ HOSP IP/OBS MODERATE 35: CPT | Performed by: STUDENT IN AN ORGANIZED HEALTH CARE EDUCATION/TRAINING PROGRAM

## 2020-10-27 PROCEDURE — 97168 OT RE-EVAL EST PLAN CARE: CPT

## 2020-10-27 PROCEDURE — 94760 N-INVAS EAR/PLS OXIMETRY 1: CPT

## 2020-10-27 PROCEDURE — 94799 UNLISTED PULMONARY SVC/PX: CPT

## 2020-10-27 PROCEDURE — 82803 BLOOD GASES ANY COMBINATION: CPT

## 2020-10-27 PROCEDURE — 83735 ASSAY OF MAGNESIUM: CPT | Performed by: SURGERY

## 2020-10-27 PROCEDURE — 25010000002 CALCIUM GLUCONATE PER 10 ML: Performed by: INTERNAL MEDICINE

## 2020-10-27 PROCEDURE — 84100 ASSAY OF PHOSPHORUS: CPT | Performed by: SURGERY

## 2020-10-27 PROCEDURE — 25010000002 MAGNESIUM SULFATE IN D5W 1G/100ML (PREMIX) 1-5 GM/100ML-% SOLUTION: Performed by: SURGERY

## 2020-10-27 PROCEDURE — 25010000002 MAGNESIUM SULFATE PER 500 MG OF MAGNESIUM: Performed by: SURGERY

## 2020-10-27 PROCEDURE — 82962 GLUCOSE BLOOD TEST: CPT

## 2020-10-27 PROCEDURE — 25010000002 HYDROMORPHONE PER 4 MG: Performed by: SURGERY

## 2020-10-27 PROCEDURE — 99024 POSTOP FOLLOW-UP VISIT: CPT | Performed by: SURGERY

## 2020-10-27 PROCEDURE — 25010000002 ALBUMIN HUMAN 5% PER 50 ML: Performed by: SURGERY

## 2020-10-27 PROCEDURE — 25010000002 POTASSIUM CHLORIDE PER 2 MEQ OF POTASSIUM: Performed by: SURGERY

## 2020-10-27 PROCEDURE — P9041 ALBUMIN (HUMAN),5%, 50ML: HCPCS | Performed by: SURGERY

## 2020-10-27 PROCEDURE — 85025 COMPLETE CBC W/AUTO DIFF WBC: CPT | Performed by: SURGERY

## 2020-10-27 PROCEDURE — 94640 AIRWAY INHALATION TREATMENT: CPT

## 2020-10-27 PROCEDURE — 25010000003 POTASSIUM CHLORIDE 10 MEQ/100ML SOLUTION: Performed by: SURGERY

## 2020-10-27 PROCEDURE — 3E0H76Z INTRODUCTION OF NUTRITIONAL SUBSTANCE INTO LOWER GI, VIA NATURAL OR ARTIFICIAL OPENING: ICD-10-PCS | Performed by: FAMILY MEDICINE

## 2020-10-27 PROCEDURE — 25010000002 CALCIUM GLUCONATE PER 10 ML: Performed by: SURGERY

## 2020-10-27 PROCEDURE — 80053 COMPREHEN METABOLIC PANEL: CPT | Performed by: SURGERY

## 2020-10-27 PROCEDURE — 97164 PT RE-EVAL EST PLAN CARE: CPT

## 2020-10-27 PROCEDURE — 94003 VENT MGMT INPAT SUBQ DAY: CPT

## 2020-10-27 PROCEDURE — 25010000002 HYDROMORPHONE 1 MG/ML SOLUTION: Performed by: SURGERY

## 2020-10-27 RX ORDER — FAMOTIDINE 10 MG/ML
20 INJECTION, SOLUTION INTRAVENOUS DAILY
Status: DISCONTINUED | OUTPATIENT
Start: 2020-10-27 | End: 2020-10-30

## 2020-10-27 RX ORDER — MAGNESIUM SULFATE 1 G/100ML
1 INJECTION INTRAVENOUS ONCE
Status: COMPLETED | OUTPATIENT
Start: 2020-10-27 | End: 2020-10-27

## 2020-10-27 RX ORDER — NICOTINE POLACRILEX 4 MG
15 LOZENGE BUCCAL
Status: DISCONTINUED | OUTPATIENT
Start: 2020-10-27 | End: 2020-11-03 | Stop reason: HOSPADM

## 2020-10-27 RX ORDER — DEXTROSE MONOHYDRATE 25 G/50ML
25 INJECTION, SOLUTION INTRAVENOUS
Status: DISCONTINUED | OUTPATIENT
Start: 2020-10-27 | End: 2020-11-03 | Stop reason: HOSPADM

## 2020-10-27 RX ORDER — ALBUMIN, HUMAN INJ 5% 5 %
500 SOLUTION INTRAVENOUS ONCE
Status: COMPLETED | OUTPATIENT
Start: 2020-10-27 | End: 2020-10-27

## 2020-10-27 RX ORDER — HYDROMORPHONE HCL IN 0.9% NACL 0.2 MG/ML
PLASTIC BAG, INJECTION (ML) INTRAVENOUS CONTINUOUS
Status: DISPENSED | OUTPATIENT
Start: 2020-10-27 | End: 2020-10-29

## 2020-10-27 RX ORDER — ALBUMIN, HUMAN INJ 5% 5 %
500 SOLUTION INTRAVENOUS ONCE
Status: DISCONTINUED | OUTPATIENT
Start: 2020-10-27 | End: 2020-10-27

## 2020-10-27 RX ORDER — POTASSIUM CHLORIDE 7.45 MG/ML
10 INJECTION INTRAVENOUS
Status: COMPLETED | OUTPATIENT
Start: 2020-10-27 | End: 2020-10-27

## 2020-10-27 RX ADMIN — MAGNESIUM SULFATE HEPTAHYDRATE 1 G: 1 INJECTION, SOLUTION INTRAVENOUS at 08:23

## 2020-10-27 RX ADMIN — I.V. FAT EMULSION 12 G: 20 EMULSION INTRAVENOUS at 18:57

## 2020-10-27 RX ADMIN — SODIUM CHLORIDE, PRESERVATIVE FREE 10 ML: 5 INJECTION INTRAVENOUS at 08:25

## 2020-10-27 RX ADMIN — FAMOTIDINE 20 MG: 10 INJECTION INTRAVENOUS at 08:24

## 2020-10-27 RX ADMIN — HYDROMORPHONE HYDROCHLORIDE 1 MG: 1 INJECTION, SOLUTION INTRAMUSCULAR; INTRAVENOUS; SUBCUTANEOUS at 21:20

## 2020-10-27 RX ADMIN — CHLORHEXIDINE GLUCONATE 0.12% ORAL RINSE 15 ML: 1.2 LIQUID ORAL at 08:24

## 2020-10-27 RX ADMIN — POTASSIUM CHLORIDE 10 MEQ: 7.46 INJECTION, SOLUTION INTRAVENOUS at 08:24

## 2020-10-27 RX ADMIN — ALBUTEROL SULFATE 2.5 MG: 2.5 SOLUTION RESPIRATORY (INHALATION) at 14:32

## 2020-10-27 RX ADMIN — ALBUMIN HUMAN 500 ML: 0.05 INJECTION, SOLUTION INTRAVENOUS at 08:23

## 2020-10-27 RX ADMIN — ALBUTEROL SULFATE 2.5 MG: 2.5 SOLUTION RESPIRATORY (INHALATION) at 20:19

## 2020-10-27 RX ADMIN — POTASSIUM CHLORIDE 10 MEQ: 7.46 INJECTION, SOLUTION INTRAVENOUS at 11:49

## 2020-10-27 RX ADMIN — POTASSIUM CHLORIDE 10 MEQ: 7.46 INJECTION, SOLUTION INTRAVENOUS at 09:24

## 2020-10-27 RX ADMIN — CALCIUM GLUCONATE 1 G: 98 INJECTION, SOLUTION INTRAVENOUS at 11:31

## 2020-10-27 RX ADMIN — POTASSIUM CHLORIDE 10 MEQ: 7.46 INJECTION, SOLUTION INTRAVENOUS at 10:37

## 2020-10-27 RX ADMIN — SODIUM CHLORIDE, POTASSIUM CHLORIDE, SODIUM LACTATE AND CALCIUM CHLORIDE 50 ML/HR: 600; 310; 30; 20 INJECTION, SOLUTION INTRAVENOUS at 19:01

## 2020-10-27 RX ADMIN — HYDROMORPHONE HYDROCHLORIDE: 2 INJECTION INTRAMUSCULAR; INTRAVENOUS; SUBCUTANEOUS at 22:28

## 2020-10-27 RX ADMIN — CALCIUM GLUCONATE: 94 INJECTION, SOLUTION INTRAVENOUS at 18:57

## 2020-10-27 RX ADMIN — SODIUM CHLORIDE, POTASSIUM CHLORIDE, SODIUM LACTATE AND CALCIUM CHLORIDE 100 ML/HR: 600; 310; 30; 20 INJECTION, SOLUTION INTRAVENOUS at 02:36

## 2020-10-28 LAB
ANION GAP SERPL CALCULATED.3IONS-SCNC: 8 MMOL/L (ref 5–15)
BASOPHILS # BLD AUTO: 0.02 10*3/MM3 (ref 0–0.2)
BASOPHILS NFR BLD AUTO: 0.2 % (ref 0–1.5)
BUN SERPL-MCNC: 33 MG/DL (ref 8–23)
BUN/CREAT SERPL: 18.2 (ref 7–25)
CALCIUM SPEC-SCNC: 8.3 MG/DL (ref 8.6–10.5)
CHLORIDE SERPL-SCNC: 101 MMOL/L (ref 98–107)
CO2 SERPL-SCNC: 31 MMOL/L (ref 22–29)
CREAT SERPL-MCNC: 1.81 MG/DL (ref 0.76–1.27)
DEPRECATED RDW RBC AUTO: 49.7 FL (ref 37–54)
EOSINOPHIL # BLD AUTO: 0.09 10*3/MM3 (ref 0–0.4)
EOSINOPHIL NFR BLD AUTO: 0.9 % (ref 0.3–6.2)
ERYTHROCYTE [DISTWIDTH] IN BLOOD BY AUTOMATED COUNT: 14 % (ref 12.3–15.4)
GFR SERPL CREATININE-BSD FRML MDRD: 37 ML/MIN/1.73
GLUCOSE BLDC GLUCOMTR-MCNC: 123 MG/DL (ref 70–130)
GLUCOSE BLDC GLUCOMTR-MCNC: 125 MG/DL (ref 70–130)
GLUCOSE BLDC GLUCOMTR-MCNC: 132 MG/DL (ref 70–130)
GLUCOSE BLDC GLUCOMTR-MCNC: 140 MG/DL (ref 70–130)
GLUCOSE SERPL-MCNC: 121 MG/DL (ref 65–99)
HCT VFR BLD AUTO: 32.4 % (ref 37.5–51)
HGB BLD-MCNC: 10.7 G/DL (ref 13–17.7)
IMM GRANULOCYTES # BLD AUTO: 0.05 10*3/MM3 (ref 0–0.05)
IMM GRANULOCYTES NFR BLD AUTO: 0.5 % (ref 0–0.5)
LYMPHOCYTES # BLD AUTO: 0.69 10*3/MM3 (ref 0.7–3.1)
LYMPHOCYTES NFR BLD AUTO: 6.8 % (ref 19.6–45.3)
MAGNESIUM SERPL-MCNC: 2 MG/DL (ref 1.6–2.4)
MCH RBC QN AUTO: 32.2 PG (ref 26.6–33)
MCHC RBC AUTO-ENTMCNC: 33 G/DL (ref 31.5–35.7)
MCV RBC AUTO: 97.6 FL (ref 79–97)
MONOCYTES # BLD AUTO: 0.5 10*3/MM3 (ref 0.1–0.9)
MONOCYTES NFR BLD AUTO: 4.9 % (ref 5–12)
NEUTROPHILS NFR BLD AUTO: 8.77 10*3/MM3 (ref 1.7–7)
NEUTROPHILS NFR BLD AUTO: 86.7 % (ref 42.7–76)
NRBC BLD AUTO-RTO: 0 /100 WBC (ref 0–0.2)
PHOSPHATE SERPL-MCNC: 3 MG/DL (ref 2.5–4.5)
PLATELET # BLD AUTO: 96 10*3/MM3 (ref 140–450)
PMV BLD AUTO: 11.8 FL (ref 6–12)
POTASSIUM SERPL-SCNC: 3.9 MMOL/L (ref 3.5–5.2)
RBC # BLD AUTO: 3.32 10*6/MM3 (ref 4.14–5.8)
SODIUM SERPL-SCNC: 140 MMOL/L (ref 136–145)
WBC # BLD AUTO: 10.12 10*3/MM3 (ref 3.4–10.8)

## 2020-10-28 PROCEDURE — 25010000002 POTASSIUM CHLORIDE PER 2 MEQ OF POTASSIUM: Performed by: STUDENT IN AN ORGANIZED HEALTH CARE EDUCATION/TRAINING PROGRAM

## 2020-10-28 PROCEDURE — 97110 THERAPEUTIC EXERCISES: CPT

## 2020-10-28 PROCEDURE — 97530 THERAPEUTIC ACTIVITIES: CPT

## 2020-10-28 PROCEDURE — 83735 ASSAY OF MAGNESIUM: CPT | Performed by: SURGERY

## 2020-10-28 PROCEDURE — 82962 GLUCOSE BLOOD TEST: CPT

## 2020-10-28 PROCEDURE — 94799 UNLISTED PULMONARY SVC/PX: CPT

## 2020-10-28 PROCEDURE — 80048 BASIC METABOLIC PNL TOTAL CA: CPT | Performed by: SURGERY

## 2020-10-28 PROCEDURE — 25010000002 HEPARIN (PORCINE) PER 1000 UNITS: Performed by: SURGERY

## 2020-10-28 PROCEDURE — 84100 ASSAY OF PHOSPHORUS: CPT | Performed by: SURGERY

## 2020-10-28 PROCEDURE — 99232 SBSQ HOSP IP/OBS MODERATE 35: CPT | Performed by: STUDENT IN AN ORGANIZED HEALTH CARE EDUCATION/TRAINING PROGRAM

## 2020-10-28 PROCEDURE — 25010000002 MAGNESIUM SULFATE PER 500 MG OF MAGNESIUM: Performed by: STUDENT IN AN ORGANIZED HEALTH CARE EDUCATION/TRAINING PROGRAM

## 2020-10-28 PROCEDURE — 25010000002 CALCIUM GLUCONATE PER 10 ML: Performed by: STUDENT IN AN ORGANIZED HEALTH CARE EDUCATION/TRAINING PROGRAM

## 2020-10-28 PROCEDURE — 85025 COMPLETE CBC W/AUTO DIFF WBC: CPT | Performed by: SURGERY

## 2020-10-28 RX ADMIN — ALBUTEROL SULFATE 2.5 MG: 2.5 SOLUTION RESPIRATORY (INHALATION) at 12:51

## 2020-10-28 RX ADMIN — ALBUTEROL SULFATE 2.5 MG: 2.5 SOLUTION RESPIRATORY (INHALATION) at 07:23

## 2020-10-28 RX ADMIN — I.V. FAT EMULSION 36 G: 20 EMULSION INTRAVENOUS at 17:48

## 2020-10-28 RX ADMIN — SODIUM CHLORIDE, PRESERVATIVE FREE 10 ML: 5 INJECTION INTRAVENOUS at 09:05

## 2020-10-28 RX ADMIN — HEPARIN SODIUM 5000 UNITS: 5000 INJECTION INTRAVENOUS; SUBCUTANEOUS at 05:46

## 2020-10-28 RX ADMIN — HEPARIN SODIUM 5000 UNITS: 5000 INJECTION INTRAVENOUS; SUBCUTANEOUS at 15:02

## 2020-10-28 RX ADMIN — SODIUM CHLORIDE, PRESERVATIVE FREE 10 ML: 5 INJECTION INTRAVENOUS at 21:18

## 2020-10-28 RX ADMIN — FAMOTIDINE 20 MG: 10 INJECTION INTRAVENOUS at 09:05

## 2020-10-28 RX ADMIN — HEPARIN SODIUM 5000 UNITS: 5000 INJECTION INTRAVENOUS; SUBCUTANEOUS at 21:18

## 2020-10-28 RX ADMIN — SODIUM CHLORIDE, POTASSIUM CHLORIDE, SODIUM LACTATE AND CALCIUM CHLORIDE 30 ML/HR: 600; 310; 30; 20 INJECTION, SOLUTION INTRAVENOUS at 10:32

## 2020-10-28 RX ADMIN — ALBUTEROL SULFATE 2.5 MG: 2.5 SOLUTION RESPIRATORY (INHALATION) at 19:17

## 2020-10-28 RX ADMIN — ALBUTEROL SULFATE 2.5 MG: 2.5 SOLUTION RESPIRATORY (INHALATION) at 01:40

## 2020-10-28 RX ADMIN — CALCIUM GLUCONATE: 94 INJECTION, SOLUTION INTRAVENOUS at 17:48

## 2020-10-29 LAB
BACTERIA FLD CULT: NORMAL
GLUCOSE BLDC GLUCOMTR-MCNC: 103 MG/DL (ref 70–130)
GLUCOSE BLDC GLUCOMTR-MCNC: 107 MG/DL (ref 70–130)
GLUCOSE BLDC GLUCOMTR-MCNC: 139 MG/DL (ref 70–130)
GRAM STN SPEC: NORMAL
GRAM STN SPEC: NORMAL

## 2020-10-29 PROCEDURE — 97530 THERAPEUTIC ACTIVITIES: CPT

## 2020-10-29 PROCEDURE — 94799 UNLISTED PULMONARY SVC/PX: CPT

## 2020-10-29 PROCEDURE — 99232 SBSQ HOSP IP/OBS MODERATE 35: CPT | Performed by: STUDENT IN AN ORGANIZED HEALTH CARE EDUCATION/TRAINING PROGRAM

## 2020-10-29 PROCEDURE — 97110 THERAPEUTIC EXERCISES: CPT

## 2020-10-29 PROCEDURE — 99024 POSTOP FOLLOW-UP VISIT: CPT | Performed by: SURGERY

## 2020-10-29 PROCEDURE — 82962 GLUCOSE BLOOD TEST: CPT

## 2020-10-29 PROCEDURE — 25010000002 HEPARIN (PORCINE) PER 1000 UNITS: Performed by: SURGERY

## 2020-10-29 PROCEDURE — 25010000002 HYDROMORPHONE PER 4 MG: Performed by: SURGERY

## 2020-10-29 RX ADMIN — HYDROMORPHONE HYDROCHLORIDE: 2 INJECTION INTRAMUSCULAR; INTRAVENOUS; SUBCUTANEOUS at 05:41

## 2020-10-29 RX ADMIN — HEPARIN SODIUM 5000 UNITS: 5000 INJECTION INTRAVENOUS; SUBCUTANEOUS at 13:34

## 2020-10-29 RX ADMIN — FAMOTIDINE 20 MG: 10 INJECTION INTRAVENOUS at 08:29

## 2020-10-29 RX ADMIN — HEPARIN SODIUM 5000 UNITS: 5000 INJECTION INTRAVENOUS; SUBCUTANEOUS at 05:19

## 2020-10-29 RX ADMIN — SODIUM CHLORIDE, PRESERVATIVE FREE 10 ML: 5 INJECTION INTRAVENOUS at 08:29

## 2020-10-29 RX ADMIN — ALBUTEROL SULFATE 2.5 MG: 2.5 SOLUTION RESPIRATORY (INHALATION) at 19:47

## 2020-10-29 RX ADMIN — HEPARIN SODIUM 5000 UNITS: 5000 INJECTION INTRAVENOUS; SUBCUTANEOUS at 21:47

## 2020-10-29 RX ADMIN — ALBUTEROL SULFATE 2.5 MG: 2.5 SOLUTION RESPIRATORY (INHALATION) at 07:41

## 2020-10-29 RX ADMIN — ALBUTEROL SULFATE 2.5 MG: 2.5 SOLUTION RESPIRATORY (INHALATION) at 00:29

## 2020-10-30 LAB
ANION GAP SERPL CALCULATED.3IONS-SCNC: 6 MMOL/L (ref 5–15)
BASOPHILS # BLD AUTO: 0.01 10*3/MM3 (ref 0–0.2)
BASOPHILS NFR BLD AUTO: 0.2 % (ref 0–1.5)
BH BB BLOOD EXPIRATION DATE: NORMAL
BH BB BLOOD TYPE BARCODE: NORMAL
BH BB DISPENSE STATUS: NORMAL
BH BB PRODUCT CODE: NORMAL
BH BB UNIT NUMBER: NORMAL
BUN SERPL-MCNC: 33 MG/DL (ref 8–23)
BUN/CREAT SERPL: 22 (ref 7–25)
CALCIUM SPEC-SCNC: 8.2 MG/DL (ref 8.6–10.5)
CHLORIDE SERPL-SCNC: 99 MMOL/L (ref 98–107)
CO2 SERPL-SCNC: 31 MMOL/L (ref 22–29)
CREAT SERPL-MCNC: 1.5 MG/DL (ref 0.76–1.27)
CROSSMATCH INTERPRETATION: NORMAL
DEPRECATED RDW RBC AUTO: 49.2 FL (ref 37–54)
EOSINOPHIL # BLD AUTO: 0.07 10*3/MM3 (ref 0–0.4)
EOSINOPHIL NFR BLD AUTO: 1.3 % (ref 0.3–6.2)
ERYTHROCYTE [DISTWIDTH] IN BLOOD BY AUTOMATED COUNT: 13.8 % (ref 12.3–15.4)
GFR SERPL CREATININE-BSD FRML MDRD: 46 ML/MIN/1.73
GLUCOSE BLDC GLUCOMTR-MCNC: 104 MG/DL (ref 70–130)
GLUCOSE BLDC GLUCOMTR-MCNC: 108 MG/DL (ref 70–130)
GLUCOSE BLDC GLUCOMTR-MCNC: 110 MG/DL (ref 70–130)
GLUCOSE SERPL-MCNC: 116 MG/DL (ref 65–99)
HCT VFR BLD AUTO: 28.5 % (ref 37.5–51)
HGB BLD-MCNC: 9.4 G/DL (ref 13–17.7)
IMM GRANULOCYTES # BLD AUTO: 0.03 10*3/MM3 (ref 0–0.05)
IMM GRANULOCYTES NFR BLD AUTO: 0.5 % (ref 0–0.5)
LYMPHOCYTES # BLD AUTO: 0.5 10*3/MM3 (ref 0.7–3.1)
LYMPHOCYTES NFR BLD AUTO: 9 % (ref 19.6–45.3)
MAGNESIUM SERPL-MCNC: 2 MG/DL (ref 1.6–2.4)
MCH RBC QN AUTO: 32.3 PG (ref 26.6–33)
MCHC RBC AUTO-ENTMCNC: 33 G/DL (ref 31.5–35.7)
MCV RBC AUTO: 97.9 FL (ref 79–97)
MONOCYTES # BLD AUTO: 0.35 10*3/MM3 (ref 0.1–0.9)
MONOCYTES NFR BLD AUTO: 6.3 % (ref 5–12)
NEUTROPHILS NFR BLD AUTO: 4.6 10*3/MM3 (ref 1.7–7)
NEUTROPHILS NFR BLD AUTO: 82.7 % (ref 42.7–76)
NRBC BLD AUTO-RTO: 0 /100 WBC (ref 0–0.2)
PHOSPHATE SERPL-MCNC: 2.8 MG/DL (ref 2.5–4.5)
PLATELET # BLD AUTO: 148 10*3/MM3 (ref 140–450)
PMV BLD AUTO: 11.2 FL (ref 6–12)
POTASSIUM SERPL-SCNC: 3.9 MMOL/L (ref 3.5–5.2)
RBC # BLD AUTO: 2.91 10*6/MM3 (ref 4.14–5.8)
SODIUM SERPL-SCNC: 136 MMOL/L (ref 136–145)
UNIT  ABO: NORMAL
UNIT  RH: NORMAL
WBC # BLD AUTO: 5.56 10*3/MM3 (ref 3.4–10.8)

## 2020-10-30 PROCEDURE — 94760 N-INVAS EAR/PLS OXIMETRY 1: CPT

## 2020-10-30 PROCEDURE — 84100 ASSAY OF PHOSPHORUS: CPT | Performed by: SURGERY

## 2020-10-30 PROCEDURE — 85025 COMPLETE CBC W/AUTO DIFF WBC: CPT | Performed by: SURGERY

## 2020-10-30 PROCEDURE — 97530 THERAPEUTIC ACTIVITIES: CPT

## 2020-10-30 PROCEDURE — 94799 UNLISTED PULMONARY SVC/PX: CPT

## 2020-10-30 PROCEDURE — 80048 BASIC METABOLIC PNL TOTAL CA: CPT | Performed by: SURGERY

## 2020-10-30 PROCEDURE — 97110 THERAPEUTIC EXERCISES: CPT

## 2020-10-30 PROCEDURE — 25010000002 HEPARIN (PORCINE) PER 1000 UNITS: Performed by: SURGERY

## 2020-10-30 PROCEDURE — 99024 POSTOP FOLLOW-UP VISIT: CPT | Performed by: SURGERY

## 2020-10-30 PROCEDURE — 99232 SBSQ HOSP IP/OBS MODERATE 35: CPT | Performed by: STUDENT IN AN ORGANIZED HEALTH CARE EDUCATION/TRAINING PROGRAM

## 2020-10-30 PROCEDURE — 82962 GLUCOSE BLOOD TEST: CPT

## 2020-10-30 PROCEDURE — 83735 ASSAY OF MAGNESIUM: CPT | Performed by: SURGERY

## 2020-10-30 PROCEDURE — 97535 SELF CARE MNGMENT TRAINING: CPT

## 2020-10-30 RX ORDER — POTASSIUM CHLORIDE 1.5 G/1.77G
20 POWDER, FOR SOLUTION ORAL DAILY
Status: DISCONTINUED | OUTPATIENT
Start: 2020-10-30 | End: 2020-11-03 | Stop reason: HOSPADM

## 2020-10-30 RX ORDER — FUROSEMIDE 20 MG/1
20 TABLET ORAL
Status: DISCONTINUED | OUTPATIENT
Start: 2020-10-30 | End: 2020-11-03

## 2020-10-30 RX ORDER — FAMOTIDINE 40 MG/5ML
40 POWDER, FOR SUSPENSION ORAL DAILY
Status: DISCONTINUED | OUTPATIENT
Start: 2020-10-30 | End: 2020-11-03 | Stop reason: HOSPADM

## 2020-10-30 RX ADMIN — FUROSEMIDE 20 MG: 20 TABLET ORAL at 09:45

## 2020-10-30 RX ADMIN — ALBUTEROL SULFATE 2.5 MG: 2.5 SOLUTION RESPIRATORY (INHALATION) at 19:52

## 2020-10-30 RX ADMIN — HEPARIN SODIUM 5000 UNITS: 5000 INJECTION INTRAVENOUS; SUBCUTANEOUS at 05:50

## 2020-10-30 RX ADMIN — HYDROCODONE BITARTRATE AND ACETAMINOPHEN 10 ML: 7.5; 325 SOLUTION ORAL at 22:33

## 2020-10-30 RX ADMIN — POTASSIUM CHLORIDE 20 MEQ: 1.5 POWDER, FOR SOLUTION ORAL at 09:45

## 2020-10-30 RX ADMIN — FUROSEMIDE 20 MG: 20 TABLET ORAL at 18:00

## 2020-10-30 RX ADMIN — FAMOTIDINE 40 MG: 40 POWDER, FOR SUSPENSION ORAL at 09:45

## 2020-10-30 RX ADMIN — ALBUTEROL SULFATE 2.5 MG: 2.5 SOLUTION RESPIRATORY (INHALATION) at 01:00

## 2020-10-30 RX ADMIN — ALBUTEROL SULFATE 2.5 MG: 2.5 SOLUTION RESPIRATORY (INHALATION) at 07:27

## 2020-10-30 RX ADMIN — HYDROCODONE BITARTRATE AND ACETAMINOPHEN 10 ML: 7.5; 325 SOLUTION ORAL at 11:02

## 2020-10-30 RX ADMIN — HEPARIN SODIUM 5000 UNITS: 5000 INJECTION INTRAVENOUS; SUBCUTANEOUS at 15:38

## 2020-10-31 LAB
ANION GAP SERPL CALCULATED.3IONS-SCNC: 9 MMOL/L (ref 5–15)
BUN SERPL-MCNC: 33 MG/DL (ref 8–23)
BUN/CREAT SERPL: 19.4 (ref 7–25)
CALCIUM SPEC-SCNC: 8.8 MG/DL (ref 8.6–10.5)
CHLORIDE SERPL-SCNC: 99 MMOL/L (ref 98–107)
CO2 SERPL-SCNC: 30 MMOL/L (ref 22–29)
CREAT SERPL-MCNC: 1.7 MG/DL (ref 0.76–1.27)
GFR SERPL CREATININE-BSD FRML MDRD: 40 ML/MIN/1.73
GLUCOSE BLDC GLUCOMTR-MCNC: 120 MG/DL (ref 70–130)
GLUCOSE BLDC GLUCOMTR-MCNC: 127 MG/DL (ref 70–130)
GLUCOSE BLDC GLUCOMTR-MCNC: 99 MG/DL (ref 70–130)
GLUCOSE SERPL-MCNC: 129 MG/DL (ref 65–99)
POTASSIUM SERPL-SCNC: 4 MMOL/L (ref 3.5–5.2)
SODIUM SERPL-SCNC: 138 MMOL/L (ref 136–145)

## 2020-10-31 PROCEDURE — 97110 THERAPEUTIC EXERCISES: CPT

## 2020-10-31 PROCEDURE — 99232 SBSQ HOSP IP/OBS MODERATE 35: CPT | Performed by: STUDENT IN AN ORGANIZED HEALTH CARE EDUCATION/TRAINING PROGRAM

## 2020-10-31 PROCEDURE — 99024 POSTOP FOLLOW-UP VISIT: CPT | Performed by: SURGERY

## 2020-10-31 PROCEDURE — 97535 SELF CARE MNGMENT TRAINING: CPT

## 2020-10-31 PROCEDURE — 82962 GLUCOSE BLOOD TEST: CPT

## 2020-10-31 PROCEDURE — 97530 THERAPEUTIC ACTIVITIES: CPT

## 2020-10-31 PROCEDURE — 80048 BASIC METABOLIC PNL TOTAL CA: CPT | Performed by: NURSE PRACTITIONER

## 2020-10-31 PROCEDURE — 94799 UNLISTED PULMONARY SVC/PX: CPT

## 2020-10-31 PROCEDURE — 25010000002 HEPARIN (PORCINE) PER 1000 UNITS: Performed by: SURGERY

## 2020-10-31 RX ADMIN — ALBUTEROL SULFATE 2.5 MG: 2.5 SOLUTION RESPIRATORY (INHALATION) at 12:35

## 2020-10-31 RX ADMIN — HEPARIN SODIUM 5000 UNITS: 5000 INJECTION INTRAVENOUS; SUBCUTANEOUS at 14:30

## 2020-10-31 RX ADMIN — FUROSEMIDE 20 MG: 20 TABLET ORAL at 17:49

## 2020-10-31 RX ADMIN — SODIUM CHLORIDE, PRESERVATIVE FREE 10 ML: 5 INJECTION INTRAVENOUS at 09:02

## 2020-10-31 RX ADMIN — HYDROCODONE BITARTRATE AND ACETAMINOPHEN 10 ML: 7.5; 325 SOLUTION ORAL at 22:25

## 2020-10-31 RX ADMIN — HYDROCORTISONE: 1 CREAM TOPICAL at 22:25

## 2020-10-31 RX ADMIN — POTASSIUM CHLORIDE 20 MEQ: 1.5 POWDER, FOR SOLUTION ORAL at 09:31

## 2020-10-31 RX ADMIN — HYDROCODONE BITARTRATE AND ACETAMINOPHEN 10 ML: 7.5; 325 SOLUTION ORAL at 17:49

## 2020-10-31 RX ADMIN — HEPARIN SODIUM 5000 UNITS: 5000 INJECTION INTRAVENOUS; SUBCUTANEOUS at 05:08

## 2020-10-31 RX ADMIN — HYDROCODONE BITARTRATE AND ACETAMINOPHEN 10 ML: 7.5; 325 SOLUTION ORAL at 10:15

## 2020-10-31 RX ADMIN — ALBUTEROL SULFATE 2.5 MG: 2.5 SOLUTION RESPIRATORY (INHALATION) at 19:28

## 2020-10-31 RX ADMIN — FUROSEMIDE 20 MG: 20 TABLET ORAL at 09:31

## 2020-10-31 RX ADMIN — ALBUTEROL SULFATE 2.5 MG: 2.5 SOLUTION RESPIRATORY (INHALATION) at 06:40

## 2020-10-31 RX ADMIN — FAMOTIDINE 40 MG: 40 POWDER, FOR SUSPENSION ORAL at 09:31

## 2020-11-01 LAB
ANION GAP SERPL CALCULATED.3IONS-SCNC: 11 MMOL/L (ref 5–15)
BASOPHILS # BLD AUTO: 0.03 10*3/MM3 (ref 0–0.2)
BASOPHILS NFR BLD AUTO: 0.3 % (ref 0–1.5)
BUN SERPL-MCNC: 33 MG/DL (ref 8–23)
BUN/CREAT SERPL: 19.8 (ref 7–25)
CALCIUM SPEC-SCNC: 8.9 MG/DL (ref 8.6–10.5)
CHLORIDE SERPL-SCNC: 95 MMOL/L (ref 98–107)
CO2 SERPL-SCNC: 31 MMOL/L (ref 22–29)
CREAT SERPL-MCNC: 1.67 MG/DL (ref 0.76–1.27)
DEPRECATED RDW RBC AUTO: 50.4 FL (ref 37–54)
EOSINOPHIL # BLD AUTO: 0.04 10*3/MM3 (ref 0–0.4)
EOSINOPHIL NFR BLD AUTO: 0.4 % (ref 0.3–6.2)
ERYTHROCYTE [DISTWIDTH] IN BLOOD BY AUTOMATED COUNT: 14.5 % (ref 12.3–15.4)
GFR SERPL CREATININE-BSD FRML MDRD: 41 ML/MIN/1.73
GLUCOSE BLDC GLUCOMTR-MCNC: 111 MG/DL (ref 70–130)
GLUCOSE BLDC GLUCOMTR-MCNC: 118 MG/DL (ref 70–130)
GLUCOSE BLDC GLUCOMTR-MCNC: 128 MG/DL (ref 70–130)
GLUCOSE BLDC GLUCOMTR-MCNC: 131 MG/DL (ref 70–130)
GLUCOSE BLDC GLUCOMTR-MCNC: 140 MG/DL (ref 70–130)
GLUCOSE SERPL-MCNC: 115 MG/DL (ref 65–99)
HCT VFR BLD AUTO: 30 % (ref 37.5–51)
HGB BLD-MCNC: 10.1 G/DL (ref 13–17.7)
HOLD SPECIMEN: NORMAL
IMM GRANULOCYTES # BLD AUTO: 0.03 10*3/MM3 (ref 0–0.05)
IMM GRANULOCYTES NFR BLD AUTO: 0.3 % (ref 0–0.5)
LYMPHOCYTES # BLD AUTO: 0.6 10*3/MM3 (ref 0.7–3.1)
LYMPHOCYTES NFR BLD AUTO: 6.3 % (ref 19.6–45.3)
MCH RBC QN AUTO: 32.5 PG (ref 26.6–33)
MCHC RBC AUTO-ENTMCNC: 33.7 G/DL (ref 31.5–35.7)
MCV RBC AUTO: 96.5 FL (ref 79–97)
MONOCYTES # BLD AUTO: 0.37 10*3/MM3 (ref 0.1–0.9)
MONOCYTES NFR BLD AUTO: 3.9 % (ref 5–12)
NEUTROPHILS NFR BLD AUTO: 8.45 10*3/MM3 (ref 1.7–7)
NEUTROPHILS NFR BLD AUTO: 88.8 % (ref 42.7–76)
NRBC BLD AUTO-RTO: 0 /100 WBC (ref 0–0.2)
PLATELET # BLD AUTO: 189 10*3/MM3 (ref 140–450)
PMV BLD AUTO: 10.7 FL (ref 6–12)
POTASSIUM SERPL-SCNC: 3.9 MMOL/L (ref 3.5–5.2)
RBC # BLD AUTO: 3.11 10*6/MM3 (ref 4.14–5.8)
SODIUM SERPL-SCNC: 137 MMOL/L (ref 136–145)
WBC # BLD AUTO: 9.52 10*3/MM3 (ref 3.4–10.8)

## 2020-11-01 PROCEDURE — 82962 GLUCOSE BLOOD TEST: CPT

## 2020-11-01 PROCEDURE — 99024 POSTOP FOLLOW-UP VISIT: CPT | Performed by: SURGERY

## 2020-11-01 PROCEDURE — 25010000002 HEPARIN (PORCINE) PER 1000 UNITS: Performed by: SURGERY

## 2020-11-01 PROCEDURE — 97110 THERAPEUTIC EXERCISES: CPT

## 2020-11-01 PROCEDURE — 94760 N-INVAS EAR/PLS OXIMETRY 1: CPT

## 2020-11-01 PROCEDURE — 85025 COMPLETE CBC W/AUTO DIFF WBC: CPT | Performed by: STUDENT IN AN ORGANIZED HEALTH CARE EDUCATION/TRAINING PROGRAM

## 2020-11-01 PROCEDURE — 80048 BASIC METABOLIC PNL TOTAL CA: CPT | Performed by: NURSE PRACTITIONER

## 2020-11-01 PROCEDURE — 94799 UNLISTED PULMONARY SVC/PX: CPT

## 2020-11-01 PROCEDURE — 99232 SBSQ HOSP IP/OBS MODERATE 35: CPT | Performed by: STUDENT IN AN ORGANIZED HEALTH CARE EDUCATION/TRAINING PROGRAM

## 2020-11-01 PROCEDURE — 97530 THERAPEUTIC ACTIVITIES: CPT

## 2020-11-01 RX ORDER — PHENAZOPYRIDINE HYDROCHLORIDE 200 MG/1
200 TABLET, FILM COATED ORAL 2 TIMES DAILY
Status: DISCONTINUED | OUTPATIENT
Start: 2020-11-01 | End: 2020-11-03 | Stop reason: HOSPADM

## 2020-11-01 RX ADMIN — SODIUM CHLORIDE, PRESERVATIVE FREE 10 ML: 5 INJECTION INTRAVENOUS at 10:55

## 2020-11-01 RX ADMIN — HYDROCODONE BITARTRATE AND ACETAMINOPHEN 10 ML: 7.5; 325 SOLUTION ORAL at 02:50

## 2020-11-01 RX ADMIN — HEPARIN SODIUM 5000 UNITS: 5000 INJECTION INTRAVENOUS; SUBCUTANEOUS at 15:44

## 2020-11-01 RX ADMIN — FUROSEMIDE 20 MG: 20 TABLET ORAL at 17:09

## 2020-11-01 RX ADMIN — HYDROCODONE BITARTRATE AND ACETAMINOPHEN 10 ML: 7.5; 325 SOLUTION ORAL at 08:11

## 2020-11-01 RX ADMIN — POTASSIUM CHLORIDE 20 MEQ: 1.5 POWDER, FOR SOLUTION ORAL at 08:11

## 2020-11-01 RX ADMIN — FAMOTIDINE 40 MG: 40 POWDER, FOR SUSPENSION ORAL at 08:11

## 2020-11-01 RX ADMIN — FUROSEMIDE 20 MG: 20 TABLET ORAL at 08:11

## 2020-11-01 RX ADMIN — HEPARIN SODIUM 5000 UNITS: 5000 INJECTION INTRAVENOUS; SUBCUTANEOUS at 02:51

## 2020-11-01 RX ADMIN — ALBUTEROL SULFATE 2.5 MG: 2.5 SOLUTION RESPIRATORY (INHALATION) at 13:08

## 2020-11-01 RX ADMIN — ALBUTEROL SULFATE 2.5 MG: 2.5 SOLUTION RESPIRATORY (INHALATION) at 07:04

## 2020-11-01 NOTE — PROGRESS NOTES
[facility] MEDICINE DAILY PROGRESS NOTE    NAME: Bar Crockett  : 1950  MRN: [id number]      LOS: 18 days     PROVIDER OF SERVICE: Kim Ojeda MD    Chief Complaint: C. difficile colitis    Subjective:     Interval History:  History taken from: patient  Patient has no concerns. No acute overnight events. Creatinine better today at 1.67. Nephrology following. Patient tolerating tube feeds at 55, will try to advance to goal as tolerated.     Review of Systems:   Review of Systems   Constitutional: Negative for activity change, appetite change, chills, fatigue and fever.   HENT: Negative for drooling, ear discharge, ear pain, facial swelling, hearing loss, mouth sores, rhinorrhea and sinus pain.    Eyes: Negative for pain, redness and itching.   Respiratory: Negative for cough, choking, chest tightness, shortness of breath and stridor.    Cardiovascular: Negative for chest pain, palpitations and leg swelling.   Gastrointestinal: Negative for abdominal distention, abdominal pain, anal bleeding, blood in stool, constipation, diarrhea and nausea.   Endocrine: Negative for heat intolerance, polydipsia and polyphagia.   Genitourinary: Negative for dysuria, flank pain, frequency and genital sores.   Musculoskeletal: Negative for back pain, gait problem, joint swelling and myalgias.   Skin: Negative for pallor and rash.   Neurological: Negative for seizures, facial asymmetry, speech difficulty, light-headedness, numbness and headaches.   Hematological: Negative for adenopathy. Does not bruise/bleed easily.   Psychiatric/Behavioral: Negative for confusion, decreased concentration, dysphoric mood and hallucinations. The patient is not nervous/anxious and is not hyperactive.        Objective:     Vital Signs  Temp:  [98.1 °F (36.7 °C)-99.5 °F (37.5 °C)] 98.1 °F (36.7 °C)  Heart Rate:  [80-98] 86  Resp:  [16-18] 18  BP: (130-137)/(58-77) 137/64  Body mass index is 22.9 kg/m².    Physical Exam  Physical  Exam  Constitutional:       Appearance: He is well-developed.   HENT:      Head: Normocephalic and atraumatic.      Right Ear: External ear normal.      Left Ear: External ear normal.      Nose: Nose normal.   Eyes:      Conjunctiva/sclera: Conjunctivae normal.      Pupils: Pupils are equal, round, and reactive to light.   Neck:      Musculoskeletal: Normal range of motion and neck supple.   Cardiovascular:      Rate and Rhythm: Normal rate and regular rhythm.      Heart sounds: Normal heart sounds.   Pulmonary:      Effort: Pulmonary effort is normal.      Breath sounds: Normal breath sounds.   Abdominal:      General: Bowel sounds are normal.      Palpations: Abdomen is soft.      Comments: Greenish stool in his collecting bag.    Musculoskeletal: Normal range of motion.   Skin:     General: Skin is warm and dry.   Neurological:      Mental Status: He is alert and oriented to person, place, and time.   Psychiatric:         Behavior: Behavior normal.         Thought Content: Thought content normal.         Judgment: Judgment normal.         Medication Review    Current Facility-Administered Medications:   •  albuterol (PROVENTIL) nebulizer solution 0.083% 2.5 mg/3mL, 2.5 mg, Nebulization, Q6H - RT, Nahum Herzog MD, 2.5 mg at 11/01/20 0704  •  dextrose (D50W) 25 g/ 50mL Intravenous Solution 25 g, 25 g, Intravenous, Q15 Min PRN, Nahum Herzog MD  •  dextrose (GLUTOSE) oral gel 15 g, 15 g, Oral, Q15 Min PRN, Nahum Herzog MD  •  enalaprilat (VASOTEC) injection 0.625 mg, 0.625 mg, Intravenous, Q6H PRN, Nahum Herzog MD  •  famotidine (PEPCID) 40 MG/5ML suspension 40 mg, 40 mg, Per J Tube, Daily, Nahum Herzog MD, 40 mg at 10/31/20 0931  •  furosemide (LASIX) tablet 20 mg, 20 mg, Oral, BID, Hugh Luu MD, 20 mg at 10/31/20 1749  •  glucagon (human recombinant) (GLUCAGEN DIAGNOSTIC) injection 1 mg, 1 mg, Subcutaneous, Q15 Min PRN, Nahum Herzog MD  •  heparin (porcine) 5000 UNIT/ML injection 5,000 Units, 5,000  Units, Subcutaneous, Q8H, Nahum Herzog MD, 5,000 Units at 11/01/20 0251  •  HYDROcodone-acetaminophen (HYCET) 7.5-325 MG/15ML solution 10 mL, 10 mL, Per J Tube, Q4H PRN, Nahum Herzog MD, 10 mL at 11/01/20 0250  •  HYDROmorphone (DILAUDID) injection 1 mg, 1 mg, Intravenous, Q3H PRN, 1 mg at 10/27/20 2120 **AND** naloxone (NARCAN) injection 0.4 mg, 0.4 mg, Intravenous, Q5 Min PRN, Nahum Herzog MD  •  insulin aspart (novoLOG) injection 0-9 Units, 0-9 Units, Subcutaneous, TID AC, Nahum Herzog MD  •  magic butt ointment, , Topical, PRN, Saurabh Beverly, APRN  •  ondansetron (ZOFRAN) injection 4 mg, 4 mg, Intravenous, Q6H PRN, Nahum Herzog MD  •  potassium chloride (KLOR-CON) packet 20 mEq, 20 mEq, Per PEG Tube, Daily, Hugh Luu MD, 20 mEq at 10/31/20 0931  •  [COMPLETED] Insert peripheral IV, , , Once **AND** sodium chloride 0.9 % flush 10 mL, 10 mL, Intravenous, PRN, Nahum Herzog MD  •  sodium chloride 0.9 % flush 10 mL, 10 mL, Intravenous, Q12H, Nahum Herzog MD, 10 mL at 10/31/20 0902  •  sodium chloride 0.9 % flush 10 mL, 10 mL, Intravenous, PRN, Nahum Herzog MD     Diagnostic Data    Lab Results (last 24 hours)     Procedure Component Value Units Date/Time    Basic Metabolic Panel [[id number]]  (Abnormal) Collected: 11/01/20 0534    Specimen: Blood Updated: 11/01/20 0645     Glucose 115 mg/dL      BUN 33 mg/dL      Creatinine 1.67 mg/dL      Sodium 137 mmol/L      Potassium 3.9 mmol/L      Chloride 95 mmol/L      CO2 31.0 mmol/L      Calcium 8.9 mg/dL      eGFR Non African Amer 41 mL/min/1.73      BUN/Creatinine Ratio 19.8     Anion Gap 11.0 mmol/L     Narrative:      GFR Normal >60  Chronic Kidney Disease <60  Kidney Failure <15      POC Glucose Once [[id number]]  (Abnormal) Collected: 10/31/20 1614    Specimen: Blood Updated: 11/01/20 0032     Glucose 131 mg/dL      Comment: RN NotifiedOperator: [id number] DILIA ANGELITAMeter ID: [id number]       POC Glucose Once [[id number]]  (Normal) Collected: 10/31/20  "1055    Specimen: Blood Updated: 11/01/20 0031     Glucose 118 mg/dL      Comment: RN NotifiedOperator: [id number] DILIA Gonzalez ID: [id number]       Basic Metabolic Panel [[id number]]  (Abnormal) Collected: 10/31/20 1102    Specimen: Blood Updated: 10/31/20 1206     Glucose 129 mg/dL      BUN 33 mg/dL      Creatinine 1.70 mg/dL      Sodium 138 mmol/L      Potassium 4.0 mmol/L      Chloride 99 mmol/L      CO2 30.0 mmol/L      Calcium 8.8 mg/dL      eGFR Non African Amer 40 mL/min/1.73      BUN/Creatinine Ratio 19.4     Anion Gap 9.0 mmol/L     Narrative:      GFR Normal >60  Chronic Kidney Disease <60  Kidney Failure <15      POC Glucose Once [[id number]]  (Normal) Collected: 10/30/20 1947    Specimen: Blood Updated: 10/31/20 1103     Glucose 120 mg/dL      Comment: RN NotifiedOperator: [id number] HAZEL Ramírez ID: [id number]               I reviewed the patient's new clinical results.  I reviewed the patient's new imaging results and agree with the interpretation.  I reviewed the patient's other test results and agree with the interpretation    Assessment/Plan:     Active Hospital Problems    Diagnosis POA   • **C. difficile colitis [A04.72] Yes     - Confirmed antigen  -CT ordered of abdomen and pelvis 10/17/2020: Markedly large bowel with bowel wall thickening as read by Howard Douglas MD  - Vancomycin started 10/14/20  - Started Metronidazole IV 10/15/2020  -Decision to proceed with fecal transplant on 10/23/20, awaiting supplies to be delivered at this time  - WBC remains elevated at 13.6  -Dr. Wilson and Dr. Herzog following, have decided against surgery for the time being and plan was for fecal transplant on 10/23/2020. However this AM pt is denying any intervention and wishes to be left on his own \"to die.\"  Fecal transplant today.  -10/24/2020 No complications after fecal microbial transplant.  No pain in abdomen.  -10/24/2020 remains on clear liquid diet  -10/24/2020 Repeat faecal transplant " "planned for 25th/26th  -10/26/2020 subtotal colectomy, ileostomy, placement of gastrostomy and jejunostomy tubes  -10/27/2020 Doing well from a pain control standpoint. Continues to complain of some tenderness at incision site. G and J tube in place and receiving feedings     • Suicidal ideation [R45.851] Not Applicable     -Psych consulted: Dr. Pisano following  -States he will \"go home and shoot himself\" after discharge  -10/24/2020 no suicidal intention reported today, appears in a better mood post fecal transplant.  -10/24/2020 remains in close contact supervision with one-to-one sitter  -1 to 1 sitter has been removed. Appears in better mood     • YOBANY (acute kidney injury) (CMS/[facility]) [N17.9] Yes     Recent Labs     10/30/20  0552 10/31/20  1102 11/01/20  0534   CREATININE 1.50* 1.70* 1.67*     -Currently receiving LR at 30 cc/hour     • Hemorrhoids [K64.9] Yes     10/17/2020 started hydrocortisone cream ending 10/19/2020  -Out-patient surgical consult required     • Severe malnutrition (CMS/[facility]) [E43] Yes     -nutrition consult  -Started TPN 10/24/2020     • Hypokalemia [E87.6] No     K+ 3.1 on admission, 3.9 today  -Monitor and replace as needed  -K+ 3.0 10/23/2020 IV-replacement protocol K+ ordered   -Mag 2.0 today     • Hyperlipidemia [E78.5] Yes     - Continue to monitor     • Hypertension [I10] Yes     - Continue hydralazine TID     • GERD (gastroesophageal reflux disease) [K21.9] Yes     - Continue Famotidine 20mg  IV daily     • History of TIA (transient ischemic attack) [Z86.73] Not Applicable     - Continue Plavix     • Stage 3a chronic kidney disease [N18.31] Yes     ,- GFR 46 on admission. Since this month has ranged from 40-51  - Currently receiving LR at 30 cc/hr  -Nephrology consulted, Dr Luu following         DVT prophylaxis: SCDs/TEDs  Code status is   Code Status and Medical Interventions:   Ordered at: 10/26/20 1633     Level Of Support Discussed With:    Patient     Code Status:    CPR "     Medical Interventions (Level of Support Prior to Arrest):    Full       Plan for disposition:Where: SNF and When:  2-3days           Kim Ojeda M.D. PGY3  [facility] Medicine [facility]  [address] [place], [place] [address]  Office: [phone]    This document has been electronically signed by Kim Ojeda MD on November 1, 2020 08:00 CST

## 2020-11-01 NOTE — THERAPY TREATMENT NOTE
Acute Care - Occupational Therapy Treatment Note  [facility]     Patient Name: Bar Crockett  : 1950  MRN: [id number]  Today's Date: 2020  Onset of Illness/Injury or Date of Surgery: 10/26/20  Date of Referral to OT: 10/26/20  Referring Physician: NELLY Blackman MD     Admit Date: 10/14/2020       ICD-10-CM ICD-9-CM   1. Colitis  K52.9 558.9   2. Sepsis without acute organ dysfunction, due to unspecified organism (CMS/HCC)  A41.9 038.9     995.91   3. Chronic kidney disease, unspecified CKD stage  N18.9 585.9   4. Impaired physical mobility  Z74.09 781.99   5. Impaired mobility and ADLs  Z74.09 V49.89    Z78.9    6. Pancolitis (CMS/HCC)  K51.00 556.6   7. Clostridium difficile infection  A49.8 041.84   8. C. difficile colitis  A04.72 008.45   9. Impaired mobility and activities of daily living  Z74.09 V49.89    Z78.9      Patient Active Problem List   Diagnosis   • Sigmoid diverticulitis   • Pancolitis (CMS/HCC)   • Hyperlipidemia   • Hypertension   • GERD (gastroesophageal reflux disease)   • History of TIA (transient ischemic attack)   • Stage 3a chronic kidney disease   • Sepsis without acute organ dysfunction (CMS/HCC)   • C. difficile colitis   • Hypokalemia   • Severe malnutrition (CMS/HCC)   • Hemorrhoids   • Edema of both ankles   • Tachycardia   • YOBANY (acute kidney injury) (CMS/HCC)   • Suicidal ideation     Past Medical History:   Diagnosis Date   • Hyperlipidemia    • Hypertension    • Rectal abnormality     Rectum came out - er put back in.     • TIA (transient ischemic attack)      Past Surgical History:   Procedure Laterality Date   • COLONOSCOPY N/A 12/10/2018    Procedure: COLONOSCOPY;  Surgeon: Jay Wilson DO;  Location: [facility] ENDOSCOPY;  Service: Gastroenterology   • COLONOSCOPY Left 10/25/2020    Procedure: COLONOSCOPY WITH ENDOSCOPIC FECAL MICROBIOTA TRANSPLANT;  Surgeon: Jay Wilson DO;  Location: [facility] OR;  Service: Gastroenterology;  Laterality: Left;    • COLONOSCOPY N/A 10/23/2020    Procedure: COLONOSCOPY WITH ENDOSCOPIC FECAL MICROBIOTA TRANSPLANT;  Surgeon: Jay Wilson DO;  Location: [facility] ENDOSCOPY;  Service: Gastroenterology;  Laterality: N/A;   • OTHER SURGICAL HISTORY      Loop recorder          OT ASSESSMENT FLOWSHEET (last 12 hours)      OT Evaluation and Treatment     Row Name 11/01/20 0814                   OT Time and Intention    Subjective Information  complains of;pain  -BB        Document Type  therapy note (daily note)  -BB        Mode of Treatment  individual therapy;occupational therapy  -BB        Total Minutes, Occupational Therapy  25  -BB        Patient Effort  adequate  -BB           General Information    Patient/Family/Caregiver Comments/Observations  no family present  -BB        Existing Precautions/Restrictions  fall C Diff  -BB           Cognition    Affect/Mental Status (Cognitive)  flat/blunted affect  -BB        Orientation Status (Cognition)  oriented x 4  -BB        Personal Safety Interventions  fall prevention program maintained;muscle strengthening facilitated;nonskid shoes/slippers when out of bed  -BB           Pain Scale: Numbers Pre/Post-Treatment    Pretreatment Pain Rating  4/10  -BB        Posttreatment Pain Rating  4/10  -BB        Pain Location  abdomen  -BB        Pain Intervention(s)  Medication (See MAR)  -BB           Bed Mobility    Comment (Bed Mobility)  Pt defers EOB this tx.  -BB           Shoulder (Therapeutic Exercise)    Shoulder (Therapeutic Exercise)  AROM (active range of motion)  -BB        Shoulder AROM (Therapeutic Exercise)  bilateral;flexion;extension 1x15  -BB        Shoulder Strengthening (Therapeutic Exercise)  1 lb free weight  -BB           Elbow/Forearm (Therapeutic Exercise)    Elbow/Forearm (Therapeutic Exercise)  AROM (active range of motion)  -BB        Elbow/Forearm AROM (Therapeutic Exercise)  bilateral;flexion;extension 1x15 reps  -BB        Elbow/Forearm Strengthening  (Therapeutic Exercise)  1 lb free weight  -BB           Wrist (Therapeutic Exercise)    Wrist (Therapeutic Exercise)  AROM (active range of motion)  -BB        Wrist AROM (Therapeutic Exercise)  bilateral;flexion;extension 1x15  -BB           Hand (Therapeutic Exercise)    Hand (Therapeutic Exercise)  AROM (active range of motion)  -BB        Hand AROM/AAROM (Therapeutic Exercise)  bilateral;finger extension;finger flexion 1x15  -BB           Wound 10/26/20 abdomen Incision    Wound - Properties Group Placement Date: 10/26/20  -TR Present on Hospital Admission: N  -TR Location: abdomen  -TR Primary Wound Type: Incision  -TR    Retired Wound - Properties Group Date first assessed: 10/26/20  -TR Present on Hospital Admission: N  -TR Location: abdomen  -TR Primary Wound Type: Incision  -TR       Plan of Care Review    Plan of Care Reviewed With  patient  -BB        Progress  no change  -BB        Outcome Summary  Pt defers EOB this tx. Pt performs B UE exercises with 1 lb wt to increase strength to promote independence with ADLs and transfers. No new goals met this tx.  -BB           Vital Signs    Pre Systolic BP Rehab  146  -BB        Pre Treatment Diastolic BP  72  -BB        Pretreatment Heart Rate (beats/min)  92  -BB        Intratreatment Heart Rate (beats/min)  98  -BB        Posttreatment Heart Rate (beats/min)  98  -BB        Pre SpO2 (%)  94  -BB        O2 Delivery Pre Treatment  supplemental O2  -BB        Intra SpO2 (%)  93  -BB        O2 Delivery Intra Treatment  supplemental O2  -BB        Post SpO2 (%)  96  -BB        O2 Delivery Post Treatment  supplemental O2  -BB        Pre Patient Position  Supine  -BB        Intra Patient Position  Supine  -BB        Post Patient Position  Supine  -BB           Positioning and Restraints    Pre-Treatment Position  in bed  -BB        Post Treatment Position  bed  -BB        In Bed  fowlers;call light within reach;encouraged to call for assist;exit alarm on  -BB           User Key  (r) = Recorded By, (t) = Taken By, (c) = Cosigned By    Initials Name Effective Dates    BB Robyn Dumont COTA/MARTELL 03/07/18 -     Juju Paulino RN 10/17/16 -          Occupational Therapy Education                 Title: PT OT SLP Therapies (In Progress)     Topic: Occupational Therapy (In Progress)     Point: ADL training (In Progress)     Description:   Instruct learner(s) on proper safety adaptation and remediation techniques during self care or transfers.   Instruct in proper use of assistive devices.              Learning Progress Summary           Patient Acceptance, E, NR by  at 10/30/2020 1129                   Point: Home exercise program (Not Started)     Description:   Instruct learner(s) on appropriate technique for monitoring, assisting and/or progressing therapeutic exercises/activities.              Learner Progress:  Not documented in this visit.          Point: Precautions (In Progress)     Description:   Instruct learner(s) on prescribed precautions during self-care and functional transfers.              Learning Progress Summary           Patient Acceptance, E, NR by  at 10/30/2020 1129    Nonacceptance, E, NR,NL,RT by ME at 10/15/2020 1314    Comment: Educated on OT and POC. Educated to call for assistance. Educated on safety precautions. Educated on importance of working with therapy.                   Point: Body mechanics (In Progress)     Description:   Instruct learner(s) on proper positioning and spine alignment during self-care, functional mobility activities and/or exercises.              Learning Progress Summary           Patient Acceptance, E, NR by  at 10/30/2020 1129                               User Key     Initials Effective Dates Name Provider Type Discipline     03/07/18 -  Jessica Stephens COTA/L Occupational Therapy Assistant OT    ME 08/24/20 -  Ragini Proctor OTR/L Occupational Therapist OT                  OT Recommendation and Plan      Plan of Care Review  Plan of Care Reviewed With: patient  Progress: no change  Outcome Summary: Pt defers EOB this tx. Pt performs B UE exercises with 1 lb wt to increase strength to promote independence with ADLs and transfers. No new goals met this tx.  Plan of Care Reviewed With: patient  Outcome Summary: Pt defers EOB this tx. Pt performs B UE exercises with 1 lb wt to increase strength to promote independence with ADLs and transfers. No new goals met this tx.    Outcome Measures     Row Name 11/01/20 0814 10/31/20 0917 10/31/20 0830       How much help from another person do you currently need...    Turning from your back to your side while in flat bed without using bedrails?  --  --  2  -CA    Moving from lying on back to sitting on the side of a flat bed without bedrails?  --  --  2  -CA    Moving to and from a bed to a chair (including a wheelchair)?  --  --  1  -CA    Standing up from a chair using your arms (e.g., wheelchair, bedside chair)?  --  --  1  -CA    Climbing 3-5 steps with a railing?  --  --  1  -CA    To walk in hospital room?  --  --  1  -CA    AM-PAC 6 Clicks Score (PT)  --  --  8  -CA       How much help from another is currently needed...    Putting on and taking off regular lower body clothing?  1  -BB  1  -BB  --    Bathing (including washing, rinsing, and drying)  2  -BB  2  -BB  --    Toileting (which includes using toilet bed pan or urinal)  2  -BB  2  -BB  --    Putting on and taking off regular upper body clothing  2  -BB  2  -BB  --    Taking care of personal grooming (such as brushing teeth)  3  -BB  3  -BB  --    Eating meals  3  -BB  3  -BB  --    AM-PAC 6 Clicks Score (OT)  13  -BB  13  -BB  --       Functional Assessment    Outcome Measure Options  --  --  AM-PAC 6 Clicks Basic Mobility (PT)  -CA    Row Name 10/30/20 1100 10/30/20 1038          How much help from another person do you currently need...    Turning from your back to your side while in flat bed without using  bedrails?  --  2  -LN     Moving from lying on back to sitting on the side of a flat bed without bedrails?  --  2  -LN     Moving to and from a bed to a chair (including a wheelchair)?  --  1  -LN     Standing up from a chair using your arms (e.g., wheelchair, bedside chair)?  --  1  -LN     Climbing 3-5 steps with a railing?  --  1  -LN     To walk in hospital room?  --  1  -LN     AM-PAC 6 Clicks Score (PT)  --  8  -LN        How much help from another is currently needed...    Putting on and taking off regular lower body clothing?  2  -RC  --     Bathing (including washing, rinsing, and drying)  2  -RC  --     Toileting (which includes using toilet bed pan or urinal)  2  -RC  --     Putting on and taking off regular upper body clothing  2  -RC  --     Taking care of personal grooming (such as brushing teeth)  3  -RC  --     Eating meals  3  -RC  --     AM-PAC 6 Clicks Score (OT)  14  -RC  --        Functional Assessment    Outcome Measure Options  --  AM-PAC 6 Clicks Basic Mobility (PT)  -LN       User Key  (r) = Recorded By, (t) = Taken By, (c) = Cosigned By    Initials Name Provider Type    CA Elliott Jiménez, KACI Physical Therapy Assistant    LN Courtney Rice, KACI Physical Therapy Assistant    BB Robyn Dumont COTA/L Occupational Therapy Assistant    RC Jessica Stephens COTA/L Occupational Therapy Assistant          Time Calculation:   Time Calculation- OT     Row Name 11/01/20 1414             Time Calculation- OT    OT Start Time  0814  -BB      OT Stop Time  0839  -BB      OT Time Calculation (min)  25 min  -BB      Total Timed Code Minutes- OT  25 minute(s)  -BB      OT Received On  11/01/20  -BB        User Key  (r) = Recorded By, (t) = Taken By, (c) = Cosigned By    Initials Name Provider Type    Robyn Grande, YAJAIRA/L Occupational Therapy Assistant        Therapy Charges for Today     Code Description Service Date Service Provider Modifiers Qty    03057258254  OT SELF CARE/MGMT/TRAIN EA  15 MIN 10/31/2020 Robyn Dumont COTA/L GO 3    [id number]  OT THER PROC EA 15 MIN 11/1/2020 Robyn Dumont COTA/L GO 2               LEONA Oliver  11/1/2020

## 2020-11-01 NOTE — THERAPY TREATMENT NOTE
Acute Care - Physical Therapy Treatment Note  [facility]     Patient Name: Bar Crockett  : 1950  MRN: [id number]  Today's Date: 2020   Onset of Illness/Injury or Date of Surgery: 10/26/20       PT Assessment (last 12 hours)      PT Evaluation and Treatment     Row Name 20 1011          Physical Therapy Time and Intention    Subjective Information  complains of;pain  -TW     Document Type  therapy note (daily note)  -TW     Mode of Treatment  physical therapy;individual therapy  -TW     Patient Effort  adequate  -TW     Row Name 20 1011          General Information    Patient Profile Reviewed  yes  -TW     Patient/Family/Caregiver Comments/Observations  No family present.  -TW     General Observations of Patient  Pt supine and agreeable to therapy.  -TW     Existing Precautions/Restrictions  fall C diff  -TW     Row Name 20 1011          Cognition    Orientation Status (Cognition)  oriented x 4  -TW     Follows Commands (Cognition)  WFL  -TW     Cognitive Function (Cognitive)  WFL  -TW     Personal Safety Interventions  fall prevention program maintained;nonskid shoes/slippers when out of bed;muscle strengthening facilitated  -TW     Row Name 20 1011          Pain Scale: Numbers Pre/Post-Treatment    Pre/Posttreatment Pain Comment  Pt reports pain during and throughout tx but doesnt give pain level when asked.  -TW     Row Name 20 1011          Bed Mobility    Bed Mobility  rolling left;supine-sit;sit-supine  -TW     Rolling Left Wagoner (Bed Mobility)  minimum assist (75% patient effort)  -TW     Supine-Sit Wagoner (Bed Mobility)  minimum assist (75% patient effort);contact guard  -TW     Sit-Supine Wagoner (Bed Mobility)  minimum assist (75% patient effort)  -TW     Comment (Bed Mobility)  Pt sat EOB x 11 minutes before requesting to return to supine.  -TW     Row Name 20 1011          Hip (Therapeutic Exercise)    Hip (Therapeutic Exercise)   "AAROM (active assistive range of motion)  -TW     Hip AROM (Therapeutic Exercise)  bilateral;sitting  -TW     Row Name 11/01/20 1011          Knee (Therapeutic Exercise)    Knee (Therapeutic Exercise)  AAROM (active assistive range of motion)  -TW     Knee AROM (Therapeutic Exercise)  bilateral;sitting  -TW     Row Name 11/01/20 1011          Ankle (Therapeutic Exercise)    Ankle (Therapeutic Exercise)  AROM (active range of motion)  -TW     Ankle AROM (Therapeutic Exercise)  bilateral;sitting  -TW     Row Name             Wound 10/26/20 abdomen Incision    Wound - Properties Group Placement Date: 10/26/20  -TR Present on Hospital Admission: N  -TR Location: abdomen  -TR Primary Wound Type: Incision  -TR    Retired Wound - Properties Group Date first assessed: 10/26/20  -TR Present on Hospital Admission: N  -TR Location: abdomen  -TR Primary Wound Type: Incision  -TR    Row Name 11/01/20 1011          Plan of Care Review    Plan of Care Reviewed With  patient  -TW     Progress  improving  -TW     Outcome Summary  Pt agreeable to therapy. Pt t/f sup to sit by using PTA's hand to pull himself up to sitting EOB with HOB elevated. Pt was able to sit EOB with SBA for 11 minutes to perform AAROM B LE ther ex with several interuptions from MD's and APRN. Pt then t/f back to sup with drainage noted on bed linens from colostomy. Nsg made aware and reports she will \"Take care of it\". Pt verb good tolerance to activity and exs this tx. Pt would cont to benefit from therapy upon DC.  -TW     Row Name 11/01/20 1011          Vital Signs    Pre Patient Position  Supine  -TW     Intra Patient Position  Sitting  -TW     Post Patient Position  Supine  -TW     Row Name 11/01/20 1011          Bed Mobility Goal 1 (PT)    Activity/Assistive Device (Bed Mobility Goal 1, PT)  sit to supine/supine to sit  -TW     [place] Level/Cues Needed (Bed Mobility Goal 1, PT)  minimum assist (75% or more patient effort)  -TW     Time Frame (Bed " Mobility Goal 1, PT)  long term goal (LTG);by discharge  -TW     Strategies/Barriers (Bed Mobility Goal 1, PT)  HOB flat, no bed rails.  -TW     Progress/Outcomes (Bed Mobility Goal 1, PT)  goal not met  -TW     Row Name 11/01/20 1011          Transfer Goal 1 (PT)    Activity/Assistive Device (Transfer Goal 1, PT)  sit-to-stand/stand-to-sit;bed-to-chair/chair-to-bed;walker, rolling  -TW     [place] Level/Cues Needed (Transfer Goal 1, PT)  minimum assist (75% or more patient effort)  -TW     Time Frame (Transfer Goal 1, PT)  long term goal (LTG);by discharge  -TW     Progress/Outcome (Transfer Goal 1, PT)  goal not met  -TW     Row Name 11/01/20 1011          Gait Training Goal 1 (PT)    Activity/Assistive Device (Gait Training Goal 1, PT)  walker, rolling  -TW     [place] Level (Gait Training Goal 1, PT)  minimum assist (75% or more patient effort)  -TW     Distance (Gait Training Goal 1, PT)  25' x 2 (while in isolation).  -TW     Time Frame (Gait Training Goal 1, PT)  long term goal (LTG);by discharge  -TW     Progress/Outcome (Gait Training Goal 1, PT)  goal not met  -TW     Row Name 11/01/20 1011          ROM Goal 1 (PT)    ROM Goal 1 (PT)  Score 25/28 on Tinetti fall risk.  -TW     Time Frame (ROM Goal 1, PT)  long-term goal (LTG);by discharge  -TW     Progress/Outcome (ROM Goal 1, PT)  goal not met  -TW     Row Name 11/01/20 1011          Positioning and Restraints    Pre-Treatment Position  in bed  -TW     Post Treatment Position  bed  -TW     In Bed  supine;call light within reach;encouraged to call for assist;exit alarm on  -TW     Row Name 11/01/20 1011          Therapy Assessment/Plan (PT)    Rehab Potential (PT)  fair, will monitor progress closely  -TW     Row Name 11/01/20 1011          Progress Summary (PT)    Progress Toward Functional Goals (PT)  progress toward functional goals is fair  -TW       User Key  (r) = Recorded By, (t) = Taken By, (c) = Cosigned By    Initials Name Provider Type  "   Jaiden Perry PTA Physical Therapy Assistant    Juju Paulino, RN Registered Nurse        Physical Therapy Education                 Title: PT OT SLP Therapies (In Progress)     Topic: Physical Therapy (In Progress)     Point: Mobility training (Done)     Learning Progress Summary           Patient Acceptance, E,TB, VU,NR by  at 10/30/2020 1121    Acceptance, E, VU,NR by  at 10/27/2020 1321    Comment: Educated on PT POC and goals.    Acceptance, E, VU,NR by  at 10/15/2020 1217    Comment: Educated on the benefits of participating with PT to speed recovery.                   Point: Home exercise program (Done)     Learning Progress Summary           Patient Acceptance, E,TB, VU,NR by LN at 10/30/2020 1121                   Point: Body mechanics (Not Started)     Learner Progress:  Not documented in this visit.          Point: Precautions (Done)     Learning Progress Summary           Patient Acceptance, E,TB, VU,NR by  at 10/30/2020 1121                               User Key     Initials Effective Dates Name Provider Type Discipline     03/07/18 -  Courtney Rice PTA Physical Therapy Assistant PT     04/03/18 -  Ismael Morillo PT Physical Therapist PT              PT Recommendation and Plan  Anticipated Discharge Disposition (PT): skilled nursing facility  Progress Summary (PT)  Progress Toward Functional Goals (PT): progress toward functional goals is fair  Plan of Care Reviewed With: patient  Progress: improving  Outcome Summary: Pt agreeable to therapy. Pt t/f sup to sit by using PTA's hand to pull himself up to sitting EOB with HOB elevated. Pt was able to sit EOB with SBA for 11 minutes to perform AAROM B LE ther ex with several interuptions from MD's and APRN. Pt then t/f back to sup with drainage noted on bed linens from colostomy. Nsg made aware and reports she will \"Take care of it\". Pt verb good tolerance to activity and exs this tx. Pt would cont to benefit from therapy " upon DC.  Outcome Measures     Row Name 11/01/20 1011 11/01/20 0814 10/31/20 0917       How much help from another person do you currently need...    Turning from your back to your side while in flat bed without using bedrails?  3  -TW  --  --    Moving from lying on back to sitting on the side of a flat bed without bedrails?  3  -TW  --  --    Moving to and from a bed to a chair (including a wheelchair)?  1  -TW  --  --    Standing up from a chair using your arms (e.g., wheelchair, bedside chair)?  1  -TW  --  --    Climbing 3-5 steps with a railing?  1  -TW  --  --    To walk in hospital room?  1  -TW  --  --    AM-PAC 6 Clicks Score (PT)  10  -TW  --  --       How much help from another is currently needed...    Putting on and taking off regular lower body clothing?  --  1  -BB  1  -BB    Bathing (including washing, rinsing, and drying)  --  2  -BB  2  -BB    Toileting (which includes using toilet bed pan or urinal)  --  2  -BB  2  -BB    Putting on and taking off regular upper body clothing  --  2  -BB  2  -BB    Taking care of personal grooming (such as brushing teeth)  --  3  -BB  3  -BB    Eating meals  --  3  -BB  3  -BB    AM-PAC 6 Clicks Score (OT)  --  13  -BB  13  -BB    Row Name 10/31/20 0830 10/30/20 1100 10/30/20 1038       How much help from another person do you currently need...    Turning from your back to your side while in flat bed without using bedrails?  2  -CA  --  2  -LN    Moving from lying on back to sitting on the side of a flat bed without bedrails?  2  -CA  --  2  -LN    Moving to and from a bed to a chair (including a wheelchair)?  1  -CA  --  1  -LN    Standing up from a chair using your arms (e.g., wheelchair, bedside chair)?  1  -CA  --  1  -LN    Climbing 3-5 steps with a railing?  1  -CA  --  1  -LN    To walk in hospital room?  1  -CA  --  1  -LN    AM-PAC 6 Clicks Score (PT)  8  -CA  --  8  -LN       How much help from another is currently needed...    Putting on and taking off  regular lower body clothing?  --  2  -RC  --    Bathing (including washing, rinsing, and drying)  --  2  -RC  --    Toileting (which includes using toilet bed pan or urinal)  --  2  -RC  --    Putting on and taking off regular upper body clothing  --  2  -RC  --    Taking care of personal grooming (such as brushing teeth)  --  3  -RC  --    Eating meals  --  3  -RC  --    AM-PAC 6 Clicks Score (OT)  --  14  -RC  --       Functional Assessment    Outcome Measure Options  AM-PAC 6 Clicks Basic Mobility (PT)  -CA  --  AM-PAC 6 Clicks Basic Mobility (PT)  -LN      User Key  (r) = Recorded By, (t) = Taken By, (c) = Cosigned By    Initials Name Provider Type    CA Elliott Jiménez, PTA Physical Therapy Assistant    LN Courtney Rice, KACI Physical Therapy Assistant    TW Jaiden Rondon, KACI Physical Therapy Assistant    BB Robyn Dumont, GATES/L Occupational Therapy Assistant    RC Jessica Stephens, GATES/L Occupational Therapy Assistant           Time Calculation:   PT Charges     Row Name 11/01/20 1417             Time Calculation    Start Time  1011  -TW      Stop Time  1055  -TW      Time Calculation (min)  44 min  -TW      PT Non-Billable Time (min)  5 min  -TW      PT Received On  11/01/20  -TW      PT Goal Re-Cert Due Date  12/09/20  -TW         Time Calculation- PT    Total Timed Code Minutes- PT  39 minute(s)  -TW        User Key  (r) = Recorded By, (t) = Taken By, (c) = Cosigned By    Initials Name Provider Type     Jaiden Rondon PTA Physical Therapy Assistant        Therapy Charges for Today     Code Description Service Date Service Provider Modifiers Qty    76685613842 HC PT THERAPEUTIC ACT EA 15 MIN 11/1/2020 Jaiden Rondon PTA GP 2    19701439780 HC PT THER PROC EA 15 MIN 11/1/2020 Jaiden Rondon PTA GP 1          PT G-Codes  Outcome Measure Options: AM-PAC 6 Clicks Basic Mobility (PT)  AM-PAC 6 Clicks Score (PT): 10  AM-PAC 6 Clicks Score (OT): 13    Jaiden Rondon  PTA  11/1/2020

## 2020-11-01 NOTE — PROGRESS NOTES
LOS: 18 days     Patient Care Team:  Satnam Troncoso APRN as PCP - General (Emergency Medicine)      Subjective     Primary dysuria penoscrotal swelling resolved    Objective       Vital Signs  Temp:  [98.1 °F (36.7 °C)-99.5 °F (37.5 °C)] 98.1 °F (36.7 °C)  Heart Rate:  [80-98] 86  Resp:  [16-18] 18  BP: (130-137)/(58-77) 137/64    Physical Exam:        General Appearance:   No acute distress     Respiratory:    UNLABORED RESPIRATIONS.     Abdomen:     SOFT.       Genitourinary:  Genitalia back to normal size     Rectal:     DEFERRED       Results Review:       Imaging Results (Last 24 Hours)     ** No results found for the last 24 hours. **        Lab Results (last 24 hours)     Procedure Component Value Units Date/Time    POC Glucose Once [[id number]]  (Normal) Collected: 11/01/20 0804    Specimen: Blood Updated: 11/01/20 0806     Glucose 111 mg/dL      Comment: RN NotifiedOperator: [id number] DILIA PosseMeter ID: [id number]       Basic Metabolic Panel [[id number]]  (Abnormal) Collected: 11/01/20 0534    Specimen: Blood Updated: 11/01/20 0645     Glucose 115 mg/dL      BUN 33 mg/dL      Creatinine 1.67 mg/dL      Sodium 137 mmol/L      Potassium 3.9 mmol/L      Chloride 95 mmol/L      CO2 31.0 mmol/L      Calcium 8.9 mg/dL      eGFR Non African Amer 41 mL/min/1.73      BUN/Creatinine Ratio 19.8     Anion Gap 11.0 mmol/L     Narrative:      GFR Normal >60  Chronic Kidney Disease <60  Kidney Failure <15      POC Glucose Once [[id number]]  (Abnormal) Collected: 10/31/20 1614    Specimen: Blood Updated: 11/01/20 0032     Glucose 131 mg/dL      Comment: RN NotifiedOperator: [id number] EMIRCytonicsMeter ID: [id number]       POC Glucose Once [[id number]]  (Normal) Collected: 10/31/20 1055    Specimen: Blood Updated: 11/01/20 0031     Glucose 118 mg/dL      Comment: RN NotifiedOperator: [id number] EMIRCytonicsMeter ID: [id number]       Basic Metabolic Panel [[id number]]  (Abnormal) Collected: 10/31/20 1105     Specimen: Blood Updated: 10/31/20 1206     Glucose 129 mg/dL      BUN 33 mg/dL      Creatinine 1.70 mg/dL      Sodium 138 mmol/L      Potassium 4.0 mmol/L      Chloride 99 mmol/L      CO2 30.0 mmol/L      Calcium 8.8 mg/dL      eGFR Non African Amer 40 mL/min/1.73      BUN/Creatinine Ratio 19.4     Anion Gap 9.0 mmol/L     Narrative:      GFR Normal >60  Chronic Kidney Disease <60  Kidney Failure <15      POC Glucose Once [[id number]]  (Normal) Collected: 10/30/20 1947    Specimen: Blood Updated: 10/31/20 1103     Glucose 120 mg/dL      Comment: RN NotifiedOperator: [id number] HAZEL SHIRLEYmk ID: [id number]               I reviewed the patient's new clinical results.  I reviewed the patient's new imaging results and agree with the interpretation.  I reviewed the patient's other test results and agree with the interpretation        Assessment/Plan       C. difficile colitis    Hyperlipidemia    Hypertension    GERD (gastroesophageal reflux disease)    History of TIA (transient ischemic attack)    Stage 3a chronic kidney disease    Hypokalemia    Severe malnutrition (CMS/HCC)    Hemorrhoids    YOBANY (acute kidney injury) (CMS/HCC)    Suicidal ideation      No change recommendations except Pyridium 200 mg twice daily for dysuria      Kayden Mathew MD  11/01/20  08:12 CST

## 2020-11-01 NOTE — PROGRESS NOTES
GENERAL SURGERY PROGRESS NOTE  Chief Complaint:  Surgery Follow up   LOS: 18 days       Subjective     Interval History:     Doesn't speak much but voices no complaints. Says he didn't have nausea with Gtube clamped. Has had some clears.    Objective     Vital Signs  Temp:  [98.1 °F (36.7 °C)-99.5 °F (37.5 °C)] 99.5 °F (37.5 °C)  Heart Rate:  [] 102  Resp:  [16-18] 18  BP: (128-137)/(58-80) 128/80    Physical Exam:   Ostomy in place.  Labs:  Lab Results (last 24 hours)     Procedure Component Value Units Date/Time    Extra Tubes [[id number]] Collected: 11/01/20 0818    Specimen: Blood, Venous Line Updated: 11/01/20 0930    Narrative:      The following orders were created for panel order Extra Tubes.  Procedure                               Abnormality         Status                     ---------                               -----------         ------                     Green Top (Gel)[[id number]]                                  Final result                 Please view results for these tests on the individual orders.    Green Top (Gel) [[id number]] Collected: 11/01/20 0818    Specimen: Blood Updated: 11/01/20 0930     Extra Tube Hold for add-ons.     Comment: Auto resulted.       CBC & Differential [[id number]]  (Abnormal) Collected: 11/01/20 0915    Specimen: Blood Updated: 11/01/20 0921    Narrative:      The following orders were created for panel order CBC & Differential.  Procedure                               Abnormality         Status                     ---------                               -----------         ------                     CBC Auto Differential[[id number]]        Abnormal            Final result                 Please view results for these tests on the individual orders.    CBC Auto Differential [[id number]]  (Abnormal) Collected: 11/01/20 0915    Specimen: Blood Updated: 11/01/20 0921     WBC 9.52 10*3/mm3      RBC 3.11 10*6/mm3      Hemoglobin 10.1 g/dL      Hematocrit 30.0 %      MCV  96.5 fL      MCH 32.5 pg      MCHC 33.7 g/dL      RDW 14.5 %      RDW-SD 50.4 fl      MPV 10.7 fL      Platelets 189 10*3/mm3      Neutrophil % 88.8 %      Lymphocyte % 6.3 %      Monocyte % 3.9 %      Eosinophil % 0.4 %      Basophil % 0.3 %      Immature Grans % 0.3 %      Neutrophils, Absolute 8.45 10*3/mm3      Lymphocytes, Absolute 0.60 10*3/mm3      Monocytes, Absolute 0.37 10*3/mm3      Eosinophils, Absolute 0.04 10*3/mm3      Basophils, Absolute 0.03 10*3/mm3      Immature Grans, Absolute 0.03 10*3/mm3      nRBC 0.0 /100 WBC     POC Glucose Once [[id number]]  (Normal) Collected: 11/01/20 0804    Specimen: Blood Updated: 11/01/20 0806     Glucose 111 mg/dL      Comment: RN NotifiedOperator: [id number] EMIRPrisyncMeter ID: [id number]       Basic Metabolic Panel [[id number]]  (Abnormal) Collected: 11/01/20 0534    Specimen: Blood Updated: 11/01/20 0645     Glucose 115 mg/dL      BUN 33 mg/dL      Creatinine 1.67 mg/dL      Sodium 137 mmol/L      Potassium 3.9 mmol/L      Chloride 95 mmol/L      CO2 31.0 mmol/L      Calcium 8.9 mg/dL      eGFR Non African Amer 41 mL/min/1.73      BUN/Creatinine Ratio 19.8     Anion Gap 11.0 mmol/L     Narrative:      GFR Normal >60  Chronic Kidney Disease <60  Kidney Failure <15      POC Glucose Once [[id number]]  (Abnormal) Collected: 10/31/20 1614    Specimen: Blood Updated: 11/01/20 0032     Glucose 131 mg/dL      Comment: RN NotifiedOperator: [id number] Treasure DataITAMeter ID: [id number]       POC Glucose Once [[id number]]  (Normal) Collected: 10/31/20 1055    Specimen: Blood Updated: 11/01/20 0031     Glucose 118 mg/dL      Comment: RN NotifiedOperator: [id number] THE MELTMeter ID: [id number]              Results Review:     Labs and imaging for today were reviewed.    Assessment/Plan     Bar Crockett is a 70 y.o. male who is s/p subtotal colectomy      Continue clears and TF. Advance TF to goal.          This document has been electronically signed by Kayden  Boy Landon MD on November 1, 2020 11:17 CST        Kayden Landon MD  11/01/20  11:17 CST

## 2020-11-01 NOTE — PLAN OF CARE
Problem: Adult Inpatient Plan of Care  Goal: Plan of Care Review  Recent Flowsheet Documentation  Taken 11/1/2020 1412 by Robyn Dumont COTA/L  Progress: no change  Plan of Care Reviewed With: patient  Taken 11/1/2020 0814 by Robyn Dumont COTA/L  Progress: no change  Plan of Care Reviewed With: patient  Outcome Summary: Pt defers EOB this tx. Pt performs B UE exercises with 1 lb wt to increase strength to promote independence with ADLs and transfers. No new goals met this tx.

## 2020-11-01 NOTE — PLAN OF CARE
Goal Outcome Evaluation:  Plan of Care Reviewed With: patient  Progress: no change   Tube feed at goal, minimal clear liquids tolerating what he's taking in, no complaints, better spirits today.

## 2020-11-01 NOTE — PLAN OF CARE
"  Problem: Adult Inpatient Plan of Care  Goal: Plan of Care Review  Recent Flowsheet Documentation  Taken 11/1/2020 1011 by Jaiden Rondon PTA  Progress: improving  Plan of Care Reviewed With: patient  Outcome Summary: Pt agreeable to therapy. Pt t/f sup to sit by using PTA's hand to pull himself up to sitting EOB with HOB elevated. Pt was able to sit EOB with SBA for 11 minutes to perform AAROM B LE ther ex with several interuptions from MD's and APRN. Pt then t/f back to sup with drainage noted on bed linens from colostomy. Nsg made aware and reports she will \"Take care of it\". Pt verb good tolerance to activity and exs this tx. Pt would cont to benefit from therapy upon DC.     "

## 2020-11-01 NOTE — PROGRESS NOTES
"[facility]  [address] [place] [address]. [address]  T - [phone]  F - [phone]     Progress Note          PATIENT  DEMOGRAPHICS   PATIENT NAME: Bar Crockett                      PHYSICIAN: ZACKARY Jonas  : 1950  MRN: [id number]   LOS: 18 days    Patient Care Team:  Satnam Troncoso APRN as PCP - General (Emergency Medicine)  Subjective   SUBJECTIVE   Tired today.       Objective   OBJECTIVE   Vital Signs  Temp:  [98.1 °F (36.7 °C)-99.5 °F (37.5 °C)] 98.1 °F (36.7 °C)  Heart Rate:  [80-98] 86  Resp:  [16-18] 18  BP: (130-137)/(58-77) 137/64    Flowsheet Rows      First Filed Value   Admission Height  167.6 cm (66\") Documented at 10/14/2020 1329   Admission Weight  55.9 kg (123 lb 4.8 oz) Documented at 10/14/2020 1329           I/O last 3 completed shifts:  In: 1052 [P.O.:60; Other:492; NG/GT:500]  Out: 4400 [Urine:3300; Emesis/NG output:125; Stool:975]    PHYSICAL EXAM    Physical Exam  Constitutional:       Appearance: He is well-developed. He is ill-appearing.   HENT:      Head: Normocephalic and atraumatic.   Eyes:      Conjunctiva/sclera: Conjunctivae normal.      Pupils: Pupils are equal, round, and reactive to light.   Neck:      Musculoskeletal: Neck supple.   Cardiovascular:      Rate and Rhythm: Normal rate and regular rhythm.   Pulmonary:      Effort: Pulmonary effort is normal.      Breath sounds: Normal breath sounds.   Abdominal:      Palpations: Abdomen is soft.   Musculoskeletal:      Right lower leg: Edema present.      Left lower leg: Edema present.   Skin:     General: Skin is warm and dry.   Neurological:      Mental Status: He is alert and oriented to person, place, and time.   Psychiatric:         Mood and Affect: Mood normal.         Behavior: Behavior normal.         RESULTS   Results Review:    Results from last 7 days   Lab Units 20  0534 10/31/20  1102 10/30/20  0552  10/27/20  0416  10/26/20  0520   SODIUM mmol/L 137 138 136   < > " 142   < > 138   POTASSIUM mmol/L 3.9 4.0 3.9   < > 3.3*   < > 3.4*   CHLORIDE mmol/L 95* 99 99   < > 110*   < > 101   CO2 mmol/L 31.0* 30.0* 31.0*   < > 23.0   < > 28.0   BUN mg/dL 33* 33* 33*   < > 26*   < > 24*   CREATININE mg/dL 1.67* 1.70* 1.50*   < > 1.42*   < > 1.67*   CALCIUM mg/dL 8.9 8.8 8.2*   < > 6.3*   < > 8.4*   BILIRUBIN mg/dL  --   --   --   --  0.3  --  0.7   ALK PHOS U/L  --   --   --   --  55  --  56   ALT (SGPT) U/L  --   --   --   --  7  --  9   AST (SGOT) U/L  --   --   --   --  19  --  22   GLUCOSE mg/dL 115* 129* 116*   < > 154*   < > 164*    < > = values in this interval not displayed.       Estimated Creatinine Clearance: 36.4 mL/min (A) (by C-G formula based on SCr of 1.67 mg/dL (H)).    Results from last 7 days   Lab Units 10/30/20  0552 10/28/20  0528 10/27/20  0416   MAGNESIUM mg/dL 2.0 2.0 1.6   PHOSPHORUS mg/dL 2.8 3.0 3.5             Results from last 7 days   Lab Units 11/01/20  0915 10/30/20  0552 10/28/20  0528 10/27/20  0416 10/26/20  1641   WBC 10*3/mm3 9.52 5.56 10.12 13.36* 24.03*   HEMOGLOBIN g/dL 10.1* 9.4* 10.7* 10.5* 14.7   PLATELETS 10*3/mm3 189 148 96* 82* 108*               Imaging Results (Last 24 Hours)     ** No results found for the last 24 hours. **           MEDICATIONS    albuterol, 2.5 mg, Nebulization, Q6H - RT  famotidine, 40 mg, Per J Tube, Daily  furosemide, 20 mg, Oral, BID  heparin (porcine), 5,000 Units, Subcutaneous, Q8H  insulin aspart, 0-9 Units, Subcutaneous, TID AC  potassium chloride, 20 mEq, Per PEG Tube, Daily  sodium chloride, 10 mL, Intravenous, Q12H           Assessment/Plan   ASSESSMENT / PLAN      C. difficile colitis    Hyperlipidemia    Hypertension    GERD (gastroesophageal reflux disease)    History of TIA (transient ischemic attack)    Stage 3a chronic kidney disease    Hypokalemia    Severe malnutrition (CMS/HCC)    Hemorrhoids    YOBANY (acute kidney injury) (CMS/HCC)    Suicidal ideation    1.  YOBANY on CKD 3- baseline creatinine around  1.3-1.4.  Creatinine was at baseline on current presentation, up to 2.1 peak. now 1.4-1.5. Getting TF and off TPN. No hydro on U/S, L atrophic kidney     -Acidosis stable, has fluid overload and was on lasix before surgery.Gonzalez negative. Now wt is 141 lbs and stable overall. Dunn per Urology    -Added po lasix 20mg bid 10/30/20, Cr up to 1.7 yesterday, stable today.     2.  C. difficile/pancolitis- on oral Vanco and IV Flagyl, plan per primary team, blood culture negative so far. Colonoscopy showed A diffuse pseudomembrane was found in the entire colon. Worse right than left. ??? better than on Friday but minimal.  s/p fecal microbiol transplant (bacteriotherapy). SUBTOTAL COLECTOMY,  ILEOSTOMY, PLACEMENT OF GASTROSTOMY AND JEJEUNOSTOMY TUBES   SUBTOTAL COLECTOMY,  ILEOSTOMY, PLACEMENT OF GASTROSTOMY AND JEJEUNOSTOMY TUBES on 10/26 .  Now on TF.     3.  Malnutrition- on TF     4.  Metabolic acidosis- improved     5.  History of hyperlipidemia     6.  History of hypertension- now low - off hydralazine and aldactone    7. FEN- k mg and phos are all stable. Keep po kcl 20meq daily          This document has been electronically signed by ZACKARY Jonas on November 1, 2020 10:52 CST

## 2020-11-02 PROBLEM — F33.1 MODERATE EPISODE OF RECURRENT MAJOR DEPRESSIVE DISORDER: Status: ACTIVE | Noted: 2020-11-02

## 2020-11-02 LAB
ANION GAP SERPL CALCULATED.3IONS-SCNC: 9 MMOL/L (ref 5–15)
BUN SERPL-MCNC: 37 MG/DL (ref 8–23)
BUN/CREAT SERPL: 22.8 (ref 7–25)
CALCIUM SPEC-SCNC: 8.8 MG/DL (ref 8.6–10.5)
CHLORIDE SERPL-SCNC: 95 MMOL/L (ref 98–107)
CO2 SERPL-SCNC: 30 MMOL/L (ref 22–29)
CREAT SERPL-MCNC: 1.62 MG/DL (ref 0.76–1.27)
GFR SERPL CREATININE-BSD FRML MDRD: 42 ML/MIN/1.73
GLUCOSE BLDC GLUCOMTR-MCNC: 123 MG/DL (ref 70–130)
GLUCOSE BLDC GLUCOMTR-MCNC: 128 MG/DL (ref 70–130)
GLUCOSE BLDC GLUCOMTR-MCNC: 142 MG/DL (ref 70–130)
GLUCOSE BLDC GLUCOMTR-MCNC: 144 MG/DL (ref 70–130)
GLUCOSE SERPL-MCNC: 149 MG/DL (ref 65–99)
POTASSIUM SERPL-SCNC: 3.7 MMOL/L (ref 3.5–5.2)
SODIUM SERPL-SCNC: 134 MMOL/L (ref 136–145)

## 2020-11-02 PROCEDURE — 94799 UNLISTED PULMONARY SVC/PX: CPT

## 2020-11-02 PROCEDURE — 82962 GLUCOSE BLOOD TEST: CPT

## 2020-11-02 PROCEDURE — 25010000002 ONDANSETRON PER 1 MG: Performed by: SURGERY

## 2020-11-02 PROCEDURE — 25010000002 HEPARIN (PORCINE) PER 1000 UNITS: Performed by: SURGERY

## 2020-11-02 PROCEDURE — 97530 THERAPEUTIC ACTIVITIES: CPT

## 2020-11-02 PROCEDURE — 97140 MANUAL THERAPY 1/> REGIONS: CPT

## 2020-11-02 PROCEDURE — 80048 BASIC METABOLIC PNL TOTAL CA: CPT | Performed by: NURSE PRACTITIONER

## 2020-11-02 PROCEDURE — 97110 THERAPEUTIC EXERCISES: CPT

## 2020-11-02 PROCEDURE — 99024 POSTOP FOLLOW-UP VISIT: CPT | Performed by: SURGERY

## 2020-11-02 PROCEDURE — 99232 SBSQ HOSP IP/OBS MODERATE 35: CPT | Performed by: STUDENT IN AN ORGANIZED HEALTH CARE EDUCATION/TRAINING PROGRAM

## 2020-11-02 PROCEDURE — 97535 SELF CARE MNGMENT TRAINING: CPT

## 2020-11-02 RX ORDER — VENLAFAXINE HYDROCHLORIDE 75 MG/1
75 CAPSULE, EXTENDED RELEASE ORAL
Status: DISCONTINUED | OUTPATIENT
Start: 2020-11-02 | End: 2020-11-03 | Stop reason: HOSPADM

## 2020-11-02 RX ADMIN — ALBUTEROL SULFATE 2.5 MG: 2.5 SOLUTION RESPIRATORY (INHALATION) at 19:05

## 2020-11-02 RX ADMIN — FUROSEMIDE 20 MG: 20 TABLET ORAL at 17:23

## 2020-11-02 RX ADMIN — ONDANSETRON 4 MG: 2 INJECTION INTRAMUSCULAR; INTRAVENOUS at 21:32

## 2020-11-02 RX ADMIN — POTASSIUM CHLORIDE 20 MEQ: 1.5 POWDER, FOR SOLUTION ORAL at 08:47

## 2020-11-02 RX ADMIN — PHENAZOPYRIDINE 200 MG: 200 TABLET ORAL at 21:02

## 2020-11-02 RX ADMIN — HEPARIN SODIUM 5000 UNITS: 5000 INJECTION INTRAVENOUS; SUBCUTANEOUS at 21:02

## 2020-11-02 RX ADMIN — FAMOTIDINE 40 MG: 40 POWDER, FOR SUSPENSION ORAL at 08:47

## 2020-11-02 RX ADMIN — HEPARIN SODIUM 5000 UNITS: 5000 INJECTION INTRAVENOUS; SUBCUTANEOUS at 04:37

## 2020-11-02 RX ADMIN — SODIUM CHLORIDE, PRESERVATIVE FREE 10 ML: 5 INJECTION INTRAVENOUS at 09:41

## 2020-11-02 RX ADMIN — ALBUTEROL SULFATE 2.5 MG: 2.5 SOLUTION RESPIRATORY (INHALATION) at 06:58

## 2020-11-02 RX ADMIN — HYDROCORTISONE: 1 CREAM TOPICAL at 21:03

## 2020-11-02 RX ADMIN — VENLAFAXINE HYDROCHLORIDE 75 MG: 75 CAPSULE, EXTENDED RELEASE ORAL at 11:07

## 2020-11-02 RX ADMIN — HEPARIN SODIUM 5000 UNITS: 5000 INJECTION INTRAVENOUS; SUBCUTANEOUS at 15:07

## 2020-11-02 RX ADMIN — PHENAZOPYRIDINE 200 MG: 200 TABLET ORAL at 08:47

## 2020-11-02 RX ADMIN — FUROSEMIDE 20 MG: 20 TABLET ORAL at 08:47

## 2020-11-02 NOTE — DISCHARGE PLACEMENT REQUEST
"Leandra Crockett (70 y.o. Male)     Date of Birth Social Security Number Address Home Phone MRN    1950  [address]  [address]   [facility] [address] [phone] [id number]    [employer] Marital Status          [employer]        Admission Date Admission Type Admitting Provider Attending Provider Department, Room/Bed    10/14/20 Emergency Fer Gonzalez MD Moon, Anthony, MD [address], 412/1    Discharge Date Discharge Disposition Discharge Destination                       Attending Provider: Michael Butler MD    Allergies: No Known Allergies    Isolation: Spore   Infection: C.difficile (10/14/20)   Code Status: CPR    Ht: 167.6 cm (66\")   Wt: 64.4 kg (141 lb 14.4 oz)    Admission Cmt: None   Principal Problem: C. difficile colitis [A04.72] More...                 Active Insurance as of 10/14/2020     Primary Coverage     Payor Plan Insurance Group Employer/Plan Group    MEDICARE MEDICARE A & B      Payor Plan Address Payor Plan Phone Number Payor Plan Fax Number Effective Dates    [address] [phone]  4/1/2013 - None Entered    [facility] [address]       Subscriber Name Subscriber Birth Date Member ID       LEANDRA CROCKETT 1950 [id number]           Secondary Coverage     Payor Plan Insurance Group Employer/Plan Group     LIFE MC SUP   LIFE MC SUPP PLAN F     Payor Plan Address Payor Plan Phone Number Payor Plan Fax Number Effective Dates    [address] [address] [phone]  10/6/2018 - None Entered    [facility] [id number]       Subscriber Name Subscriber Birth Date Member ID       LEANDRA CROCKETT 1950 [id number]                 Emergency Contacts      (Rel.) Home Phone Work Phone Mobile Phone    Kisha Crockett (Spouse) [phone] -- [phone]    Maureen Crockett (Daughter) [phone] -- [phone]            Shania Santiago RN [facility]  [phone] phone  [phone] fax    "

## 2020-11-02 NOTE — PROGRESS NOTES
"   LOS: 19 days   Patient Care Team:  Satnam Troncoso APRN as PCP - General (Emergency Medicine)    Subjective     Subjective:  Symptoms:  Improved.  (Near resolution of penoscrotal edema, reports he has no dysuria this morning. Nursing staff present and assisting Mr. Crockett.).    Diet:  No nausea or vomiting.        History taken from: patient chart RN    Objective     Vital Signs  Temp:  [98.9 °F (37.2 °C)-100.5 °F (38.1 °C)] 99.2 °F (37.3 °C)  Heart Rate:  [] 102  Resp:  [16-18] 16  BP: (124-150)/(69-81) 139/80    Objective:  General Appearance:  In no acute distress.    Vital signs: (most recent): Blood pressure 139/80, pulse 102, temperature 99.2 °F (37.3 °C), temperature source Oral, resp. rate 16, height 167.6 cm (66\"), weight 64.4 kg (141 lb 14.4 oz), SpO2 95 %.  No fever (TMAX 100.5).    Output: Producing urine and producing stool.    Lungs:  Normal effort and normal respiratory rate.    Abdomen: Abdomen is soft and non-distended.  (Trace penoscrotal edema).   Neurological: Patient is alert.    Pupils:  Pupils are equal, round, and reactive to light.    Skin:  Warm and dry.              Results Review:    Lab Results (last 24 hours)     Procedure Component Value Units Date/Time    POC Glucose Once [[id number]]  (Normal) Collected: 11/02/20 1038    Specimen: Blood Updated: 11/02/20 1108     Glucose 128 mg/dL      Comment: RN NotifiedOperator: [id number] TAY NATHANMeter ID: [id number]       POC Glucose Once [[id number]]  (Abnormal) Collected: 11/02/20 0740    Specimen: Blood Updated: 11/02/20 0803     Glucose 142 mg/dL      Comment: RN NotifiedOperator: [id number] TAY NATHANMeter ID: [id number]       Basic Metabolic Panel [[id number]]  (Abnormal) Collected: 11/02/20 0547    Specimen: Blood Updated: 11/02/20 0638     Glucose 149 mg/dL      BUN 37 mg/dL      Creatinine 1.62 mg/dL      Sodium 134 mmol/L      Potassium 3.7 mmol/L      Chloride 95 mmol/L      CO2 30.0 mmol/L      Calcium 8.8 mg/dL   "     eGFR Non African Amer 42 mL/min/1.73      BUN/Creatinine Ratio 22.8     Anion Gap 9.0 mmol/L     Narrative:      GFR Normal >60  Chronic Kidney Disease <60  Kidney Failure <15      POC Glucose Once [[id number]]  (Abnormal) Collected: 11/01/20 1957    Specimen: Blood Updated: 11/02/20 0116     Glucose 144 mg/dL      Comment: RN NotifiedOperator: [id number] GREG eBll ID: [id number]       POC Glucose Once [[id number]]  (Abnormal) Collected: 11/01/20 1612    Specimen: Blood Updated: 11/01/20 1633     Glucose 140 mg/dL      Comment: RN NotifiedOperator: [id number] DILIA GARCIAMeter ID: [id number]            Imaging Results (Last 24 Hours)     ** No results found for the last 24 hours. **           I reviewed the patient's new clinical results.  I reviewed the patient's new imaging results and agree with the interpretation.  I reviewed the patient's other test results and agree with the interpretation      Assessment/Plan       C. difficile colitis    Hyperlipidemia    Hypertension    GERD (gastroesophageal reflux disease)    History of TIA (transient ischemic attack)    Stage 3a chronic kidney disease    Hypokalemia    Severe malnutrition (CMS/HCC)    Hemorrhoids    YOBANY (acute kidney injury) (CMS/HCC)    Suicidal ideation    Moderate episode of recurrent major depressive disorder (CMS/HCC)      Assessment & Plan    Post op 10/26/20 with Dr. Herzog, colectomy, ileostomy after he did not respond to 2 fecal transplants.   Consulted to Urology for penoscrotal edema, severe-->now trace, nearly resolved. He initially had decreased urine output after Dunn replaced on 10/18/20 at 1300. Renal ultrasound on 10/20/20 at 1741 showed atrophy of the left kidney, no hydronephrosis right kidney and bladder was decompressed. Acute kidney injury being managed by Dr. Luu and is improving.   -WBC 9.52  -Estimated Creatinine Clearance: 37.5 mL/min (A) (by C-G formula based on SCr of 1.62 mg/dL (H)).   -Urine output 1,725  mL last 24 hours     Plan:  Keep scrotum elevated with jock strap.   UA PRN UTI s/s.   PVR PRN symptoms of retention.     Saurabh Beverly, APRN  11/02/20  14:06 CST

## 2020-11-02 NOTE — THERAPY TREATMENT NOTE
Acute Care - Physical Therapy Treatment Note  [facility]     Patient Name: Bar Crockett  : 1950  MRN: [id number]  Today's Date: 2020   Onset of Illness/Injury or Date of Surgery: 10/26/20       PT Assessment (last 12 hours)      PT Evaluation and Treatment     Row Name 20 1255          Physical Therapy Time and Intention    Subjective Information  complains of;pain  -TA     Document Type  therapy note (daily note)  -TA     Mode of Treatment  physical therapy;individual therapy  -TA     Patient Effort  adequate  -TA     Row Name 20 1255          General Information    Patient Profile Reviewed  yes  -TA     Existing Precautions/Restrictions  fall C diff  -TA     Row Name 20 1255          Cognition    Orientation Status (Cognition)  oriented x 4  -TA     Follows Commands (Cognition)  WFL  -TA     Cognitive Function (Cognitive)  WFL  -TA     Personal Safety Interventions  fall prevention program maintained;gait belt;muscle strengthening facilitated;nonskid shoes/slippers when out of bed;supervised activity  -TA     Row Name 20 1259          Pain Scale: Numbers Pre/Post-Treatment    Pretreatment Pain Rating  2/10  -TA     Posttreatment Pain Rating  2/10  -TA     Pain Location  abdomen  -TA     Row Name 20 1255          Bed Mobility    Bed Mobility  rolling left;supine-sit;sit-supine;scooting/bridging  -TA     Rolling Left [place] (Bed Mobility)  minimum assist (75% patient effort);verbal cues  -TA     Rolling Right [place] (Bed Mobility)  minimum assist (75% patient effort);verbal cues  -TA     Scooting/Bridging [place] (Bed Mobility)  dependent (less than 25% patient effort);2 person assist  -TA     Supine-Sit [place] (Bed Mobility)  minimum assist (75% patient effort)  -TA     Sit-Supine [place] (Bed Mobility)  moderate assist (50% patient effort);1 person assist  -TA     Assistive Device (Bed Mobility)  bed rails;draw sheet;head of bed  elevated  -TA     Row Name 11/02/20 1255          Transfers    Comment (Transfers)  pt performed sit<>stand x 3  -TA     Sit-Stand [place] (Transfers)  minimum assist (75% patient effort)  -TA     Stand-Sit [place] (Transfers)  minimum assist (75% patient effort)  -TA     Row Name 11/02/20 1255          Sit-Stand Transfer    Assistive Device (Sit-Stand Transfers)  walker, front-wheeled  -TA     Row Name 11/02/20 1255          Stand-Sit Transfer    Assistive Device (Stand-Sit Transfers)  walker, front-wheeled  -TA     Row Name 11/02/20 1255          Gait/Stairs (Locomotion)    [place] Level (Gait)  unable to assess  -TA     Row Name 11/02/20 1255          Hip (Therapeutic Exercise)    Hip (Therapeutic Exercise)  AROM (active range of motion)  -TA     Hip AROM (Therapeutic Exercise)  bilateral;flexion;sitting;5 repetitions  -TA     Row Name 11/02/20 1255          Knee (Therapeutic Exercise)    Knee (Therapeutic Exercise)  AROM (active range of motion)  -TA     Knee AROM (Therapeutic Exercise)  bilateral;LAQ (long arc quad);sitting;10 repetitions  -TA     Row Name 11/02/20 1255          Ankle (Therapeutic Exercise)    Ankle (Therapeutic Exercise)  AROM (active range of motion)  -TA     Ankle AROM (Therapeutic Exercise)  bilateral;dorsiflexion;plantarflexion;10 repetitions  -     Row Name             Wound 10/26/20 abdomen Incision    Wound - Properties Group Placement Date: 10/26/20  -TR Present on Hospital Admission: N  -TR Location: abdomen  -TR Primary Wound Type: Incision  -TR    Retired Wound - Properties Group Date first assessed: 10/26/20  -TR Present on Hospital Admission: N  -TR Location: abdomen  -TR Primary Wound Type: Incision  -TR    Row Name 11/02/20 1255          Plan of Care Review    Plan of Care Reviewed With  patient  -TA     Outcome Summary  pt supsit with min assist,sit>sup with mod assist, pt sit<>stand with min assist. pt stood x 3 with RW . pt will require 24/7 care & continued  PT services @ D/C  -TA     Row Name 11/02/20 1255          Vital Signs    Pre Systolic BP Rehab  135  -TA     Pre Treatment Diastolic BP  82  -TA     Post Systolic BP Rehab  135  -TA     Post Treatment Diastolic BP  87  -TA     Pretreatment Heart Rate (beats/min)  97  -TA     Posttreatment Heart Rate (beats/min)  96  -TA     Pre SpO2 (%)  93  -TA     O2 Delivery Pre Treatment  supplemental O2  -TA     Post SpO2 (%)  94  -TA     O2 Delivery Post Treatment  supplemental O2  -TA     Pre Patient Position  Supine  -TA     Post Patient Position  Supine  -TA     Row Name 11/02/20 1255          Bed Mobility Goal 1 (PT)    Activity/Assistive Device (Bed Mobility Goal 1, PT)  sit to supine/supine to sit  -TA     [place] Level/Cues Needed (Bed Mobility Goal 1, PT)  minimum assist (75% or more patient effort)  -TA     Time Frame (Bed Mobility Goal 1, PT)  long term goal (LTG);by discharge  -TA     Strategies/Barriers (Bed Mobility Goal 1, PT)  HOB flat, no bed rails.  -TA     Progress/Outcomes (Bed Mobility Goal 1, PT)  goal not met  -TA     Row Name 11/02/20 1255          Transfer Goal 1 (PT)    Activity/Assistive Device (Transfer Goal 1, PT)  sit-to-stand/stand-to-sit;bed-to-chair/chair-to-bed;walker, rolling  -TA     [place] Level/Cues Needed (Transfer Goal 1, PT)  minimum assist (75% or more patient effort)  -TA     Time Frame (Transfer Goal 1, PT)  long term goal (LTG);by discharge  -TA     Progress/Outcome (Transfer Goal 1, PT)  goal not met  -TA     Row Name 11/02/20 1255          Gait Training Goal 1 (PT)    Activity/Assistive Device (Gait Training Goal 1, PT)  walker, rolling  -TA     [place] Level (Gait Training Goal 1, PT)  minimum assist (75% or more patient effort)  -TA     Distance (Gait Training Goal 1, PT)  25' x 2 (while in isolation).  -TA     Time Frame (Gait Training Goal 1, PT)  long term goal (LTG);by discharge  -TA     Progress/Outcome (Gait Training Goal 1, PT)  goal not met  -TA     Row  Name 11/02/20 1255          ROM Goal 1 (PT)    ROM Goal 1 (PT)  Score 25/28 on [place] fall risk.  -TA     Time Frame (ROM Goal 1, PT)  long-term goal (LTG);by discharge  -TA     Progress/Outcome (ROM Goal 1, PT)  goal not met  -TA     Row Name 11/02/20 1255          Positioning and Restraints    Pre-Treatment Position  in bed  -TA     Post Treatment Position  bed  -TA     In Bed  supine;call light within reach;exit alarm on;with family/caregiver  -TA     Row Name 11/02/20 1255          Therapy Assessment/Plan (PT)    Rehab Potential (PT)  fair, will monitor progress closely  -TA     Comment, Therapy Assessment/Plan (PT)  continue  -TA     Row Name 11/02/20 1255          Progress Summary (PT)    Progress Toward Functional Goals (PT)  progress toward functional goals is gradual  -TA       User Key  (r) = Recorded By, (t) = Taken By, (c) = Cosigned By    Initials Name Provider Type    Chapis Dennis PTA Physical Therapy Assistant    Juju Paulino RN Registered Nurse        Physical Therapy Education                 Title: PT OT SLP Therapies (In Progress)     Topic: Physical Therapy (In Progress)     Point: Mobility training (Done)     Learning Progress Summary           Patient Acceptance, E,TB, VU,NR by LN at 10/30/2020 1121    Acceptance, E, VU,NR by CZ at 10/27/2020 1321    Comment: Educated on PT POC and goals.    Acceptance, E, VU,NR by CZ at 10/15/2020 1217    Comment: Educated on the benefits of participating with PT to speed recovery.                   Point: Home exercise program (Done)     Learning Progress Summary           Patient Acceptance, E,TB, VU,NR by LN at 10/30/2020 1121                   Point: Body mechanics (Not Started)     Learner Progress:  Not documented in this visit.          Point: Precautions (Done)     Learning Progress Summary           Patient Acceptance, E,TB, VU,NR by LN at 10/30/2020 1121                               User Key     Initials Effective Dates Name Provider  Type Discipline    LN 03/07/18 -  Courtney Rice, PTA Physical Therapy Assistant PT    CZ 04/03/18 -  Ismael Morillo, PT Physical Therapist PT              PT Recommendation and Plan  Anticipated Discharge Disposition (PT): skilled nursing facility  Progress Summary (PT)  Progress Toward Functional Goals (PT): progress toward functional goals is gradual  Plan of Care Reviewed With: patient  Outcome Summary: pt supsit with min assist,sit>sup with mod assist, pt sit<>stand with min assist. pt stood x 3 with RW . pt will require 24/7 care & continued PT services @ D/C  Outcome Measures     Row Name 11/02/20 0854 11/01/20 1011 11/01/20 0814       How much help from another person do you currently need...    Turning from your back to your side while in flat bed without using bedrails?  --  3  -TW  --    Moving from lying on back to sitting on the side of a flat bed without bedrails?  --  3  -TW  --    Moving to and from a bed to a chair (including a wheelchair)?  --  1  -TW  --    Standing up from a chair using your arms (e.g., wheelchair, bedside chair)?  --  1  -TW  --    Climbing 3-5 steps with a railing?  --  1  -TW  --    To walk in hospital room?  --  1  -TW  --    AM-PAC 6 Clicks Score (PT)  --  10  -TW  --       How much help from another is currently needed...    Putting on and taking off regular lower body clothing?  2  -RB  --  1  -BB    Bathing (including washing, rinsing, and drying)  2  -RB  --  2  -BB    Toileting (which includes using toilet bed pan or urinal)  2  -RB  --  2  -BB    Putting on and taking off regular upper body clothing  2  -RB  --  2  -BB    Taking care of personal grooming (such as brushing teeth)  3  -RB  --  3  -BB    Eating meals  3  -RB  --  3  -BB    AM-PAC 6 Clicks Score (OT)  14  -RB  --  13  -BB       Functional Assessment    Outcome Measure Options  AM-PAC 6 Clicks Daily Activity (OT)  -RB  --  --    Row Name 10/31/20 0917 10/31/20 0830          How much help from another  person do you currently need...    Turning from your back to your side while in flat bed without using bedrails?  --  2  -CA     Moving from lying on back to sitting on the side of a flat bed without bedrails?  --  2  -CA     Moving to and from a bed to a chair (including a wheelchair)?  --  1  -CA     Standing up from a chair using your arms (e.g., wheelchair, bedside chair)?  --  1  -CA     Climbing 3-5 steps with a railing?  --  1  -CA     To walk in hospital room?  --  1  -CA     AM-PAC 6 Clicks Score (PT)  --  8  -CA        How much help from another is currently needed...    Putting on and taking off regular lower body clothing?  1  -BB  --     Bathing (including washing, rinsing, and drying)  2  -BB  --     Toileting (which includes using toilet bed pan or urinal)  2  -BB  --     Putting on and taking off regular upper body clothing  2  -BB  --     Taking care of personal grooming (such as brushing teeth)  3  -BB  --     Eating meals  3  -BB  --     AM-PAC 6 Clicks Score (OT)  13  -BB  --        Functional Assessment    Outcome Measure Options  --  AM-PAC 6 Clicks Basic Mobility (PT)  -CA       User Key  (r) = Recorded By, (t) = Taken By, (c) = Cosigned By    Initials Name Provider Type    RB Michael Blakely, OT Occupational Therapist    CA Elliott Jiménez, KACI Physical Therapy Assistant    TW Jaiden Rondon, KACI Physical Therapy Assistant    BB Robyn Dumont, YAJAIRA/L Occupational Therapy Assistant           Time Calculation:   PT Charges     Row Name 11/02/20 1546             Time Calculation    Start Time  1255  -TA      Stop Time  1336  -TA      Time Calculation (min)  41 min  -TA      PT Received On  11/02/20  -TA         Time Calculation- PT    Total Timed Code Minutes- PT  41 minute(s)  -TA        User Key  (r) = Recorded By, (t) = Taken By, (c) = Cosigned By    Initials Name Provider Type    TA Chapis South, PTA Physical Therapy Assistant        Therapy Charges for Today     Code Description  Service Date Service Provider Modifiers Qty    69767636961 HC PT THERAPEUTIC ACT EA 15 MIN 11/2/2020 Chapis South, KACI GP 2    96201081783 HC PT THER PROC EA 15 MIN 11/2/2020 Chapis South, KACI GP 1          PT G-Codes  Outcome Measure Options: AM-PAC 6 Clicks Daily Activity (OT)  AM-PAC 6 Clicks Score (PT): 10  AM-PAC 6 Clicks Score (OT): 14    Chapis South PTA  11/2/2020

## 2020-11-02 NOTE — THERAPY TREATMENT NOTE
Acute Care - Occupational Therapy Treatment Note  [facility]     Patient Name: Bar Crockett  : 1950  MRN: [id number]  Today's Date: 2020  Onset of Illness/Injury or Date of Surgery: 10/26/20  Date of Referral to OT: 10/26/20  Referring Physician: NELLY Blackman MD     Admit Date: 10/14/2020       ICD-10-CM ICD-9-CM   1. Colitis  K52.9 558.9   2. Sepsis without acute organ dysfunction, due to unspecified organism (CMS/HCC)  A41.9 038.9     995.91   3. Chronic kidney disease, unspecified CKD stage  N18.9 585.9   4. Impaired physical mobility  Z74.09 781.99   5. Impaired mobility and ADLs  Z74.09 V49.89    Z78.9    6. Pancolitis (CMS/HCC)  K51.00 556.6   7. Clostridium difficile infection  A49.8 041.84   8. C. difficile colitis  A04.72 008.45   9. Impaired mobility and activities of daily living  Z74.09 V49.89    Z78.9      Patient Active Problem List   Diagnosis   • Sigmoid diverticulitis   • Pancolitis (CMS/HCC)   • Hyperlipidemia   • Hypertension   • GERD (gastroesophageal reflux disease)   • History of TIA (transient ischemic attack)   • Stage 3a chronic kidney disease   • Sepsis without acute organ dysfunction (CMS/HCC)   • C. difficile colitis   • Hypokalemia   • Severe malnutrition (CMS/HCC)   • Hemorrhoids   • Edema of both ankles   • Tachycardia   • YOBANY (acute kidney injury) (CMS/HCC)   • Suicidal ideation     Past Medical History:   Diagnosis Date   • Hyperlipidemia    • Hypertension    • Rectal abnormality     Rectum came out - er put back in.     • TIA (transient ischemic attack)      Past Surgical History:   Procedure Laterality Date   • COLONOSCOPY N/A 12/10/2018    Procedure: COLONOSCOPY;  Surgeon: Jay Wilson DO;  Location: [facility] ENDOSCOPY;  Service: Gastroenterology   • COLONOSCOPY Left 10/25/2020    Procedure: COLONOSCOPY WITH ENDOSCOPIC FECAL MICROBIOTA TRANSPLANT;  Surgeon: Jay Wilson DO;  Location: [facility] OR;  Service: Gastroenterology;  Laterality: Left;    • COLONOSCOPY N/A 10/23/2020    Procedure: COLONOSCOPY WITH ENDOSCOPIC FECAL MICROBIOTA TRANSPLANT;  Surgeon: Jay Wilson DO;  Location: [facility] ENDOSCOPY;  Service: Gastroenterology;  Laterality: N/A;   • OTHER SURGICAL HISTORY      Loop recorder          OT ASSESSMENT FLOWSHEET (last 12 hours)      OT Evaluation and Treatment     Row Name 11/02/20 0854                   OT Time and Intention    Subjective Information  no complaints  -RB        Document Type  therapy note (daily note)  -RB        Mode of Treatment  individual therapy;occupational therapy  -RB        Patient Effort  adequate  -RB           General Information    Existing Precautions/Restrictions  fall;oxygen therapy device and L/min C Diff  -RB           Cognition    Affect/Mental Status (Cognitive)  flat/blunted affect  -RB        Orientation Status (Cognition)  oriented x 4  -RB           Pain Scale: Numbers Pre/Post-Treatment    Pretreatment Pain Rating  0/10 - no pain  -RB        Posttreatment Pain Rating  0/10 - no pain  -RB           Bed Mobility    Supine-Sit [place] (Bed Mobility)  minimum assist (75% patient effort)  -RB        Sit-Supine [place] (Bed Mobility)  minimum assist (75% patient effort)  -RB        Bed Mobility, Safety Issues  decreased use of arms for pushing/pulling;decreased use of legs for bridging/pushing  -RB        Assistive Device (Bed Mobility)  bed rails;head of bed elevated  -RB           Transfer Assessment/Treatment    Transfers  sit-stand transfer;stand-sit transfer  -RB           Transfers    Sit-Stand [place] (Transfers)  moderate assist (50% patient effort)  -RB        Stand-Sit [place] (Transfers)  moderate assist (50% patient effort)  -RB           Sit-Stand Transfer    Assistive Device (Sit-Stand Transfers)  other (see comments) none  -RB           Stand-Sit Transfer    Assistive Device (Stand-Sit Transfers)  other (see comments) none  -RB           Shoulder (Therapeutic Exercise)     Shoulder (Therapeutic Exercise)  AAROM (active assistive range of motion)  -RB        Shoulder AROM (Therapeutic Exercise)  sitting;flexion;extension  -RB        Shoulder Strengthening (Therapeutic Exercise)  bilateral;resistance tubing;10 repetitions;3 lb free weight Therapist assisting with this exercise.  -RB           Elbow/Forearm (Therapeutic Exercise)    Elbow/Forearm (Therapeutic Exercise)  strengthening exercise  -RB        Elbow/Forearm Strengthening (Therapeutic Exercise)  bilateral;flexion;extension;sitting;3 lb free weight;resistance tubing;2 sets;10 repetitions  -RB           Balance    Comment, Balance  Sat EOB ~ 20 minutes while performing B UE therex.  -RB           Activities of Daily Living    BADL Assessment/Intervention  lower body dressing  -RB           Lower Body Dressing Assessment/Training    [place] Level (Lower Body Dressing)  lower body dressing skills;don;socks;moderate assist (50% patient effort);maximum assist (25% patient effort)  -RB        Position (Lower Body Dressing)  supported sitting  -RB           Wound 10/26/20 abdomen Incision    Wound - Properties Group Placement Date: 10/26/20  -TR Present on Hospital Admission: N  -TR Location: abdomen  -TR Primary Wound Type: Incision  -TR    Retired Wound - Properties Group Date first assessed: 10/26/20  -TR Present on Hospital Admission: N  -TR Location: abdomen  -TR Primary Wound Type: Incision  -TR       Vital Signs    Pre Systolic BP Rehab  140  -RB        Pre Treatment Diastolic BP  80  -RB        Post Systolic BP Rehab  159  -RB        Post Treatment Diastolic BP  93  -RB        Pretreatment Heart Rate (beats/min)  98  -RB        Posttreatment Heart Rate (beats/min)  100  -RB        Pre SpO2 (%)  96  -RB        O2 Delivery Pre Treatment  supplemental O2  -RB        Intra SpO2 (%)  96  -RB        O2 Delivery Intra Treatment  room air  -RB        Post SpO2 (%)  96  -RB        O2 Delivery Post Treatment  supplemental O2  -RB         Pre Patient Position  Supine  -RB        Intra Patient Position  Standing  -RB        Post Patient Position  Supine  -RB           OT Goals    Transfer Goal Selection (OT)  transfer, OT goal 1  -RB        Bathing Goal Selection (OT)  bathing, OT goal 1  -RB        Dressing Goal Selection (OT)  dressing, OT goal 1  -RB        Toileting Goal Selection (OT)  toileting, OT goal 1  -RB        Endurance Goal Selection (OT)  endurance, OT goal 1  -RB           Transfer Goal 1 (OT)    Activity/Assistive Device (Transfer Goal 1, OT)  toilet AD as needed   -RB        [place] Level/Cues Needed (Transfer Goal 1, OT)  minimum assist (75% or more patient effort);verbal cues required;tactile cues required  -RB        Time Frame (Transfer Goal 1, OT)  long term goal (LTG);by discharge  -RB        Progress/Outcome (Transfer Goal 1, OT)  goal not met  -RB           Bathing Goal 1 (OT)    Activity/Device (Bathing Goal 1, OT)  lower body bathing AD as needed   -RB        [place] Level/Cues Needed (Bathing Goal 1, OT)  minimum assist (75% or more patient effort);verbal cues required;tactile cues required  -RB        Time Frame (Bathing Goal 1, OT)  long term goal (LTG);by discharge  -RB        Progress/Outcomes (Bathing Goal 1, OT)  goal not met  -RB           Dressing Goal 1 (OT)    Activity/Device (Dressing Goal 1, OT)  lower body dressing AD as needed   -RB        [place]/Cues Needed (Dressing Goal 1, OT)  minimum assist (75% or more patient effort);verbal cues required;tactile cues required  -RB        Time Frame (Dressing Goal 1, OT)  long term goal (LTG);by discharge  -RB        Progress/Outcome (Dressing Goal 1, OT)  goal not met  -RB           Toileting Goal 1 (OT)    Activity/Device (Toileting Goal 1, OT)  toileting skills, all  -RB        [place] Level/Cues Needed (Toileting Goal 1, OT)  minimum assist (75% or more patient effort);verbal cues required;tactile cues required  -RB        Time Frame  (Toileting Goal 1, OT)  long term goal (LTG);by discharge  -RB        Progress/Outcome (Toileting Goal 1, OT)  goal not met  -RB            Endurance Goal 1 (OT)    Endurance Goal 1 (OT)  15 min functional activity with proper EC techniques.  -RB        Time Frame (Endurance Goal 1, OT)  long term goal (LTG)  -RB        Progress/Outcome (Endurance Goal 1, OT)  (S) goal met  -RB           Positioning and Restraints    Pre-Treatment Position  in bed  -RB        Post Treatment Position  bed  -RB        In Bed  supine;call light within reach;encouraged to call for assist;exit alarm on;notified nsg  -RB           Progress Summary (OT)    Progress Toward Functional Goals (OT)  progress toward functional goals as expected  -RB        Daily Progress Summary (OT)  Cont with OT POC.  -RB          User Key  (r) = Recorded By, (t) = Taken By, (c) = Cosigned By    Initials Name Effective Dates    RB Michael Blakely, KRISTY 07/24/19 -     Juju Paulino RN 10/17/16 -          Occupational Therapy Education                 Title: PT OT SLP Therapies (In Progress)     Topic: Occupational Therapy (In Progress)     Point: ADL training (In Progress)     Description:   Instruct learner(s) on proper safety adaptation and remediation techniques during self care or transfers.   Instruct in proper use of assistive devices.              Learning Progress Summary           Patient Acceptance, E, NR by RC at 10/30/2020 1129                   Point: Home exercise program (Not Started)     Description:   Instruct learner(s) on appropriate technique for monitoring, assisting and/or progressing therapeutic exercises/activities.              Learner Progress:  Not documented in this visit.          Point: Precautions (Done)     Description:   Instruct learner(s) on prescribed precautions during self-care and functional transfers.              Learning Progress Summary           Patient Acceptance, D, VU,NR by RB at 11/2/2020 1010    Comment: Use on  non skid socks when OOB.    Acceptance, E, NR by  at 10/30/2020 1129    Nonacceptance, E, NR,NL,RT by ME at 10/15/2020 1314    Comment: Educated on OT and POC. Educated to call for assistance. Educated on safety precautions. Educated on importance of working with therapy.                   Point: Body mechanics (In Progress)     Description:   Instruct learner(s) on proper positioning and spine alignment during self-care, functional mobility activities and/or exercises.              Learning Progress Summary           Patient Acceptance, E, NR by  at 10/30/2020 1129                               User Key     Initials Effective Dates Name Provider Type Discipline     07/24/19 -  Michael Blakely OT Occupational Therapist OT     03/07/18 -  Jessica Stephens COTA/L Occupational Therapy Assistant OT    ME 08/24/20 -  Ragini Proctor OTR/L Occupational Therapist OT                  OT Recommendation and Plan     Progress Toward Functional Goals (OT): progress toward functional goals as expected  Plan of Care Review  Plan of Care Reviewed With: patient  Outcome Summary: OT tx on this date.  Pt performed LB dressing to don socks with mod to max A.  He performed B UE therex with 3 lb tubing.  Pt sat EOB ~ 20 with LB dressing and therex.  Will cont to work with pt to increase independence with ADLs and functional mobility.  Plan of Care Reviewed With: patient  Outcome Summary: OT tx on this date.  Pt performed LB dressing to don socks with mod to max A.  He performed B UE therex with 3 lb tubing.  Pt sat EOB ~ 20 with LB dressing and therex.  Will cont to work with pt to increase independence with ADLs and functional mobility.    Outcome Measures     Row Name 11/02/20 0854 11/01/20 1011 11/01/20 0814       How much help from another person do you currently need...    Turning from your back to your side while in flat bed without using bedrails?  --  3  -TW  --    Moving from lying on back to sitting on the side of a  flat bed without bedrails?  --  3  -TW  --    Moving to and from a bed to a chair (including a wheelchair)?  --  1  -TW  --    Standing up from a chair using your arms (e.g., wheelchair, bedside chair)?  --  1  -TW  --    Climbing 3-5 steps with a railing?  --  1  -TW  --    To walk in hospital room?  --  1  -TW  --    AM-PAC 6 Clicks Score (PT)  --  10  -TW  --       How much help from another is currently needed...    Putting on and taking off regular lower body clothing?  2  -RB  --  1  -BB    Bathing (including washing, rinsing, and drying)  2  -RB  --  2  -BB    Toileting (which includes using toilet bed pan or urinal)  2  -RB  --  2  -BB    Putting on and taking off regular upper body clothing  2  -RB  --  2  -BB    Taking care of personal grooming (such as brushing teeth)  3  -RB  --  3  -BB    Eating meals  3  -RB  --  3  -BB    AM-PAC 6 Clicks Score (OT)  14  -RB  --  13  -BB       Functional Assessment    Outcome Measure Options  AM-PAC 6 Clicks Daily Activity (OT)  -RB  --  --    Row Name 10/31/20 0917 10/31/20 0830 10/30/20 1100       How much help from another person do you currently need...    Turning from your back to your side while in flat bed without using bedrails?  --  2  -CA  --    Moving from lying on back to sitting on the side of a flat bed without bedrails?  --  2  -CA  --    Moving to and from a bed to a chair (including a wheelchair)?  --  1  -CA  --    Standing up from a chair using your arms (e.g., wheelchair, bedside chair)?  --  1  -CA  --    Climbing 3-5 steps with a railing?  --  1  -CA  --    To walk in hospital room?  --  1  -CA  --    AM-PAC 6 Clicks Score (PT)  --  8  -CA  --       How much help from another is currently needed...    Putting on and taking off regular lower body clothing?  1  -BB  --  2  -RC    Bathing (including washing, rinsing, and drying)  2  -BB  --  2  -RC    Toileting (which includes using toilet bed pan or urinal)  2  -BB  --  2  -RC    Putting on and  taking off regular upper body clothing  2  -BB  --  2  -RC    Taking care of personal grooming (such as brushing teeth)  3  -BB  --  3  -RC    Eating meals  3  -BB  --  3  -RC    AM-PAC 6 Clicks Score (OT)  13  -BB  --  14  -RC       Functional Assessment    Outcome Measure Options  --  AM-PAC 6 Clicks Basic Mobility (PT)  -CA  --    Row Name 10/30/20 1038             How much help from another person do you currently need...    Turning from your back to your side while in flat bed without using bedrails?  2  -LN      Moving from lying on back to sitting on the side of a flat bed without bedrails?  2  -LN      Moving to and from a bed to a chair (including a wheelchair)?  1  -LN      Standing up from a chair using your arms (e.g., wheelchair, bedside chair)?  1  -LN      Climbing 3-5 steps with a railing?  1  -LN      To walk in hospital room?  1  -LN      AM-PAC 6 Clicks Score (PT)  8  -LN         Functional Assessment    Outcome Measure Options  AM-PAC 6 Clicks Basic Mobility (PT)  -LN        User Key  (r) = Recorded By, (t) = Taken By, (c) = Cosigned By    Initials Name Provider Type    RB Michael Blakely OT Occupational Therapist    CA Elliott Jiménez, PTA Physical Therapy Assistant    LN Courtney Rice, PTA Physical Therapy Assistant    TW Jaiden Rondon, PTA Physical Therapy Assistant    BB Robyn Dumont, GATES/L Occupational Therapy Assistant    RC Jessica Stephens, GATES/L Occupational Therapy Assistant          Time Calculation:   Time Calculation- OT     Row Name 11/02/20 1015             Time Calculation- OT    OT Start Time  0854  -RB      OT Stop Time  0944  -RB      OT Time Calculation (min)  50 min  -RB      OT Received On  11/02/20  -        User Key  (r) = Recorded By, (t) = Taken By, (c) = Cosigned By    Initials Name Provider Type    Michael Peters OT Occupational Therapist        Therapy Charges for Today     Code Description Service Date Service Provider Modifiers Qty    00725226769   OT SELF CARE/MGMT/TRAIN EA 15 MIN 11/2/2020 Michael Blakely, OT GO 1    [id number]  OT THERAPEUTIC ACT EA 15 MIN 11/2/2020 Michael Blakely OT GO 1    [id number] HC OT MANUAL THERAPY EA 15 MIN 11/2/2020 Michael Blakely OT GO 1               Michael Blakely OT  11/2/2020

## 2020-11-02 NOTE — PROGRESS NOTES
"   LOS: 19 days   Patient Care Team:  Satnam Troncoso APRN as PCP - General (Emergency Medicine)    Chief Complaint: POD 8    Subjective     Fever   Pertinent negatives include no chest pain, coughing, diarrhea, nausea or vomiting.   Cough  Associated symptoms include shortness of breath. Pertinent negatives include no chest pain or fever.       Subjective:  Symptoms:  Stable.  He reports shortness of breath.  No malaise, cough, chest pain, weakness, headache, chest pressure, anorexia, diarrhea or anxiety.    Diet:  Adequate intake.  No nausea or vomiting.    Activity level: Normal.    Pain:  He reports no pain.        History taken from: patient chart RN    Objective     Vital Signs  Temp:  [98.9 °F (37.2 °C)-100.5 °F (38.1 °C)] 99.2 °F (37.3 °C)  Heart Rate:  [] 102  Resp:  [16-18] 16  BP: (124-150)/(69-81) 139/80    Objective:  General Appearance:  Comfortable.    Vital signs: (most recent): Blood pressure 139/80, pulse 102, temperature 99.2 °F (37.3 °C), temperature source Oral, resp. rate 16, height 167.6 cm (66\"), weight 64.4 kg (141 lb 14.4 oz), SpO2 95 %.  Vital signs are normal.  No fever.    Output: Producing urine and producing stool.              Results Review:     I reviewed the patient's new clinical results.    Medication Review: Done    Assessment/Plan       C. difficile colitis    Hyperlipidemia    Hypertension    GERD (gastroesophageal reflux disease)    History of TIA (transient ischemic attack)    Stage 3a chronic kidney disease    Hypokalemia    Severe malnutrition (CMS/HCC)    Hemorrhoids    YOBANY (acute kidney injury) (CMS/[facility])    Suicidal ideation      Assessment:    Condition: In stable condition.  Improving.       Plan:   (1. Regular diet  2. Decrease TF by 50%).             This document has been electronically signed by Nahum Herzog MD on November 2, 2020 09:06 CST      aNhum Herzog MD  11/02/20  09:02 CST    Time: 15 mins      "

## 2020-11-02 NOTE — PROGRESS NOTES
"[facility]  [address] [place] [address]. [address]  T - [phone]  F - [phone]     Progress Note          PATIENT  DEMOGRAPHICS   PATIENT NAME: Bar Crockett                      PHYSICIAN: Hugh Luu MD  : 1950  MRN: [id number]   LOS: 19 days    Patient Care Team:  Satnam Troncoso APRN as PCP - General (Emergency Medicine)  Subjective   SUBJECTIVE   Tired today. Doesn't want to eat anything       Objective   OBJECTIVE   Vital Signs  Temp:  [98.9 °F (37.2 °C)-100.5 °F (38.1 °C)] 99.2 °F (37.3 °C)  Heart Rate:  [] 102  Resp:  [16-18] 16  BP: (124-150)/(69-81) 139/80    Flowsheet Rows      First Filed Value   Admission Height  167.6 cm (66\") Documented at 10/14/2020 1329   Admission Weight  55.9 kg (123 lb 4.8 oz) Documented at 10/14/2020 1329           I/O last 3 completed shifts:  In: 3426 [P.O.:480; Other:1093; NG/GT:1853]  Out: 3425 [Urine:2675; Stool:750]    PHYSICAL EXAM    Physical Exam  Constitutional:       Appearance: He is well-developed. He is ill-appearing.   HENT:      Head: Normocephalic and atraumatic.   Eyes:      Conjunctiva/sclera: Conjunctivae normal.      Pupils: Pupils are equal, round, and reactive to light.   Neck:      Musculoskeletal: Neck supple.   Cardiovascular:      Rate and Rhythm: Normal rate and regular rhythm.   Pulmonary:      Effort: Pulmonary effort is normal.      Breath sounds: Normal breath sounds.   Abdominal:      Palpations: Abdomen is soft.   Musculoskeletal:      Right lower leg: Edema present.      Left lower leg: Edema present.   Skin:     General: Skin is warm and dry.   Neurological:      Mental Status: He is alert and oriented to person, place, and time.   Psychiatric:         Mood and Affect: Mood normal.         Behavior: Behavior normal.         RESULTS   Results Review:    Results from last 7 days   Lab Units 20  0547 20  0534 10/31/20  1102  10/27/20  0416   SODIUM mmol/L 134* 137 138   < > 142 "   POTASSIUM mmol/L 3.7 3.9 4.0   < > 3.3*   CHLORIDE mmol/L 95* 95* 99   < > 110*   CO2 mmol/L 30.0* 31.0* 30.0*   < > 23.0   BUN mg/dL 37* 33* 33*   < > 26*   CREATININE mg/dL 1.62* 1.67* 1.70*   < > 1.42*   CALCIUM mg/dL 8.8 8.9 8.8   < > 6.3*   BILIRUBIN mg/dL  --   --   --   --  0.3   ALK PHOS U/L  --   --   --   --  55   ALT (SGPT) U/L  --   --   --   --  7   AST (SGOT) U/L  --   --   --   --  19   GLUCOSE mg/dL 149* 115* 129*   < > 154*    < > = values in this interval not displayed.       Estimated Creatinine Clearance: 37.5 mL/min (A) (by C-G formula based on SCr of 1.62 mg/dL (H)).    Results from last 7 days   Lab Units 10/30/20  0552 10/28/20  0528 10/27/20  0416   MAGNESIUM mg/dL 2.0 2.0 1.6   PHOSPHORUS mg/dL 2.8 3.0 3.5             Results from last 7 days   Lab Units 11/01/20  0915 10/30/20  0552 10/28/20  0528 10/27/20  0416 10/26/20  1641   WBC 10*3/mm3 9.52 5.56 10.12 13.36* 24.03*   HEMOGLOBIN g/dL 10.1* 9.4* 10.7* 10.5* 14.7   PLATELETS 10*3/mm3 189 148 96* 82* 108*               Imaging Results (Last 24 Hours)     ** No results found for the last 24 hours. **           MEDICATIONS    albuterol, 2.5 mg, Nebulization, Q6H - RT  famotidine, 40 mg, Per J Tube, Daily  furosemide, 20 mg, Oral, BID  heparin (porcine), 5,000 Units, Subcutaneous, Q8H  insulin aspart, 0-9 Units, Subcutaneous, TID AC  phenazopyridine, 200 mg, Oral, BID  potassium chloride, 20 mEq, Per PEG Tube, Daily  sodium chloride, 10 mL, Intravenous, Q12H  venlafaxine XR, 75 mg, Oral, Daily With Breakfast           Assessment/Plan   ASSESSMENT / PLAN      C. difficile colitis    Hyperlipidemia    Hypertension    GERD (gastroesophageal reflux disease)    History of TIA (transient ischemic attack)    Stage 3a chronic kidney disease    Hypokalemia    Severe malnutrition (CMS/HCC)    Hemorrhoids    YOBANY (acute kidney injury) (CMS/HCC)    Suicidal ideation    Moderate episode of recurrent major depressive disorder (CMS/HCC)    1.  YOBANY on CKD  3- baseline creatinine around 1.3-1.4.  Creatinine was at baseline on current presentation, up to 2.1 peak. now 1.5-1.7 range. Off TF and TPN. No hydro on U/S, L atrophic kidney     -Acidosis stable, has fluid overload and was on lasix before surgery.Gonzalez negative. Now wt is 141 lbs and stable overall. Dunn per Urology    -Added po lasix 20mg bid 10/30/20, Cr stable     2.  C. difficile/pancolitis- on oral Vanco and IV Flagyl, plan per primary team, blood culture negative so far. Colonoscopy showed A diffuse pseudomembrane was found in the entire colon. Worse right than left. ??? better than on Friday but minimal.  s/p fecal microbiol transplant (bacteriotherapy). SUBTOTAL COLECTOMY,  ILEOSTOMY, PLACEMENT OF GASTROSTOMY AND JEJEUNOSTOMY TUBES   SUBTOTAL COLECTOMY,  ILEOSTOMY, PLACEMENT OF GASTROSTOMY AND JEJEUNOSTOMY TUBES on 10/26 .      3.  Malnutrition- on TF     4.  Metabolic acidosis- improved     5.  History of hyperlipidemia     6.  History of hypertension- now low - off hydralazine and aldactone    7. FEN- k mg and phos are all stable. Keep po kcl 20meq daily          This document has been electronically signed by Hugh Luu MD on November 2, 2020 10:55 CST

## 2020-11-02 NOTE — SIGNIFICANT NOTE
"Pt refusing PICC line dressing change. He refused on 11/1 during his assessment and is maintaining refusal at this time. Pt has been educated that this line is a portal for infection and the dressing needs to be changed weekly as well as prn if dressing is soiled or compromised. Pt acknowledges understanding but states he \"does not care.\"  "

## 2020-11-02 NOTE — PLAN OF CARE
Problem: Adult Inpatient Plan of Care  Goal: Plan of Care Review  Recent Flowsheet Documentation  Taken 11/2/2020 1255 by Chapis South PTA  Plan of Care Reviewed With: patient  Outcome Summary: pt supsit with min assist,sit>sup with mod assist, pt sit<>stand with min assist. pt stood x 3 with RW . pt will require 24/7 care & continued PT services @ D/C

## 2020-11-02 NOTE — PROGRESS NOTES
FAMILY MEDICINE DAILY PROGRESS NOTE    NAME: Bar Crockett  : 1950  MRN: [id number]      LOS: 19 days     PROVIDER OF SERVICE: Sandor Martinez MD    Chief Complaint: C. difficile colitis    Subjective:     Interval History:  History taken from: patient  Mr Crockett did well overnight with no acute events. VSS. Mr. Crockett reports that his pain is well controlled, denies fevers, nausea, vomiting, angina, or dyspnea. Liquid brown/green stool drains into his stoma bag. Overnight nursing staff report that Mr. Crockett refused his heparin, Dr. Butler discussed risks and dangers of doing this at c.07.30hrs. PT/OT, respiratory therapy continue to help Mr. Crockett progress.       Review of Systems:   Review of Systems   Constitutional: Negative for activity change, appetite change, chills, diaphoresis, fatigue and fever.   HENT: Negative for congestion, dental problem, ear discharge, ear pain, hearing loss, mouth sores, nosebleeds, postnasal drip, sinus pain, sore throat, tinnitus, trouble swallowing and voice change.    Eyes: Negative for photophobia, pain, discharge and itching.   Respiratory: Negative for cough, choking, chest tightness, shortness of breath and wheezing.    Cardiovascular: Negative for chest pain, palpitations and leg swelling.   Gastrointestinal: Negative for abdominal distention, abdominal pain, blood in stool, constipation, diarrhea, nausea and vomiting.        Stoma bag in place//draining brown-green liquid  Wound CDI   Endocrine: Negative for cold intolerance and heat intolerance.   Genitourinary: Negative for difficulty urinating, dysuria, flank pain and hematuria.   Musculoskeletal: Negative for back pain, joint swelling and neck pain.   Skin: Negative for color change and rash.   Neurological: Negative for dizziness, tremors, seizures, weakness, light-headedness, numbness and headaches.   Hematological: Negative for adenopathy.   Psychiatric/Behavioral: Negative for behavioral problems,  confusion, hallucinations, self-injury and sleep disturbance.       Objective:     Vital Signs  Temp:  [98.9 °F (37.2 °C)-100.5 °F (38.1 °C)] 99.2 °F (37.3 °C)  Heart Rate:  [] 101  Resp:  [16-18] 18  BP: (124-150)/(69-81) 134/78  Body mass index is 22.9 kg/m².    Physical Exam  Physical Exam  Vitals signs and nursing note reviewed.   Constitutional:       Appearance: Normal appearance. He is not ill-appearing or diaphoretic.   HENT:      Head: Normocephalic and atraumatic.      Right Ear: External ear normal.      Left Ear: External ear normal.      Nose: Nose normal. No rhinorrhea.      Mouth/Throat:      Mouth: Mucous membranes are moist.      Pharynx: No posterior oropharyngeal erythema.   Eyes:      General: No scleral icterus.        Right eye: No discharge.         Left eye: No discharge.      Extraocular Movements: Extraocular movements intact.   Neck:      Musculoskeletal: No muscular tenderness.      Vascular: No carotid bruit.   Cardiovascular:      Rate and Rhythm: Normal rate and regular rhythm.      Pulses: Normal pulses.      Heart sounds: Normal heart sounds. No gallop.    Pulmonary:      Effort: Pulmonary effort is normal.      Breath sounds: Normal breath sounds. No wheezing.   Chest:      Chest wall: No tenderness.   Abdominal:      General: Bowel sounds are normal.      Palpations: Abdomen is soft.      Tenderness: There is no rebound.      Comments: Stoma bag in place//draining brown-green liquid  Wound CDI   Musculoskeletal:         General: No swelling.      Right lower leg: Edema present.      Left lower leg: Edema present.      Comments: Edema remains however decreased from 2 days ago   Lymphadenopathy:      Cervical: No cervical adenopathy.   Skin:     General: Skin is warm and dry.      Capillary Refill: Capillary refill takes 2 to 3 seconds.      Findings: No erythema or rash.   Neurological:      General: No focal deficit present.      Mental Status: He is alert and oriented to  person, place, and time.      Motor: No weakness.   Psychiatric:         Mood and Affect: Mood normal.         Behavior: Behavior normal.         Thought Content: Thought content normal.         Judgment: Judgment normal.         Medication Review    Current Facility-Administered Medications:   •  albuterol (PROVENTIL) nebulizer solution 0.083% 2.5 mg/3mL, 2.5 mg, Nebulization, Q6H - RT, Nahum Herzog MD, 2.5 mg at 11/02/20 0658  •  dextrose (D50W) 25 g/ 50mL Intravenous Solution 25 g, 25 g, Intravenous, Q15 Min PRN, Nahum Herzog MD  •  dextrose (GLUTOSE) oral gel 15 g, 15 g, Oral, Q15 Min PRN, Nahum Herzog MD  •  enalaprilat (VASOTEC) injection 0.625 mg, 0.625 mg, Intravenous, Q6H PRN, Nahum Herzog MD  •  famotidine (PEPCID) 40 MG/5ML suspension 40 mg, 40 mg, Per J Tube, Daily, Nahum Herzog MD, 40 mg at 11/01/20 0811  •  furosemide (LASIX) tablet 20 mg, 20 mg, Oral, BID, Hugh Luu MD, 20 mg at 11/01/20 1709  •  glucagon (human recombinant) (GLUCAGEN DIAGNOSTIC) injection 1 mg, 1 mg, Subcutaneous, Q15 Min PRN, Nahum Herzog MD  •  heparin (porcine) 5000 UNIT/ML injection 5,000 Units, 5,000 Units, Subcutaneous, Q8H, Nahum Herzog MD, 5,000 Units at 11/02/20 0437  •  HYDROcodone-acetaminophen (HYCET) 7.5-325 MG/15ML solution 10 mL, 10 mL, Per J Tube, Q4H PRN, Nahum Herzog MD, 10 mL at 11/01/20 0811  •  HYDROmorphone (DILAUDID) injection 1 mg, 1 mg, Intravenous, Q3H PRN, 1 mg at 10/27/20 2120 **AND** naloxone (NARCAN) injection 0.4 mg, 0.4 mg, Intravenous, Q5 Min PRN, Nahum Herzog MD  •  insulin aspart (novoLOG) injection 0-9 Units, 0-9 Units, Subcutaneous, TID AC, Nahum Herzog MD  •  magic butt ointment, , Topical, PRN, Saurabh Beverly APRN  •  ondansetron (ZOFRAN) injection 4 mg, 4 mg, Intravenous, Q6H PRN, Nahum Herzog MD  •  phenazopyridine (PYRIDIUM) tablet 200 mg, 200 mg, Oral, BID, [place], Kayden PARDO MD  •  potassium chloride (KLOR-CON) packet 20 mEq, 20 mEq, Per PEG Tube, Daily, Hugh Luu MD, 20  mEq at 11/01/20 0811  •  [COMPLETED] Insert peripheral IV, , , Once **AND** sodium chloride 0.9 % flush 10 mL, 10 mL, Intravenous, PRN, Nahum Herzog MD  •  sodium chloride 0.9 % flush 10 mL, 10 mL, Intravenous, Q12H, Nahum Herzog MD, 10 mL at 11/01/20 1055  •  sodium chloride 0.9 % flush 10 mL, 10 mL, Intravenous, PRN, Nahum Herzog MD     Diagnostic Data    Lab Results (last 24 hours)     Procedure Component Value Units Date/Time    Basic Metabolic Panel [[id number]]  (Abnormal) Collected: 11/02/20 0547    Specimen: Blood Updated: 11/02/20 0638     Glucose 149 mg/dL      BUN 37 mg/dL      Creatinine 1.62 mg/dL      Sodium 134 mmol/L      Potassium 3.7 mmol/L      Chloride 95 mmol/L      CO2 30.0 mmol/L      Calcium 8.8 mg/dL      eGFR Non African Amer 42 mL/min/1.73      BUN/Creatinine Ratio 22.8     Anion Gap 9.0 mmol/L     Narrative:      GFR Normal >60  Chronic Kidney Disease <60  Kidney Failure <15      POC Glucose Once [[id number]]  (Abnormal) Collected: 11/01/20 1957    Specimen: Blood Updated: 11/02/20 0116     Glucose 144 mg/dL      Comment: RN NotifiedOperator: [id number] GREG [facility] ID: [id number]       POC Glucose Once [[id number]]  (Abnormal) Collected: 11/01/20 1612    Specimen: Blood Updated: 11/01/20 1633     Glucose 140 mg/dL      Comment: RN NotifiedOperator: [id number] DILIA WANGITAMeter ID: [id number]       POC Glucose Once [[id number]]  (Normal) Collected: 11/01/20 1200    Specimen: Blood Updated: 11/01/20 1230     Glucose 128 mg/dL      Comment: Result Not ConfirmedOperator: [id number] DILIA ANGELITAMeter ID: [id number]       Extra Tubes [[id number]] Collected: 11/01/20 0818    Specimen: Blood, Venous Line Updated: 11/01/20 0930    Narrative:      The following orders were created for panel order Extra Tubes.  Procedure                               Abnormality         Status                     ---------                               -----------         ------                        Green Top (Gel)[[id number]]                                  Final result                 Please view results for these tests on the individual orders.    Green Top (Gel) [[id number]] Collected: 11/01/20 0818    Specimen: Blood Updated: 11/01/20 0930     Extra Tube Hold for add-ons.     Comment: Auto resulted.       CBC & Differential [[id number]]  (Abnormal) Collected: 11/01/20 0915    Specimen: Blood Updated: 11/01/20 0921    Narrative:      The following orders were created for panel order CBC & Differential.  Procedure                               Abnormality         Status                     ---------                               -----------         ------                     CBC Auto Differential[[id number]]        Abnormal            Final result                 Please view results for these tests on the individual orders.    CBC Auto Differential [[id number]]  (Abnormal) Collected: 11/01/20 0915    Specimen: Blood Updated: 11/01/20 0921     WBC 9.52 10*3/mm3      RBC 3.11 10*6/mm3      Hemoglobin 10.1 g/dL      Hematocrit 30.0 %      MCV 96.5 fL      MCH 32.5 pg      MCHC 33.7 g/dL      RDW 14.5 %      RDW-SD 50.4 fl      MPV 10.7 fL      Platelets 189 10*3/mm3      Neutrophil % 88.8 %      Lymphocyte % 6.3 %      Monocyte % 3.9 %      Eosinophil % 0.4 %      Basophil % 0.3 %      Immature Grans % 0.3 %      Neutrophils, Absolute 8.45 10*3/mm3      Lymphocytes, Absolute 0.60 10*3/mm3      Monocytes, Absolute 0.37 10*3/mm3      Eosinophils, Absolute 0.04 10*3/mm3      Basophils, Absolute 0.03 10*3/mm3      Immature Grans, Absolute 0.03 10*3/mm3      nRBC 0.0 /100 WBC     POC Glucose Once [[id number]]  (Normal) Collected: 11/01/20 0804    Specimen: Blood Updated: 11/01/20 0806     Glucose 111 mg/dL      Comment: RN NotifiedOperator: [id number] DILIA Gonzalez ID: [id number]               I reviewed the patient's new clinical results.    Assessment/Plan:     Active Hospital Problems    Diagnosis POA   •  "**C. difficile colitis [A04.72] Yes     - Confirmed antigen  -CT ordered of abdomen and pelvis 10/17/2020: Markedly large bowel with bowel wall thickening as read by Howard Douglas MD  - Vancomycin started 10/14/20  - Started Metronidazole IV 10/15/2020  -Decision to proceed with fecal transplant on 10/23/20, awaiting supplies to be delivered at this time  - WBC remains elevated at 13.6  -Dr. Wilson and Dr. Herzog following, have decided against surgery for the time being and plan was for fecal transplant on 10/23/2020. However this AM pt is denying any intervention and wishes to be left on his own \"to die.\"  Fecal transplant today.  -10/24/2020 No complications after fecal microbial transplant.  No pain in abdomen.  -10/24/2020 remains on clear liquid diet  -10/24/2020 Repeat faecal transplant planned for 25th/26th  -10/26/2020 subtotal colectomy, ileostomy, placement of gastrostomy and jejunostomy tubes  -10/27/2020 Doing well from a pain control standpoint. Continues to complain of some tenderness at incision site. G and J tube in place and receiving feedings     • Suicidal ideation [R45.851] Not Applicable     -Psych consulted: Dr. Pisano following  -States he will \"go home and shoot himself\" after discharge  -10/24/2020 no suicidal intention reported today, appears in a better mood post fecal transplant.  -10/24/2020 remains in close contact supervision with one-to-one sitter  -1 to 1 sitter has been removed. Appears in better mood     • YOBANY (acute kidney injury) (CMS/HCC) [N17.9] Yes     Recent Labs     10/30/20  0552 10/31/20  1102 11/01/20  0534   CREATININE 1.50* 1.70* 1.67*     -Currently receiving LR at 30 cc/hour     • Hemorrhoids [K64.9] Yes     10/17/2020 started hydrocortisone cream ending 10/19/2020  -Out-patient surgical consult required     • Severe malnutrition (CMS/HCC) [E43] Yes     -nutrition consult  -Started TPN 10/24/2020     • Hypokalemia [E87.6] No     K+ 3.1 on admission, 3.9 today  -Monitor " and replace as needed  -K+ 3.0 10/23/2020 IV-replacement protocol K+ ordered   -Mag 2.0 today     • Hyperlipidemia [E78.5] Yes     - Continue to monitor     • Hypertension [I10] Yes     - Continue hydralazine TID     • GERD (gastroesophageal reflux disease) [K21.9] Yes     - Continue Famotidine 20mg  IV daily     • History of TIA (transient ischemic attack) [Z86.73] Not Applicable     - Continue Plavix     • Stage 3a chronic kidney disease [N18.31] Yes     ,- GFR 46 on admission. Since this month has ranged from 40-51  - Currently receiving LR at 30 cc/hr  -Nephrology consulted, Dr Luu following         DVT prophylaxis: SCDs/TEDs  Code status is   Code Status and Medical Interventions:   Ordered at: 10/26/20 1633     Level Of Support Discussed With:    Patient     Code Status:    CPR     Medical Interventions (Level of Support Prior to Arrest):    Full       Plan for disposition:Where: home      Time: 15mins    [place] [address]  Office: [phone]      This document has been electronically signed by Sandor Martinez MD on November 2, 2020 07:55 CST

## 2020-11-02 NOTE — PLAN OF CARE
Problem: Adult Inpatient Plan of Care  Goal: Plan of Care Review  Outcome: Ongoing, Progressing  Flowsheets (Taken 11/2/2020 1501)  Progress: improving  Plan of Care Reviewed With:   caregiver   patient  Outcome Summary: Pt was receiving tube feeding @ 55cc/hr.  He has now been started on a regular diet and tube feeding to be changed to cyclic feedings from 6pm--6am.  Will add supplements. MOnitor     Problem: Skin Injury Risk Increased  Goal: Skin Health and Integrity  Intervention: Promote and Optimize Oral Intake  Flowsheets (Taken 11/2/2020 1501)  Oral Nutrition Promotion:   calorie-dense foods provided   calorie-dense liquids provided   nutritional therapy counseling provided

## 2020-11-02 NOTE — NURSING NOTE
"Pt is very withdrawn. He does not participate in his care, does not have any interest in learning how to care for his ostomy, will not attempt to sit up or stand, does not carry on conversation. Pt states he \"just doesn't care about much right now\" but denies suicidal ideation when asked.   "

## 2020-11-02 NOTE — CONSULTS
Adult Nutrition  Assessment    Patient Name:  Bar Crockett  YOB: 1950  MRN: [id number]  Admit Date:  10/14/2020    Assessment Date:  11/2/2020    Comments:   Pt currently on a regular diet.  He was receiving tube feeding at 55cc/hr.  He will now receive night time feedings at 65cc/hr at 6pm--6am..  He seemed indifferent to food and eating when I visited.  He does not have his dentures and per his wife he would not let her bring them.  RD will add Boost Breeze to all trays and magic cups in an effort to optimize po and protein intake.  Will also modify diet texture to soft with chopped meats.  He continues on Lasix due to volume overload.  YOBANY with elevated but stable Bun and Creat.  RD will continue to monitor.     Reason for Assessment     Row Name 11/02/20 1448          Reason for Assessment    Reason For Assessment  follow-up protocol         Nutrition/Diet History     Row Name 11/02/20 1442          Nutrition/Diet History    Typical Food/Fluid Intake  Pt with lunch tray in front of him.  His wife is present.  He told her not to bring his dentures so he couldn't eat the lunch he received.  He will drink the Boost Breeze if I send it.  He denies any Gi distress.  [facility] staff tube feeding to be decreased to night time feedings.           Labs/Tests/Procedures/Meds     Row Name 11/02/20 1443          Labs/Procedures/Meds    Lab Results Reviewed  reviewed, pertinent        Diagnostic Tests/Procedures    Diagnostic Test/Procedure Reviewed  reviewed, pertinent        Medications    Pertinent Medications Reviewed  reviewed, pertinent         Physical Findings     Row Name 11/02/20 1443          Physical Findings    Overall Physical Appearance  on oxygen therapy;loss of muscle mass;loss of subcutaneous fat     Gastrointestinal  ileostomy     Tubes  jejunostomy tube;gastrostomy tube           Nutrition Prescription Ordered     Row Name 11/02/20 1444          Nutrition Prescription PO    Current PO Diet   Regular     Fluid Consistency  Thin        Nutrition Prescription EN    Enteral Route  Jejunostomy     Product  Dk HN (Osmolite 1.2)     TF Delivery Method  Continuous     Continuous TF Goal Rate (mL/hr)  55 mL/hr     Continuous TF Current Rate (mL/hr)  55 mL/hr     Water flush (mL)   25 mL     Water Flush Frequency  Per hour         Evaluation of Received Nutrient/Fluid Intake     Row Name 11/02/20 1448          Calories Evaluation    Enteral Calories (kcal)  1584     % of Kcal Needs  83        Protein Evaluation    Enteral Protein (gm)  71     % of Protein Needs  85               Electronically signed by:  Keily Bauer RD  11/02/20 15:04 CST

## 2020-11-02 NOTE — SIGNIFICANT NOTE
Significant event  Spoke with nursing staff who reported that the patient has been refusing his heparin.  This provider spoke with patient at length this morning regarding the risks and dangers of such decision.  Patient voiced understanding.    Signed,   Michael Butler MD  Family Medicine Resident PGY3  [facility]        This document has been electronically signed by Michael Butler MD on November 2, 2020 07:29 CST

## 2020-11-02 NOTE — PLAN OF CARE
Problem: Adult Inpatient Plan of Care  Goal: Plan of Care Review  Outcome: Ongoing, Progressing  Flowsheets (Taken 11/2/2020 1011)  Plan of Care Reviewed With: patient  Outcome Summary: OT tx on this date.  Pt performed LB dressing to don socks with mod to max A.  He performed B UE therex with 3 lb tubing.  Pt sat EOB ~ 20 with LB dressing and therex.  Will cont to work with pt to increase independence with ADLs and functional mobility.

## 2020-11-02 NOTE — PLAN OF CARE
Problem: Adult Inpatient Plan of Care  Goal: Plan of Care Review  Outcome: Ongoing, Progressing  Flowsheets (Taken 11/2/2020 0429)  Progress: improving  Plan of Care Reviewed With: patient  Outcome Summary: vss. regular diet ordered for the day. tube feeding to be from 6p-6a. resting between care. will continue to monitor.   Goal Outcome Evaluation:  Plan of Care Reviewed With: patient  Progress: improving  Outcome Summary: vss. regular diet ordered for the day. tube feeding to be from 6p-6a. resting between care. will continue to monitor.

## 2020-11-02 NOTE — PLAN OF CARE
Goal Outcome Evaluation:  Plan of Care Reviewed With: patient  Progress: no change  Outcome Summary: vss, participates minimally in care, does not have interest in learning how to care for ostomy, verbalizes frustration with his health situation - questions encouraged and answered at this time, will monitor

## 2020-11-03 ENCOUNTER — HOSPITAL ENCOUNTER (OUTPATIENT)
Facility: HOSPITAL | Age: 70
Discharge: HOME | End: 2020-11-23
Attending: INTERNAL MEDICINE | Admitting: INTERNAL MEDICINE

## 2020-11-03 VITALS
OXYGEN SATURATION: 95 % | RESPIRATION RATE: 18 BRPM | DIASTOLIC BLOOD PRESSURE: 84 MMHG | HEIGHT: 66 IN | SYSTOLIC BLOOD PRESSURE: 139 MMHG | WEIGHT: 143.8 LBS | HEART RATE: 74 BPM | BODY MASS INDEX: 23.11 KG/M2 | TEMPERATURE: 96.9 F

## 2020-11-03 DIAGNOSIS — Z74.09 IMPAIRED MOBILITY AND ACTIVITIES OF DAILY LIVING: ICD-10-CM

## 2020-11-03 DIAGNOSIS — Z78.9 IMPAIRED MOBILITY AND ACTIVITIES OF DAILY LIVING: ICD-10-CM

## 2020-11-03 DIAGNOSIS — Z74.09 IMPAIRED FUNCTIONAL MOBILITY, BALANCE, GAIT, AND ENDURANCE: ICD-10-CM

## 2020-11-03 PROBLEM — E87.6 HYPOKALEMIA: Status: RESOLVED | Noted: 2020-10-15 | Resolved: 2020-11-03

## 2020-11-03 PROBLEM — K64.9 HEMORRHOIDS: Status: RESOLVED | Noted: 2020-10-17 | Resolved: 2020-11-03

## 2020-11-03 PROBLEM — R45.851 SUICIDAL IDEATION: Status: RESOLVED | Noted: 2020-10-23 | Resolved: 2020-11-03

## 2020-11-03 PROBLEM — A04.72 C. DIFFICILE COLITIS: Status: RESOLVED | Noted: 2020-10-14 | Resolved: 2020-11-03

## 2020-11-03 LAB
ANION GAP SERPL CALCULATED.3IONS-SCNC: 11 MMOL/L (ref 5–15)
BUN SERPL-MCNC: 43 MG/DL (ref 8–23)
BUN/CREAT SERPL: 30.3 (ref 7–25)
CALCIUM SPEC-SCNC: 8.8 MG/DL (ref 8.6–10.5)
CHLORIDE SERPL-SCNC: 97 MMOL/L (ref 98–107)
CO2 SERPL-SCNC: 29 MMOL/L (ref 22–29)
CREAT SERPL-MCNC: 1.42 MG/DL (ref 0.76–1.27)
GFR SERPL CREATININE-BSD FRML MDRD: 49 ML/MIN/1.73
GLUCOSE BLDC GLUCOMTR-MCNC: 108 MG/DL (ref 70–130)
GLUCOSE BLDC GLUCOMTR-MCNC: 108 MG/DL (ref 70–130)
GLUCOSE BLDC GLUCOMTR-MCNC: 125 MG/DL (ref 70–130)
GLUCOSE BLDC GLUCOMTR-MCNC: 130 MG/DL (ref 70–130)
GLUCOSE BLDC GLUCOMTR-MCNC: 146 MG/DL (ref 70–130)
GLUCOSE SERPL-MCNC: 122 MG/DL (ref 65–99)
POTASSIUM SERPL-SCNC: 3.9 MMOL/L (ref 3.5–5.2)
SODIUM SERPL-SCNC: 137 MMOL/L (ref 136–145)

## 2020-11-03 PROCEDURE — 25010000002 HEPARIN (PORCINE) PER 1000 UNITS: Performed by: SURGERY

## 2020-11-03 PROCEDURE — 97530 THERAPEUTIC ACTIVITIES: CPT

## 2020-11-03 PROCEDURE — 25010000002 HEPARIN (PORCINE) PER 1000 UNITS: Performed by: INTERNAL MEDICINE

## 2020-11-03 PROCEDURE — 97110 THERAPEUTIC EXERCISES: CPT

## 2020-11-03 PROCEDURE — 93005 ELECTROCARDIOGRAM TRACING: CPT | Performed by: INTERNAL MEDICINE

## 2020-11-03 PROCEDURE — 99239 HOSP IP/OBS DSCHRG MGMT >30: CPT | Performed by: STUDENT IN AN ORGANIZED HEALTH CARE EDUCATION/TRAINING PROGRAM

## 2020-11-03 PROCEDURE — 82962 GLUCOSE BLOOD TEST: CPT

## 2020-11-03 PROCEDURE — 94799 UNLISTED PULMONARY SVC/PX: CPT

## 2020-11-03 PROCEDURE — 80048 BASIC METABOLIC PNL TOTAL CA: CPT | Performed by: NURSE PRACTITIONER

## 2020-11-03 PROCEDURE — 93010 ELECTROCARDIOGRAM REPORT: CPT | Performed by: INTERNAL MEDICINE

## 2020-11-03 RX ORDER — HEPARIN SODIUM 5000 [USP'U]/ML
5000 INJECTION, SOLUTION INTRAVENOUS; SUBCUTANEOUS EVERY 8 HOURS SCHEDULED
Status: DISCONTINUED | OUTPATIENT
Start: 2020-11-03 | End: 2020-11-23 | Stop reason: HOSPADM

## 2020-11-03 RX ORDER — FAMOTIDINE 40 MG/5ML
40 POWDER, FOR SUSPENSION ORAL DAILY
Qty: 50 ML | Refills: 3 | Status: SHIPPED | OUTPATIENT
Start: 2020-11-04 | End: 2020-12-04

## 2020-11-03 RX ORDER — VENLAFAXINE HYDROCHLORIDE 75 MG/1
75 CAPSULE, EXTENDED RELEASE ORAL
Qty: 30 CAPSULE | Refills: 0 | Status: SHIPPED | OUTPATIENT
Start: 2020-11-04 | End: 2021-01-11

## 2020-11-03 RX ORDER — FUROSEMIDE 20 MG/1
20 TABLET ORAL DAILY
Status: DISCONTINUED | OUTPATIENT
Start: 2020-11-04 | End: 2020-11-03 | Stop reason: HOSPADM

## 2020-11-03 RX ORDER — FUROSEMIDE 20 MG/1
20 TABLET ORAL DAILY
Qty: 30 TABLET | Refills: 0 | Status: SHIPPED | OUTPATIENT
Start: 2020-11-04 | End: 2021-01-11

## 2020-11-03 RX ORDER — FUROSEMIDE 20 MG/1
20 TABLET ORAL
Status: DISCONTINUED | OUTPATIENT
Start: 2020-11-03 | End: 2020-11-23 | Stop reason: HOSPADM

## 2020-11-03 RX ORDER — DEXTROSE MONOHYDRATE 25 G/50ML
25 INJECTION, SOLUTION INTRAVENOUS
Status: DISCONTINUED | OUTPATIENT
Start: 2020-11-03 | End: 2020-11-23 | Stop reason: HOSPADM

## 2020-11-03 RX ORDER — ENALAPRILAT 2.5 MG/2ML
0.62 INJECTION INTRAVENOUS EVERY 6 HOURS PRN
Status: DISCONTINUED | OUTPATIENT
Start: 2020-11-03 | End: 2020-11-23 | Stop reason: HOSPADM

## 2020-11-03 RX ORDER — ONDANSETRON 2 MG/ML
4 INJECTION INTRAMUSCULAR; INTRAVENOUS EVERY 6 HOURS PRN
Status: DISCONTINUED | OUTPATIENT
Start: 2020-11-03 | End: 2020-11-23 | Stop reason: HOSPADM

## 2020-11-03 RX ORDER — ALBUTEROL SULFATE 2.5 MG/3ML
2.5 SOLUTION RESPIRATORY (INHALATION) EVERY 6 HOURS PRN
Status: DISCONTINUED | OUTPATIENT
Start: 2020-11-03 | End: 2020-11-03

## 2020-11-03 RX ORDER — POTASSIUM CHLORIDE 1.5 G/1.77G
20 POWDER, FOR SOLUTION ORAL DAILY
Status: DISCONTINUED | OUTPATIENT
Start: 2020-11-04 | End: 2020-11-12 | Stop reason: CLARIF

## 2020-11-03 RX ORDER — NICOTINE POLACRILEX 4 MG
15 LOZENGE BUCCAL
Status: DISCONTINUED | OUTPATIENT
Start: 2020-11-03 | End: 2020-11-23 | Stop reason: HOSPADM

## 2020-11-03 RX ORDER — POTASSIUM CHLORIDE 1.5 G/1.77G
20 POWDER, FOR SOLUTION ORAL DAILY
Qty: 30 PACKET | Refills: 0 | Status: SHIPPED | OUTPATIENT
Start: 2020-11-04 | End: 2020-12-04

## 2020-11-03 RX ORDER — L. ACIDOPHILUS/L.BULGARICUS 1MM CELL
1 TABLET ORAL 2 TIMES DAILY
Status: DISCONTINUED | OUTPATIENT
Start: 2020-11-03 | End: 2020-11-23 | Stop reason: HOSPADM

## 2020-11-03 RX ORDER — SODIUM CHLORIDE 0.9 % (FLUSH) 0.9 %
10 SYRINGE (ML) INJECTION EVERY 12 HOURS SCHEDULED
Status: DISCONTINUED | OUTPATIENT
Start: 2020-11-03 | End: 2020-11-23 | Stop reason: HOSPADM

## 2020-11-03 RX ORDER — PHENAZOPYRIDINE HYDROCHLORIDE 200 MG/1
200 TABLET, FILM COATED ORAL 2 TIMES DAILY
Status: DISCONTINUED | OUTPATIENT
Start: 2020-11-03 | End: 2020-11-22

## 2020-11-03 RX ORDER — PHENAZOPYRIDINE HYDROCHLORIDE 200 MG/1
200 TABLET, FILM COATED ORAL 2 TIMES DAILY
Qty: 14 TABLET | Refills: 0 | Status: SHIPPED | OUTPATIENT
Start: 2020-11-03 | End: 2020-11-10

## 2020-11-03 RX ORDER — FAMOTIDINE 40 MG/5ML
40 POWDER, FOR SUSPENSION ORAL DAILY
Status: DISCONTINUED | OUTPATIENT
Start: 2020-11-04 | End: 2020-11-12 | Stop reason: CLARIF

## 2020-11-03 RX ORDER — VENLAFAXINE HYDROCHLORIDE 75 MG/1
75 CAPSULE, EXTENDED RELEASE ORAL
Status: DISCONTINUED | OUTPATIENT
Start: 2020-11-04 | End: 2020-11-23 | Stop reason: HOSPADM

## 2020-11-03 RX ADMIN — VENLAFAXINE HYDROCHLORIDE 75 MG: 75 CAPSULE, EXTENDED RELEASE ORAL at 08:12

## 2020-11-03 RX ADMIN — POTASSIUM CHLORIDE 20 MEQ: 1.5 POWDER, FOR SOLUTION ORAL at 08:12

## 2020-11-03 RX ADMIN — ALBUTEROL SULFATE 2.5 MG: 2.5 SOLUTION RESPIRATORY (INHALATION) at 00:41

## 2020-11-03 RX ADMIN — SODIUM CHLORIDE, PRESERVATIVE FREE 10 ML: 5 INJECTION INTRAVENOUS at 08:12

## 2020-11-03 RX ADMIN — HEPARIN SODIUM 5000 UNITS: 5000 INJECTION INTRAVENOUS; SUBCUTANEOUS at 05:31

## 2020-11-03 RX ADMIN — FAMOTIDINE 40 MG: 40 POWDER, FOR SUSPENSION ORAL at 08:12

## 2020-11-03 RX ADMIN — HYDROCORTISONE: 1 CREAM TOPICAL at 08:12

## 2020-11-03 RX ADMIN — PHENAZOPYRIDINE 200 MG: 200 TABLET ORAL at 08:12

## 2020-11-03 RX ADMIN — FUROSEMIDE 20 MG: 20 TABLET ORAL at 08:12

## 2020-11-03 NOTE — PROGRESS NOTES
"[facility]  [address] [place] [address]. [address]  T - [phone]  F - [phone]     Progress Note          PATIENT  DEMOGRAPHICS   PATIENT NAME: Bar Crockett                      PHYSICIAN: Hugh Luu MD  : 1950  MRN: [id number]   LOS: 20 days    Patient Care Team:  Satnam Troncoso, ZACKARY as PCP - General (Emergency Medicine)  Subjective   SUBJECTIVE   Watching TV, not in marked pain       Objective   OBJECTIVE   Vital Signs  Temp:  [96.9 °F (36.1 °C)-99.1 °F (37.3 °C)] 96.9 °F (36.1 °C)  Heart Rate:  [74-98] 74  Resp:  [16-18] 18  BP: (119-142)/(71-84) 139/84    Flowsheet Rows      First Filed Value   Admission Height  167.6 cm (66\") Documented at 10/14/2020 1329   Admission Weight  55.9 kg (123 lb 4.8 oz) Documented at 10/14/2020 1329           I/O last 3 completed shifts:  In: 3303 [P.O.:120; Other:904; NG/GT:2279]  Out: 2300 [Urine:1575; Stool:725]    PHYSICAL EXAM    Physical Exam  Constitutional:       Appearance: He is well-developed. He is ill-appearing.   HENT:      Head: Normocephalic and atraumatic.   Eyes:      Conjunctiva/sclera: Conjunctivae normal.      Pupils: Pupils are equal, round, and reactive to light.   Neck:      Musculoskeletal: Neck supple.   Cardiovascular:      Rate and Rhythm: Normal rate and regular rhythm.   Pulmonary:      Effort: Pulmonary effort is normal.      Breath sounds: Normal breath sounds.   Abdominal:      Palpations: Abdomen is soft.   Musculoskeletal:      Right lower leg: Edema present.      Left lower leg: Edema present.   Skin:     General: Skin is warm and dry.   Neurological:      Mental Status: He is alert and oriented to person, place, and time.   Psychiatric:         Mood and Affect: Mood normal.         Behavior: Behavior normal.         RESULTS   Results Review:    Results from last 7 days   Lab Units 20  0612 20  0547 20  0534   SODIUM mmol/L 137 134* 137   POTASSIUM mmol/L 3.9 3.7 3.9 "   CHLORIDE mmol/L 97* 95* 95*   CO2 mmol/L 29.0 30.0* 31.0*   BUN mg/dL 43* 37* 33*   CREATININE mg/dL 1.42* 1.62* 1.67*   CALCIUM mg/dL 8.8 8.8 8.9   GLUCOSE mg/dL 122* 149* 115*       Estimated Creatinine Clearance: 43.4 mL/min (A) (by C-G formula based on SCr of 1.42 mg/dL (H)).    Results from last 7 days   Lab Units 10/30/20  0552 10/28/20  0528   MAGNESIUM mg/dL 2.0 2.0   PHOSPHORUS mg/dL 2.8 3.0             Results from last 7 days   Lab Units 11/01/20  0915 10/30/20  0552 10/28/20  0528   WBC 10*3/mm3 9.52 5.56 10.12   HEMOGLOBIN g/dL 10.1* 9.4* 10.7*   PLATELETS 10*3/mm3 189 148 96*               Imaging Results (Last 24 Hours)     ** No results found for the last 24 hours. **           MEDICATIONS    famotidine, 40 mg, Per J Tube, Daily  furosemide, 20 mg, Oral, BID  heparin (porcine), 5,000 Units, Subcutaneous, Q8H  insulin aspart, 0-9 Units, Subcutaneous, TID AC  phenazopyridine, 200 mg, Oral, BID  potassium chloride, 20 mEq, Per PEG Tube, Daily  sodium chloride, 10 mL, Intravenous, Q12H  venlafaxine XR, 75 mg, Oral, Daily With Breakfast           Assessment/Plan   ASSESSMENT / PLAN      C. difficile colitis    Hyperlipidemia    Hypertension    GERD (gastroesophageal reflux disease)    History of TIA (transient ischemic attack)    Stage 3a chronic kidney disease    Hypokalemia    Severe malnutrition (CMS/[facility])    Hemorrhoids    YOBANY (acute kidney injury) (CMS/[facility])    Suicidal ideation    Moderate episode of recurrent major depressive disorder (CMS/[facility])    1.  YOBANY on CKD 3- baseline creatinine around 1.3-1.4.  Creatinine was at baseline on current presentation, up to 2.1 peak. now 1.4-1.6 range. Off TF and TPN. No hydro on U/S, L atrophic kidney     -Acidosis stable, has fluid overload and was on lasix before surgery.Gonzalez negative. Now wt is 143 lbs and stable overall. Dunn per Urology    -lower lasix to 20mg po daily    Will sign off here and f/u in office in 2 weeks      2.  C. difficile/pancolitis- on  oral Vanco and IV Flagyl, plan per primary team, blood culture negative so far. Colonoscopy showed A diffuse pseudomembrane was found in the entire colon. Worse right than left. ??? better than on Friday but minimal.  s/p fecal microbiol transplant (bacteriotherapy). SUBTOTAL COLECTOMY,  ILEOSTOMY, PLACEMENT OF GASTROSTOMY AND JEJEUNOSTOMY TUBES   SUBTOTAL COLECTOMY,  ILEOSTOMY, PLACEMENT OF GASTROSTOMY AND JEJEUNOSTOMY TUBES on 10/26 .      3.  Malnutrition- on TF     4.  Metabolic acidosis- improved     5.  History of hyperlipidemia     6.  History of hypertension- now low - off hydralazine and aldactone    7. FEN- k mg and phos are all stable. Keep po kcl 20meq daily          This document has been electronically signed by Hugh Luu MD on November 3, 2020 12:10 CST

## 2020-11-03 NOTE — THERAPY TREATMENT NOTE
Acute Care - Physical Therapy Treatment Note  [facility]     Patient Name: Bar Crockett  : 1950  MRN: [id number]  Today's Date: 11/3/2020   Onset of Illness/Injury or Date of Surgery: 10/26/20       PT Assessment (last 12 hours)      PT Evaluation and Treatment     Row Name 20 0840          Physical Therapy Time and Intention    Subjective Information  no complaints  -TAYLOR     Document Type  therapy note (daily note)  -TAYLOR     Mode of Treatment  physical therapy;individual therapy  -TAYLOR     Patient Effort  adequate  -TAYLOR     Row Name 20 0840          General Information    Patient Profile Reviewed  yes  -TAYLOR     Existing Precautions/Restrictions  fall C diff  -     Row Name 20 0840          Cognition    Orientation Status (Cognition)  oriented x 4  -TAYLOR     Follows Commands (Cognition)  WFL  -TAYLOR     Cognitive Function (Cognitive)  WFL  -     Row Name 20 0840          Pain Scale: Numbers Pre/Post-Treatment    Pretreatment Pain Rating  0/10 - no pain  -TAYLOR     Posttreatment Pain Rating  0/10 - no pain  -TAYLOR     Row Name 20 0840          Bed Mobility    Bed Mobility  supine-sit;rolling right  -TAYLOR     Rolling Right [place] (Bed Mobility)  minimum assist (75% patient effort);verbal cues  -TAYLOR     Supine-Sit [place] (Bed Mobility)  minimum assist (75% patient effort)  -TAYLOR     Assistive Device (Bed Mobility)  bed rails;draw sheet;head of bed elevated  -TAYLOR     Comment (Bed Mobility)  Pt. sat EOB while CNA assisted with emptying illeostomy bag this a.m. prior to OOB transfer  -TAYLOR     Row Name 20 0840          Transfers    Bed-Chair [place] (Transfers)  minimum assist (75% patient effort)  -TAYLOR     Assistive Device (Bed-Chair Transfers)  other (see comments) no AD holding onto clinician  -TAYLOR     Sit-Stand [place] (Transfers)  minimum assist (75% patient effort)  -TAYLOR     Stand-Sit [place] (Transfers)  minimum assist (75% patient effort)  -TAYLOR     Row Name  11/03/20 0840          Sit-Stand Transfer    Assistive Device (Sit-Stand Transfers)  other (see comments) no AD  -JA     Row Name 11/03/20 0840          Stand-Sit Transfer    Assistive Device (Stand-Sit Transfers)  other (see comments) no AD  -JA     Row Name 11/03/20 0840          Hip (Therapeutic Exercise)    Hip AROM (Therapeutic Exercise)  aBduction;aDduction 2x10  -JA     Hip AAROM (Therapeutic Exercise)  sitting;bilateral;flexion 2x10  -JA     Row Name 11/03/20 0840          Knee (Therapeutic Exercise)    Knee AROM (Therapeutic Exercise)  bilateral;LAQ (long arc quad) 2x10  -JA     Row Name 11/03/20 0840          Ankle (Therapeutic Exercise)    Ankle AROM (Therapeutic Exercise)  bilateral;dorsiflexion;plantarflexion x20  -JA     Row Name             Wound 10/26/20 abdomen Incision    Wound - Properties Group Placement Date: 10/26/20  -TR Present on Hospital Admission: N  -TR Location: abdomen  -TR Primary Wound Type: Incision  -TR    Retired Wound - Properties Group Date first assessed: 10/26/20  -TR Present on Hospital Admission: N  -TR Location: abdomen  -TR Primary Wound Type: Incision  -TR    Row Name 11/03/20 0840          Vital Signs    Pre Systolic BP Rehab  127  -JA     Pre Treatment Diastolic BP  75  -JA     Post Systolic BP Rehab  135  -JA     Post Treatment Diastolic BP  80  -JA     Pretreatment Heart Rate (beats/min)  78  -JA     Posttreatment Heart Rate (beats/min)  90  -JA     Pre SpO2 (%)  93  -JA     O2 Delivery Pre Treatment  supplemental O2  -JA     Post SpO2 (%)  93  -JA     O2 Delivery Post Treatment  supplemental O2  -JA     Pre Patient Position  Supine  -JA     Intra Patient Position  Standing  -JA     Post Patient Position  Sitting  -JA     Row Name 11/03/20 0840          Bed Mobility Goal 1 (PT)    Activity/Assistive Device (Bed Mobility Goal 1, PT)  sit to supine/supine to sit  -JA     [place] Level/Cues Needed (Bed Mobility Goal 1, PT)  minimum assist (75% or more patient effort)   -     Time Frame (Bed Mobility Goal 1, PT)  long term goal (LTG);by discharge  -     Strategies/Barriers (Bed Mobility Goal 1, PT)  HOB flat, no bed rails.  -     Progress/Outcomes (Bed Mobility Goal 1, PT)  goal partially met  -     Row Name 11/03/20 0840          Transfer Goal 1 (PT)    Activity/Assistive Device (Transfer Goal 1, PT)  sit-to-stand/stand-to-sit;bed-to-chair/chair-to-bed;walker, rolling  -JA     [place] Level/Cues Needed (Transfer Goal 1, PT)  minimum assist (75% or more patient effort)  -     Time Frame (Transfer Goal 1, PT)  long term goal (LTG);by discharge  -     Progress/Outcome (Transfer Goal 1, PT)  goal partially met  -     Row Name 11/03/20 0840          Gait Training Goal 1 (PT)    Activity/Assistive Device (Gait Training Goal 1, PT)  walker, rolling  -JA     [place] Level (Gait Training Goal 1, PT)  minimum assist (75% or more patient effort)  -     Distance (Gait Training Goal 1, PT)  25' x 2 (while in isolation).  -     Time Frame (Gait Training Goal 1, PT)  long term goal (LTG);by discharge  -     Progress/Outcome (Gait Training Goal 1, PT)  goal not met  -     Row Name 11/03/20 0840          ROM Goal 1 (PT)    ROM Goal 1 (PT)  Score 25/28 on Tinetti fall risk.  -     Time Frame (ROM Goal 1, PT)  long-term goal (LTG);by discharge  -     Progress/Outcome (ROM Goal 1, PT)  goal not met  -     Row Name 11/03/20 0840          Positioning and Restraints    Pre-Treatment Position  in bed  -     Post Treatment Position  chair  -JA     In Chair  sitting;reclined;call light within reach;encouraged to call for assist;exit alarm on;notified nsg  -     Row Name 11/03/20 0840          Therapy Assessment/Plan (PT)    Rehab Potential (PT)  fair, will monitor progress closely  -     Row Name 11/03/20 0840          Progress Summary (PT)    Progress Toward Functional Goals (PT)  progress toward functional goals is fair  -       User Key  (r) = Recorded By,  (t) = Taken By, (c) = Cosigned By    Initials Name Provider Type    Atif Aparicio PTA Physical Therapy Assistant    Juju Paulino, RN Registered Nurse        Physical Therapy Education                 Title: PT OT SLP Therapies (In Progress)     Topic: Physical Therapy (In Progress)     Point: Mobility training (Done)     Learning Progress Summary           Patient Acceptance, E,TB, VU,NR by  at 10/30/2020 1121    Acceptance, E, VU,NR by  at 10/27/2020 1321    Comment: Educated on PT POC and goals.    Acceptance, E, VU,NR by  at 10/15/2020 1217    Comment: Educated on the benefits of participating with PT to speed recovery.                   Point: Home exercise program (Done)     Learning Progress Summary           Patient Acceptance, E,TB, VU,NR by  at 10/30/2020 1121                   Point: Body mechanics (Not Started)     Learner Progress:  Not documented in this visit.          Point: Precautions (Done)     Learning Progress Summary           Patient Acceptance, E,TB, VU,NR by  at 10/30/2020 1121                               User Key     Initials Effective Dates Name Provider Type Discipline     03/07/18 -  Courtney Rice PTA Physical Therapy Assistant PT     04/03/18 -  Ismael Morillo, PT Physical Therapist PT              PT Recommendation and Plan  Anticipated Discharge Disposition (PT): skilled nursing facility  Progress Summary (PT)  Progress Toward Functional Goals (PT): progress toward functional goals is fair  Plan of Care Reviewed With: patient  Progress: improving  Outcome Summary: Pt. with good effort with OOB transfer & therex this a.m. Pt. transferred sup-sit Ivanna, sat EOB SBA while nsg. assisted with emptying illeostomy bag prior to OOB transfer, sit-stand-sit Ivanna, bed-chair st. pivot Ivanna, pt. performed 2x10 reps A-AAROM seated therex this a.m. Cont. OOB-Gt as pt. able  Outcome Measures     Row Name 11/02/20 0854 11/01/20 1011 11/01/20 0814       How much help  from another person do you currently need...    Turning from your back to your side while in flat bed without using bedrails?  --  3  -TW  --    Moving from lying on back to sitting on the side of a flat bed without bedrails?  --  3  -TW  --    Moving to and from a bed to a chair (including a wheelchair)?  --  1  -TW  --    Standing up from a chair using your arms (e.g., wheelchair, bedside chair)?  --  1  -TW  --    Climbing 3-5 steps with a railing?  --  1  -TW  --    To walk in hospital room?  --  1  -TW  --    AM-PAC 6 Clicks Score (PT)  --  10  -TW  --       How much help from another is currently needed...    Putting on and taking off regular lower body clothing?  2  -RB  --  1  -BB    Bathing (including washing, rinsing, and drying)  2  -RB  --  2  -BB    Toileting (which includes using toilet bed pan or urinal)  2  -RB  --  2  -BB    Putting on and taking off regular upper body clothing  2  -RB  --  2  -BB    Taking care of personal grooming (such as brushing teeth)  3  -RB  --  3  -BB    Eating meals  3  -RB  --  3  -BB    AM-PAC 6 Clicks Score (OT)  14  -RB  --  13  -BB       Functional Assessment    Outcome Measure Options  AM-PAC 6 Clicks Daily Activity (OT)  -RB  --  --      User Key  (r) = Recorded By, (t) = Taken By, (c) = Cosigned By    Initials Name Provider Type    Michael Peters, OT Occupational Therapist    TW Jaiden Rondon, KACI Physical Therapy Assistant    BB Robyn Dumont, YAJAIRA/L Occupational Therapy Assistant           Time Calculation:   PT Charges     Row Name 11/03/20 1059             Time Calculation    Start Time  0840  -TAYLOR      Stop Time  0920  -TAYLOR      Time Calculation (min)  40 min  -TAYLOR         Time Calculation- PT    Total Timed Code Minutes- PT  40 minute(s)  -TAYLOR        User Key  (r) = Recorded By, (t) = Taken By, (c) = Cosigned By    Initials Name Provider Type    Atif Aparicio PTA Physical Therapy Assistant        Therapy Charges for Today     Code  Description Service Date Service Provider Modifiers Qty    32895300562 HC PT THERAPEUTIC ACT EA 15 MIN 11/3/2020 Atif Faye, KACI GP 1    14887979081 HC PT THER PROC EA 15 MIN 11/3/2020 Atif Faye, KACI GP 2          PT G-Codes  Outcome Measure Options: AM-PAC 6 Clicks Daily Activity (OT)  AM-PAC 6 Clicks Score (PT): 10  AM-PAC 6 Clicks Score (OT): 14    Atif Faye PTA  11/3/2020

## 2020-11-03 NOTE — THERAPY TREATMENT NOTE
Acute Care - Occupational Therapy Treatment Note  [facility]     Patient Name: Bar Crockett  : 1950  MRN: [id number]  Today's Date: 11/3/2020  Onset of Illness/Injury or Date of Surgery: 10/26/20  Date of Referral to OT: 10/26/20  Referring Physician: NELLY Blackman MD     Admit Date: 10/14/2020       ICD-10-CM ICD-9-CM   1. Colitis  K52.9 558.9   2. Sepsis without acute organ dysfunction, due to unspecified organism (CMS/HCC)  A41.9 038.9     995.91   3. Chronic kidney disease, unspecified CKD stage  N18.9 585.9   4. Impaired physical mobility  Z74.09 781.99   5. Impaired mobility and ADLs  Z74.09 V49.89    Z78.9    6. Pancolitis (CMS/HCC)  K51.00 556.6   7. Clostridium difficile infection  A49.8 041.84   8. C. difficile colitis  A04.72 008.45   9. Impaired mobility and activities of daily living  Z74.09 V49.89    Z78.9      Patient Active Problem List   Diagnosis   • Sigmoid diverticulitis   • Pancolitis (CMS/HCC)   • Hyperlipidemia   • Hypertension   • GERD (gastroesophageal reflux disease)   • History of TIA (transient ischemic attack)   • Stage 3a chronic kidney disease   • Sepsis without acute organ dysfunction (CMS/HCC)   • C. difficile colitis   • Hypokalemia   • Severe malnutrition (CMS/HCC)   • Hemorrhoids   • Edema of both ankles   • Tachycardia   • YOBANY (acute kidney injury) (CMS/HCC)   • Suicidal ideation   • Moderate episode of recurrent major depressive disorder (CMS/HCC)     Past Medical History:   Diagnosis Date   • Hyperlipidemia    • Hypertension    • Rectal abnormality     Rectum came out - er put back in.     • TIA (transient ischemic attack)      Past Surgical History:   Procedure Laterality Date   • COLONOSCOPY N/A 12/10/2018    Procedure: COLONOSCOPY;  Surgeon: Jay Wilson DO;  Location: [facility] ENDOSCOPY;  Service: Gastroenterology   • COLONOSCOPY Left 10/25/2020    Procedure: COLONOSCOPY WITH ENDOSCOPIC FECAL MICROBIOTA TRANSPLANT;  Surgeon: Jay Wilson DO;   Location: [facility] OR;  Service: Gastroenterology;  Laterality: Left;   • COLONOSCOPY N/A 10/23/2020    Procedure: COLONOSCOPY WITH ENDOSCOPIC FECAL MICROBIOTA TRANSPLANT;  Surgeon: Jay Wilson DO;  Location: [facility] ENDOSCOPY;  Service: Gastroenterology;  Laterality: N/A;   • OTHER SURGICAL HISTORY      Loop recorder          OT ASSESSMENT FLOWSHEET (last 12 hours)      OT Evaluation and Treatment     Row Name 11/03/20 1123 11/03/20 1056                OT Time and Intention    Subjective Information  --  complains of;fatigue  -AS       Document Type  --  therapy note (daily note)  -AS       Mode of Treatment  --  individual therapy;occupational therapy  -AS       Total Minutes, Occupational Therapy  --  14  -AS       Patient Effort  --  adequate  -AS          Pain Scale: Numbers Pre/Post-Treatment    Pretreatment Pain Rating  --  0/10 - no pain  -AS       Posttreatment Pain Rating  --  0/10 - no pain  -AS          Safety Issues, Functional Mobility    Impairments Affecting Function (Mobility)  --  balance;endurance/activity tolerance  -AS          Motor Skills    Therapeutic Exercise  --  shoulder;elbow/forearm  -AS          Shoulder (Therapeutic Exercise)    Shoulder (Therapeutic Exercise)  --  strengthening exercise  -AS       Shoulder Strengthening (Therapeutic Exercise)  --  bilateral;flexion;supine;3 lb free weight;10 repetitions forward and backward rowing; required min A for rowing  -AS          Elbow/Forearm (Therapeutic Exercise)    Elbow/Forearm (Therapeutic Exercise)  --  strengthening exercise  -AS       Elbow/Forearm Strengthening (Therapeutic Exercise)  --  bilateral;flexion;extension;3 lb free weight;10 repetitions 3# dowel kayla  -AS          Wound 10/26/20 abdomen Incision    Wound - Properties Group Placement Date: 10/26/20  -TR Present on Hospital Admission: N  -TR Location: abdomen  -TR Primary Wound Type: Incision  -TR    Retired Wound - Properties Group Date first assessed: 10/26/20  -TR  Present on Hospital Admission: N  -TR Location: abdomen  -TR Primary Wound Type: Incision  -TR       Plan of Care Review    Outcome Summary  OT tx completed. Pt agreeable to in bed exercises only. Pt tolerated 1 set 10 reps of bilat SF, forward/backward rowing, and elbow flex/ext using 3# dowel kayla. Pt required min A to complete rowing exercises. Pt fatigued after one set and unable to perform additional sets. Cont OT POC to progress towards goals.  -AS  --          Progress Summary (OT)    Progress Toward Functional Goals (OT)  --  progress toward functional goals as expected  -AS       Daily Progress Summary (OT)  --  Cont OT POC  -AS         User Key  (r) = Recorded By, (t) = Taken By, (c) = Cosigned By    Initials Name Effective Dates    AS Kacy Rogers OT 07/05/20 -     TR Juju Turner RN 10/17/16 -          Occupational Therapy Education                 Title: PT OT SLP Therapies (In Progress)     Topic: Occupational Therapy (In Progress)     Point: ADL training (In Progress)     Description:   Instruct learner(s) on proper safety adaptation and remediation techniques during self care or transfers.   Instruct in proper use of assistive devices.              Learning Progress Summary           Patient Acceptance, E, NR by RC at 10/30/2020 1129                   Point: Home exercise program (In Progress)     Description:   Instruct learner(s) on appropriate technique for monitoring, assisting and/or progressing therapeutic exercises/activities.              Learning Progress Summary           Patient Acceptance, E, NR by AS at 11/3/2020 1123    Comment: UE exercises                   Point: Precautions (Done)     Description:   Instruct learner(s) on prescribed precautions during self-care and functional transfers.              Learning Progress Summary           Patient Acceptance, D, VU,NR by RB at 11/2/2020 1010    Comment: Use on non skid socks when OOB.    Acceptance, E, NR by RC at 10/30/2020 1129     Nonacceptance, E, NR,NL,RT by ME at 10/15/2020 1314    Comment: Educated on OT and POC. Educated to call for assistance. Educated on safety precautions. Educated on importance of working with therapy.                   Point: Body mechanics (In Progress)     Description:   Instruct learner(s) on proper positioning and spine alignment during self-care, functional mobility activities and/or exercises.              Learning Progress Summary           Patient Acceptance, E, NR by  at 10/30/2020 1129                               User Key     Initials Effective Dates Name Provider Type Discipline     07/24/19 -  Michael Blakely, OT Occupational Therapist OT     03/07/18 -  Jessica Stephens COTA/L Occupational Therapy Assistant OT    AS 07/05/20 -  Kacy Rogers OT Occupational Therapist OT    ME 08/24/20 -  Ragini Proctor OTR/L Occupational Therapist OT                  OT Recommendation and Plan     Progress Toward Functional Goals (OT): progress toward functional goals as expected  Plan of Care Review  Plan of Care Reviewed With: patient  Outcome Summary: OT tx completed. Pt agreeable to in bed exercises only. Pt tolerated 1 set 10 reps of bilat SF, forward/backward rowing, and elbow flex/ext using 3# dowel kayla. Pt required min A to complete rowing exercises. Pt fatigued after one set and unable to perform additional sets. Cont OT POC to progress towards goals.  Plan of Care Reviewed With: patient  Outcome Summary: OT tx completed. Pt agreeable to in bed exercises only. Pt tolerated 1 set 10 reps of bilat SF, forward/backward rowing, and elbow flex/ext using 3# dowel kayla. Pt required min A to complete rowing exercises. Pt fatigued after one set and unable to perform additional sets. Cont OT POC to progress towards goals.    Outcome Measures     Row Name 11/03/20 1056 11/02/20 0854 11/01/20 1011       How much help from another person do you currently need...    Turning from your back to your side while in  flat bed without using bedrails?  --  --  3  -TW    Moving from lying on back to sitting on the side of a flat bed without bedrails?  --  --  3  -TW    Moving to and from a bed to a chair (including a wheelchair)?  --  --  1  -TW    Standing up from a chair using your arms (e.g., wheelchair, bedside chair)?  --  --  1  -TW    Climbing 3-5 steps with a railing?  --  --  1  -TW    To walk in hospital room?  --  --  1  -TW    AM-PAC 6 Clicks Score (PT)  --  --  10  -TW       How much help from another is currently needed...    Putting on and taking off regular lower body clothing?  2  -AS  2  -RB  --    Bathing (including washing, rinsing, and drying)  2  -AS  2  -RB  --    Toileting (which includes using toilet bed pan or urinal)  2  -AS  2  -RB  --    Putting on and taking off regular upper body clothing  2  -AS  2  -RB  --    Taking care of personal grooming (such as brushing teeth)  3  -AS  3  -RB  --    Eating meals  3  -AS  3  -RB  --    AM-PAC 6 Clicks Score (OT)  14  -AS  14  -RB  --       Functional Assessment    Outcome Measure Options  --  AM-PAC 6 Clicks Daily Activity (OT)  -RB  --    Row Name 11/01/20 0814             How much help from another is currently needed...    Putting on and taking off regular lower body clothing?  1  -BB      Bathing (including washing, rinsing, and drying)  2  -BB      Toileting (which includes using toilet bed pan or urinal)  2  -BB      Putting on and taking off regular upper body clothing  2  -BB      Taking care of personal grooming (such as brushing teeth)  3  -BB      Eating meals  3  -BB      AM-PAC 6 Clicks Score (OT)  13  -BB        User Key  (r) = Recorded By, (t) = Taken By, (c) = Cosigned By    Initials Name Provider Type    RB Michael Blakely, OT Occupational Therapist    TW Jaiden Rondon, PTA Physical Therapy Assistant    BB Robyn Dumont COTA/L Occupational Therapy Assistant    AS Kacy Rogers, OT Occupational Therapist          Time Calculation:    Time Calculation- OT     Row Name 11/03/20 1127             Time Calculation- OT    OT Start Time  1056  -AS      OT Stop Time  1110  -AS      OT Time Calculation (min)  14 min  -AS      OT Received On  11/03/20  -AS        User Key  (r) = Recorded By, (t) = Taken By, (c) = Cosigned By    Initials Name Provider Type    AS Kacy Rogers OT Occupational Therapist        Therapy Charges for Today     Code Description Service Date Service Provider Modifiers Qty    44665827418  OT THER PROC EA 15 MIN 11/3/2020 Kacy Rogers OT GO 1               Kacy Rogers OT  11/3/2020

## 2020-11-03 NOTE — MEDICAL STUDENT
"GENERAL SURGERY PROGRESS NOTE     LOS: 20 days     Chief Complaint:     C. Difficile colitis s/p total colectomy 10/26/2020    Interval History:     Overnight Mr. Crockett did well per RN, did experience one episode of a \"large\" bilious emesis, for which he received IV Zofran. Did not ask for any pain control.       Medication Review:   albuterol, 2.5 mg, Nebulization, Q6H - RT  famotidine, 40 mg, Per J Tube, Daily  furosemide, 20 mg, Oral, BID  heparin (porcine), 5,000 Units, Subcutaneous, Q8H  insulin aspart, 0-9 Units, Subcutaneous, TID AC  phenazopyridine, 200 mg, Oral, BID  potassium chloride, 20 mEq, Per PEG Tube, Daily  sodium chloride, 10 mL, Intravenous, Q12H  venlafaxine XR, 75 mg, Oral, Daily With Breakfast         Objective     Vital Signs:  Temp:  [98.2 °F (36.8 °C)-99.1 °F (37.3 °C)] 98.6 °F (37 °C)  Heart Rate:  [] 91  Resp:  [16-18] 18  BP: (119-142)/(71-83) 142/76    Intake/Output Summary (Last 24 hours) at 11/3/2020 0530  Last data filed at 11/3/2020 0440  Gross per 24 hour   Intake 785 ml   Output 1350 ml   Net -565 ml       Physical Exam    Results Review:    Results from last 7 days   Lab Units 11/02/20  0547 11/01/20  0534 10/31/20  1102   SODIUM mmol/L 134* 137 138   POTASSIUM mmol/L 3.7 3.9 4.0   CHLORIDE mmol/L 95* 95* 99   CO2 mmol/L 30.0* 31.0* 30.0*   BUN mg/dL 37* 33* 33*   CREATININE mg/dL 1.62* 1.67* 1.70*   GLUCOSE mg/dL 149* 115* 129*   CALCIUM mg/dL 8.8 8.9 8.8     Results from last 7 days   Lab Units 11/01/20  0915 10/30/20  0552 10/28/20  0528   WBC 10*3/mm3 9.52 5.56 10.12   HEMOGLOBIN g/dL 10.1* 9.4* 10.7*   HEMATOCRIT % 30.0* 28.5* 32.4*   PLATELETS 10*3/mm3 189 148 96*       Assessment:    C. difficile colitis    Hyperlipidemia    Hypertension    GERD (gastroesophageal reflux disease)    History of TIA (transient ischemic attack)    Stage 3a chronic kidney disease    Hypokalemia    Severe malnutrition (CMS/HCC)    Hemorrhoids    YOBANY (acute kidney injury) (CMS/HCC)    " Suicidal ideation    Moderate episode of recurrent major depressive disorder (CMS/HCC)      Mr. Crockett is 71 y/o man on hospital day 20 for C. Difficile colitis, s/p total colectomy on 10/26/20. He is stable and continuing to improve post surgery. Pt given diet but unable to eat due to lack of teeth per RD Note, adding magic cups and boost to supplement nutrition. Pt. Has an YOBANY secondary to CKD, Sodium is low and has been downtrending,Bun/Creatinine stable.      Plan:    1. Advance diet to mechanical soft foods  2. YOBANY: Watch electrolytes, start NS if sodium reaches critical level or symptomatic.       This document has been electronically signed by Josh Luz, Medical Student on November 3, 2020 05:30 CST

## 2020-11-03 NOTE — DISCHARGE SUMMARY
DISCHARGE SUMMARY    PATIENT NAME: Bar Crockett       PHYSICIAN: Sadnor Martinez MD  : 1950  MRN: [id number]    ADMITTED: 10/14/2020     DISCHARGED: 11/3/2020    ADMISSION DIAGNOSES:  Active Hospital Problems    Diagnosis  POA   • Moderate episode of recurrent major depressive disorder (CMS/HCC) [F33.1]  Yes   • YOBANY (acute kidney injury) (CMS/HCC) [N17.9]  Yes   • Severe malnutrition (CMS/HCC) [E43]  Yes   • Stage 3a chronic kidney disease [N18.31]  Yes      Resolved Hospital Problems    Diagnosis Date Resolved POA   • **C. difficile colitis [A04.72] 2020 Yes   • Suicidal ideation [R45.851] 2020 Not Applicable   • Hemorrhoids [K64.9] 2020 Yes   • Hypokalemia [E87.6] 2020 No   • Colitis [K52.9] 10/26/2020 Yes   • Hyperlipidemia [E78.5] 2020 Yes   • Hypertension [I10] 2020 Yes   • GERD (gastroesophageal reflux disease) [K21.9] 2020 Yes   • History of TIA (transient ischemic attack) [Z86.73] 2020 Not Applicable     DISCHARGE DIAGNOSES:   Active Hospital Problems    Diagnosis  POA   • Moderate episode of recurrent major depressive disorder (CMS/HCC) [F33.1]  Yes   • YOBANY (acute kidney injury) (CMS/HCC) [N17.9]  Yes   • Severe malnutrition (CMS/HCC) [E43]  Yes   • Stage 3a chronic kidney disease [N18.31]  Yes      Resolved Hospital Problems    Diagnosis Date Resolved POA   • **C. difficile colitis [A04.72] 2020 Yes   • Suicidal ideation [R45.851] 2020 Not Applicable   • Hemorrhoids [K64.9] 2020 Yes   • Hypokalemia [E87.6] 2020 No   • Colitis [K52.9] 10/26/2020 Yes   • Hyperlipidemia [E78.5] 2020 Yes   • Hypertension [I10] 2020 Yes   • GERD (gastroesophageal reflux disease) [K21.9] 2020 Yes   • History of TIA (transient ischemic attack) [Z86.73] 2020 Not Applicable       SERVICE: Family Medicine Residency  Attending: Isiah Thomas MD  Resident: Sandor Martinez MD    CONSULTS:   Consult Orders (all) (From  admission, onward)     Start     Ordered    11/02/20 0758  Inpatient Case Management  Consult  Once     Comments: Please look for outside SNF   Provider:  (Not yet assigned)    11/02/20 0757    10/27/20 0743  Inpatient Consult to Nutrition Services  Once     Provider:  (Not yet assigned)    10/27/20 0744    10/24/20 1235  Inpatient Consult to Nutrition Services  Once     Provider:  (Not yet assigned)    10/24/20 1239    10/23/20 0818  Inpatient Psychiatrist Consult  Once     Specialty:  Psychiatry  Provider:  Bhargav Pisano MD    10/23/20 0818    10/20/20 1630  Inpatient Urology Consult  Once     Specialty:  Urology  Provider:  Kayden Mathew MD    10/20/20 1629    10/20/20 0702  Inpatient General Surgery Consult  IN AM     Specialty:  General Surgery  Provider:  Nahum Herzog MD    10/19/20 1741    10/17/20 0853  Inpatient Nephrology Consult  Once     Specialty:  Nephrology  Provider:  Corina Escobedo MD    10/17/20 0852    10/14/20 1621  Family Practice - Resident (on-call MD unless specified)  Once,   Status:  Canceled     Specialty:  Family Medicine  Provider:  (Not yet assigned)    10/14/20 1620    10/14/20 1621  Gastroenterology (on-call MD unless specified)  Once     Specialty:  Gastroenterology  Provider:  (Not yet assigned)    10/14/20 1620                PROCEDURES:   10/23/2020, 10/24/2020, 10/25/20 COLONOSCOPY WITH ENDOSCOPIC FECAL MICROBIOTA TRANSPLANT (Left )  10/26/2020 SUBTOTAL COLECTOMY,  ILEOSTOMY, PLACEMENT OF GASTROSTOMY AND JEJEUNOSTOMY TUBES      HISTORY OF PRESENT ILLNESS:   Adapted from the note of Dr. Suman Blackman 10/14/2020:  Bar Crockett is a 70 y.o. male with a CMH of hypertension, hyperlipidemia, several TIAs, GERD, and stage III CKD who presents complaining of 3 weeks of semisolid diarrhea.  The patient has noted blood on tissue when cleaning himself which he states is from a blister in his colon, and hemorrhoids which she developed within the last 3  weeks.  He states today he had 20 watery bowel movements.  The reason he decided to come to the hospital today after 3 weeks of diarrhea was because he had one episode of emesis which is unknown if it was bloody. He denies any exacerbating or relieving factors for the vomiting or diarrhea.  He had a fever of 100.6 Fahrenheit 4 days prior to admission.  He was admitted to the hospital at the beginning of October for diverticulitis and was treated and discharged.  He has associated left lower quadrant pressure which is intermittent, nonradiating, has no exacerbating or relieving factors.     DIAGNOSTIC DATA:   Lab Results (last 24 hours)     Procedure Component Value Units Date/Time    POC Glucose Once [[id number]]  (Normal) Collected: 11/03/20 1118    Specimen: Blood Updated: 11/03/20 1135     Glucose 130 mg/dL      Comment: RN NotifiedOperator: [id number] [employer] Data Softwareer ID: [id number]       POC Glucose Once [[id number]]  (Normal) Collected: 11/03/20 0720    Specimen: Blood Updated: 11/03/20 0755     Glucose 108 mg/dL      Comment: : [id number] DoNanzaMeter ID: [id number]       Basic Metabolic Panel [[id number]]  (Abnormal) Collected: 11/03/20 0612    Specimen: Blood Updated: 11/03/20 0650     Glucose 122 mg/dL      BUN 43 mg/dL      Creatinine 1.42 mg/dL      Sodium 137 mmol/L      Potassium 3.9 mmol/L      Chloride 97 mmol/L      CO2 29.0 mmol/L      Calcium 8.8 mg/dL      eGFR Non African Amer 49 mL/min/1.73      BUN/Creatinine Ratio 30.3     Anion Gap 11.0 mmol/L     Narrative:      GFR Normal >60  Chronic Kidney Disease <60  Kidney Failure <15      POC Glucose Once [[id number]]  (Abnormal) Collected: 11/02/20 2008    Specimen: Blood Updated: 11/03/20 0134     Glucose 146 mg/dL      Comment: RN NotifiedOperator: [id number] MARYJANE Tessie ID: [id number]       POC Glucose Once [[id number]]  (Normal) Collected: 11/02/20 1643    Specimen: Blood Updated: 11/02/20 1713     Glucose 123 mg/dL       Comment: RN NotifiedOperator: [id number] SUDHA Flores ID: [id number]           Imaging Results (Last 24 Hours)     ** No results found for the last 24 hours. **             HOSPITAL COURSE:  10/14/2020 Bar Crockett is a 70 y.o. male with a CMH of hypertension, hyperlipidemia, several TIAs, GERD, and stage III CKD who presented to the ED complaining of 3 weeks of semisolid diarrhea.  Diagnosed with C. difficile treated with a course of vancomycin and metronidazole, patient had no resolution from this treatment protocol.  Diagnosed with pancolitis.  Subsequently Mr. Crockett had three fecal transfer operations by Dr. Jones with no benefit or resolution of his symptoms.  Following this on the 10/26/2020 he had a SUBTOTAL COLECTOMY,  ILEOSTOMY, PLACEMENT OF GASTROSTOMY AND JEJEUNOSTOMY TUBES completed by Dr. Herzog. During Mr. Crockett's hospital stay he input from PT, OT dietitian due to malnutrion, nephrology (CKD),  gastroenterology and psychiatry.  Over the course of Mr. Crockett's hospital stay he had periods of undulating mood swings in relation to his lack of progress and frustration due to his condition and he had a period of one to one contact pre-opertaively. Post surgically Mr Crockett appeared in a brighter mood and was compliant with medical consultations and other healthcare professionals.  At this point 11/3/2020 in time Mr. Crockett is happy for discharge to LTAC and with his pathway of care.    DISCHARGE CONDITION:   Stable    DISPOSITION:  Long Term Care ([facility])Stable     DISCHARGE MEDICATIONS     Discharge Medications      ASK your doctor about these medications      Instructions Start Date   ALIGN PREBIOTIC-PROBIOTIC PO   Oral      clopidogrel 75 MG tablet  Commonly known as: PLAVIX   75 mg, Oral, Daily      dicyclomine 20 MG tablet  Commonly known as: BENTYL   20 mg, Oral, 3 Times Daily      famotidine 40 MG tablet  Commonly known as: PEPCID   40 mg, Oral, Daily      hydrALAZINE  25 MG tablet  Commonly known as: APRESOLINE   25 mg, Oral, 3 Times Daily      tamsulosin 0.4 MG capsule 24 hr capsule  Commonly known as: FLOMAX   1 capsule, Oral, Nightly      vitamin B-12 500 MCG tablet  Commonly known as: CYANOCOBALAMIN   500 mcg, Oral, Daily      vitamin C 500 MG tablet  Commonly known as: ASCORBIC ACID   500 mg, Oral, Daily      vitamin D 1.25 MG (75751 UT) capsule capsule  Commonly known as: ERGOCALCIFEROL   50,000 Units, Oral, Weekly             INSTRUCTIONS:  Activity: PT OT continue input  Diet: As per surgical protocol and dietitians input    FOLLOW UP:    Contact information for follow-up providers     Satnam Troncoso APRN Follow up.    Specialties: Nurse Practitioner, Emergency Medicine  Contact information:  [address] [place] [address] [phone]             Sae Liu APRN Follow up in 4 week(s).    Specialty: Nurse Practitioner  Contact information:  [address] [id number] [phone]                   Contact information for after-discharge care     Destination     [address] .    Service: Long Term Acute Care  Contact information:  [address] [place] [address]  [phone]                             PENDING TEST RESULTS AT DISCHARGE      Time: >30 minutes was spent in discharge planning, medication reconciliation and coordination of care for this patient.    Isiah Thomas MD is the attending at time of discharge, He is aware of the patient's status and agrees with the above discharge summary.  Signature  Sandor Martinez MD  [facility]  [address], [place] [address]  Office: [phone]      This document has been electronically signed by Sandor Martinez MD on November 3, 2020 14:11 CST

## 2020-11-03 NOTE — PLAN OF CARE
Problem: Adult Inpatient Plan of Care  Goal: Plan of Care Review  Flowsheets (Taken 11/3/2020 1123)  Plan of Care Reviewed With: patient  Outcome Summary: OT tx completed. Pt agreeable to in bed exercises only. Pt tolerated 1 set 10 reps of bilat SF, forward/backward rowing, and elbow flex/ext using 3# dowel kayla. Pt required min A to complete rowing exercises. Pt fatigued after one set and unable to perform additional sets. Cont OT POC to progress towards goals.

## 2020-11-03 NOTE — PROGRESS NOTES
"    FAMILY MEDICINE DAILY PROGRESS NOTE    NAME: Bar Crockett  : 1950  MRN: [id number]      LOS: 20 days     PROVIDER OF SERVICE: Sandor Martinez MD    Chief Complaint: C. difficile colitis    Subjective:     Interval History:  History taken from: patient  Mr Crockett VSS. He did experience one episode of a \"large\" bilious emesis, for which he received IV Zofran. Mr. Crockett reports that his pain is well controlled, denies fevers, nausea, vomiting, angina, or dyspnea. Liquid brown/green stool drains into his stoma bag. Mr Crockett was in a bright mood on discussion with me this AM. On consultation with Respiratory therapy in agreement  SPO2 100%, clear lung fields, changed albuterol nebulizer to every 6 hours as needed.  PT/OT continue to help Mr. Crockett progress.    Review of Systems:   Review of Systems   Constitutional: Negative for activity change, appetite change, chills, diaphoresis, fatigue and fever.   HENT: Negative for congestion, dental problem, ear discharge, ear pain, hearing loss, mouth sores, nosebleeds, postnasal drip, sinus pain, sore throat, tinnitus, trouble swallowing and voice change.    Eyes: Negative for photophobia, pain, discharge and itching.   Respiratory: Negative for cough, choking, chest tightness, shortness of breath and wheezing.    Cardiovascular: Negative for chest pain, palpitations and leg swelling.   Gastrointestinal: Negative for abdominal distention, abdominal pain, blood in stool, constipation, diarrhea, nausea and vomiting.   Endocrine: Negative for cold intolerance and heat intolerance.   Genitourinary: Negative for difficulty urinating, dysuria, flank pain and hematuria.   Musculoskeletal: Negative for back pain, joint swelling and neck pain.   Skin: Negative for color change and rash.   Neurological: Negative for dizziness, tremors, seizures, weakness, light-headedness, numbness and headaches.   Hematological: Negative for adenopathy.   Psychiatric/Behavioral: " Negative for behavioral problems, confusion, hallucinations, self-injury and sleep disturbance.       Objective:     Vital Signs  Temp:  [98.2 °F (36.8 °C)-99.1 °F (37.3 °C)] 98.6 °F (37 °C)  Heart Rate:  [] 83  Resp:  [16-18] 16  BP: (119-142)/(71-83) 142/76  Body mass index is 23.21 kg/m².    Physical Exam  Physical Exam  Vitals signs and nursing note reviewed.   Constitutional:       Appearance: Normal appearance. He is not ill-appearing or diaphoretic.   HENT:      Head: Normocephalic and atraumatic.      Right Ear: External ear normal.      Left Ear: External ear normal.      Nose: Nose normal. No rhinorrhea.      Mouth/Throat:      Mouth: Mucous membranes are moist.      Pharynx: No posterior oropharyngeal erythema.   Eyes:      General: No scleral icterus.        Right eye: No discharge.         Left eye: No discharge.      Extraocular Movements: Extraocular movements intact.   Neck:      Musculoskeletal: No muscular tenderness.      Vascular: No carotid bruit.   Cardiovascular:      Rate and Rhythm: Normal rate and regular rhythm.      Pulses: Normal pulses.      Heart sounds: Normal heart sounds. No gallop.    Pulmonary:      Effort: Pulmonary effort is normal.      Breath sounds: Normal breath sounds. No wheezing.   Chest:      Chest wall: No tenderness.   Abdominal:      General: Bowel sounds are normal.      Palpations: Abdomen is soft.      Tenderness: There is no rebound.      Comments: Stoma-CDI  Brown green liquid//stoma bag   Musculoskeletal:         General: No swelling.      Right lower leg: No edema.      Left lower leg: No edema.   Lymphadenopathy:      Cervical: No cervical adenopathy.   Skin:     General: Skin is warm and dry.      Capillary Refill: Capillary refill takes 2 to 3 seconds.      Findings: No erythema or rash.   Neurological:      General: No focal deficit present.      Mental Status: He is alert and oriented to person, place, and time.      Motor: No weakness.      Psychiatric:         Mood and Affect: Mood normal.         Behavior: Behavior normal.         Thought Content: Thought content normal.         Judgment: Judgment normal.         Medication Review    Current Facility-Administered Medications:   •  albuterol (PROVENTIL) nebulizer solution 0.083% 2.5 mg/3mL, 2.5 mg, Nebulization, Q6H - RT, Nahum Herzog MD, 2.5 mg at 11/03/20 0041  •  dextrose (D50W) 25 g/ 50mL Intravenous Solution 25 g, 25 g, Intravenous, Q15 Min PRN, Nahum Herzog MD  •  dextrose (GLUTOSE) oral gel 15 g, 15 g, Oral, Q15 Min PRN, Nahum Herzog MD  •  enalaprilat (VASOTEC) injection 0.625 mg, 0.625 mg, Intravenous, Q6H PRN, Nahum Herzog MD  •  famotidine (PEPCID) 40 MG/5ML suspension 40 mg, 40 mg, Per J Tube, Daily, Nahum Herzog MD, 40 mg at 11/02/20 0847  •  furosemide (LASIX) tablet 20 mg, 20 mg, Oral, BID, Hugh Luu MD, 20 mg at 11/02/20 1723  •  glucagon (human recombinant) (GLUCAGEN DIAGNOSTIC) injection 1 mg, 1 mg, Subcutaneous, Q15 Min PRN, Nahum Herzog MD  •  heparin (porcine) 5000 UNIT/ML injection 5,000 Units, 5,000 Units, Subcutaneous, Q8H, Nahum Herzog MD, 5,000 Units at 11/03/20 0531  •  HYDROcodone-acetaminophen (HYCET) 7.5-325 MG/15ML solution 10 mL, 10 mL, Per J Tube, Q4H PRN, Nahum Herzog MD, 10 mL at 11/01/20 0811  •  HYDROmorphone (DILAUDID) injection 1 mg, 1 mg, Intravenous, Q3H PRN, 1 mg at 10/27/20 2120 **AND** naloxone (NARCAN) injection 0.4 mg, 0.4 mg, Intravenous, Q5 Min PRN, Nahum Herzog MD  •  insulin aspart (novoLOG) injection 0-9 Units, 0-9 Units, Subcutaneous, TID AC, Nahum Herzog MD  •  magic butt ointment, , Topical, PRN, Saurabh Beverly, ZACKARY  •  ondansetron (ZOFRAN) injection 4 mg, 4 mg, Intravenous, Q6H PRN, Nahum Herzog MD, 4 mg at 11/02/20 2132  •  phenazopyridine (PYRIDIUM) tablet 200 mg, 200 mg, Oral, BID, Kayden Mathew MD, 200 mg at 11/02/20 2102  •  potassium chloride (KLOR-CON) packet 20 mEq, 20 mEq, Per PEG Tube, Daily, Hugh Luu MD, 20  mEq at 11/02/20 0847  •  [COMPLETED] Insert peripheral IV, , , Once **AND** sodium chloride 0.9 % flush 10 mL, 10 mL, Intravenous, PRN, Nahum Herzog MD  •  sodium chloride 0.9 % flush 10 mL, 10 mL, Intravenous, Q12H, Nahum Herzog MD, 10 mL at 11/02/20 0941  •  sodium chloride 0.9 % flush 10 mL, 10 mL, Intravenous, PRN, Nahum Herzog MD  •  venlafaxine XR (EFFEXOR-XR) 24 hr capsule 75 mg, 75 mg, Oral, Daily With Breakfast, Michael Butler MD, 75 mg at 11/02/20 1107     Diagnostic Data    Lab Results (last 24 hours)     Procedure Component Value Units Date/Time    Basic Metabolic Panel [[id number]]  (Abnormal) Collected: 11/03/20 0612    Specimen: Blood Updated: 11/03/20 0650     Glucose 122 mg/dL      BUN 43 mg/dL      Creatinine 1.42 mg/dL      Sodium 137 mmol/L      Potassium 3.9 mmol/L      Chloride 97 mmol/L      CO2 29.0 mmol/L      Calcium 8.8 mg/dL      eGFR Non African Amer 49 mL/min/1.73      BUN/Creatinine Ratio 30.3     Anion Gap 11.0 mmol/L     Narrative:      GFR Normal >60  Chronic Kidney Disease <60  Kidney Failure <15      POC Glucose Once [[id number]]  (Abnormal) Collected: 11/02/20 2008    Specimen: Blood Updated: 11/03/20 0134     Glucose 146 mg/dL      Comment: RN NotifiedOperator: [id number] MARYJANE YASHHMeter ID: [id number]       POC Glucose Once [[id number]]  (Normal) Collected: 11/02/20 1643    Specimen: Blood Updated: 11/02/20 1713     Glucose 123 mg/dL      Comment: RN NotifiedOperator: [id number] HALEY ALISONMeter ID: [id number]       POC Glucose Once [[id number]]  (Normal) Collected: 11/02/20 1038    Specimen: Blood Updated: 11/02/20 1108     Glucose 128 mg/dL      Comment: RN NotifiedOperator: [id number] TAY NATHANMeter ID: [id number]       POC Glucose Once [[id number]]  (Abnormal) Collected: 11/02/20 0740    Specimen: Blood Updated: 11/02/20 0803     Glucose 142 mg/dL      Comment: RN NotifiedOperator: [id number] TAY NATHANMeter ID: [id number]               I reviewed the  "patient's new clinical results.    Assessment/Plan:     Active Hospital Problems    Diagnosis POA   • **C. difficile colitis [A04.72] Yes     - Confirmed antigen  -CT ordered of abdomen and pelvis 10/17/2020: Markedly large bowel with bowel wall thickening as read by Howard Douglas MD  - Vancomycin started 10/14/20  - Started Metronidazole IV 10/15/2020  -Decision to proceed with fecal transplant on 10/23/20, awaiting supplies to be delivered at this time  - WBC remains elevated at 13.6  -Dr. Wilson and Dr. Herzog following, have decided against surgery for the time being and plan was for fecal transplant on 10/23/2020. However this AM pt is denying any intervention and wishes to be left on his own \"to die.\"  Fecal transplant today.  -10/24/2020 No complications after fecal microbial transplant.  No pain in abdomen.  -10/24/2020 remains on clear liquid diet  -10/24/2020 Repeat faecal transplant planned for 25th/26th  -10/26/2020 subtotal colectomy, ileostomy, placement of gastrostomy and jejunostomy tubes  -10/27/2020 Doing well from a pain control standpoint. Continues to complain of some tenderness at incision site. G and J tube in place and receiving feedings     • Moderate episode of recurrent major depressive disorder (CMS/HCC) [F33.1] Yes     -will start Effexor 75 mg daily   -patient denies Hi or SI  -high likelihood depression is secondary to new diagnosis      • Suicidal ideation [R45.851] Not Applicable     -Psych consulted: Dr. Pisano following  -States he will \"go home and shoot himself\" after discharge  -10/24/2020 no suicidal intention reported today, appears in a better mood post fecal transplant.  -10/24/2020 remains in close contact supervision with one-to-one sitter  -1 to 1 sitter has been removed. Appears in better mood     • YOBANY (acute kidney injury) (CMS/HCC) [N17.9] Yes     Recent Labs     10/30/20  0552 10/31/20  1102 11/01/20  0534   CREATININE 1.50* 1.70* 1.67*     -Currently receiving LR at " 30 cc/hour     • Hemorrhoids [K64.9] Yes     10/17/2020 started hydrocortisone cream ending 10/19/2020  -Out-patient surgical consult required     • Severe malnutrition (CMS/HCC) [E43] Yes     -nutrition consult  -Started TPN 10/24/2020     • Hypokalemia [E87.6] No     K+ 3.1 on admission, 3.9 today  -Monitor and replace as needed  -K+ 3.0 10/23/2020 IV-replacement protocol K+ ordered   -Mag 2.0 today     • Hyperlipidemia [E78.5] Yes     - Continue to monitor     • Hypertension [I10] Yes     - Continue hydralazine TID     • GERD (gastroesophageal reflux disease) [K21.9] Yes     - Continue Famotidine 20mg  IV daily     • History of TIA (transient ischemic attack) [Z86.73] Not Applicable     - Continue Plavix     • Stage 3a chronic kidney disease [N18.31] Yes     ,- GFR 46 on admission. Since this month has ranged from 40-51  - Currently receiving LR at 30 cc/hr  -Nephrology consulted, Dr Luu following         DVT prophylaxis: SCDs/TEDs  Code status is   Code Status and Medical Interventions:   Ordered at: 10/26/20 1633     Level Of Support Discussed With:    Patient     Code Status:    CPR     Medical Interventions (Level of Support Prior to Arrest):    Full       Plan for disposition:Where: home      Time: 15mins      Signature  Sandor Martinze MD  [facility]  [address] [place] [address]  Office: [phone]      This document has been electronically signed by Sandor Martinez MD on November 3, 2020 07:29 CST

## 2020-11-03 NOTE — PLAN OF CARE
Problem: Adult Inpatient Plan of Care  Goal: Plan of Care Review  Outcome: Ongoing, Progressing  Flowsheets (Taken 11/3/2020 0840)  Progress: improving  Plan of Care Reviewed With: patient  Outcome Summary: Pt. with good effort with OOB transfer & therex this a.m. Pt. transferred sup-sit Ivanna, sat EOB SBA while nsg. assisted with emptying illeostomy bag prior to OOB transfer, sit-stand-sit Ivanna, bed-chair st. yeny Goncalves, pt. performed 2x10 reps A-AAROM seated therex this a.m. Cont. OOB-Gt as pt. able

## 2020-11-04 LAB
ALBUMIN SERPL-MCNC: 3.1 G/DL (ref 3.5–5.2)
ALBUMIN/GLOB SERPL: 1.1 G/DL
ALP SERPL-CCNC: 164 U/L (ref 39–117)
ALT SERPL W P-5'-P-CCNC: 28 U/L (ref 1–41)
ANION GAP SERPL CALCULATED.3IONS-SCNC: 10 MMOL/L (ref 5–15)
AST SERPL-CCNC: 36 U/L (ref 1–40)
BILIRUB SERPL-MCNC: 0.7 MG/DL (ref 0–1.2)
BUN SERPL-MCNC: 39 MG/DL (ref 8–23)
BUN/CREAT SERPL: 30.7 (ref 7–25)
CALCIUM SPEC-SCNC: 8.8 MG/DL (ref 8.6–10.5)
CHLORIDE SERPL-SCNC: 99 MMOL/L (ref 98–107)
CO2 SERPL-SCNC: 29 MMOL/L (ref 22–29)
CREAT SERPL-MCNC: 1.27 MG/DL (ref 0.76–1.27)
DEPRECATED RDW RBC AUTO: 49.3 FL (ref 37–54)
ERYTHROCYTE [DISTWIDTH] IN BLOOD BY AUTOMATED COUNT: 14 % (ref 12.3–15.4)
GFR SERPL CREATININE-BSD FRML MDRD: 56 ML/MIN/1.73
GLOBULIN UR ELPH-MCNC: 2.7 GM/DL
GLUCOSE BLDC GLUCOMTR-MCNC: 106 MG/DL (ref 70–130)
GLUCOSE BLDC GLUCOMTR-MCNC: 110 MG/DL (ref 70–130)
GLUCOSE BLDC GLUCOMTR-MCNC: 119 MG/DL (ref 70–130)
GLUCOSE BLDC GLUCOMTR-MCNC: 126 MG/DL (ref 70–130)
GLUCOSE SERPL-MCNC: 102 MG/DL (ref 65–99)
HCT VFR BLD AUTO: 28.2 % (ref 37.5–51)
HGB BLD-MCNC: 9.3 G/DL (ref 13–17.7)
MCH RBC QN AUTO: 31.7 PG (ref 26.6–33)
MCHC RBC AUTO-ENTMCNC: 33 G/DL (ref 31.5–35.7)
MCV RBC AUTO: 96.2 FL (ref 79–97)
PLATELET # BLD AUTO: 267 10*3/MM3 (ref 140–450)
PMV BLD AUTO: 10.9 FL (ref 6–12)
POTASSIUM SERPL-SCNC: 3.3 MMOL/L (ref 3.5–5.2)
PREALB SERPL-MCNC: 16.6 MG/DL (ref 20–40)
PROT SERPL-MCNC: 5.8 G/DL (ref 6–8.5)
RBC # BLD AUTO: 2.93 10*6/MM3 (ref 4.14–5.8)
SODIUM SERPL-SCNC: 138 MMOL/L (ref 136–145)
WBC # BLD AUTO: 4.86 10*3/MM3 (ref 3.4–10.8)

## 2020-11-04 PROCEDURE — 25010000002 ONDANSETRON PER 1 MG: Performed by: INTERNAL MEDICINE

## 2020-11-04 PROCEDURE — 85027 COMPLETE CBC AUTOMATED: CPT | Performed by: INTERNAL MEDICINE

## 2020-11-04 PROCEDURE — 97162 PT EVAL MOD COMPLEX 30 MIN: CPT

## 2020-11-04 PROCEDURE — 25010000002 HEPARIN (PORCINE) PER 1000 UNITS: Performed by: INTERNAL MEDICINE

## 2020-11-04 PROCEDURE — 80053 COMPREHEN METABOLIC PANEL: CPT | Performed by: INTERNAL MEDICINE

## 2020-11-04 PROCEDURE — 82962 GLUCOSE BLOOD TEST: CPT

## 2020-11-04 PROCEDURE — 97166 OT EVAL MOD COMPLEX 45 MIN: CPT

## 2020-11-04 PROCEDURE — 84134 ASSAY OF PREALBUMIN: CPT | Performed by: INTERNAL MEDICINE

## 2020-11-05 LAB
GLUCOSE BLDC GLUCOMTR-MCNC: 154 MG/DL (ref 70–130)
GLUCOSE BLDC GLUCOMTR-MCNC: 86 MG/DL (ref 70–130)
LAB AP CASE REPORT: NORMAL
PATH REPORT.FINAL DX SPEC: NORMAL

## 2020-11-05 PROCEDURE — 82962 GLUCOSE BLOOD TEST: CPT

## 2020-11-05 PROCEDURE — 25010000002 HEPARIN (PORCINE) PER 1000 UNITS: Performed by: INTERNAL MEDICINE

## 2020-11-05 PROCEDURE — 25010000002 ONDANSETRON PER 1 MG: Performed by: INTERNAL MEDICINE

## 2020-11-05 PROCEDURE — 97530 THERAPEUTIC ACTIVITIES: CPT

## 2020-11-05 PROCEDURE — 97116 GAIT TRAINING THERAPY: CPT

## 2020-11-05 RX ORDER — DIAPER,BRIEF,INFANT-TODD,DISP
EACH MISCELLANEOUS 2 TIMES DAILY
Status: DISCONTINUED | OUTPATIENT
Start: 2020-11-05 | End: 2020-11-23 | Stop reason: HOSPADM

## 2020-11-06 LAB
GLUCOSE BLDC GLUCOMTR-MCNC: 111 MG/DL (ref 70–130)
GLUCOSE BLDC GLUCOMTR-MCNC: 119 MG/DL (ref 70–130)
GLUCOSE BLDC GLUCOMTR-MCNC: 128 MG/DL (ref 70–130)
GLUCOSE BLDC GLUCOMTR-MCNC: 155 MG/DL (ref 70–130)
GLUCOSE BLDC GLUCOMTR-MCNC: 99 MG/DL (ref 70–130)

## 2020-11-06 PROCEDURE — 97110 THERAPEUTIC EXERCISES: CPT

## 2020-11-06 PROCEDURE — 25010000002 HEPARIN (PORCINE) PER 1000 UNITS: Performed by: INTERNAL MEDICINE

## 2020-11-06 PROCEDURE — 97530 THERAPEUTIC ACTIVITIES: CPT

## 2020-11-06 PROCEDURE — 82962 GLUCOSE BLOOD TEST: CPT

## 2020-11-06 PROCEDURE — 97116 GAIT TRAINING THERAPY: CPT

## 2020-11-07 LAB
GLUCOSE BLDC GLUCOMTR-MCNC: 126 MG/DL (ref 70–130)
GLUCOSE BLDC GLUCOMTR-MCNC: 146 MG/DL (ref 70–130)
GLUCOSE BLDC GLUCOMTR-MCNC: 90 MG/DL (ref 70–130)
GLUCOSE BLDC GLUCOMTR-MCNC: 93 MG/DL (ref 70–130)

## 2020-11-07 PROCEDURE — 82962 GLUCOSE BLOOD TEST: CPT

## 2020-11-07 PROCEDURE — 25010000002 HEPARIN (PORCINE) PER 1000 UNITS: Performed by: INTERNAL MEDICINE

## 2020-11-08 LAB
GLUCOSE BLDC GLUCOMTR-MCNC: 109 MG/DL (ref 70–130)
GLUCOSE BLDC GLUCOMTR-MCNC: 115 MG/DL (ref 70–130)
GLUCOSE BLDC GLUCOMTR-MCNC: 124 MG/DL (ref 70–130)

## 2020-11-08 PROCEDURE — 97110 THERAPEUTIC EXERCISES: CPT

## 2020-11-08 PROCEDURE — 82962 GLUCOSE BLOOD TEST: CPT

## 2020-11-08 PROCEDURE — 25010000002 HEPARIN (PORCINE) PER 1000 UNITS: Performed by: INTERNAL MEDICINE

## 2020-11-09 LAB
GLUCOSE BLDC GLUCOMTR-MCNC: 122 MG/DL (ref 70–130)
GLUCOSE BLDC GLUCOMTR-MCNC: 154 MG/DL (ref 70–130)
GLUCOSE BLDC GLUCOMTR-MCNC: 84 MG/DL (ref 70–130)
GLUCOSE BLDC GLUCOMTR-MCNC: 91 MG/DL (ref 70–130)

## 2020-11-09 PROCEDURE — 82962 GLUCOSE BLOOD TEST: CPT

## 2020-11-09 PROCEDURE — 97530 THERAPEUTIC ACTIVITIES: CPT

## 2020-11-09 PROCEDURE — 97535 SELF CARE MNGMENT TRAINING: CPT

## 2020-11-09 PROCEDURE — 25010000002 ONDANSETRON PER 1 MG: Performed by: INTERNAL MEDICINE

## 2020-11-09 PROCEDURE — 25010000002 HEPARIN (PORCINE) PER 1000 UNITS: Performed by: INTERNAL MEDICINE

## 2020-11-09 PROCEDURE — 97110 THERAPEUTIC EXERCISES: CPT

## 2020-11-09 NOTE — THERAPY TREATMENT NOTE
Acute Care - Occupational Therapy Treatment Note  [facility]     Patient Name: Bar Crockett  : 1950  MRN: [id number]  Today's Date: 2020             Admit Date: 11/3/2020       ICD-10-CM ICD-9-CM   1. Impaired mobility and activities of daily living  Z74.09 V49.89    Z78.9    2. Impaired functional mobility, balance, gait, and endurance  Z74.09 V49.89     Patient Active Problem List   Diagnosis   • Sigmoid diverticulitis   • Pancolitis (CMS/HCC)   • Stage 3a chronic kidney disease   • Sepsis without acute organ dysfunction (CMS/HCC)   • Severe malnutrition (CMS/HCC)   • Edema of both ankles   • Tachycardia   • YOBANY (acute kidney injury) (CMS/HCC)   • Moderate episode of recurrent major depressive disorder (CMS/HCC)     Past Medical History:   Diagnosis Date   • Hyperlipidemia    • Hypertension    • Rectal abnormality     Rectum came out - er put back in.     • TIA (transient ischemic attack)      Past Surgical History:   Procedure Laterality Date   • COLONOSCOPY N/A 12/10/2018    Procedure: COLONOSCOPY;  Surgeon: Jay Wilson DO;  Location: [facility] ENDOSCOPY;  Service: Gastroenterology   • COLONOSCOPY Left 10/25/2020    Procedure: COLONOSCOPY WITH ENDOSCOPIC FECAL MICROBIOTA TRANSPLANT;  Surgeon: Jay Wilson DO;  Location: [facility] OR;  Service: Gastroenterology;  Laterality: Left;   • COLONOSCOPY N/A 10/23/2020    Procedure: COLONOSCOPY WITH ENDOSCOPIC FECAL MICROBIOTA TRANSPLANT;  Surgeon: Jay Wilson DO;  Location: [facility] ENDOSCOPY;  Service: Gastroenterology;  Laterality: N/A;   • OTHER SURGICAL HISTORY      Loop recorder          OT ASSESSMENT FLOWSHEET (last 12 hours)      OT Evaluation and Treatment    No documentation.        Occupational Therapy Education                 Title: PT OT SLP Therapies (Not Started)     Topic: Occupational Therapy (Not Started)     Point: ADL training (Not Started)     Description:   Instruct learner(s) on proper safety adaptation and  remediation techniques during self care or transfers.   Instruct in proper use of assistive devices.              Learner Progress:  Not documented in this visit.          Point: Home exercise program (Not Started)     Description:   Instruct learner(s) on appropriate technique for monitoring, assisting and/or progressing therapeutic exercises/activities.              Learner Progress:  Not documented in this visit.          Point: Body mechanics (Not Started)     Description:   Instruct learner(s) on proper positioning and spine alignment during self-care, functional mobility activities and/or exercises.              Learner Progress:  Not documented in this visit.                              OT Recommendation and Plan              Time Calculation:   Time Calculation- OT     Row Name 11/09/20 1203             Time Calculation- OT    OT Start Time  0735  -LW      OT Stop Time  0815  -LW      OT Time Calculation (min)  40 min  -LW      Total Timed Code Minutes- OT  40 minute(s)  -LW      OT Received On  11/09/20  -LW        User Key  (r) = Recorded By, (t) = Taken By, (c) = Cosigned By    Initials Name Provider Type    Adri William COTA/L Occupational Therapy Assistant        Therapy Charges for Today     Code Description Service Date Service Provider Modifiers Qty    96918912295 HC OT SELF CARE/MGMT/TRAIN EA 15 MIN 11/9/2020 Adri Romero GATES/L GO 1    28655159172 HC OT THER PROC EA 15 MIN 11/9/2020 Adri Romero GATES/L GO 2    69726420871 HC OT SELF CARE/MGMT/TRAIN EA 15 MIN 11/9/2020 Adri Romero GATES/L GO 1    29576487674 HC OT THER PROC EA 15 MIN 11/9/2020 Adri Romero GATES/MARTELL GO 2               YAJAIRA Martinez/MARTELL  11/9/2020

## 2020-11-10 LAB
ALBUMIN SERPL-MCNC: 3.9 G/DL (ref 3.5–5.2)
ALBUMIN/GLOB SERPL: 1.2 G/DL
ALP SERPL-CCNC: 123 U/L (ref 39–117)
ALT SERPL W P-5'-P-CCNC: 26 U/L (ref 1–41)
ANION GAP SERPL CALCULATED.3IONS-SCNC: 10 MMOL/L (ref 5–15)
AST SERPL-CCNC: 31 U/L (ref 1–40)
BILIRUB SERPL-MCNC: 0.7 MG/DL (ref 0–1.2)
BUN SERPL-MCNC: 23 MG/DL (ref 8–23)
BUN/CREAT SERPL: 17.4 (ref 7–25)
CALCIUM SPEC-SCNC: 9.6 MG/DL (ref 8.6–10.5)
CHLORIDE SERPL-SCNC: 100 MMOL/L (ref 98–107)
CO2 SERPL-SCNC: 25 MMOL/L (ref 22–29)
CREAT SERPL-MCNC: 1.32 MG/DL (ref 0.76–1.27)
DEPRECATED RDW RBC AUTO: 49.5 FL (ref 37–54)
ERYTHROCYTE [DISTWIDTH] IN BLOOD BY AUTOMATED COUNT: 14.1 % (ref 12.3–15.4)
GFR SERPL CREATININE-BSD FRML MDRD: 53 ML/MIN/1.73
GLOBULIN UR ELPH-MCNC: 3.3 GM/DL
GLUCOSE BLDC GLUCOMTR-MCNC: 100 MG/DL (ref 70–130)
GLUCOSE BLDC GLUCOMTR-MCNC: 115 MG/DL (ref 70–130)
GLUCOSE BLDC GLUCOMTR-MCNC: 125 MG/DL (ref 70–130)
GLUCOSE BLDC GLUCOMTR-MCNC: 195 MG/DL (ref 70–130)
GLUCOSE SERPL-MCNC: 98 MG/DL (ref 65–99)
HCT VFR BLD AUTO: 36.5 % (ref 37.5–51)
HGB BLD-MCNC: 12.2 G/DL (ref 13–17.7)
MCH RBC QN AUTO: 32.1 PG (ref 26.6–33)
MCHC RBC AUTO-ENTMCNC: 33.4 G/DL (ref 31.5–35.7)
MCV RBC AUTO: 96.1 FL (ref 79–97)
PLATELET # BLD AUTO: 468 10*3/MM3 (ref 140–450)
PMV BLD AUTO: 9.8 FL (ref 6–12)
POTASSIUM SERPL-SCNC: 4.2 MMOL/L (ref 3.5–5.2)
PROT SERPL-MCNC: 7.2 G/DL (ref 6–8.5)
RBC # BLD AUTO: 3.8 10*6/MM3 (ref 4.14–5.8)
SODIUM SERPL-SCNC: 135 MMOL/L (ref 136–145)
WBC # BLD AUTO: 6.33 10*3/MM3 (ref 3.4–10.8)

## 2020-11-10 PROCEDURE — 97530 THERAPEUTIC ACTIVITIES: CPT

## 2020-11-10 PROCEDURE — 25010000002 HEPARIN (PORCINE) PER 1000 UNITS: Performed by: INTERNAL MEDICINE

## 2020-11-10 PROCEDURE — 80053 COMPREHEN METABOLIC PANEL: CPT | Performed by: INTERNAL MEDICINE

## 2020-11-10 PROCEDURE — 85027 COMPLETE CBC AUTOMATED: CPT | Performed by: INTERNAL MEDICINE

## 2020-11-10 PROCEDURE — 97116 GAIT TRAINING THERAPY: CPT

## 2020-11-10 PROCEDURE — 97110 THERAPEUTIC EXERCISES: CPT

## 2020-11-10 PROCEDURE — 82962 GLUCOSE BLOOD TEST: CPT

## 2020-11-10 RX ORDER — LANOLIN ALCOHOL/MO/W.PET/CERES
3 CREAM (GRAM) TOPICAL NIGHTLY
Status: DISCONTINUED | OUTPATIENT
Start: 2020-11-10 | End: 2020-11-23 | Stop reason: HOSPADM

## 2020-11-11 LAB
GLUCOSE BLDC GLUCOMTR-MCNC: 103 MG/DL (ref 70–130)
GLUCOSE BLDC GLUCOMTR-MCNC: 145 MG/DL (ref 70–130)
GLUCOSE BLDC GLUCOMTR-MCNC: 92 MG/DL (ref 70–130)

## 2020-11-11 PROCEDURE — 97530 THERAPEUTIC ACTIVITIES: CPT

## 2020-11-11 PROCEDURE — 97535 SELF CARE MNGMENT TRAINING: CPT

## 2020-11-11 PROCEDURE — 25010000002 HEPARIN (PORCINE) PER 1000 UNITS: Performed by: INTERNAL MEDICINE

## 2020-11-11 PROCEDURE — 82962 GLUCOSE BLOOD TEST: CPT

## 2020-11-11 PROCEDURE — 97116 GAIT TRAINING THERAPY: CPT

## 2020-11-12 LAB
ALBUMIN SERPL-MCNC: 3.7 G/DL (ref 3.5–5.2)
ALBUMIN/GLOB SERPL: 1.3 G/DL
ALP SERPL-CCNC: 106 U/L (ref 39–117)
ALT SERPL W P-5'-P-CCNC: 20 U/L (ref 1–41)
ANION GAP SERPL CALCULATED.3IONS-SCNC: 13 MMOL/L (ref 5–15)
AST SERPL-CCNC: 25 U/L (ref 1–40)
BILIRUB SERPL-MCNC: 0.6 MG/DL (ref 0–1.2)
BUN SERPL-MCNC: 22 MG/DL (ref 8–23)
BUN/CREAT SERPL: 15 (ref 7–25)
CALCIUM SPEC-SCNC: 9.6 MG/DL (ref 8.6–10.5)
CHLORIDE SERPL-SCNC: 99 MMOL/L (ref 98–107)
CO2 SERPL-SCNC: 24 MMOL/L (ref 22–29)
CREAT SERPL-MCNC: 1.47 MG/DL (ref 0.76–1.27)
DEPRECATED RDW RBC AUTO: 51.7 FL (ref 37–54)
ERYTHROCYTE [DISTWIDTH] IN BLOOD BY AUTOMATED COUNT: 14.5 % (ref 12.3–15.4)
GFR SERPL CREATININE-BSD FRML MDRD: 47 ML/MIN/1.73
GLOBULIN UR ELPH-MCNC: 2.9 GM/DL
GLUCOSE SERPL-MCNC: 126 MG/DL (ref 65–99)
HCT VFR BLD AUTO: 38.6 % (ref 37.5–51)
HGB BLD-MCNC: 12.7 G/DL (ref 13–17.7)
MCH RBC QN AUTO: 32.1 PG (ref 26.6–33)
MCHC RBC AUTO-ENTMCNC: 32.9 G/DL (ref 31.5–35.7)
MCV RBC AUTO: 97.5 FL (ref 79–97)
PLATELET # BLD AUTO: 405 10*3/MM3 (ref 140–450)
PMV BLD AUTO: 10.2 FL (ref 6–12)
POTASSIUM SERPL-SCNC: 3.8 MMOL/L (ref 3.5–5.2)
PROT SERPL-MCNC: 6.6 G/DL (ref 6–8.5)
RBC # BLD AUTO: 3.96 10*6/MM3 (ref 4.14–5.8)
SODIUM SERPL-SCNC: 136 MMOL/L (ref 136–145)
WBC # BLD AUTO: 6.69 10*3/MM3 (ref 3.4–10.8)

## 2020-11-12 PROCEDURE — 25010000002 HEPARIN (PORCINE) PER 1000 UNITS: Performed by: INTERNAL MEDICINE

## 2020-11-12 PROCEDURE — 85027 COMPLETE CBC AUTOMATED: CPT | Performed by: INTERNAL MEDICINE

## 2020-11-12 PROCEDURE — 80053 COMPREHEN METABOLIC PANEL: CPT | Performed by: INTERNAL MEDICINE

## 2020-11-12 PROCEDURE — 97530 THERAPEUTIC ACTIVITIES: CPT

## 2020-11-12 PROCEDURE — 97116 GAIT TRAINING THERAPY: CPT

## 2020-11-12 RX ORDER — POTASSIUM CHLORIDE 750 MG/1
20 CAPSULE, EXTENDED RELEASE ORAL DAILY
Status: DISCONTINUED | OUTPATIENT
Start: 2020-11-13 | End: 2020-11-23 | Stop reason: HOSPADM

## 2020-11-12 RX ORDER — FAMOTIDINE 40 MG/1
40 TABLET, FILM COATED ORAL DAILY
Status: DISCONTINUED | OUTPATIENT
Start: 2020-11-13 | End: 2020-11-23 | Stop reason: HOSPADM

## 2020-11-13 PROCEDURE — 97110 THERAPEUTIC EXERCISES: CPT

## 2020-11-13 PROCEDURE — 97530 THERAPEUTIC ACTIVITIES: CPT

## 2020-11-13 PROCEDURE — 25010000002 HEPARIN (PORCINE) PER 1000 UNITS: Performed by: INTERNAL MEDICINE

## 2020-11-14 PROCEDURE — 25010000002 HEPARIN (PORCINE) PER 1000 UNITS: Performed by: INTERNAL MEDICINE

## 2020-11-14 RX ORDER — DIPHENHYDRAMINE HCL 25 MG
25 CAPSULE ORAL NIGHTLY PRN
Status: DISCONTINUED | OUTPATIENT
Start: 2020-11-14 | End: 2020-11-23 | Stop reason: HOSPADM

## 2020-11-15 LAB
QT INTERVAL: 418 MS
QTC INTERVAL: 444 MS

## 2020-11-15 PROCEDURE — 25010000002 HEPARIN (PORCINE) PER 1000 UNITS: Performed by: INTERNAL MEDICINE

## 2020-11-15 PROCEDURE — 63710000001 DIPHENHYDRAMINE PER 50 MG: Performed by: NURSE PRACTITIONER

## 2020-11-16 LAB
ALBUMIN SERPL-MCNC: 3.8 G/DL (ref 3.5–5.2)
ALBUMIN/GLOB SERPL: 1.2 G/DL
ALP SERPL-CCNC: 95 U/L (ref 39–117)
ALT SERPL W P-5'-P-CCNC: 15 U/L (ref 1–41)
ANION GAP SERPL CALCULATED.3IONS-SCNC: 11 MMOL/L (ref 5–15)
AST SERPL-CCNC: 24 U/L (ref 1–40)
BILIRUB SERPL-MCNC: 0.7 MG/DL (ref 0–1.2)
BUN SERPL-MCNC: 25 MG/DL (ref 8–23)
BUN/CREAT SERPL: 16.1 (ref 7–25)
CALCIUM SPEC-SCNC: 9.5 MG/DL (ref 8.6–10.5)
CHLORIDE SERPL-SCNC: 101 MMOL/L (ref 98–107)
CO2 SERPL-SCNC: 24 MMOL/L (ref 22–29)
CREAT SERPL-MCNC: 1.55 MG/DL (ref 0.76–1.27)
DEPRECATED RDW RBC AUTO: 51.8 FL (ref 37–54)
ERYTHROCYTE [DISTWIDTH] IN BLOOD BY AUTOMATED COUNT: 14.4 % (ref 12.3–15.4)
GFR SERPL CREATININE-BSD FRML MDRD: 44 ML/MIN/1.73
GLOBULIN UR ELPH-MCNC: 3.1 GM/DL
GLUCOSE SERPL-MCNC: 108 MG/DL (ref 65–99)
HCT VFR BLD AUTO: 36.7 % (ref 37.5–51)
HGB BLD-MCNC: 12 G/DL (ref 13–17.7)
MCH RBC QN AUTO: 31.9 PG (ref 26.6–33)
MCHC RBC AUTO-ENTMCNC: 32.7 G/DL (ref 31.5–35.7)
MCV RBC AUTO: 97.6 FL (ref 79–97)
PLATELET # BLD AUTO: 322 10*3/MM3 (ref 140–450)
PMV BLD AUTO: 10.5 FL (ref 6–12)
POTASSIUM SERPL-SCNC: 4 MMOL/L (ref 3.5–5.2)
PROT SERPL-MCNC: 6.9 G/DL (ref 6–8.5)
RBC # BLD AUTO: 3.76 10*6/MM3 (ref 4.14–5.8)
SODIUM SERPL-SCNC: 136 MMOL/L (ref 136–145)
WBC # BLD AUTO: 7.48 10*3/MM3 (ref 3.4–10.8)

## 2020-11-16 PROCEDURE — 97110 THERAPEUTIC EXERCISES: CPT

## 2020-11-16 PROCEDURE — 97116 GAIT TRAINING THERAPY: CPT

## 2020-11-16 PROCEDURE — 97535 SELF CARE MNGMENT TRAINING: CPT

## 2020-11-16 PROCEDURE — 80053 COMPREHEN METABOLIC PANEL: CPT | Performed by: INTERNAL MEDICINE

## 2020-11-16 PROCEDURE — 25010000002 HEPARIN (PORCINE) PER 1000 UNITS: Performed by: INTERNAL MEDICINE

## 2020-11-16 PROCEDURE — 85027 COMPLETE CBC AUTOMATED: CPT | Performed by: INTERNAL MEDICINE

## 2020-11-17 LAB — C DIFF TOX GENS STL QL NAA+PROBE: NEGATIVE

## 2020-11-17 PROCEDURE — 87493 C DIFF AMPLIFIED PROBE: CPT | Performed by: INTERNAL MEDICINE

## 2020-11-17 PROCEDURE — 87427 SHIGA-LIKE TOXIN AG IA: CPT | Performed by: INTERNAL MEDICINE

## 2020-11-17 PROCEDURE — 25010000002 HEPARIN (PORCINE) PER 1000 UNITS: Performed by: INTERNAL MEDICINE

## 2020-11-17 PROCEDURE — 87045 FECES CULTURE AEROBIC BACT: CPT | Performed by: INTERNAL MEDICINE

## 2020-11-17 PROCEDURE — 97530 THERAPEUTIC ACTIVITIES: CPT

## 2020-11-17 PROCEDURE — 97112 NEUROMUSCULAR REEDUCATION: CPT

## 2020-11-17 PROCEDURE — 87046 STOOL CULTR AEROBIC BACT EA: CPT | Performed by: INTERNAL MEDICINE

## 2020-11-18 PROCEDURE — 97530 THERAPEUTIC ACTIVITIES: CPT

## 2020-11-18 PROCEDURE — 97110 THERAPEUTIC EXERCISES: CPT

## 2020-11-18 PROCEDURE — 25010000002 HEPARIN (PORCINE) PER 1000 UNITS: Performed by: INTERNAL MEDICINE

## 2020-11-18 PROCEDURE — 63710000001 DIPHENHYDRAMINE PER 50 MG: Performed by: NURSE PRACTITIONER

## 2020-11-18 PROCEDURE — 82962 GLUCOSE BLOOD TEST: CPT

## 2020-11-19 LAB
ALBUMIN SERPL-MCNC: 4.1 G/DL (ref 3.5–5.2)
ALBUMIN/GLOB SERPL: 1.2 G/DL
ALP SERPL-CCNC: 99 U/L (ref 39–117)
ALT SERPL W P-5'-P-CCNC: 16 U/L (ref 1–41)
ANION GAP SERPL CALCULATED.3IONS-SCNC: 16 MMOL/L (ref 5–15)
AST SERPL-CCNC: 20 U/L (ref 1–40)
BILIRUB SERPL-MCNC: 0.7 MG/DL (ref 0–1.2)
BUN SERPL-MCNC: 24 MG/DL (ref 8–23)
BUN/CREAT SERPL: 15.5 (ref 7–25)
CALCIUM SPEC-SCNC: 10.1 MG/DL (ref 8.6–10.5)
CHLORIDE SERPL-SCNC: 100 MMOL/L (ref 98–107)
CO2 SERPL-SCNC: 22 MMOL/L (ref 22–29)
CREAT SERPL-MCNC: 1.55 MG/DL (ref 0.76–1.27)
DEPRECATED RDW RBC AUTO: 48.7 FL (ref 37–54)
ERYTHROCYTE [DISTWIDTH] IN BLOOD BY AUTOMATED COUNT: 13.9 % (ref 12.3–15.4)
GFR SERPL CREATININE-BSD FRML MDRD: 44 ML/MIN/1.73
GLOBULIN UR ELPH-MCNC: 3.4 GM/DL
GLUCOSE BLDC GLUCOMTR-MCNC: 115 MG/DL (ref 70–130)
GLUCOSE SERPL-MCNC: 92 MG/DL (ref 65–99)
HCT VFR BLD AUTO: 39.7 % (ref 37.5–51)
HGB BLD-MCNC: 13.4 G/DL (ref 13–17.7)
MCH RBC QN AUTO: 32.2 PG (ref 26.6–33)
MCHC RBC AUTO-ENTMCNC: 33.8 G/DL (ref 31.5–35.7)
MCV RBC AUTO: 95.4 FL (ref 79–97)
PLATELET # BLD AUTO: 276 10*3/MM3 (ref 140–450)
PMV BLD AUTO: 11.4 FL (ref 6–12)
POTASSIUM SERPL-SCNC: 3.9 MMOL/L (ref 3.5–5.2)
PROT SERPL-MCNC: 7.5 G/DL (ref 6–8.5)
RBC # BLD AUTO: 4.16 10*6/MM3 (ref 4.14–5.8)
SODIUM SERPL-SCNC: 138 MMOL/L (ref 136–145)
WBC # BLD AUTO: 8.67 10*3/MM3 (ref 3.4–10.8)

## 2020-11-19 PROCEDURE — 25010000002 HEPARIN (PORCINE) PER 1000 UNITS: Performed by: INTERNAL MEDICINE

## 2020-11-19 PROCEDURE — 97110 THERAPEUTIC EXERCISES: CPT

## 2020-11-19 PROCEDURE — 97530 THERAPEUTIC ACTIVITIES: CPT

## 2020-11-19 PROCEDURE — 80053 COMPREHEN METABOLIC PANEL: CPT | Performed by: INTERNAL MEDICINE

## 2020-11-19 PROCEDURE — 97116 GAIT TRAINING THERAPY: CPT

## 2020-11-19 PROCEDURE — 85027 COMPLETE CBC AUTOMATED: CPT | Performed by: INTERNAL MEDICINE

## 2020-11-20 PROCEDURE — 97116 GAIT TRAINING THERAPY: CPT

## 2020-11-20 PROCEDURE — 25010000002 HEPARIN (PORCINE) PER 1000 UNITS: Performed by: INTERNAL MEDICINE

## 2020-11-20 PROCEDURE — 97535 SELF CARE MNGMENT TRAINING: CPT

## 2020-11-20 PROCEDURE — 97530 THERAPEUTIC ACTIVITIES: CPT

## 2020-11-21 LAB
BACTERIA SPEC CULT: NORMAL
BACTERIA SPEC CULT: NORMAL
BACTERIA UR QL AUTO: ABNORMAL /HPF
BILIRUB UR QL STRIP: ABNORMAL
CAMPYLOBACTER STL CULT: NORMAL
CLARITY UR: ABNORMAL
COLOR UR: ABNORMAL
E COLI SXT STL QL IA: NEGATIVE
GLUCOSE UR STRIP-MCNC: NEGATIVE MG/DL
HGB UR QL STRIP.AUTO: ABNORMAL
HYALINE CASTS UR QL AUTO: ABNORMAL /LPF
KETONES UR QL STRIP: ABNORMAL
LEUKOCYTE ESTERASE UR QL STRIP.AUTO: ABNORMAL
NITRITE UR QL STRIP: POSITIVE
PH UR STRIP.AUTO: <=5 [PH] (ref 5–9)
PROT UR QL STRIP: ABNORMAL
RBC # UR: ABNORMAL /HPF
REF LAB TEST METHOD: ABNORMAL
SALM + SHIG STL CULT: NORMAL
SP GR UR STRIP: 1.01 (ref 1–1.03)
SQUAMOUS #/AREA URNS HPF: ABNORMAL /HPF
UROBILINOGEN UR QL STRIP: ABNORMAL
WBC UR QL AUTO: ABNORMAL /HPF

## 2020-11-21 PROCEDURE — 25010000002 HEPARIN (PORCINE) PER 1000 UNITS: Performed by: INTERNAL MEDICINE

## 2020-11-21 PROCEDURE — 81001 URINALYSIS AUTO W/SCOPE: CPT | Performed by: INTERNAL MEDICINE

## 2020-11-21 PROCEDURE — 87077 CULTURE AEROBIC IDENTIFY: CPT | Performed by: INTERNAL MEDICINE

## 2020-11-21 PROCEDURE — 87086 URINE CULTURE/COLONY COUNT: CPT | Performed by: INTERNAL MEDICINE

## 2020-11-21 PROCEDURE — 87186 SC STD MICRODIL/AGAR DIL: CPT | Performed by: INTERNAL MEDICINE

## 2020-11-21 RX ORDER — CEFDINIR 300 MG/1
300 CAPSULE ORAL EVERY 12 HOURS SCHEDULED
Status: DISCONTINUED | OUTPATIENT
Start: 2020-11-21 | End: 2020-11-23 | Stop reason: HOSPADM

## 2020-11-22 PROCEDURE — 25010000002 HEPARIN (PORCINE) PER 1000 UNITS: Performed by: INTERNAL MEDICINE

## 2020-11-22 PROCEDURE — 97530 THERAPEUTIC ACTIVITIES: CPT

## 2020-11-23 LAB
ALBUMIN SERPL-MCNC: 3.7 G/DL (ref 3.5–5.2)
ALBUMIN/GLOB SERPL: 1.2 G/DL
ALP SERPL-CCNC: 88 U/L (ref 39–117)
ALT SERPL W P-5'-P-CCNC: 13 U/L (ref 1–41)
ANION GAP SERPL CALCULATED.3IONS-SCNC: 13 MMOL/L (ref 5–15)
AST SERPL-CCNC: 16 U/L (ref 1–40)
BACTERIA SPEC AEROBE CULT: ABNORMAL
BILIRUB SERPL-MCNC: 0.6 MG/DL (ref 0–1.2)
BUN SERPL-MCNC: 26 MG/DL (ref 8–23)
BUN/CREAT SERPL: 15.2 (ref 7–25)
CALCIUM SPEC-SCNC: 9.7 MG/DL (ref 8.6–10.5)
CHLORIDE SERPL-SCNC: 97 MMOL/L (ref 98–107)
CO2 SERPL-SCNC: 24 MMOL/L (ref 22–29)
CREAT SERPL-MCNC: 1.71 MG/DL (ref 0.76–1.27)
DEPRECATED RDW RBC AUTO: 47.7 FL (ref 37–54)
ERYTHROCYTE [DISTWIDTH] IN BLOOD BY AUTOMATED COUNT: 13.4 % (ref 12.3–15.4)
GFR SERPL CREATININE-BSD FRML MDRD: 40 ML/MIN/1.73
GLOBULIN UR ELPH-MCNC: 3 GM/DL
GLUCOSE SERPL-MCNC: 104 MG/DL (ref 65–99)
HCT VFR BLD AUTO: 37.4 % (ref 37.5–51)
HGB BLD-MCNC: 12.5 G/DL (ref 13–17.7)
MCH RBC QN AUTO: 31.7 PG (ref 26.6–33)
MCHC RBC AUTO-ENTMCNC: 33.4 G/DL (ref 31.5–35.7)
MCV RBC AUTO: 94.9 FL (ref 79–97)
PLATELET # BLD AUTO: 193 10*3/MM3 (ref 140–450)
PMV BLD AUTO: 10.6 FL (ref 6–12)
POTASSIUM SERPL-SCNC: 3.6 MMOL/L (ref 3.5–5.2)
PROT SERPL-MCNC: 6.7 G/DL (ref 6–8.5)
RBC # BLD AUTO: 3.94 10*6/MM3 (ref 4.14–5.8)
SODIUM SERPL-SCNC: 134 MMOL/L (ref 136–145)
WBC # BLD AUTO: 6.52 10*3/MM3 (ref 3.4–10.8)

## 2020-11-23 PROCEDURE — 97535 SELF CARE MNGMENT TRAINING: CPT

## 2020-11-23 PROCEDURE — 97530 THERAPEUTIC ACTIVITIES: CPT

## 2020-11-23 PROCEDURE — 85027 COMPLETE CBC AUTOMATED: CPT | Performed by: INTERNAL MEDICINE

## 2020-11-23 PROCEDURE — 80053 COMPREHEN METABOLIC PANEL: CPT | Performed by: INTERNAL MEDICINE

## 2020-11-23 PROCEDURE — 97116 GAIT TRAINING THERAPY: CPT

## 2020-11-23 PROCEDURE — 97110 THERAPEUTIC EXERCISES: CPT

## 2020-11-25 ENCOUNTER — OFFICE VISIT (OUTPATIENT)
Dept: SURGERY | Facility: CLINIC | Age: 70
End: 2020-11-25

## 2020-11-25 VITALS
HEIGHT: 66 IN | SYSTOLIC BLOOD PRESSURE: 110 MMHG | HEART RATE: 77 BPM | WEIGHT: 101.8 LBS | TEMPERATURE: 97.1 F | BODY MASS INDEX: 16.36 KG/M2 | DIASTOLIC BLOOD PRESSURE: 72 MMHG

## 2020-11-25 DIAGNOSIS — Z90.49 S/P PARTIAL COLECTOMY: Primary | ICD-10-CM

## 2020-11-25 PROCEDURE — 99024 POSTOP FOLLOW-UP VISIT: CPT | Performed by: NURSE PRACTITIONER

## 2020-11-25 RX ORDER — CEFDINIR 300 MG/1
CAPSULE ORAL 2 TIMES DAILY
COMMUNITY
Start: 2020-11-23 | End: 2021-01-11

## 2020-11-25 RX ORDER — FAMOTIDINE 40 MG/1
40 TABLET, FILM COATED ORAL DAILY
COMMUNITY
Start: 2020-11-23 | End: 2021-03-11 | Stop reason: SDUPTHER

## 2020-11-25 RX ORDER — LISINOPRIL 5 MG/1
5 TABLET ORAL DAILY
COMMUNITY
Start: 2020-09-01 | End: 2021-01-11

## 2020-11-25 NOTE — PATIENT INSTRUCTIONS
MyPlate from USDA    MyPlate is an outline of a general healthy diet based on the 2010 Dietary Guidelines for Americans, from the U.S. Department of Agriculture (USDA). It sets guidelines for how much food you should eat from each food group based on your age, sex, and level of physical activity.  What are tips for following MyPlate?  To follow MyPlate recommendations:  · Eat a wide variety of fruits and vegetables, grains, and protein foods.  · Serve smaller portions and eat less food throughout the day.  · Limit portion sizes to avoid overeating.  · Enjoy your food.  · Get at least 150 minutes of exercise every week. This is about 30 minutes each day, 5 or more days per week.  It can be difficult to have every meal look like MyPlate. Think about MyPlate as eating guidelines for an entire day, rather than each individual meal.  Fruits and vegetables  · Make half of your plate fruits and vegetables.  · Eat many different colors of fruits and vegetables each day.  · For a 2,000 calorie daily food plan, eat:  ? 2½ cups of vegetables every day.  ? 2 cups of fruit every day.  · 1 cup is equal to:  ? 1 cup raw or cooked vegetables.  ? 1 cup raw fruit.  ? 1 medium-sized orange, apple, or banana.  ? 1 cup 100% fruit or vegetable juice.  ? 2 cups raw leafy greens, such as lettuce, spinach, or kale.  ? ½ cup dried fruit.  Grains  · One fourth of your plate should be grains.  · Make at least half of the grains you eat each day whole grains.  · For a 2,000 calorie daily food plan, eat 6 oz of grains every day.  · 1 oz is equal to:  ? 1 slice bread.  ? 1 cup cereal.  ? ½ cup cooked rice, cereal, or pasta.  Protein  · One fourth of your plate should be protein.  · Eat a wide variety of protein foods, including meat, poultry, fish, eggs, beans, nuts, and tofu.  · For a 2,000 calorie daily food plan, eat 5½ oz of protein every day.  · 1 oz is equal to:  ? 1 oz meat, poultry, or fish.  ? ¼ cup cooked beans.  ? 1 egg.  ? ½ oz nuts  or seeds.  ? 1 Tbsp peanut butter.  Dairy  · Drink fat-free or low-fat (1%) milk.  · Eat or drink dairy as a side to meals.  · For a 2,000 calorie daily food plan, eat or drink 3 cups of dairy every day.  · 1 cup is equal to:  ? 1 cup milk, yogurt, cottage cheese, or soy milk (soy beverage).  ? 2 oz processed cheese.  ? 1½ oz natural cheese.  Fats, oils, salt, and sugars  · Only small amounts of oils are recommended.  · Avoid foods that are high in calories and low in nutritional value (empty calories), like foods high in fat or added sugars.  · Choose foods that are low in salt (sodium). Choose foods that have less than 140 milligrams (mg) of sodium per serving.  · Drink water instead of sugary drinks. Drink enough water each day to keep your urine pale yellow.  Where to find support  · Work with your health care provider or a nutrition specialist (dietitian) to develop a customized eating plan that is right for you.  · Download an paramjit (mobile application) to help you track your daily food intake.  Where to find more information  · Go to ChooseMyPlate.gov for more information.  Summary  · MyPlate is a general guideline for healthy eating from the USDA. It is based on the 2010 Dietary Guidelines for Americans.  · In general, fruits and vegetables should take up ½ of your plate, grains should take up ¼ of your plate, and protein should take up ¼ of your plate.  This information is not intended to replace advice given to you by your health care provider. Make sure you discuss any questions you have with your health care provider.  Document Revised: 05/21/2020 Document Reviewed: 03/19/2018  Elsevier Patient Education © 2020 Elsevier Inc.

## 2020-12-11 ENCOUNTER — OFFICE VISIT (OUTPATIENT)
Dept: SURGERY | Facility: CLINIC | Age: 70
End: 2020-12-11

## 2020-12-11 VITALS
DIASTOLIC BLOOD PRESSURE: 70 MMHG | BODY MASS INDEX: 17.52 KG/M2 | SYSTOLIC BLOOD PRESSURE: 122 MMHG | WEIGHT: 109 LBS | HEART RATE: 68 BPM | TEMPERATURE: 97.5 F | HEIGHT: 66 IN

## 2020-12-11 DIAGNOSIS — A04.72 C. DIFFICILE COLITIS: Primary | ICD-10-CM

## 2020-12-11 PROCEDURE — 99024 POSTOP FOLLOW-UP VISIT: CPT | Performed by: SURGERY

## 2020-12-13 NOTE — PROGRESS NOTES
CHIEF COMPLAINT:   Chief Complaint   Patient presents with   • Follow-up     Recheck G& J Tube        HPI: This patient is seen for a post-operatively following subtotal colectomy for C. difficile colitis.  Patient  is doing well. Eating well without any significant nausea. Having good bowel function. No problems with constipation or diarrhea. No urinary complaints. Denies fever. Ambulating well and slowly returning to normal activities.      PHYSICAL EXAM:    ABD: Incisions are healing well without any erythema or signs of infection.  All tubes are removed  ASSESSMENT    Diagnoses and all orders for this visit:    1. C. difficile colitis (Primary)        PLAN:    1. The patient will follow-up 1 month unless there are any problems.  2. May shower.   3. Gradually return to normal activity without restrictions.  4.  We will consider ostomy takedown at 100 days from his original operation          This document has been electronically signed by Nahum Herzog MD on December 13, 2020 13:49 CST

## 2021-01-08 ENCOUNTER — TELEPHONE (OUTPATIENT)
Dept: SURGERY | Facility: CLINIC | Age: 71
End: 2021-01-08

## 2021-01-11 ENCOUNTER — OFFICE VISIT (OUTPATIENT)
Dept: SURGERY | Facility: CLINIC | Age: 71
End: 2021-01-11

## 2021-01-11 ENCOUNTER — HOSPITAL ENCOUNTER (OUTPATIENT)
Dept: GENERAL RADIOLOGY | Facility: HOSPITAL | Age: 71
Discharge: HOME | End: 2021-01-11

## 2021-01-11 ENCOUNTER — APPOINTMENT (OUTPATIENT)
Dept: PREADMISSION TESTING | Facility: HOSPITAL | Age: 71
End: 2021-01-11

## 2021-01-11 VITALS — HEART RATE: 76 BPM | OXYGEN SATURATION: 92 % | RESPIRATION RATE: 18 BRPM

## 2021-01-11 VITALS
SYSTOLIC BLOOD PRESSURE: 128 MMHG | DIASTOLIC BLOOD PRESSURE: 72 MMHG | TEMPERATURE: 97 F | BODY MASS INDEX: 18.48 KG/M2 | HEIGHT: 66 IN | WEIGHT: 115 LBS | HEART RATE: 77 BPM

## 2021-01-11 DIAGNOSIS — Z93.2 ILEOSTOMY IN PLACE (HCC): ICD-10-CM

## 2021-01-11 DIAGNOSIS — E44.0 MODERATE PROTEIN-CALORIE MALNUTRITION (HCC): Primary | ICD-10-CM

## 2021-01-11 LAB
ABO GROUP BLD: NORMAL
ANION GAP SERPL CALCULATED.3IONS-SCNC: 11 MMOL/L (ref 5–15)
BLD GP AB SCN SERPL QL: NEGATIVE
BUN SERPL-MCNC: 18 MG/DL (ref 8–23)
BUN/CREAT SERPL: 11.3 (ref 7–25)
CALCIUM SPEC-SCNC: 9.6 MG/DL (ref 8.6–10.5)
CHLORIDE SERPL-SCNC: 102 MMOL/L (ref 98–107)
CO2 SERPL-SCNC: 26 MMOL/L (ref 22–29)
CREAT SERPL-MCNC: 1.6 MG/DL (ref 0.76–1.27)
GFR SERPL CREATININE-BSD FRML MDRD: 43 ML/MIN/1.73
GLUCOSE SERPL-MCNC: 95 MG/DL (ref 65–99)
Lab: NORMAL
POTASSIUM SERPL-SCNC: 4.1 MMOL/L (ref 3.5–5.2)
RH BLD: POSITIVE
SODIUM SERPL-SCNC: 139 MMOL/L (ref 136–145)
T&S EXPIRATION DATE: NORMAL

## 2021-01-11 PROCEDURE — 80048 BASIC METABOLIC PNL TOTAL CA: CPT

## 2021-01-11 PROCEDURE — 86850 RBC ANTIBODY SCREEN: CPT

## 2021-01-11 PROCEDURE — 71046 X-RAY EXAM CHEST 2 VIEWS: CPT

## 2021-01-11 PROCEDURE — 86901 BLOOD TYPING SEROLOGIC RH(D): CPT

## 2021-01-11 PROCEDURE — 93010 ELECTROCARDIOGRAM REPORT: CPT | Performed by: INTERNAL MEDICINE

## 2021-01-11 PROCEDURE — 36415 COLL VENOUS BLD VENIPUNCTURE: CPT

## 2021-01-11 PROCEDURE — 86900 BLOOD TYPING SEROLOGIC ABO: CPT

## 2021-01-11 PROCEDURE — 99024 POSTOP FOLLOW-UP VISIT: CPT | Performed by: SURGERY

## 2021-01-11 PROCEDURE — 93005 ELECTROCARDIOGRAM TRACING: CPT

## 2021-01-11 RX ORDER — MELOXICAM 15 MG/1
15 TABLET ORAL ONCE
Status: CANCELLED | OUTPATIENT
Start: 2021-01-25 | End: 2021-01-11

## 2021-01-11 RX ORDER — SODIUM CHLORIDE, SODIUM GLUCONATE, SODIUM ACETATE, POTASSIUM CHLORIDE, AND MAGNESIUM CHLORIDE 526; 502; 368; 37; 30 MG/100ML; MG/100ML; MG/100ML; MG/100ML; MG/100ML
1000 INJECTION, SOLUTION INTRAVENOUS CONTINUOUS
Status: CANCELLED | OUTPATIENT
Start: 2021-01-25

## 2021-01-11 RX ORDER — ACETAMINOPHEN 500 MG
1000 TABLET ORAL EVERY 6 HOURS
Status: CANCELLED | OUTPATIENT
Start: 2021-01-25

## 2021-01-11 RX ORDER — ALVIMOPAN 12 MG/1
12 CAPSULE ORAL ONCE
Status: CANCELLED | OUTPATIENT
Start: 2021-01-25 | End: 2021-01-18

## 2021-01-11 RX ORDER — METRONIDAZOLE 500 MG/1
TABLET ORAL
Qty: 4 TABLET | Refills: 0 | Status: SHIPPED | OUTPATIENT
Start: 2021-01-11 | End: 2021-01-11

## 2021-01-11 RX ORDER — NEOMYCIN SULFATE 500 MG/1
TABLET ORAL
Qty: 4 TABLET | Refills: 0 | Status: SHIPPED | OUTPATIENT
Start: 2021-01-11 | End: 2021-01-11

## 2021-01-11 RX ORDER — CHLORTHALIDONE 50 MG/1
50 TABLET ORAL DAILY
COMMUNITY
End: 2023-01-30 | Stop reason: ALTCHOICE

## 2021-01-11 RX ORDER — SCOLOPAMINE TRANSDERMAL SYSTEM 1 MG/1
1 PATCH, EXTENDED RELEASE TRANSDERMAL CONTINUOUS
Status: CANCELLED | OUTPATIENT
Start: 2021-01-25 | End: 2021-01-28

## 2021-01-11 RX ORDER — GABAPENTIN 300 MG/1
600 CAPSULE ORAL ONCE
Status: CANCELLED | OUTPATIENT
Start: 2021-01-25 | End: 2021-01-11

## 2021-01-11 RX ORDER — CHLORHEXIDINE GLUCONATE 4 G/100ML
SOLUTION TOPICAL 2 TIMES DAILY
Qty: 236 ML | Refills: 0 | Status: SHIPPED | OUTPATIENT
Start: 2021-01-11 | End: 2021-05-17

## 2021-01-11 NOTE — PAT
Chlorhexidine scrub given with instruction sheet. Instructions reviewed in PAT, understanding verbalized.

## 2021-01-11 NOTE — DISCHARGE INSTRUCTIONS
[facility]  Pre-op Information and Guidelines    You will be called after 2 p.m. the day before your surgery (Friday for Monday surgery) and notified of your time for arrival and approximate surgery time.  If you have not received a call by 4P.M., please contact Same Day Surgery at [phone] of if outside [facility] call [phone].    Please Follow these Important Safety Guidelines:    • The morning of your procedure, take only the medications listed below with   A sip of water:_____________________________________________       ______________________________________________    • DO NOT eat or drink anything after 12:00 midnight the night before surgery  Specific instructions concerning drinking clear liquids will be discussed during  the pre-surgery instruction call the day before your surgery.    • If you take a blood thinner (ex. Plavix, Coumadin, aspirin), ask your doctor when to stop it before surgery  STOP DATE: _________________    • Only 2 visitors are allowed in patient rooms at a time  Your visitors will be asked to wait in the lobby until the admission process is complete with the exception of a parent with a child and patients in need of special assistance.    • YOU CANNOT DRIVE YOURSELF HOME  You must be accompanied by someone who will be responsible for driving you home after surgery and for your care at home.    • DO NOT chew gum, use breath mints, hard candy, or smoke the day of surgery  • DO NOT drink alcohol for at least 24 hours before your surgery  • DO NOT wear any jewelry and remove all body piercing before coming to the hospital  • DO NOT wear make-up to the hospital  • If you are having surgery on an extremity (arm/leg/foot) remove nail polish/artificial nails on the surgical side  • Clothing, glasses, contacts, dentures, and hairpieces must be removed before surgery  • Bathe the night before or the morning of your surgery and do not use powders/lotions on  skin.

## 2021-01-11 NOTE — PATIENT INSTRUCTIONS
"BMI for Adults  What is BMI?  Body mass index (BMI) is a number that is calculated from a person's weight and height. BMI can help estimate how much of a person's weight is composed of fat. BMI does not measure body fat directly. Rather, it is an alternative to procedures that directly measure body fat, which can be difficult and expensive.  BMI can help identify people who may be at higher risk for certain medical problems.  What are BMI measurements used for?  BMI is used as a screening tool to identify possible weight problems. It helps determine whether a person is obese, overweight, a healthy weight, or underweight.  BMI is useful for:  · Identifying a weight problem that may be related to a medical condition or may increase the risk for medical problems.  · Promoting changes, such as changes in diet and exercise, to help reach a healthy weight. BMI screening can be repeated to see if these changes are working.  How is BMI calculated?  BMI involves measuring your weight in relation to your height. Both height and weight are measured, and the BMI is calculated from those numbers. This can be done either in English (U[place] or metric measurements. Note that charts and online BMI calculators are available to help you find your BMI quickly and easily without having to do these calculations yourself.  To calculate your BMI in English (U[place] measurements:    1. Measure your weight in pounds (lb).  2. Multiply the number of pounds by 703.  ? For example, for a person who weighs 180 lb, multiply that number by 703, which equals 126,540.  3. Measure your height in inches. Then multiply that number by itself to get a measurement called \"inches squared.\"  ? For example, for a person who is 70 inches tall, the \"inches squared\" measurement is 70 inches x 70 inches, which equals 4,900 inches squared.  4. Divide the total from step 2 (number of lb x 703) by the total from step 3 (inches squared): 126,540 ÷ 4,900 = 25.8. This is " "your BMI.  To calculate your BMI in metric measurements:  1. Measure your weight in kilograms (kg).  2. Measure your height in meters (m). Then multiply that number by itself to get a measurement called \"meters squared.\"  ? For example, for a person who is 1.75 m tall, the \"meters squared\" measurement is 1.75 m x 1.75 m, which is equal to 3.1 meters squared.  3. Divide the number of kilograms (your weight) by the meters squared number. In this example: 70 ÷ 3.1 = 22.6. This is your BMI.  What do the results mean?  BMI charts are used to identify whether you are underweight, normal weight, overweight, or obese. The following guidelines will be used:  · Underweight: BMI less than 18.5.  · Normal weight: BMI between 18.5 and 24.9.  · Overweight: BMI between 25 and 29.9.  · Obese: BMI of 30 or above.  Keep these notes in mind:  · Weight includes both fat and muscle, so someone with a muscular build, such as an athlete, may have a BMI that is higher than 24.9. In cases like these, BMI is not an accurate measure of body fat.  · To determine if excess body fat is the cause of a BMI of 25 or higher, further assessments may need to be done by a health care provider.  · BMI is usually interpreted in the same way for men and women.  Where to find more information  For more information about BMI, including tools to quickly calculate your BMI, go to these websites:  · Centers for Disease Control and Prevention: www.cdc.gov  · American Heart Association: www.heart.org  · National Heart, Lung, and Blood [place]: www.nhlbi.nih.gov  Summary  · Body mass index (BMI) is a number that is calculated from a person's weight and height.  · BMI may help estimate how much of a person's weight is composed of fat. BMI can help identify those who may be at higher risk for certain medical problems.  · BMI can be measured using English measurements or metric measurements.  · BMI charts are used to identify whether you are underweight, normal " weight, overweight, or obese.  This information is not intended to replace advice given to you by your health care provider. Make sure you discuss any questions you have with your health care provider.  Document Revised: 09/09/2020 Document Reviewed: 07/17/2020  Elsevier Patient Education © 2020 Elsevier Inc.

## 2021-01-13 NOTE — PROGRESS NOTES
Chief Complaint   Patient presents with   • Follow-up     Recheck G & J tube        HPI  70-year-old man who underwent a subtotal colectomy for severe C. difficile colitis.  An ileostomy was left in place and gastrostomy and jejunostomy tubes were placed which has subsequently been removed.  He has done well with good appetite and good bowel function and is gaining weight appropriately though he is still not quite up to his preillness weight.  Overall, his performance status has been excellent.  Past Medical History:   Diagnosis Date   • Hyperlipidemia    • Hypertension    • Rectal abnormality     Rectum came out - er put back in.  9/18   • TIA (transient ischemic attack)        Past Surgical History:   Procedure Laterality Date   • COLON RESECTION N/A 10/26/2020    Procedure: SUBTOTAL COLECTOMY,  ILEOSTOMY, PLACEMENT OF GASTROSTOMY AND JEJEUNOSTOMY TUBES;  Surgeon: Nahum Herzog MD;  Location: [facility] OR;  Service: General;  Laterality: N/A;   • COLONOSCOPY N/A 12/10/2018    Procedure: COLONOSCOPY;  Surgeon: Jay Wilson DO;  Location: [facility] ENDOSCOPY;  Service: Gastroenterology   • COLONOSCOPY Left 10/25/2020    Procedure: COLONOSCOPY WITH ENDOSCOPIC FECAL MICROBIOTA TRANSPLANT;  Surgeon: Jay Wilson DO;  Location: [facility] OR;  Service: Gastroenterology;  Laterality: Left;   • COLONOSCOPY N/A 10/23/2020    Procedure: COLONOSCOPY WITH ENDOSCOPIC FECAL MICROBIOTA TRANSPLANT;  Surgeon: Jay Wilson DO;  Location: [facility] ENDOSCOPY;  Service: Gastroenterology;  Laterality: N/A;   • KNEE MENISCAL REPAIR Left    • OTHER SURGICAL HISTORY      Loop recorder         Current Outpatient Medications:   •  Bacillus Coagulans-Inulin (ALIGN PREBIOTIC-PROBIOTIC PO), Take 1 tablet by mouth Every Night., Disp: , Rfl:   •  chlorthalidone (HYGROTEN) 50 MG tablet, Take 50 mg by mouth Daily., Disp: , Rfl:   •  famotidine (PEPCID) 40 MG tablet, Take 40 mg by mouth Daily., Disp: , Rfl:   •  tamsulosin (FLOMAX) 0.4 MG  capsule 24 hr capsule, Take 1 capsule by mouth Daily., Disp: , Rfl:   •  vitamin B-12 (CYANOCOBALAMIN) 500 MCG tablet, Take 500 mcg by mouth Daily., Disp: , Rfl:   •  vitamin C (ASCORBIC ACID) 500 MG tablet, Take 500 mg by mouth Daily., Disp: , Rfl:   •  vitamin D (ERGOCALCIFEROL) 1.25 MG (99061 UT) capsule capsule, Take 50,000 Units by mouth 1 (One) Time Per Week., Disp: , Rfl:   •  chlorhexidine (HIBICLENS) 4 % external liquid, Apply  topically to the appropriate area as directed 2 (Two) Times a Day. Shower With Hibiclens Solution Twice The Day Before Surgery, Disp: 236 mL, Rfl: 0    No Known Allergies    Family History   Problem Relation Age of Onset   • Glaucoma Mother        Social History     Socioeconomic History   • Marital status:      Spouse name: Not on file   • Number of children: Not on file   • Years of education: Not on file   • Highest education level: Not on file   Tobacco Use   • Smoking status: Former Smoker     Quit date: 2018     Years since quitting: 3.0   • Smokeless tobacco: Former User   Substance and Sexual Activity   • Alcohol use: Yes     Alcohol/week: 1.0 standard drinks     Types: 1 Cans of beer per week     Comment: rarely   • Drug use: No   • Sexual activity: Defer       Review of Systems   Constitutional: Negative for activity change, appetite change, chills and fever.   HENT: Negative for hearing loss, nosebleeds and trouble swallowing.    Cardiovascular: Negative for chest pain, palpitations and leg swelling.   Gastrointestinal: Negative for abdominal distention, abdominal pain, anal bleeding, blood in stool, constipation, diarrhea, nausea, rectal pain and vomiting.   Endocrine: Negative for cold intolerance, heat intolerance, polydipsia and polyuria.   Genitourinary: Negative for decreased urine volume, difficulty urinating, dysuria, enuresis, frequency, hematuria and urgency.   Musculoskeletal: Negative for arthralgias, back pain, gait problem, myalgias and neck pain.    Skin: Negative for pallor, rash and wound.   Allergic/Immunologic: Negative for immunocompromised state.   Neurological: Negative for dizziness, seizures, weakness, light-headedness, numbness and headaches.   Psychiatric/Behavioral: Negative for agitation and behavioral problems. The patient is not nervous/anxious.        Physical Exam  Vitals signs reviewed.   Constitutional:       Appearance: He is well-developed. He is not diaphoretic.   HENT:      Head: Normocephalic and atraumatic.      Nose: Nose normal.   Eyes:      General:         Right eye: No discharge.         Left eye: No discharge.      Conjunctiva/sclera: Conjunctivae normal.   Neck:      Musculoskeletal: Normal range of motion and neck supple. No edema.      Thyroid: No thyromegaly.      Vascular: No JVD.      Trachea: Trachea and phonation normal. No tracheal deviation.   Cardiovascular:      Rate and Rhythm: Normal rate and regular rhythm.      Heart sounds: Normal heart sounds. No murmur. No friction rub. No gallop.    Pulmonary:      Effort: Pulmonary effort is normal. No accessory muscle usage or respiratory distress.      Breath sounds: Normal breath sounds. No decreased breath sounds, wheezing or rales.   Chest:      Chest wall: No tenderness.   Abdominal:      General: There is no distension.      Palpations: Abdomen is soft. There is no fluid wave, mass or pulsatile mass.      Tenderness: There is no abdominal tenderness. There is no guarding or rebound.      Hernia: No hernia is present.       Musculoskeletal: Normal range of motion.         General: No tenderness or deformity.   Lymphadenopathy:      Cervical: No cervical adenopathy.      Upper Body:      Left upper body: No supraclavicular adenopathy.   Skin:     General: Skin is warm and dry.      Coloration: Skin is not pale.      Findings: No erythema or rash.   Neurological:      Mental Status: He is alert and oriented to person, place, and time.      Cranial Nerves: No cranial  nerve deficit.   Psychiatric:         Speech: Speech normal.         Behavior: Behavior normal.         Thought Content: Thought content normal.         Judgment: Judgment normal.           ASSESSMENT    Diagnoses and all orders for this visit:    1. Moderate protein-calorie malnutrition (CMS/HCC) (Primary)  -     CBC & Differential; Future  -     Comprehensive Metabolic Panel; Future  -     Magnesium; Future  -     Phosphorus; Future  -     Prealbumin; Future    2. Ileostomy in place (CMS/HCC)  -     Case Request; Standing  -     Type & Screen; Future  -     ECG 12 Lead; Future  -     Chest X-Ray PA & Lateral; Future  -     acetaminophen (TYLENOL) tablet 1,000 mg  -     meloxicam (MOBIC) tablet 15 mg  -     gabapentin (NEURONTIN) capsule 600 mg  -     Scopolamine (TRANSDERM-SCOP) 1.5 MG/3DAYS patch 1 patch  -     alvimopan (ENTEREG) capsule 12 mg  -     ceFOXitin (MEFOXIN) 2 g in sodium chloride 0.9 % 100 mL IVPB  -     Case Request    Other orders  -     Follow Anesthesia Guidelines / Standing Orders; Future  -     Provide Hydration Instructions to Patient; Future  -     Provide NPO Instructions to Patient; Future  -     Provide Patient With Enhanced Recovery Booklet(s) or Handout; Future  -     Provide Chlorhexidine Skin Prep Wipes & Instructions; Future  -     Nurse to Contact Surgeon for Cardiology Consult if Indicated; Future  -     Nurse to Consult  Anesthesia if Indicated; Future  -     chlorhexidine (HIBICLENS) 4 % external liquid; Apply  topically to the appropriate area as directed 2 (Two) Times a Day. Shower With Hibiclens Solution Twice The Day Before Surgery  Dispense: 236 mL; Refill: 0  -     Follow Anesthesia Guidelines / Standing Orders; Standing  -     Provide Patient With Enhanced Recovery Booklet(s) OR Handout; Standing  -     Obtain Informed Consent; Standing  -     Place Sequential Compression Device on Patient in Pre-Op; Standing  -     Insert Peripheral IV x2; Standing  -     Clip Operative  Site; Standing  -     Verify / Perform Chlorhexidine Skin Prep (If Not Already Done); Standing  -     Verify NPO Status; Standing  -     Verify the Time Patient Completed Gatorade / G2; Standing  -     Inpatient Admission; Standing  -     Discontinue: neomycin (MYCIFRADIN) 500 MG tablet; TAKE 2 TABLETS  AND 1100PM NIGHT BEFORE SURGERY  Dispense: 4 tablet; Refill: 0  -     Discontinue: metroNIDAZOLE (Flagyl) 500 MG tablet; Take two (2) tablets at 7 PM and two (2) tablets at 11 PM the night prior to surgery.  Dispense: 4 tablet; Refill: 0        PLAN    1.  Ileostomy closure is planned.  This will involve an ileorectal anastomosis.    The procedure was explained to the patient including the risks of bleeding, infection, poor wound healing, open operation, postoperative diarrhea, scarring, adhesions, hernia, medical complication.  He understands and agrees.              This document has been electronically signed by Nahum Herzog MD on January 12, 2021 18:57 CST

## 2021-01-22 ENCOUNTER — LAB (OUTPATIENT)
Dept: LAB | Facility: HOSPITAL | Age: 71
End: 2021-01-22

## 2021-01-22 DIAGNOSIS — Z01.818 PREOP TESTING: Primary | ICD-10-CM

## 2021-01-22 LAB — SARS-COV-2 ORF1AB RESP QL NAA+PROBE: NOT DETECTED

## 2021-01-22 PROCEDURE — C9803 HOPD COVID-19 SPEC COLLECT: HCPCS

## 2021-01-22 PROCEDURE — U0004 COV-19 TEST NON-CDC HGH THRU: HCPCS

## 2021-01-24 ENCOUNTER — ANESTHESIA EVENT (OUTPATIENT)
Dept: PERIOP | Facility: HOSPITAL | Age: 71
End: 2021-01-24

## 2021-01-25 ENCOUNTER — ANESTHESIA (OUTPATIENT)
Dept: PERIOP | Facility: HOSPITAL | Age: 71
End: 2021-01-25

## 2021-01-25 ENCOUNTER — HOSPITAL ENCOUNTER (INPATIENT)
Facility: HOSPITAL | Age: 71
LOS: 9 days | Discharge: NURSING, REHAB OR LONG-TERM CARE | End: 2021-02-03
Attending: SURGERY | Admitting: SURGERY

## 2021-01-25 DIAGNOSIS — Z78.9 IMPAIRED MOBILITY AND ADLS: ICD-10-CM

## 2021-01-25 DIAGNOSIS — N18.31 STAGE 3A CHRONIC KIDNEY DISEASE (HCC): ICD-10-CM

## 2021-01-25 DIAGNOSIS — E44.0 MODERATE PROTEIN-CALORIE MALNUTRITION (HCC): ICD-10-CM

## 2021-01-25 DIAGNOSIS — Z93.2 ILEOSTOMY IN PLACE (HCC): ICD-10-CM

## 2021-01-25 DIAGNOSIS — N17.9 AKI (ACUTE KIDNEY INJURY) (HCC): ICD-10-CM

## 2021-01-25 DIAGNOSIS — I70.229 CRITICAL LOWER LIMB ISCHEMIA (HCC): Primary | ICD-10-CM

## 2021-01-25 DIAGNOSIS — I73.9 PVD (PERIPHERAL VASCULAR DISEASE) (HCC): ICD-10-CM

## 2021-01-25 DIAGNOSIS — Z74.09 IMPAIRED MOBILITY AND ADLS: ICD-10-CM

## 2021-01-25 DIAGNOSIS — Z74.09 IMPAIRED FUNCTIONAL MOBILITY, BALANCE, GAIT, AND ENDURANCE: ICD-10-CM

## 2021-01-25 LAB
A-A DO2: ABNORMAL
ABO GROUP BLD: NORMAL
ALBUMIN SERPL-MCNC: 4.4 G/DL (ref 3.5–5.2)
ALBUMIN/GLOB SERPL: 1 G/DL
ALP SERPL-CCNC: 99 U/L (ref 39–117)
ALT SERPL W P-5'-P-CCNC: 21 U/L (ref 1–41)
ANION GAP SERPL CALCULATED.3IONS-SCNC: 18 MMOL/L (ref 5–15)
ARTERIAL PATENCY WRIST A: ABNORMAL
AST SERPL-CCNC: 22 U/L (ref 1–40)
ATMOSPHERIC PRESS: 738 MMHG
ATMOSPHERIC PRESS: 739 MMHG
ATMOSPHERIC PRESS: 740 MMHG
BASE EXCESS BLDA CALC-SCNC: -5.5 MMOL/L (ref 0–2)
BASE EXCESS BLDA CALC-SCNC: -6.2 MMOL/L (ref 0–2)
BASE EXCESS BLDA CALC-SCNC: -9.2 MMOL/L (ref 0–2)
BASOPHILS # BLD AUTO: 0.06 10*3/MM3 (ref 0–0.2)
BASOPHILS NFR BLD AUTO: 0.4 % (ref 0–1.5)
BDY SITE: ABNORMAL
BILIRUB SERPL-MCNC: 0.4 MG/DL (ref 0–1.2)
BUN SERPL-MCNC: 24 MG/DL (ref 8–23)
BUN/CREAT SERPL: 10.8 (ref 7–25)
CA-I BLD-MCNC: 4.19 MG/DL (ref 4.6–5.6)
CA-I BLD-MCNC: 4.32 MG/DL (ref 4.6–5.6)
CA-I BLD-MCNC: 4.85 MG/DL (ref 4.6–5.6)
CALCIUM SPEC-SCNC: 10.2 MG/DL (ref 8.6–10.5)
CHLORIDE SERPL-SCNC: 100 MMOL/L (ref 98–107)
CO2 SERPL-SCNC: 15 MMOL/L (ref 22–29)
COHGB MFR BLD: 0.9 % (ref 0–5)
COHGB MFR BLD: 1 % (ref 0–5)
COHGB MFR BLD: 1 % (ref 0–5)
CREAT SERPL-MCNC: 2.22 MG/DL (ref 0.76–1.27)
DEPRECATED RDW RBC AUTO: 43.3 FL (ref 37–54)
EOSINOPHIL # BLD AUTO: 0.03 10*3/MM3 (ref 0–0.4)
EOSINOPHIL NFR BLD AUTO: 0.2 % (ref 0.3–6.2)
ERYTHROCYTE [DISTWIDTH] IN BLOOD BY AUTOMATED COUNT: 12.8 % (ref 12.3–15.4)
GFR SERPL CREATININE-BSD FRML MDRD: 29 ML/MIN/1.73
GLOBULIN UR ELPH-MCNC: 4.4 GM/DL
GLUCOSE BLDA-MCNC: 123 MMOL/L (ref 65–95)
GLUCOSE BLDA-MCNC: 139 MMOL/L (ref 65–95)
GLUCOSE BLDA-MCNC: 141 MMOL/L (ref 65–95)
GLUCOSE SERPL-MCNC: 93 MG/DL (ref 65–99)
HCO3 BLDA-SCNC: 17.3 MMOL/L (ref 20–26)
HCO3 BLDA-SCNC: 19.7 MMOL/L (ref 20–26)
HCO3 BLDA-SCNC: 20.4 MMOL/L (ref 20–26)
HCT VFR BLD AUTO: 47.9 % (ref 37.5–51)
HCT VFR BLD CALC: 33.2 % (ref 38–51)
HCT VFR BLD CALC: 35 % (ref 38–51)
HCT VFR BLD CALC: 37.4 % (ref 38–51)
HGB BLD-MCNC: 16.6 G/DL (ref 13–17.7)
HGB BLDA-MCNC: 10.8 G/DL (ref 14–18)
HGB BLDA-MCNC: 11.4 G/DL (ref 14–18)
HGB BLDA-MCNC: 12.2 G/DL (ref 14–18)
IMM GRANULOCYTES # BLD AUTO: 0.08 10*3/MM3 (ref 0–0.05)
IMM GRANULOCYTES NFR BLD AUTO: 0.6 % (ref 0–0.5)
LYMPHOCYTES # BLD AUTO: 1.9 10*3/MM3 (ref 0.7–3.1)
LYMPHOCYTES NFR BLD AUTO: 13.6 % (ref 19.6–45.3)
Lab: ABNORMAL
Lab: ABNORMAL
MAGNESIUM SERPL-MCNC: 2.1 MG/DL (ref 1.6–2.4)
MCH RBC QN AUTO: 32.2 PG (ref 26.6–33)
MCHC RBC AUTO-ENTMCNC: 34.7 G/DL (ref 31.5–35.7)
MCV RBC AUTO: 93 FL (ref 79–97)
METHGB BLD QL: 0.6 % (ref 0–3)
METHGB BLD QL: 1.3 % (ref 0–3)
METHGB BLD QL: 1.3 % (ref 0–3)
MODALITY: ABNORMAL
MONOCYTES # BLD AUTO: 1.02 10*3/MM3 (ref 0.1–0.9)
MONOCYTES NFR BLD AUTO: 7.3 % (ref 5–12)
NEUTROPHILS NFR BLD AUTO: 10.88 10*3/MM3 (ref 1.7–7)
NEUTROPHILS NFR BLD AUTO: 77.9 % (ref 42.7–76)
NOTE: ABNORMAL
NRBC BLD AUTO-RTO: 0 /100 WBC (ref 0–0.2)
OXYHGB MFR BLDV: 97.7 % (ref 94–99)
OXYHGB MFR BLDV: 97.7 % (ref 94–99)
OXYHGB MFR BLDV: >98.5 % (ref 94–99)
PCO2 BLDA: 38.4 MM HG (ref 35–45)
PCO2 BLDA: 39.4 MM HG (ref 35–45)
PCO2 BLDA: 40 MM HG (ref 35–45)
PCO2 TEMP ADJ BLD: ABNORMAL MM[HG]
PH BLDA: 7.26 PH UNITS (ref 7.35–7.45)
PH BLDA: 7.31 PH UNITS (ref 7.35–7.45)
PH BLDA: 7.32 PH UNITS (ref 7.35–7.45)
PH, TEMP CORRECTED: ABNORMAL
PHOSPHATE SERPL-MCNC: 5.1 MG/DL (ref 2.5–4.5)
PLATELET # BLD AUTO: 277 10*3/MM3 (ref 140–450)
PMV BLD AUTO: 10.4 FL (ref 6–12)
PO2 BLDA: 216 MM HG (ref 83–108)
PO2 BLDA: 302 MM HG (ref 83–108)
PO2 BLDA: 539 MM HG (ref 83–108)
PO2 TEMP ADJ BLD: ABNORMAL MM[HG]
POTASSIUM BLDA-SCNC: 3.1 MMOL/L (ref 3.4–4.5)
POTASSIUM BLDA-SCNC: 3.4 MMOL/L (ref 3.4–4.5)
POTASSIUM BLDA-SCNC: 3.7 MMOL/L (ref 3.4–4.5)
POTASSIUM SERPL-SCNC: 3.8 MMOL/L (ref 3.5–5.2)
PREALB SERPL-MCNC: 40.6 MG/DL (ref 20–40)
PROT SERPL-MCNC: 8.8 G/DL (ref 6–8.5)
RBC # BLD AUTO: 5.15 10*6/MM3 (ref 4.14–5.8)
RH BLD: POSITIVE
SAO2 % BLDCOA: 100 % (ref 94–99)
SAO2 % BLDCOA: 100 % (ref 94–99)
SAO2 % BLDCOA: >100 % (ref 94–99)
SODIUM BLDA-SCNC: 136 MMOL/L (ref 136–146)
SODIUM BLDA-SCNC: 139 MMOL/L (ref 136–146)
SODIUM BLDA-SCNC: 139 MMOL/L (ref 136–146)
SODIUM SERPL-SCNC: 133 MMOL/L (ref 136–145)
VENTILATOR MODE: ABNORMAL
WBC # BLD AUTO: 13.97 10*3/MM3 (ref 3.4–10.8)

## 2021-01-25 PROCEDURE — 25010000002 MIDAZOLAM PER 1 MG: Performed by: NURSE ANESTHETIST, CERTIFIED REGISTERED

## 2021-01-25 PROCEDURE — 86901 BLOOD TYPING SEROLOGIC RH(D): CPT | Performed by: NURSE ANESTHETIST, CERTIFIED REGISTERED

## 2021-01-25 PROCEDURE — 25010000002 PHENYLEPHRINE PER 1 ML: Performed by: NURSE ANESTHETIST, CERTIFIED REGISTERED

## 2021-01-25 PROCEDURE — 80053 COMPREHEN METABOLIC PANEL: CPT | Performed by: SURGERY

## 2021-01-25 PROCEDURE — 25010000003 MEPERIDINE PER 100 MG: Performed by: ANESTHESIOLOGY

## 2021-01-25 PROCEDURE — 84100 ASSAY OF PHOSPHORUS: CPT | Performed by: SURGERY

## 2021-01-25 PROCEDURE — 83735 ASSAY OF MAGNESIUM: CPT | Performed by: SURGERY

## 2021-01-25 PROCEDURE — 94799 UNLISTED PULMONARY SVC/PX: CPT

## 2021-01-25 PROCEDURE — 25010000003 POTASSIUM CHLORIDE PER 2 MEQ: Performed by: NURSE ANESTHETIST, CERTIFIED REGISTERED

## 2021-01-25 PROCEDURE — 83050 HGB METHEMOGLOBIN QUAN: CPT

## 2021-01-25 PROCEDURE — 25010000002 PROPOFOL 10 MG/ML EMULSION: Performed by: NURSE ANESTHETIST, CERTIFIED REGISTERED

## 2021-01-25 PROCEDURE — 25010000002 HYDROMORPHONE 1 MG/ML SOLUTION: Performed by: NURSE ANESTHETIST, CERTIFIED REGISTERED

## 2021-01-25 PROCEDURE — 44320 COLOSTOMY: CPT | Performed by: SURGERY

## 2021-01-25 PROCEDURE — 25010000002 ONDANSETRON PER 1 MG: Performed by: NURSE ANESTHETIST, CERTIFIED REGISTERED

## 2021-01-25 PROCEDURE — 25010000002 DEXAMETHASONE PER 1 MG: Performed by: NURSE ANESTHETIST, CERTIFIED REGISTERED

## 2021-01-25 PROCEDURE — 25010000002 ALBUMIN HUMAN 5% PER 50 ML: Performed by: NURSE ANESTHETIST, CERTIFIED REGISTERED

## 2021-01-25 PROCEDURE — 85025 COMPLETE CBC W/AUTO DIFF WBC: CPT | Performed by: SURGERY

## 2021-01-25 PROCEDURE — 25010000002 NEOSTIGMINE 4 MG/4ML SOLUTION PREFILLED SYRINGE: Performed by: NURSE ANESTHETIST, CERTIFIED REGISTERED

## 2021-01-25 PROCEDURE — P9041 ALBUMIN (HUMAN),5%, 50ML: HCPCS | Performed by: NURSE ANESTHETIST, CERTIFIED REGISTERED

## 2021-01-25 PROCEDURE — 25010000002 SUCCINYLCHOLINE PER 20 MG: Performed by: NURSE ANESTHETIST, CERTIFIED REGISTERED

## 2021-01-25 PROCEDURE — 88304 TISSUE EXAM BY PATHOLOGIST: CPT

## 2021-01-25 PROCEDURE — 82805 BLOOD GASES W/O2 SATURATION: CPT

## 2021-01-25 PROCEDURE — 86900 BLOOD TYPING SEROLOGIC ABO: CPT | Performed by: NURSE ANESTHETIST, CERTIFIED REGISTERED

## 2021-01-25 PROCEDURE — 25010000002 CEFOXITIN: Performed by: SURGERY

## 2021-01-25 PROCEDURE — 25010000002 FENTANYL CITRATE (PF) 100 MCG/2ML SOLUTION: Performed by: NURSE ANESTHETIST, CERTIFIED REGISTERED

## 2021-01-25 PROCEDURE — 44620 REPAIR BOWEL OPENING: CPT | Performed by: SURGERY

## 2021-01-25 PROCEDURE — 84134 ASSAY OF PREALBUMIN: CPT | Performed by: SURGERY

## 2021-01-25 PROCEDURE — 82375 ASSAY CARBOXYHB QUANT: CPT

## 2021-01-25 DEVICE — PROXIMATE RELOADABLE LINEAR CUTTER WITH SAFETY LOCK-OUT.  55MM LINEAR CUTTER.
Type: IMPLANTABLE DEVICE | Site: ABDOMEN | Status: FUNCTIONAL
Brand: PROXIMATE

## 2021-01-25 DEVICE — CURVED INTRALUMINAL STAPLER (ILS) 20 TITANIUM ADJUSTABLE HEIGHT STAPLES: Type: IMPLANTABLE DEVICE | Site: ABDOMEN | Status: FUNCTIONAL

## 2021-01-25 DEVICE — PROXIMATE RELOADABLE LINEAR STAPLER
Type: IMPLANTABLE DEVICE | Site: ABDOMEN | Status: FUNCTIONAL
Brand: PROXIMATE

## 2021-01-25 RX ORDER — SODIUM CHLORIDE 9 MG/ML
INJECTION, SOLUTION INTRAVENOUS CONTINUOUS PRN
Status: DISCONTINUED | OUTPATIENT
Start: 2021-01-25 | End: 2021-01-25 | Stop reason: SURG

## 2021-01-25 RX ORDER — MIDAZOLAM HYDROCHLORIDE 1 MG/ML
INJECTION INTRAMUSCULAR; INTRAVENOUS AS NEEDED
Status: DISCONTINUED | OUTPATIENT
Start: 2021-01-25 | End: 2021-01-25 | Stop reason: SURG

## 2021-01-25 RX ORDER — LIDOCAINE HYDROCHLORIDE 20 MG/ML
INJECTION, SOLUTION EPIDURAL; INFILTRATION; INTRACAUDAL; PERINEURAL AS NEEDED
Status: DISCONTINUED | OUTPATIENT
Start: 2021-01-25 | End: 2021-01-25 | Stop reason: SURG

## 2021-01-25 RX ORDER — FAMOTIDINE 20 MG/1
20 TABLET, FILM COATED ORAL 2 TIMES DAILY
Status: DISCONTINUED | OUTPATIENT
Start: 2021-01-25 | End: 2021-01-26

## 2021-01-25 RX ORDER — MEPERIDINE HYDROCHLORIDE 25 MG/ML
12.5 INJECTION INTRAMUSCULAR; INTRAVENOUS; SUBCUTANEOUS ONCE AS NEEDED
Status: COMPLETED | OUTPATIENT
Start: 2021-01-25 | End: 2021-01-25

## 2021-01-25 RX ORDER — NALOXONE HCL 0.4 MG/ML
0.4 VIAL (ML) INJECTION
Status: DISCONTINUED | OUTPATIENT
Start: 2021-01-25 | End: 2021-01-26

## 2021-01-25 RX ORDER — HEPARIN SODIUM 5000 [USP'U]/ML
5000 INJECTION, SOLUTION INTRAVENOUS; SUBCUTANEOUS EVERY 8 HOURS SCHEDULED
Status: DISCONTINUED | OUTPATIENT
Start: 2021-01-26 | End: 2021-01-26

## 2021-01-25 RX ORDER — ALBUMIN, HUMAN INJ 5% 5 %
SOLUTION INTRAVENOUS CONTINUOUS PRN
Status: DISCONTINUED | OUTPATIENT
Start: 2021-01-25 | End: 2021-01-25 | Stop reason: SURG

## 2021-01-25 RX ORDER — NEOSTIGMINE METHYLSULFATE 4 MG/4 ML
SYRINGE (ML) INTRAVENOUS AS NEEDED
Status: DISCONTINUED | OUTPATIENT
Start: 2021-01-25 | End: 2021-01-25 | Stop reason: SURG

## 2021-01-25 RX ORDER — ALVIMOPAN 12 MG/1
12 CAPSULE ORAL ONCE
Status: COMPLETED | OUTPATIENT
Start: 2021-01-25 | End: 2021-01-25

## 2021-01-25 RX ORDER — SCOLOPAMINE TRANSDERMAL SYSTEM 1 MG/1
1 PATCH, EXTENDED RELEASE TRANSDERMAL CONTINUOUS
Status: DISCONTINUED | OUTPATIENT
Start: 2021-01-25 | End: 2021-01-25 | Stop reason: HOSPADM

## 2021-01-25 RX ORDER — FUROSEMIDE 20 MG/1
20 TABLET ORAL 2 TIMES DAILY
COMMUNITY
End: 2023-01-30 | Stop reason: ALTCHOICE

## 2021-01-25 RX ORDER — SODIUM CHLORIDE, SODIUM LACTATE, POTASSIUM CHLORIDE, CALCIUM CHLORIDE 600; 310; 30; 20 MG/100ML; MG/100ML; MG/100ML; MG/100ML
100 INJECTION, SOLUTION INTRAVENOUS CONTINUOUS
Status: DISCONTINUED | OUTPATIENT
Start: 2021-01-25 | End: 2021-01-26

## 2021-01-25 RX ORDER — VASOPRESSIN 20 U/ML
INJECTION PARENTERAL AS NEEDED
Status: DISCONTINUED | OUTPATIENT
Start: 2021-01-25 | End: 2021-01-25 | Stop reason: SURG

## 2021-01-25 RX ORDER — HYDROCODONE BITARTRATE AND ACETAMINOPHEN 5; 325 MG/1; MG/1
1 TABLET ORAL EVERY 4 HOURS PRN
Status: DISCONTINUED | OUTPATIENT
Start: 2021-01-25 | End: 2021-01-26

## 2021-01-25 RX ORDER — ACETAMINOPHEN 500 MG
1000 TABLET ORAL EVERY 6 HOURS
Status: DISCONTINUED | OUTPATIENT
Start: 2021-01-25 | End: 2021-01-26

## 2021-01-25 RX ORDER — ACETAMINOPHEN 500 MG
1000 TABLET ORAL EVERY 6 HOURS
Status: DISCONTINUED | OUTPATIENT
Start: 2021-01-25 | End: 2021-01-25 | Stop reason: HOSPADM

## 2021-01-25 RX ORDER — ALVIMOPAN 12 MG/1
12 CAPSULE ORAL 2 TIMES DAILY
Status: DISCONTINUED | OUTPATIENT
Start: 2021-01-26 | End: 2021-01-29

## 2021-01-25 RX ORDER — MEPERIDINE HYDROCHLORIDE 25 MG/ML
12.5 INJECTION INTRAMUSCULAR; INTRAVENOUS; SUBCUTANEOUS ONCE AS NEEDED
Status: DISCONTINUED | OUTPATIENT
Start: 2021-01-25 | End: 2021-01-25 | Stop reason: SDUPTHER

## 2021-01-25 RX ORDER — KETAMINE HYDROCHLORIDE 100 MG/ML
INJECTION INTRAMUSCULAR; INTRAVENOUS AS NEEDED
Status: DISCONTINUED | OUTPATIENT
Start: 2021-01-25 | End: 2021-01-25 | Stop reason: SURG

## 2021-01-25 RX ORDER — PROPOFOL 10 MG/ML
VIAL (ML) INTRAVENOUS AS NEEDED
Status: DISCONTINUED | OUTPATIENT
Start: 2021-01-25 | End: 2021-01-25 | Stop reason: SURG

## 2021-01-25 RX ORDER — CALCIUM CHLORIDE 100 MG/ML
INJECTION INTRAVENOUS; INTRAVENTRICULAR AS NEEDED
Status: DISCONTINUED | OUTPATIENT
Start: 2021-01-25 | End: 2021-01-25 | Stop reason: SURG

## 2021-01-25 RX ORDER — SUCCINYLCHOLINE CHLORIDE 20 MG/ML
INJECTION INTRAMUSCULAR; INTRAVENOUS AS NEEDED
Status: DISCONTINUED | OUTPATIENT
Start: 2021-01-25 | End: 2021-01-25 | Stop reason: SURG

## 2021-01-25 RX ORDER — EPHEDRINE SULFATE 50 MG/ML
INJECTION, SOLUTION INTRAVENOUS AS NEEDED
Status: DISCONTINUED | OUTPATIENT
Start: 2021-01-25 | End: 2021-01-25 | Stop reason: SURG

## 2021-01-25 RX ORDER — POTASSIUM CHLORIDE 29.8 MG/ML
20 INJECTION INTRAVENOUS ONCE
Status: COMPLETED | OUTPATIENT
Start: 2021-01-25 | End: 2021-01-25

## 2021-01-25 RX ORDER — MELOXICAM 15 MG/1
15 TABLET ORAL ONCE
Status: COMPLETED | OUTPATIENT
Start: 2021-01-25 | End: 2021-01-25

## 2021-01-25 RX ORDER — FENTANYL CITRATE 50 UG/ML
INJECTION, SOLUTION INTRAMUSCULAR; INTRAVENOUS AS NEEDED
Status: DISCONTINUED | OUTPATIENT
Start: 2021-01-25 | End: 2021-01-25 | Stop reason: SURG

## 2021-01-25 RX ORDER — GABAPENTIN 300 MG/1
600 CAPSULE ORAL ONCE
Status: COMPLETED | OUTPATIENT
Start: 2021-01-25 | End: 2021-01-25

## 2021-01-25 RX ORDER — DEXAMETHASONE SODIUM PHOSPHATE 4 MG/ML
INJECTION, SOLUTION INTRA-ARTICULAR; INTRALESIONAL; INTRAMUSCULAR; INTRAVENOUS; SOFT TISSUE AS NEEDED
Status: DISCONTINUED | OUTPATIENT
Start: 2021-01-25 | End: 2021-01-25 | Stop reason: SURG

## 2021-01-25 RX ORDER — ROCURONIUM BROMIDE 10 MG/ML
INJECTION, SOLUTION INTRAVENOUS AS NEEDED
Status: DISCONTINUED | OUTPATIENT
Start: 2021-01-25 | End: 2021-01-25 | Stop reason: SURG

## 2021-01-25 RX ORDER — GABAPENTIN 300 MG/1
300 CAPSULE ORAL 2 TIMES DAILY
Status: DISCONTINUED | OUTPATIENT
Start: 2021-01-25 | End: 2021-01-26

## 2021-01-25 RX ORDER — SODIUM CHLORIDE, SODIUM GLUCONATE, SODIUM ACETATE, POTASSIUM CHLORIDE, AND MAGNESIUM CHLORIDE 526; 502; 368; 37; 30 MG/100ML; MG/100ML; MG/100ML; MG/100ML; MG/100ML
1000 INJECTION, SOLUTION INTRAVENOUS CONTINUOUS
Status: DISCONTINUED | OUTPATIENT
Start: 2021-01-25 | End: 2021-01-25 | Stop reason: HOSPADM

## 2021-01-25 RX ORDER — ONDANSETRON 2 MG/ML
INJECTION INTRAMUSCULAR; INTRAVENOUS AS NEEDED
Status: DISCONTINUED | OUTPATIENT
Start: 2021-01-25 | End: 2021-01-25 | Stop reason: SURG

## 2021-01-25 RX ORDER — TAMSULOSIN HYDROCHLORIDE 0.4 MG/1
0.4 CAPSULE ORAL DAILY
Status: DISCONTINUED | OUTPATIENT
Start: 2021-01-25 | End: 2021-02-03 | Stop reason: HOSPADM

## 2021-01-25 RX ADMIN — TAMSULOSIN HYDROCHLORIDE 0.4 MG: 0.4 CAPSULE ORAL at 20:05

## 2021-01-25 RX ADMIN — LIDOCAINE HYDROCHLORIDE 100 MG: 20 INJECTION, SOLUTION EPIDURAL; INFILTRATION; INTRACAUDAL; PERINEURAL at 12:15

## 2021-01-25 RX ADMIN — PHENYLEPHRINE HYDROCHLORIDE 100 MCG: 10 INJECTION INTRAVENOUS at 12:16

## 2021-01-25 RX ADMIN — POTASSIUM CHLORIDE: 400 INJECTION, SOLUTION INTRAVENOUS at 13:23

## 2021-01-25 RX ADMIN — HYDROMORPHONE HYDROCHLORIDE 0.5 MG: 1 INJECTION, SOLUTION INTRAMUSCULAR; INTRAVENOUS; SUBCUTANEOUS at 15:03

## 2021-01-25 RX ADMIN — PHENYLEPHRINE HYDROCHLORIDE 100 MCG: 10 INJECTION INTRAVENOUS at 12:42

## 2021-01-25 RX ADMIN — HYDROMORPHONE HYDROCHLORIDE 0.5 MG: 1 INJECTION, SOLUTION INTRAMUSCULAR; INTRAVENOUS; SUBCUTANEOUS at 12:59

## 2021-01-25 RX ADMIN — PHENYLEPHRINE HYDROCHLORIDE 100 MCG: 10 INJECTION INTRAVENOUS at 12:23

## 2021-01-25 RX ADMIN — MEPERIDINE HYDROCHLORIDE 12.5 MG: 25 INJECTION, SOLUTION INTRAMUSCULAR; INTRAVENOUS; SUBCUTANEOUS at 16:55

## 2021-01-25 RX ADMIN — DEXAMETHASONE SODIUM PHOSPHATE 4 MG: 4 INJECTION, SOLUTION INTRAMUSCULAR; INTRAVENOUS at 13:00

## 2021-01-25 RX ADMIN — KETAMINE HYDROCHLORIDE 30 MG: 100 INJECTION INTRAMUSCULAR; INTRAVENOUS at 13:01

## 2021-01-25 RX ADMIN — ACETAMINOPHEN 1000 MG: 500 TABLET ORAL at 09:52

## 2021-01-25 RX ADMIN — PHENYLEPHRINE HYDROCHLORIDE 100 MCG: 10 INJECTION INTRAVENOUS at 12:52

## 2021-01-25 RX ADMIN — CEFOXITIN 2 G: 2 INJECTION, POWDER, FOR SOLUTION INTRAVENOUS at 14:17

## 2021-01-25 RX ADMIN — SODIUM CHLORIDE, SODIUM GLUCONATE, SODIUM ACETATE, POTASSIUM CHLORIDE, AND MAGNESIUM CHLORIDE: 526; 502; 368; 37; 30 INJECTION, SOLUTION INTRAVENOUS at 14:43

## 2021-01-25 RX ADMIN — SODIUM CHLORIDE, SODIUM GLUCONATE, SODIUM ACETATE, POTASSIUM CHLORIDE, AND MAGNESIUM CHLORIDE 1000 ML: 526; 502; 368; 37; 30 INJECTION, SOLUTION INTRAVENOUS at 09:50

## 2021-01-25 RX ADMIN — PROPOFOL 120 MG: 10 INJECTION, EMULSION INTRAVENOUS at 12:15

## 2021-01-25 RX ADMIN — GABAPENTIN 300 MG: 300 CAPSULE ORAL at 20:03

## 2021-01-25 RX ADMIN — EPHEDRINE SULFATE 30 MG: 50 INJECTION INTRAVENOUS at 12:22

## 2021-01-25 RX ADMIN — CALCIUM CHLORIDE 250 MG: 100 INJECTION, SOLUTION INTRAVENOUS at 14:49

## 2021-01-25 RX ADMIN — Medication 4 MG: at 14:44

## 2021-01-25 RX ADMIN — SCOPALAMINE 1 PATCH: 1 PATCH, EXTENDED RELEASE TRANSDERMAL at 09:50

## 2021-01-25 RX ADMIN — ALBUMIN HUMAN: 0.05 INJECTION, SOLUTION INTRAVENOUS at 13:20

## 2021-01-25 RX ADMIN — PHENYLEPHRINE HYDROCHLORIDE 100 MCG: 10 INJECTION INTRAVENOUS at 12:19

## 2021-01-25 RX ADMIN — EPHEDRINE SULFATE 10 MG: 50 INJECTION INTRAVENOUS at 12:43

## 2021-01-25 RX ADMIN — CALCIUM CHLORIDE 250 MG: 100 INJECTION, SOLUTION INTRAVENOUS at 13:30

## 2021-01-25 RX ADMIN — MIDAZOLAM HYDROCHLORIDE 1 MG: 2 INJECTION, SOLUTION INTRAMUSCULAR; INTRAVENOUS at 12:03

## 2021-01-25 RX ADMIN — ALBUMIN HUMAN: 0.05 INJECTION, SOLUTION INTRAVENOUS at 13:29

## 2021-01-25 RX ADMIN — CALCIUM CHLORIDE 250 MG: 100 INJECTION, SOLUTION INTRAVENOUS at 13:23

## 2021-01-25 RX ADMIN — VASOPRESSIN 1 UNITS: 20 INJECTION INTRAVENOUS at 12:18

## 2021-01-25 RX ADMIN — ROCURONIUM BROMIDE 35 MG: 10 INJECTION INTRAVENOUS at 12:22

## 2021-01-25 RX ADMIN — FAMOTIDINE 20 MG: 20 TABLET, FILM COATED ORAL at 20:03

## 2021-01-25 RX ADMIN — SUCCINYLCHOLINE CHLORIDE 90 MG: 20 INJECTION, SOLUTION INTRAMUSCULAR; INTRAVENOUS at 12:15

## 2021-01-25 RX ADMIN — GABAPENTIN 600 MG: 300 CAPSULE ORAL at 09:50

## 2021-01-25 RX ADMIN — SODIUM BICARBONATE 50 ML: 84 INJECTION INTRAVENOUS at 13:23

## 2021-01-25 RX ADMIN — CALCIUM CHLORIDE 250 MG: 100 INJECTION, SOLUTION INTRAVENOUS at 13:41

## 2021-01-25 RX ADMIN — SODIUM CHLORIDE: 900 INJECTION, SOLUTION INTRAVENOUS at 12:25

## 2021-01-25 RX ADMIN — ACETAMINOPHEN 1000 MG: 500 TABLET ORAL at 19:59

## 2021-01-25 RX ADMIN — ALVIMOPAN 12 MG: 12 CAPSULE ORAL at 09:51

## 2021-01-25 RX ADMIN — FENTANYL CITRATE 50 MCG: 50 INJECTION, SOLUTION INTRAMUSCULAR; INTRAVENOUS at 12:15

## 2021-01-25 RX ADMIN — ROCURONIUM BROMIDE 5 MG: 10 INJECTION INTRAVENOUS at 12:15

## 2021-01-25 RX ADMIN — SODIUM CHLORIDE, POTASSIUM CHLORIDE, SODIUM LACTATE AND CALCIUM CHLORIDE 100 ML/HR: 600; 310; 30; 20 INJECTION, SOLUTION INTRAVENOUS at 23:43

## 2021-01-25 RX ADMIN — EPHEDRINE SULFATE 20 MG: 50 INJECTION INTRAVENOUS at 12:18

## 2021-01-25 RX ADMIN — GLYCOPYRROLATE 0.8 MG: 0.2 INJECTION, SOLUTION INTRAMUSCULAR; INTRAVITREAL at 14:44

## 2021-01-25 RX ADMIN — ONDANSETRON 4 MG: 2 INJECTION INTRAMUSCULAR; INTRAVENOUS at 13:49

## 2021-01-25 RX ADMIN — MELOXICAM 15 MG: 15 TABLET ORAL at 09:51

## 2021-01-25 RX ADMIN — SODIUM BICARBONATE 50 ML: 84 INJECTION INTRAVENOUS at 14:49

## 2021-01-25 RX ADMIN — CEFOXITIN 2 G: 2 INJECTION, POWDER, FOR SOLUTION INTRAVENOUS at 12:17

## 2021-01-25 RX ADMIN — SUGAMMADEX 200 MG: 100 INJECTION, SOLUTION INTRAVENOUS at 15:01

## 2021-01-25 RX ADMIN — SODIUM CHLORIDE, POTASSIUM CHLORIDE, SODIUM LACTATE AND CALCIUM CHLORIDE 100 ML/HR: 600; 310; 30; 20 INJECTION, SOLUTION INTRAVENOUS at 17:00

## 2021-01-25 RX ADMIN — SODIUM CHLORIDE, SODIUM GLUCONATE, SODIUM ACETATE, POTASSIUM CHLORIDE, AND MAGNESIUM CHLORIDE: 526; 502; 368; 37; 30 INJECTION, SOLUTION INTRAVENOUS at 13:35

## 2021-01-25 NOTE — ANESTHESIA PROCEDURE NOTES
Airway  Urgency: elective    Date/Time: 1/25/2021 12:17 PM  Airway not difficult    General Information and Staff    Patient location during procedure: OR  CRNA: Serafin Talley CRNA    Indications and Patient Condition  Indications for airway management: airway protection    Preoxygenated: yes  MILS maintained throughout  Mask difficulty assessment: 0 - not attempted    Final Airway Details  Final airway type: endotracheal airway      Successful airway: ETT  Cuffed: yes   Successful intubation technique: direct laryngoscopy  Facilitating devices/methods: intubating stylet  Endotracheal tube insertion site: oral  Blade: Bk  Blade size: 3  ETT size (mm): 7.5  Cormack-Lehane Classification: grade I - full view of glottis  Placement verified by: chest auscultation and single lung ventilation   Measured from: gums  Number of attempts at approach: 1  Assessment: lips, teeth, and gum same as pre-op and atraumatic intubation

## 2021-01-25 NOTE — ANESTHESIA PROCEDURE NOTES
Peripheral IV    Start time: 1/25/2021 12:20 PM  End time: 1/25/2021 12:25 PM  Line placed for Fluids/Medication Admin.  Performed By   CRNA: Serafin Talley CRNA  Preanesthetic Checklist  Completed: patient identified, site marked, surgical consent, pre-op evaluation, timeout performed, IV checked, risks and benefits discussed and monitors and equipment checked  Peripheral IV Prep   Patient position: supine   Prep: ChloraPrep  Patient monitoring: heart rate, cardiac monitor and continuous pulse ox  Peripheral IV Procedure   Laterality:right  Location:  Wrist  Catheter size: 18 G         Post Assessment   Dressing Type: gauze, tape and transparent.    IV Dressing/Site: clean, dry and intact  Additional Notes  IV placement by FREDERIC Chin.
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
## 2021-01-25 NOTE — ANESTHESIA POSTPROCEDURE EVALUATION
Patient: Bar Crockett    Procedure Summary     Date: 01/25/21 Room / Location: [facility] OR 09 / [facility] OR    Anesthesia Start: 1207 Anesthesia Stop: 1538    Procedure: ILEOSTOMY CLOSURE WITH LOOP ILEOSTOMY PLACEMENT (N/A ) Diagnosis:       Ileostomy in place (CMS/HCC)      (Ileostomy in place (CMS/HCC) [Z93.2])    Surgeon: Nahum Herzog MD Provider: Melissa Eisenberg DO    Anesthesia Type: general ASA Status: 3          Anesthesia Type: general    Vitals  No vitals data found for the desired time range.          Post Anesthesia Care and Evaluation    Patient location during evaluation: bedside  Patient participation: complete - patient cannot participate  Level of consciousness: awake  Pain score: 0  Pain management: adequate  Airway patency: patent  Anesthetic complications: No anesthetic complications  PONV Status: none  Cardiovascular status: acceptable  Respiratory status: acceptable  Hydration status: acceptable

## 2021-01-25 NOTE — ANESTHESIA PROCEDURE NOTES
Arterial Line      Start time: 1/25/2021 12:15 PM  Stop Time:1/25/2021 12:20 PM       Line placed for hemodynamic monitoring, ABGs/Labs/ISTAT and MD/Surgeon request.  Performed By   Anesthesiologist: Melissa Eisenberg DO  Preanesthetic Checklist  Completed: patient identified, site marked, surgical consent, pre-op evaluation, timeout performed, IV checked, risks and benefits discussed and monitors and equipment checked  Arterial Line Prep   Sterile Tech: cap, gloves, sterile barriers and mask  Prep: ChloraPrep  Patient monitoring: EKG, continuous pulse oximetry and blood pressure monitoring  Arterial Line Procedure   Laterality:left  Location:  radial artery  Catheter size: 20 G   Number of attempts: 1  Successful placement: yes  Post Assessment   Dressing Type: wrist guard applied, secured with tape and occlusive dressing applied.   Complications no  Circ/Move/Sens Assessment: normal and unchanged.   Patient Tolerance: patient tolerated the procedure well with no apparent complications

## 2021-01-26 ENCOUNTER — APPOINTMENT (OUTPATIENT)
Dept: INTERVENTIONAL RADIOLOGY/VASCULAR | Facility: HOSPITAL | Age: 71
End: 2021-01-26

## 2021-01-26 ENCOUNTER — ANESTHESIA EVENT (OUTPATIENT)
Dept: ICU | Facility: HOSPITAL | Age: 71
End: 2021-01-26

## 2021-01-26 ENCOUNTER — TELEPHONE (OUTPATIENT)
Dept: SURGERY | Facility: CLINIC | Age: 71
End: 2021-01-26

## 2021-01-26 ENCOUNTER — APPOINTMENT (OUTPATIENT)
Dept: GENERAL RADIOLOGY | Facility: HOSPITAL | Age: 71
End: 2021-01-26

## 2021-01-26 ENCOUNTER — ANESTHESIA EVENT (OUTPATIENT)
Dept: PERIOP | Facility: HOSPITAL | Age: 71
End: 2021-01-26

## 2021-01-26 ENCOUNTER — APPOINTMENT (OUTPATIENT)
Dept: ULTRASOUND IMAGING | Facility: HOSPITAL | Age: 71
End: 2021-01-26

## 2021-01-26 ENCOUNTER — APPOINTMENT (OUTPATIENT)
Dept: CT IMAGING | Facility: HOSPITAL | Age: 71
End: 2021-01-26

## 2021-01-26 ENCOUNTER — ANESTHESIA (OUTPATIENT)
Dept: ICU | Facility: HOSPITAL | Age: 71
End: 2021-01-26

## 2021-01-26 ENCOUNTER — ANESTHESIA (OUTPATIENT)
Dept: PERIOP | Facility: HOSPITAL | Age: 71
End: 2021-01-26

## 2021-01-26 ENCOUNTER — APPOINTMENT (OUTPATIENT)
Dept: CARDIOLOGY | Facility: HOSPITAL | Age: 71
End: 2021-01-26

## 2021-01-26 PROBLEM — I70.229 CRITICAL LOWER LIMB ISCHEMIA (HCC): Status: ACTIVE | Noted: 2021-01-11

## 2021-01-26 LAB
A-A DO2: ABNORMAL
ABO GROUP BLD: NORMAL
ANION GAP SERPL CALCULATED.3IONS-SCNC: 11 MMOL/L (ref 5–15)
APTT PPP: 37.2 SECONDS (ref 20–40.3)
ARTERIAL PATENCY WRIST A: ABNORMAL
ATMOSPHERIC PRESS: 747 MMHG
ATMOSPHERIC PRESS: 748 MMHG
ATMOSPHERIC PRESS: 748 MMHG
BASE EXCESS BLDA CALC-SCNC: -0.4 MMOL/L (ref 0–2)
BASE EXCESS BLDA CALC-SCNC: -1.8 MMOL/L (ref 0–2)
BASE EXCESS BLDA CALC-SCNC: 0.1 MMOL/L (ref 0–2)
BASOPHILS # BLD AUTO: 0.02 10*3/MM3 (ref 0–0.2)
BASOPHILS # BLD AUTO: 0.02 10*3/MM3 (ref 0–0.2)
BASOPHILS NFR BLD AUTO: 0.1 % (ref 0–1.5)
BASOPHILS NFR BLD AUTO: 0.1 % (ref 0–1.5)
BDY SITE: ABNORMAL
BH CV ECHO MEAS - AO MAX PG (FULL): 1.9 MMHG
BH CV ECHO MEAS - AO MAX PG: 6.4 MMHG
BH CV ECHO MEAS - AO MEAN PG (FULL): 1 MMHG
BH CV ECHO MEAS - AO MEAN PG: 3 MMHG
BH CV ECHO MEAS - AO V2 MAX: 126 CM/SEC
BH CV ECHO MEAS - AO V2 MEAN: 84.5 CM/SEC
BH CV ECHO MEAS - AO V2 VTI: 22.7 CM
BH CV ECHO MEAS - AVA(I,A): 2.8 CM^2
BH CV ECHO MEAS - AVA(I,D): 2.8 CM^2
BH CV ECHO MEAS - AVA(V,A): 2.9 CM^2
BH CV ECHO MEAS - AVA(V,D): 2.9 CM^2
BH CV ECHO MEAS - BSA(HAYCOCK): 1.5 M^2
BH CV ECHO MEAS - BSA: 1.5 M^2
BH CV ECHO MEAS - BZI_BMI: 17.6 KILOGRAMS/M^2
BH CV ECHO MEAS - BZI_METRIC_HEIGHT: 167.6 CM
BH CV ECHO MEAS - BZI_METRIC_WEIGHT: 49.4 KG
BH CV ECHO MEAS - EDV(CUBED): 137.4 ML
BH CV ECHO MEAS - EDV(MOD-SP2): 36.9 ML
BH CV ECHO MEAS - EDV(MOD-SP4): 60.9 ML
BH CV ECHO MEAS - EDV(TEICH): 127.2 ML
BH CV ECHO MEAS - EF(CUBED): 55.8 %
BH CV ECHO MEAS - EF(MOD-SP2): 40.1 %
BH CV ECHO MEAS - EF(MOD-SP4): 41.9 %
BH CV ECHO MEAS - EF(TEICH): 47.2 %
BH CV ECHO MEAS - ESV(CUBED): 60.7 ML
BH CV ECHO MEAS - ESV(MOD-SP2): 22.1 ML
BH CV ECHO MEAS - ESV(MOD-SP4): 35.4 ML
BH CV ECHO MEAS - ESV(TEICH): 67.1 ML
BH CV ECHO MEAS - FS: 23.8 %
BH CV ECHO MEAS - IVS/LVPW: 1.1
BH CV ECHO MEAS - IVSD: 0.86 CM
BH CV ECHO MEAS - LA DIMENSION: 3.4 CM
BH CV ECHO MEAS - LV DIASTOLIC VOL/BSA (35-75): 39.4 ML/M^2
BH CV ECHO MEAS - LV MASS(C)D: 145.9 GRAMS
BH CV ECHO MEAS - LV MASS(C)DI: 94.4 GRAMS/M^2
BH CV ECHO MEAS - LV MAX PG: 4.5 MMHG
BH CV ECHO MEAS - LV MEAN PG: 2 MMHG
BH CV ECHO MEAS - LV SYSTOLIC VOL/BSA (12-30): 22.9 ML/M^2
BH CV ECHO MEAS - LV V1 MAX: 106 CM/SEC
BH CV ECHO MEAS - LV V1 MEAN: 60 CM/SEC
BH CV ECHO MEAS - LV V1 VTI: 18.1 CM
BH CV ECHO MEAS - LVIDD: 5.2 CM
BH CV ECHO MEAS - LVIDS: 3.9 CM
BH CV ECHO MEAS - LVLD AP2: 6.1 CM
BH CV ECHO MEAS - LVLD AP4: 7.6 CM
BH CV ECHO MEAS - LVLS AP2: 6.2 CM
BH CV ECHO MEAS - LVLS AP4: 6.8 CM
BH CV ECHO MEAS - LVOT AREA (M): 3.5 CM^2
BH CV ECHO MEAS - LVOT AREA: 3.5 CM^2
BH CV ECHO MEAS - LVOT DIAM: 2.1 CM
BH CV ECHO MEAS - LVPWD: 0.76 CM
BH CV ECHO MEAS - MR MAX PG: 33.2 MMHG
BH CV ECHO MEAS - MR MAX VEL: 288 CM/SEC
BH CV ECHO MEAS - MV A MAX VEL: 110 CM/SEC
BH CV ECHO MEAS - MV DEC SLOPE: 436 CM/SEC^2
BH CV ECHO MEAS - MV E MAX VEL: 86.5 CM/SEC
BH CV ECHO MEAS - MV E/A: 0.79
BH CV ECHO MEAS - MV P1/2T MAX VEL: 86 CM/SEC
BH CV ECHO MEAS - MV P1/2T: 57.8 MSEC
BH CV ECHO MEAS - MVA P1/2T LCG: 2.6 CM^2
BH CV ECHO MEAS - MVA(P1/2T): 3.8 CM^2
BH CV ECHO MEAS - RAP SYSTOLE: 5 MMHG
BH CV ECHO MEAS - RVSP: 33.5 MMHG
BH CV ECHO MEAS - SI(CUBED): 49.6 ML/M^2
BH CV ECHO MEAS - SI(LVOT): 40.6 ML/M^2
BH CV ECHO MEAS - SI(MOD-SP2): 9.6 ML/M^2
BH CV ECHO MEAS - SI(MOD-SP4): 16.5 ML/M^2
BH CV ECHO MEAS - SI(TEICH): 38.9 ML/M^2
BH CV ECHO MEAS - SV(CUBED): 76.7 ML
BH CV ECHO MEAS - SV(LVOT): 62.7 ML
BH CV ECHO MEAS - SV(MOD-SP2): 14.8 ML
BH CV ECHO MEAS - SV(MOD-SP4): 25.5 ML
BH CV ECHO MEAS - SV(TEICH): 60.1 ML
BH CV ECHO MEAS - TR MAX VEL: 267 CM/SEC
BLD GP AB SCN SERPL QL: NEGATIVE
BUN SERPL-MCNC: 20 MG/DL (ref 8–23)
BUN/CREAT SERPL: 12.3 (ref 7–25)
CA-I BLD-MCNC: 4.41 MG/DL (ref 4.6–5.6)
CA-I BLD-MCNC: 4.74 MG/DL (ref 4.6–5.6)
CA-I BLD-MCNC: 4.94 MG/DL (ref 4.6–5.6)
CALCIUM SPEC-SCNC: 8.6 MG/DL (ref 8.6–10.5)
CHLORIDE SERPL-SCNC: 105 MMOL/L (ref 98–107)
CO2 SERPL-SCNC: 24 MMOL/L (ref 22–29)
COHGB MFR BLD: 1.1 % (ref 0–5)
COHGB MFR BLD: 1.3 % (ref 0–5)
COHGB MFR BLD: 1.4 % (ref 0–5)
CREAT SERPL-MCNC: 1.63 MG/DL (ref 0.76–1.27)
DEPRECATED RDW RBC AUTO: 43.1 FL (ref 37–54)
DEPRECATED RDW RBC AUTO: 44.9 FL (ref 37–54)
EOSINOPHIL # BLD AUTO: 0 10*3/MM3 (ref 0–0.4)
EOSINOPHIL # BLD AUTO: 0 10*3/MM3 (ref 0–0.4)
EOSINOPHIL NFR BLD AUTO: 0 % (ref 0.3–6.2)
EOSINOPHIL NFR BLD AUTO: 0 % (ref 0.3–6.2)
ERYTHROCYTE [DISTWIDTH] IN BLOOD BY AUTOMATED COUNT: 13 % (ref 12.3–15.4)
ERYTHROCYTE [DISTWIDTH] IN BLOOD BY AUTOMATED COUNT: 13.1 % (ref 12.3–15.4)
GFR SERPL CREATININE-BSD FRML MDRD: 42 ML/MIN/1.73
GLUCOSE BLDA-MCNC: 101 MMOL/L (ref 65–95)
GLUCOSE BLDA-MCNC: 102 MMOL/L (ref 65–95)
GLUCOSE BLDA-MCNC: 97 MMOL/L (ref 65–95)
GLUCOSE SERPL-MCNC: 103 MG/DL (ref 65–99)
HCO3 BLDA-SCNC: 23.6 MMOL/L (ref 20–26)
HCO3 BLDA-SCNC: 24 MMOL/L (ref 20–26)
HCO3 BLDA-SCNC: 25.6 MMOL/L (ref 20–26)
HCT VFR BLD AUTO: 31.9 % (ref 37.5–51)
HCT VFR BLD AUTO: 32.8 % (ref 37.5–51)
HCT VFR BLD CALC: 27.8 % (ref 38–51)
HCT VFR BLD CALC: 28 % (ref 38–51)
HCT VFR BLD CALC: 30.4 % (ref 38–51)
HCYS SERPL-MCNC: 8.4 UMOL/L (ref 0–15)
HGB BLD-MCNC: 10.8 G/DL (ref 13–17.7)
HGB BLD-MCNC: 11.5 G/DL (ref 13–17.7)
HGB BLDA-MCNC: 9.1 G/DL (ref 14–18)
HGB BLDA-MCNC: 9.1 G/DL (ref 14–18)
HGB BLDA-MCNC: 9.9 G/DL (ref 14–18)
HOLD SPECIMEN: NORMAL
IMM GRANULOCYTES # BLD AUTO: 0.05 10*3/MM3 (ref 0–0.05)
IMM GRANULOCYTES # BLD AUTO: 0.1 10*3/MM3 (ref 0–0.05)
IMM GRANULOCYTES NFR BLD AUTO: 0.4 % (ref 0–0.5)
IMM GRANULOCYTES NFR BLD AUTO: 0.6 % (ref 0–0.5)
INR PPP: 1.33 (ref 0.8–1.2)
LV EF 2D ECHO EST: 46 %
LYMPHOCYTES # BLD AUTO: 0.81 10*3/MM3 (ref 0.7–3.1)
LYMPHOCYTES # BLD AUTO: 0.83 10*3/MM3 (ref 0.7–3.1)
LYMPHOCYTES NFR BLD AUTO: 4.5 % (ref 19.6–45.3)
LYMPHOCYTES NFR BLD AUTO: 6 % (ref 19.6–45.3)
Lab: ABNORMAL
Lab: NORMAL
MAXIMAL PREDICTED HEART RATE: 148 BPM
MCH RBC QN AUTO: 32.2 PG (ref 26.6–33)
MCH RBC QN AUTO: 32.2 PG (ref 26.6–33)
MCHC RBC AUTO-ENTMCNC: 33.9 G/DL (ref 31.5–35.7)
MCHC RBC AUTO-ENTMCNC: 35.1 G/DL (ref 31.5–35.7)
MCV RBC AUTO: 91.9 FL (ref 79–97)
MCV RBC AUTO: 95.2 FL (ref 79–97)
METHGB BLD QL: 0.1 % (ref 0–3)
METHGB BLD QL: 0.3 % (ref 0–3)
METHGB BLD QL: 1.3 % (ref 0–3)
MODALITY: ABNORMAL
MONOCYTES # BLD AUTO: 0.57 10*3/MM3 (ref 0.1–0.9)
MONOCYTES # BLD AUTO: 0.91 10*3/MM3 (ref 0.1–0.9)
MONOCYTES NFR BLD AUTO: 4.1 % (ref 5–12)
MONOCYTES NFR BLD AUTO: 5 % (ref 5–12)
NEUTROPHILS NFR BLD AUTO: 12.46 10*3/MM3 (ref 1.7–7)
NEUTROPHILS NFR BLD AUTO: 16.18 10*3/MM3 (ref 1.7–7)
NEUTROPHILS NFR BLD AUTO: 89.4 % (ref 42.7–76)
NEUTROPHILS NFR BLD AUTO: 89.8 % (ref 42.7–76)
NOTE: ABNORMAL
NRBC BLD AUTO-RTO: 0 /100 WBC (ref 0–0.2)
NRBC BLD AUTO-RTO: 0 /100 WBC (ref 0–0.2)
OXYHGB MFR BLDV: 97.4 % (ref 94–99)
OXYHGB MFR BLDV: >98.5 % (ref 94–99)
OXYHGB MFR BLDV: >98.5 % (ref 94–99)
PCO2 BLDA: 37.3 MM HG (ref 35–45)
PCO2 BLDA: 41.9 MM HG (ref 35–45)
PCO2 BLDA: 44.6 MM HG (ref 35–45)
PCO2 TEMP ADJ BLD: ABNORMAL MM[HG]
PH BLDA: 7.36 PH UNITS (ref 7.35–7.45)
PH BLDA: 7.37 PH UNITS (ref 7.35–7.45)
PH BLDA: 7.42 PH UNITS (ref 7.35–7.45)
PH, TEMP CORRECTED: ABNORMAL
PLATELET # BLD AUTO: 168 10*3/MM3 (ref 140–450)
PLATELET # BLD AUTO: 187 10*3/MM3 (ref 140–450)
PMV BLD AUTO: 10.5 FL (ref 6–12)
PMV BLD AUTO: 10.7 FL (ref 6–12)
PO2 BLDA: 209 MM HG (ref 83–108)
PO2 BLDA: 220 MM HG (ref 83–108)
PO2 BLDA: 313 MM HG (ref 83–108)
PO2 TEMP ADJ BLD: ABNORMAL MM[HG]
POTASSIUM BLDA-SCNC: 3.7 MMOL/L (ref 3.4–4.5)
POTASSIUM BLDA-SCNC: 3.8 MMOL/L (ref 3.4–4.5)
POTASSIUM BLDA-SCNC: 3.9 MMOL/L (ref 3.4–4.5)
POTASSIUM SERPL-SCNC: 3.5 MMOL/L (ref 3.5–5.2)
PROTHROMBIN TIME: 17.2 SECONDS (ref 11.1–15.3)
RBC # BLD AUTO: 3.35 10*6/MM3 (ref 4.14–5.8)
RBC # BLD AUTO: 3.57 10*6/MM3 (ref 4.14–5.8)
RH BLD: POSITIVE
SAO2 % BLDCOA: 100 % (ref 94–99)
SAO2 % BLDCOA: >100 % (ref 94–99)
SAO2 % BLDCOA: >100 % (ref 94–99)
SODIUM BLDA-SCNC: 140 MMOL/L (ref 136–146)
SODIUM BLDA-SCNC: 141 MMOL/L (ref 136–146)
SODIUM BLDA-SCNC: 141 MMOL/L (ref 136–146)
SODIUM SERPL-SCNC: 140 MMOL/L (ref 136–145)
STRESS TARGET HR: 126 BPM
T&S EXPIRATION DATE: NORMAL
VENTILATOR MODE: ABNORMAL
WBC # BLD AUTO: 13.93 10*3/MM3 (ref 3.4–10.8)
WBC # BLD AUTO: 18.02 10*3/MM3 (ref 3.4–10.8)
WHOLE BLOOD HOLD SPECIMEN: NORMAL

## 2021-01-26 PROCEDURE — 34201 REMOVAL OF ARTERY CLOT: CPT | Performed by: THORACIC SURGERY (CARDIOTHORACIC VASCULAR SURGERY)

## 2021-01-26 PROCEDURE — 85303 CLOT INHIBIT PROT C ACTIVITY: CPT | Performed by: NURSE PRACTITIONER

## 2021-01-26 PROCEDURE — 86900 BLOOD TYPING SEROLOGIC ABO: CPT | Performed by: NURSE PRACTITIONER

## 2021-01-26 PROCEDURE — 76000 FLUOROSCOPY <1 HR PHYS/QHP: CPT

## 2021-01-26 PROCEDURE — 85302 CLOT INHIBIT PROT C ANTIGEN: CPT | Performed by: NURSE PRACTITIONER

## 2021-01-26 PROCEDURE — 75635 CT ANGIO ABDOMINAL ARTERIES: CPT

## 2021-01-26 PROCEDURE — C1757 CATH, THROMBECTOMY/EMBOLECT: HCPCS | Performed by: THORACIC SURGERY (CARDIOTHORACIC VASCULAR SURGERY)

## 2021-01-26 PROCEDURE — 25010000002 VANCOMYCIN 1 G RECONSTITUTED SOLUTION: Performed by: THORACIC SURGERY (CARDIOTHORACIC VASCULAR SURGERY)

## 2021-01-26 PROCEDURE — 0 IOPAMIDOL PER 1 ML: Performed by: SURGERY

## 2021-01-26 PROCEDURE — 85670 THROMBIN TIME PLASMA: CPT | Performed by: NURSE PRACTITIONER

## 2021-01-26 PROCEDURE — 0DBB0ZZ EXCISION OF ILEUM, OPEN APPROACH: ICD-10-PCS | Performed by: SURGERY

## 2021-01-26 PROCEDURE — 86901 BLOOD TYPING SEROLOGIC RH(D): CPT | Performed by: NURSE PRACTITIONER

## 2021-01-26 PROCEDURE — 25010000002 HEPARIN (PORCINE) PER 1000 UNITS: Performed by: THORACIC SURGERY (CARDIOTHORACIC VASCULAR SURGERY)

## 2021-01-26 PROCEDURE — 97162 PT EVAL MOD COMPLEX 30 MIN: CPT

## 2021-01-26 PROCEDURE — 99024 POSTOP FOLLOW-UP VISIT: CPT | Performed by: SURGERY

## 2021-01-26 PROCEDURE — C1768 GRAFT, VASCULAR: HCPCS | Performed by: THORACIC SURGERY (CARDIOTHORACIC VASCULAR SURGERY)

## 2021-01-26 PROCEDURE — 97166 OT EVAL MOD COMPLEX 45 MIN: CPT

## 2021-01-26 PROCEDURE — 81241 F5 GENE: CPT | Performed by: NURSE PRACTITIONER

## 2021-01-26 PROCEDURE — 85705 THROMBOPLASTIN INHIBITION: CPT | Performed by: NURSE PRACTITIONER

## 2021-01-26 PROCEDURE — 25010000002 PHENYLEPHRINE PER 1 ML: Performed by: NURSE ANESTHETIST, CERTIFIED REGISTERED

## 2021-01-26 PROCEDURE — A4648 IMPLANTABLE TISSUE MARKER: HCPCS | Performed by: THORACIC SURGERY (CARDIOTHORACIC VASCULAR SURGERY)

## 2021-01-26 PROCEDURE — 36410 VNPNXR 3YR/> PHY/QHP DX/THER: CPT

## 2021-01-26 PROCEDURE — 85610 PROTHROMBIN TIME: CPT | Performed by: SURGERY

## 2021-01-26 PROCEDURE — 25010000002 IOPAMIDOL 61 % SOLUTION: Performed by: THORACIC SURGERY (CARDIOTHORACIC VASCULAR SURGERY)

## 2021-01-26 PROCEDURE — 85025 COMPLETE CBC W/AUTO DIFF WBC: CPT | Performed by: SURGERY

## 2021-01-26 PROCEDURE — 93306 TTE W/DOPPLER COMPLETE: CPT | Performed by: INTERNAL MEDICINE

## 2021-01-26 PROCEDURE — 03HY33Z INSERTION OF INFUSION DEVICE INTO UPPER ARTERY, PERCUTANEOUS APPROACH: ICD-10-PCS | Performed by: SURGERY

## 2021-01-26 PROCEDURE — 25010000002 HEPARIN (PORCINE) PER 1000 UNITS: Performed by: ANESTHESIOLOGY

## 2021-01-26 PROCEDURE — 25010000002 FENTANYL CITRATE (PF) 100 MCG/2ML SOLUTION: Performed by: NURSE ANESTHETIST, CERTIFIED REGISTERED

## 2021-01-26 PROCEDURE — 04CC3ZZ EXTIRPATION OF MATTER FROM RIGHT COMMON ILIAC ARTERY, PERCUTANEOUS APPROACH: ICD-10-PCS | Performed by: THORACIC SURGERY (CARDIOTHORACIC VASCULAR SURGERY)

## 2021-01-26 PROCEDURE — 93005 ELECTROCARDIOGRAM TRACING: CPT | Performed by: SURGERY

## 2021-01-26 PROCEDURE — P9041 ALBUMIN (HUMAN),5%, 50ML: HCPCS | Performed by: SURGERY

## 2021-01-26 PROCEDURE — 25010000002 DEXAMETHASONE PER 1 MG: Performed by: ANESTHESIOLOGY

## 2021-01-26 PROCEDURE — 25010000002 SUCCINYLCHOLINE PER 20 MG: Performed by: NURSE ANESTHETIST, CERTIFIED REGISTERED

## 2021-01-26 PROCEDURE — 99223 1ST HOSP IP/OBS HIGH 75: CPT | Performed by: NURSE PRACTITIONER

## 2021-01-26 PROCEDURE — 93010 ELECTROCARDIOGRAM REPORT: CPT | Performed by: INTERNAL MEDICINE

## 2021-01-26 PROCEDURE — 85300 ANTITHROMBIN III ACTIVITY: CPT | Performed by: NURSE PRACTITIONER

## 2021-01-26 PROCEDURE — 041K0JJ BYPASS RIGHT FEMORAL ARTERY TO LEFT FEMORAL ARTERY WITH SYNTHETIC SUBSTITUTE, OPEN APPROACH: ICD-10-PCS | Performed by: THORACIC SURGERY (CARDIOTHORACIC VASCULAR SURGERY)

## 2021-01-26 PROCEDURE — 85613 RUSSELL VIPER VENOM DILUTED: CPT | Performed by: NURSE PRACTITIONER

## 2021-01-26 PROCEDURE — 85730 THROMBOPLASTIN TIME PARTIAL: CPT | Performed by: SURGERY

## 2021-01-26 PROCEDURE — 25010000002 PROPOFOL 10 MG/ML EMULSION: Performed by: NURSE ANESTHETIST, CERTIFIED REGISTERED

## 2021-01-26 PROCEDURE — 83090 ASSAY OF HOMOCYSTEINE: CPT | Performed by: NURSE PRACTITIONER

## 2021-01-26 PROCEDURE — 86850 RBC ANTIBODY SCREEN: CPT | Performed by: NURSE PRACTITIONER

## 2021-01-26 PROCEDURE — 93306 TTE W/DOPPLER COMPLETE: CPT

## 2021-01-26 PROCEDURE — 80048 BASIC METABOLIC PNL TOTAL CA: CPT | Performed by: SURGERY

## 2021-01-26 PROCEDURE — 25010000002 ONDANSETRON PER 1 MG: Performed by: NURSE ANESTHETIST, CERTIFIED REGISTERED

## 2021-01-26 PROCEDURE — 25010000002 HEPARIN (PORCINE) PER 1000 UNITS: Performed by: SURGERY

## 2021-01-26 PROCEDURE — 25010000002 ALBUMIN HUMAN 5% PER 50 ML: Performed by: SURGERY

## 2021-01-26 PROCEDURE — C1725 CATH, TRANSLUMIN NON-LASER: HCPCS | Performed by: THORACIC SURGERY (CARDIOTHORACIC VASCULAR SURGERY)

## 2021-01-26 PROCEDURE — 85305 CLOT INHIBIT PROT S TOTAL: CPT | Performed by: NURSE PRACTITIONER

## 2021-01-26 PROCEDURE — 25010000002 HYDROMORPHONE 1 MG/ML SOLUTION: Performed by: SURGERY

## 2021-01-26 PROCEDURE — 76937 US GUIDE VASCULAR ACCESS: CPT

## 2021-01-26 PROCEDURE — 94799 UNLISTED PULMONARY SVC/PX: CPT

## 2021-01-26 PROCEDURE — 82375 ASSAY CARBOXYHB QUANT: CPT

## 2021-01-26 PROCEDURE — 85306 CLOT INHIBIT PROT S FREE: CPT | Performed by: NURSE PRACTITIONER

## 2021-01-26 PROCEDURE — 35661 BPG FEMORAL-FEMORAL: CPT | Performed by: THORACIC SURGERY (CARDIOTHORACIC VASCULAR SURGERY)

## 2021-01-26 PROCEDURE — 81240 F2 GENE: CPT | Performed by: NURSE PRACTITIONER

## 2021-01-26 PROCEDURE — 85732 THROMBOPLASTIN TIME PARTIAL: CPT | Performed by: NURSE PRACTITIONER

## 2021-01-26 PROCEDURE — C1894 INTRO/SHEATH, NON-LASER: HCPCS | Performed by: THORACIC SURGERY (CARDIOTHORACIC VASCULAR SURGERY)

## 2021-01-26 PROCEDURE — 82805 BLOOD GASES W/O2 SATURATION: CPT

## 2021-01-26 PROCEDURE — 25010000002 HYDROMORPHONE PER 4 MG: Performed by: THORACIC SURGERY (CARDIOTHORACIC VASCULAR SURGERY)

## 2021-01-26 PROCEDURE — C1751 CATH, INF, PER/CENT/MIDLINE: HCPCS

## 2021-01-26 PROCEDURE — P9041 ALBUMIN (HUMAN),5%, 50ML: HCPCS | Performed by: ANESTHESIOLOGY

## 2021-01-26 PROCEDURE — 25010000002 ALBUMIN HUMAN 5% PER 50 ML: Performed by: ANESTHESIOLOGY

## 2021-01-26 PROCEDURE — 25010000003 CEFAZOLIN PER 500 MG: Performed by: NURSE ANESTHETIST, CERTIFIED REGISTERED

## 2021-01-26 PROCEDURE — 83050 HGB METHEMOGLOBIN QUAN: CPT

## 2021-01-26 PROCEDURE — C1769 GUIDE WIRE: HCPCS | Performed by: THORACIC SURGERY (CARDIOTHORACIC VASCULAR SURGERY)

## 2021-01-26 PROCEDURE — 25010000002 MIDAZOLAM PER 1 MG: Performed by: NURSE ANESTHETIST, CERTIFIED REGISTERED

## 2021-01-26 PROCEDURE — 25010000002 PROTAMINE SULFATE PER 10 MG: Performed by: NURSE ANESTHETIST, CERTIFIED REGISTERED

## 2021-01-26 DEVICE — ABSORBABLE HEMOSTAT (OXIDIZED REGENERATED CELLULOSE, U.S.P.)
Type: IMPLANTABLE DEVICE | Site: GROIN | Status: FUNCTIONAL
Brand: SURGICEL

## 2021-01-26 DEVICE — CLIP LIGAT VASC HORIZON TI MD BLU 6CT: Type: IMPLANTABLE DEVICE | Site: GROIN | Status: FUNCTIONAL

## 2021-01-26 DEVICE — HEMOST ABS SURGICEL SNOW 1X2IN: Type: IMPLANTABLE DEVICE | Site: GROIN | Status: FUNCTIONAL

## 2021-01-26 DEVICE — CLIP LIGAT VASC HORIZON TI SM/WD RED 24CT: Type: IMPLANTABLE DEVICE | Site: GROIN | Status: FUNCTIONAL

## 2021-01-26 DEVICE — GRFT VASC 7MMX50CM W/RNG SECT 40CM: Type: IMPLANTABLE DEVICE | Site: GROIN | Status: FUNCTIONAL

## 2021-01-26 RX ORDER — ALBUTEROL SULFATE 2.5 MG/3ML
2.5 SOLUTION RESPIRATORY (INHALATION)
Status: DISPENSED | OUTPATIENT
Start: 2021-01-26 | End: 2021-01-30

## 2021-01-26 RX ORDER — CALCIUM CHLORIDE 100 MG/ML
INJECTION INTRAVENOUS; INTRAVENTRICULAR AS NEEDED
Status: DISCONTINUED | OUTPATIENT
Start: 2021-01-26 | End: 2021-01-26 | Stop reason: SURG

## 2021-01-26 RX ORDER — HEPARIN SODIUM 10000 [USP'U]/100ML
17 INJECTION, SOLUTION INTRAVENOUS
Status: DISCONTINUED | OUTPATIENT
Start: 2021-01-26 | End: 2021-01-28

## 2021-01-26 RX ORDER — ACETAMINOPHEN 650 MG/1
650 SUPPOSITORY RECTAL ONCE AS NEEDED
Status: DISCONTINUED | OUTPATIENT
Start: 2021-01-26 | End: 2021-01-26 | Stop reason: HOSPADM

## 2021-01-26 RX ORDER — AMOXICILLIN 250 MG
2 CAPSULE ORAL 2 TIMES DAILY PRN
Status: DISCONTINUED | OUTPATIENT
Start: 2021-01-26 | End: 2021-01-26

## 2021-01-26 RX ORDER — ONDANSETRON 4 MG/1
4 TABLET, FILM COATED ORAL EVERY 6 HOURS PRN
Status: DISCONTINUED | OUTPATIENT
Start: 2021-01-26 | End: 2021-02-03 | Stop reason: HOSPADM

## 2021-01-26 RX ORDER — HEPARIN SODIUM 10000 [USP'U]/100ML
18 INJECTION, SOLUTION INTRAVENOUS
Status: DISCONTINUED | OUTPATIENT
Start: 2021-01-26 | End: 2021-01-26

## 2021-01-26 RX ORDER — DIPHENHYDRAMINE HYDROCHLORIDE 50 MG/ML
25 INJECTION INTRAMUSCULAR; INTRAVENOUS EVERY 6 HOURS PRN
Status: DISCONTINUED | OUTPATIENT
Start: 2021-01-26 | End: 2021-01-28

## 2021-01-26 RX ORDER — SODIUM CHLORIDE 9 MG/ML
75 INJECTION, SOLUTION INTRAVENOUS CONTINUOUS
Status: DISCONTINUED | OUTPATIENT
Start: 2021-01-26 | End: 2021-01-28

## 2021-01-26 RX ORDER — PROPOFOL 10 MG/ML
VIAL (ML) INTRAVENOUS AS NEEDED
Status: DISCONTINUED | OUTPATIENT
Start: 2021-01-26 | End: 2021-01-26 | Stop reason: SURG

## 2021-01-26 RX ORDER — FENTANYL CITRATE 50 UG/ML
INJECTION, SOLUTION INTRAMUSCULAR; INTRAVENOUS AS NEEDED
Status: DISCONTINUED | OUTPATIENT
Start: 2021-01-26 | End: 2021-01-26 | Stop reason: SURG

## 2021-01-26 RX ORDER — HEPARIN SODIUM 5000 [USP'U]/ML
40 INJECTION, SOLUTION INTRAVENOUS; SUBCUTANEOUS AS NEEDED
Status: DISCONTINUED | OUTPATIENT
Start: 2021-01-26 | End: 2021-01-28

## 2021-01-26 RX ORDER — NALOXONE HCL 0.4 MG/ML
0.1 VIAL (ML) INJECTION
Status: DISCONTINUED | OUTPATIENT
Start: 2021-01-26 | End: 2021-01-28

## 2021-01-26 RX ORDER — ALBUMIN, HUMAN INJ 5% 5 %
500 SOLUTION INTRAVENOUS ONCE
Status: COMPLETED | OUTPATIENT
Start: 2021-01-26 | End: 2021-01-26

## 2021-01-26 RX ORDER — HEPARIN SODIUM 1000 [USP'U]/ML
INJECTION, SOLUTION INTRAVENOUS; SUBCUTANEOUS AS NEEDED
Status: DISCONTINUED | OUTPATIENT
Start: 2021-01-26 | End: 2021-01-26 | Stop reason: SURG

## 2021-01-26 RX ORDER — PROTAMINE SULFATE 10 MG/ML
INJECTION, SOLUTION INTRAVENOUS AS NEEDED
Status: DISCONTINUED | OUTPATIENT
Start: 2021-01-26 | End: 2021-01-26 | Stop reason: SURG

## 2021-01-26 RX ORDER — HYDROMORPHONE HCL IN 0.9% NACL 0.2 MG/ML
PLASTIC BAG, INJECTION (ML) INTRAVENOUS CONTINUOUS
Status: DISCONTINUED | OUTPATIENT
Start: 2021-01-26 | End: 2021-01-27

## 2021-01-26 RX ORDER — VANCOMYCIN HYDROCHLORIDE 1 G/20ML
INJECTION, POWDER, LYOPHILIZED, FOR SOLUTION INTRAVENOUS AS NEEDED
Status: DISCONTINUED | OUTPATIENT
Start: 2021-01-26 | End: 2021-01-26 | Stop reason: HOSPADM

## 2021-01-26 RX ORDER — SODIUM CHLORIDE 9 MG/ML
INJECTION, SOLUTION INTRAVENOUS
Status: COMPLETED
Start: 2021-01-26 | End: 2021-01-26

## 2021-01-26 RX ORDER — PROMETHAZINE HYDROCHLORIDE 25 MG/1
25 TABLET ORAL ONCE AS NEEDED
Status: DISCONTINUED | OUTPATIENT
Start: 2021-01-26 | End: 2021-01-26 | Stop reason: HOSPADM

## 2021-01-26 RX ORDER — SODIUM CHLORIDE 9 MG/ML
INJECTION, SOLUTION INTRAVENOUS AS NEEDED
Status: DISCONTINUED | OUTPATIENT
Start: 2021-01-26 | End: 2021-01-26 | Stop reason: HOSPADM

## 2021-01-26 RX ORDER — NALOXONE HCL 0.4 MG/ML
0.4 VIAL (ML) INJECTION AS NEEDED
Status: DISCONTINUED | OUTPATIENT
Start: 2021-01-26 | End: 2021-01-26 | Stop reason: HOSPADM

## 2021-01-26 RX ORDER — FLUMAZENIL 0.1 MG/ML
0.2 INJECTION INTRAVENOUS AS NEEDED
Status: DISCONTINUED | OUTPATIENT
Start: 2021-01-26 | End: 2021-01-26 | Stop reason: HOSPADM

## 2021-01-26 RX ORDER — BISACODYL 10 MG
10 SUPPOSITORY, RECTAL RECTAL DAILY PRN
Status: DISCONTINUED | OUTPATIENT
Start: 2021-01-26 | End: 2021-02-03 | Stop reason: HOSPADM

## 2021-01-26 RX ORDER — ALBUMIN, HUMAN INJ 5% 5 %
SOLUTION INTRAVENOUS CONTINUOUS PRN
Status: DISCONTINUED | OUTPATIENT
Start: 2021-01-26 | End: 2021-01-26 | Stop reason: SURG

## 2021-01-26 RX ORDER — NITROGLYCERIN 20 MG/100ML
5-200 INJECTION INTRAVENOUS
Status: DISCONTINUED | OUTPATIENT
Start: 2021-01-26 | End: 2021-01-27

## 2021-01-26 RX ORDER — CEFAZOLIN SODIUM 1 G/3ML
INJECTION, POWDER, FOR SOLUTION INTRAMUSCULAR; INTRAVENOUS AS NEEDED
Status: DISCONTINUED | OUTPATIENT
Start: 2021-01-26 | End: 2021-01-26 | Stop reason: SURG

## 2021-01-26 RX ORDER — SODIUM CHLORIDE 9 MG/ML
125 INJECTION, SOLUTION INTRAVENOUS CONTINUOUS
Status: CANCELLED | OUTPATIENT
Start: 2021-01-26

## 2021-01-26 RX ORDER — HEPARIN SODIUM 5000 [USP'U]/ML
80 INJECTION, SOLUTION INTRAVENOUS; SUBCUTANEOUS ONCE
Status: COMPLETED | OUTPATIENT
Start: 2021-01-26 | End: 2021-01-26

## 2021-01-26 RX ORDER — ONDANSETRON 2 MG/ML
4 INJECTION INTRAMUSCULAR; INTRAVENOUS ONCE AS NEEDED
Status: DISCONTINUED | OUTPATIENT
Start: 2021-01-26 | End: 2021-01-26 | Stop reason: HOSPADM

## 2021-01-26 RX ORDER — HEPARIN SODIUM 5000 [USP'U]/ML
80 INJECTION, SOLUTION INTRAVENOUS; SUBCUTANEOUS AS NEEDED
Status: DISCONTINUED | OUTPATIENT
Start: 2021-01-26 | End: 2021-01-28

## 2021-01-26 RX ORDER — DEXAMETHASONE SODIUM PHOSPHATE 4 MG/ML
INJECTION, SOLUTION INTRA-ARTICULAR; INTRALESIONAL; INTRAMUSCULAR; INTRAVENOUS; SOFT TISSUE AS NEEDED
Status: DISCONTINUED | OUTPATIENT
Start: 2021-01-26 | End: 2021-01-26 | Stop reason: SURG

## 2021-01-26 RX ORDER — MEPERIDINE HYDROCHLORIDE 25 MG/ML
12.5 INJECTION INTRAMUSCULAR; INTRAVENOUS; SUBCUTANEOUS
Status: DISCONTINUED | OUTPATIENT
Start: 2021-01-26 | End: 2021-01-26 | Stop reason: HOSPADM

## 2021-01-26 RX ORDER — ONDANSETRON 2 MG/ML
INJECTION INTRAMUSCULAR; INTRAVENOUS AS NEEDED
Status: DISCONTINUED | OUTPATIENT
Start: 2021-01-26 | End: 2021-01-26 | Stop reason: SURG

## 2021-01-26 RX ORDER — HEPARIN SODIUM 5000 [USP'U]/ML
40 INJECTION, SOLUTION INTRAVENOUS; SUBCUTANEOUS AS NEEDED
Status: DISCONTINUED | OUTPATIENT
Start: 2021-01-26 | End: 2021-01-26

## 2021-01-26 RX ORDER — MIDAZOLAM HYDROCHLORIDE 1 MG/ML
INJECTION INTRAMUSCULAR; INTRAVENOUS AS NEEDED
Status: DISCONTINUED | OUTPATIENT
Start: 2021-01-26 | End: 2021-01-26 | Stop reason: SURG

## 2021-01-26 RX ORDER — HEPARIN SODIUM 5000 [USP'U]/ML
INJECTION, SOLUTION INTRAVENOUS; SUBCUTANEOUS AS NEEDED
Status: DISCONTINUED | OUTPATIENT
Start: 2021-01-26 | End: 2021-01-26 | Stop reason: HOSPADM

## 2021-01-26 RX ORDER — DIPHENHYDRAMINE HYDROCHLORIDE 50 MG/ML
12.5 INJECTION INTRAMUSCULAR; INTRAVENOUS
Status: DISCONTINUED | OUTPATIENT
Start: 2021-01-26 | End: 2021-01-26 | Stop reason: HOSPADM

## 2021-01-26 RX ORDER — GABAPENTIN 300 MG/1
300 CAPSULE ORAL 2 TIMES DAILY
Status: COMPLETED | OUTPATIENT
Start: 2021-01-27 | End: 2021-01-30

## 2021-01-26 RX ORDER — HEPARIN SODIUM 5000 [USP'U]/ML
80 INJECTION, SOLUTION INTRAVENOUS; SUBCUTANEOUS AS NEEDED
Status: DISCONTINUED | OUTPATIENT
Start: 2021-01-26 | End: 2021-01-26

## 2021-01-26 RX ORDER — ONDANSETRON 2 MG/ML
4 INJECTION INTRAMUSCULAR; INTRAVENOUS EVERY 6 HOURS PRN
Status: DISCONTINUED | OUTPATIENT
Start: 2021-01-26 | End: 2021-02-03 | Stop reason: HOSPADM

## 2021-01-26 RX ORDER — BUPIVACAINE HCL/0.9 % NACL/PF 0.1 %
2 PLASTIC BAG, INJECTION (ML) EPIDURAL ONCE
Status: CANCELLED | OUTPATIENT
Start: 2021-01-26 | End: 2021-01-26

## 2021-01-26 RX ORDER — PROMETHAZINE HYDROCHLORIDE 25 MG/1
25 SUPPOSITORY RECTAL ONCE AS NEEDED
Status: DISCONTINUED | OUTPATIENT
Start: 2021-01-26 | End: 2021-01-26 | Stop reason: HOSPADM

## 2021-01-26 RX ORDER — FAMOTIDINE 20 MG/1
20 TABLET, FILM COATED ORAL
Status: DISCONTINUED | OUTPATIENT
Start: 2021-01-27 | End: 2021-02-03 | Stop reason: HOSPADM

## 2021-01-26 RX ORDER — BUPIVACAINE HCL/0.9 % NACL/PF 0.1 %
2 PLASTIC BAG, INJECTION (ML) EPIDURAL EVERY 8 HOURS
Status: COMPLETED | OUTPATIENT
Start: 2021-01-27 | End: 2021-01-27

## 2021-01-26 RX ORDER — ACETAMINOPHEN 325 MG/1
650 TABLET ORAL ONCE AS NEEDED
Status: DISCONTINUED | OUTPATIENT
Start: 2021-01-26 | End: 2021-01-26 | Stop reason: HOSPADM

## 2021-01-26 RX ORDER — EPHEDRINE SULFATE 50 MG/ML
5 INJECTION, SOLUTION INTRAVENOUS ONCE AS NEEDED
Status: DISCONTINUED | OUTPATIENT
Start: 2021-01-26 | End: 2021-01-26 | Stop reason: HOSPADM

## 2021-01-26 RX ORDER — ACETAMINOPHEN 325 MG/1
650 TABLET ORAL EVERY 4 HOURS PRN
Status: DISCONTINUED | OUTPATIENT
Start: 2021-01-26 | End: 2021-02-03 | Stop reason: HOSPADM

## 2021-01-26 RX ORDER — SUCCINYLCHOLINE CHLORIDE 20 MG/ML
INJECTION INTRAMUSCULAR; INTRAVENOUS AS NEEDED
Status: DISCONTINUED | OUTPATIENT
Start: 2021-01-26 | End: 2021-01-26 | Stop reason: SURG

## 2021-01-26 RX ORDER — SODIUM CHLORIDE 9 MG/ML
INJECTION, SOLUTION INTRAVENOUS CONTINUOUS PRN
Status: DISCONTINUED | OUTPATIENT
Start: 2021-01-26 | End: 2021-01-26 | Stop reason: SURG

## 2021-01-26 RX ORDER — SODIUM CHLORIDE, SODIUM GLUCONATE, SODIUM ACETATE, POTASSIUM CHLORIDE, AND MAGNESIUM CHLORIDE 526; 502; 368; 37; 30 MG/100ML; MG/100ML; MG/100ML; MG/100ML; MG/100ML
INJECTION, SOLUTION INTRAVENOUS CONTINUOUS PRN
Status: DISCONTINUED | OUTPATIENT
Start: 2021-01-26 | End: 2021-01-26 | Stop reason: SURG

## 2021-01-26 RX ADMIN — ALVIMOPAN 12 MG: 12 CAPSULE ORAL at 11:52

## 2021-01-26 RX ADMIN — HYDROMORPHONE HYDROCHLORIDE 0.5 MG: 1 INJECTION, SOLUTION INTRAMUSCULAR; INTRAVENOUS; SUBCUTANEOUS at 10:15

## 2021-01-26 RX ADMIN — FAMOTIDINE 20 MG: 20 TABLET, FILM COATED ORAL at 21:27

## 2021-01-26 RX ADMIN — SODIUM CHLORIDE, POTASSIUM CHLORIDE, SODIUM LACTATE AND CALCIUM CHLORIDE 100 ML/HR: 600; 310; 30; 20 INJECTION, SOLUTION INTRAVENOUS at 10:07

## 2021-01-26 RX ADMIN — ACETAMINOPHEN 1000 MG: 500 TABLET ORAL at 13:25

## 2021-01-26 RX ADMIN — MIDAZOLAM HYDROCHLORIDE 2 MG: 2 INJECTION, SOLUTION INTRAMUSCULAR; INTRAVENOUS at 16:14

## 2021-01-26 RX ADMIN — TAMSULOSIN HYDROCHLORIDE 0.4 MG: 0.4 CAPSULE ORAL at 09:15

## 2021-01-26 RX ADMIN — CEFAZOLIN SODIUM 2 G: 1 INJECTION, POWDER, FOR SOLUTION INTRAMUSCULAR; INTRAVENOUS at 16:47

## 2021-01-26 RX ADMIN — HYDROMORPHONE HYDROCHLORIDE 0.5 MG: 1 INJECTION, SOLUTION INTRAMUSCULAR; INTRAVENOUS; SUBCUTANEOUS at 15:41

## 2021-01-26 RX ADMIN — ACETAMINOPHEN 1000 MG: 500 TABLET ORAL at 02:09

## 2021-01-26 RX ADMIN — HEPARIN SODIUM 5000 UNITS: 5000 INJECTION, SOLUTION INTRAVENOUS; SUBCUTANEOUS at 05:35

## 2021-01-26 RX ADMIN — FENTANYL CITRATE 50 MCG: 50 INJECTION, SOLUTION INTRAMUSCULAR; INTRAVENOUS at 16:14

## 2021-01-26 RX ADMIN — IOPAMIDOL 120 ML: 755 INJECTION, SOLUTION INTRAVENOUS at 11:25

## 2021-01-26 RX ADMIN — SODIUM CHLORIDE 75 ML/HR: 9 INJECTION, SOLUTION INTRAVENOUS at 20:49

## 2021-01-26 RX ADMIN — PROTAMINE SULFATE 20 MG: 10 INJECTION, SOLUTION INTRAVENOUS at 19:19

## 2021-01-26 RX ADMIN — GABAPENTIN 300 MG: 300 CAPSULE ORAL at 09:15

## 2021-01-26 RX ADMIN — FENTANYL CITRATE 25 MCG: 50 INJECTION, SOLUTION INTRAMUSCULAR; INTRAVENOUS at 20:10

## 2021-01-26 RX ADMIN — SODIUM CHLORIDE: 9 INJECTION, SOLUTION INTRAVENOUS at 11:48

## 2021-01-26 RX ADMIN — HEPARIN SODIUM 18 UNITS/KG/HR: 10000 INJECTION, SOLUTION INTRAVENOUS at 17:35

## 2021-01-26 RX ADMIN — FENTANYL CITRATE 25 MCG: 50 INJECTION, SOLUTION INTRAMUSCULAR; INTRAVENOUS at 19:54

## 2021-01-26 RX ADMIN — SODIUM CHLORIDE: 900 INJECTION, SOLUTION INTRAVENOUS at 16:18

## 2021-01-26 RX ADMIN — HYDROCODONE BITARTRATE AND ACETAMINOPHEN 1 TABLET: 5; 325 TABLET ORAL at 02:20

## 2021-01-26 RX ADMIN — FENTANYL CITRATE 50 MCG: 50 INJECTION, SOLUTION INTRAMUSCULAR; INTRAVENOUS at 17:03

## 2021-01-26 RX ADMIN — CALCIUM CHLORIDE 0.5 G: 100 INJECTION, SOLUTION INTRAVENOUS at 17:05

## 2021-01-26 RX ADMIN — NITROGLYCERIN 0.5 INCH: 20 OINTMENT TOPICAL at 13:24

## 2021-01-26 RX ADMIN — SODIUM CHLORIDE, SODIUM GLUCONATE, SODIUM ACETATE, POTASSIUM CHLORIDE, AND MAGNESIUM CHLORIDE: 526; 502; 368; 37; 30 INJECTION, SOLUTION INTRAVENOUS at 17:10

## 2021-01-26 RX ADMIN — HYDROMORPHONE HYDROCHLORIDE: 2 INJECTION INTRAMUSCULAR; INTRAVENOUS; SUBCUTANEOUS at 21:41

## 2021-01-26 RX ADMIN — HEPARIN SODIUM 4000 UNITS: 5000 INJECTION, SOLUTION INTRAVENOUS; SUBCUTANEOUS at 13:24

## 2021-01-26 RX ADMIN — ACETAMINOPHEN 1000 MG: 500 TABLET ORAL at 09:16

## 2021-01-26 RX ADMIN — HEPARIN SODIUM 18 UNITS/KG/HR: 10000 INJECTION, SOLUTION INTRAVENOUS at 13:32

## 2021-01-26 RX ADMIN — PROPOFOL 100 MG: 10 INJECTION, EMULSION INTRAVENOUS at 16:21

## 2021-01-26 RX ADMIN — DEXAMETHASONE SODIUM PHOSPHATE 4 MG: 4 INJECTION, SOLUTION INTRAMUSCULAR; INTRAVENOUS at 17:13

## 2021-01-26 RX ADMIN — ALVIMOPAN 12 MG: 12 CAPSULE ORAL at 23:07

## 2021-01-26 RX ADMIN — ALBUMIN HUMAN: 0.05 INJECTION, SOLUTION INTRAVENOUS at 16:49

## 2021-01-26 RX ADMIN — ONDANSETRON 4 MG: 2 INJECTION INTRAMUSCULAR; INTRAVENOUS at 19:44

## 2021-01-26 RX ADMIN — SUCCINYLCHOLINE CHLORIDE 160 MG: 20 INJECTION, SOLUTION INTRAMUSCULAR; INTRAVENOUS at 16:21

## 2021-01-26 RX ADMIN — ALBUMIN HUMAN 500 ML: 0.05 INJECTION, SOLUTION INTRAVENOUS at 10:06

## 2021-01-26 RX ADMIN — FENTANYL CITRATE 50 MCG: 50 INJECTION, SOLUTION INTRAMUSCULAR; INTRAVENOUS at 16:21

## 2021-01-26 RX ADMIN — PHENYLEPHRINE HYDROCHLORIDE 200 MCG: 10 INJECTION INTRAVENOUS at 16:24

## 2021-01-26 RX ADMIN — CALCIUM CHLORIDE 0.5 G: 100 INJECTION, SOLUTION INTRAVENOUS at 16:45

## 2021-01-26 RX ADMIN — NITROGLYCERIN 0.5 INCH: 20 OINTMENT TOPICAL at 20:38

## 2021-01-26 RX ADMIN — FAMOTIDINE 20 MG: 20 TABLET, FILM COATED ORAL at 09:16

## 2021-01-26 RX ADMIN — ACETAMINOPHEN 1000 MG: 500 TABLET ORAL at 21:27

## 2021-01-26 RX ADMIN — HEPARIN SODIUM 3000 UNITS: 1000 INJECTION, SOLUTION INTRAVENOUS; SUBCUTANEOUS at 17:22

## 2021-01-26 RX ADMIN — NITROGLYCERIN 0.5 INCH: 20 OINTMENT TOPICAL at 20:39

## 2021-01-26 NOTE — ANESTHESIA PROCEDURE NOTES
Airway  Urgency: elective    Date/Time: 1/26/2021 4:23 PM  End Time:1/26/2021 4:23 PM  Airway not difficult    General Information and Staff    Patient location during procedure: OR  CRNA: Amy Robledo CRNA    Indications and Patient Condition  Indications for airway management: airway protection    Preoxygenated: yes  Mask difficulty assessment: 0 - not attempted    Final Airway Details  Final airway type: endotracheal airway      Successful airway: ETT  Cuffed: yes   Successful intubation technique: video laryngoscopy  Facilitating devices/methods: intubating stylet  Endotracheal tube insertion site: oral  Blade: Bk  Blade size: 3  ETT size (mm): 7.5  Cormack-Lehane Classification: grade I - full view of glottis  Placement verified by: chest auscultation and capnometry   Cuff volume (mL): 8  Measured from: lips  ETT/EBT  to lips (cm): 21  Number of attempts at approach: 1  Assessment: lips, teeth, and gum same as pre-op

## 2021-01-26 NOTE — ANESTHESIA PROCEDURE NOTES
Arterial Line      Patient reassessed immediately prior to procedure    Patient location during procedure: ICU  Start time: 1/26/2021 2:47 PM  Stop Time:1/26/2021 2:52 PM       Line placed for hemodynamic monitoring and MD/Surgeon request.  Performed By   Anesthesiologist: John Paul Delgado MD  Preanesthetic Checklist  Completed: patient identified, site marked, surgical consent, pre-op evaluation, timeout performed, IV checked, risks and benefits discussed and monitors and equipment checked  Arterial Line Prep   Sterile Tech: cap, gloves and mask  Prep: ChloraPrep  Patient monitoring: blood pressure monitoring, continuous pulse oximetry and EKG  Arterial Line Procedure   Laterality:right  Location:  radial artery  Catheter size: 20 G   Guidance: landmark technique and palpation technique  Number of attempts: 1  Successful placement: yes  Post Assessment   Dressing Type: occlusive dressing applied, secured with tape and wrist guard applied.   Complications no  Circ/Move/Sens Assessment: normal.   Patient Tolerance: patient tolerated the procedure well with no apparent complications
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
## 2021-01-27 LAB
ANION GAP SERPL CALCULATED.3IONS-SCNC: 9 MMOL/L (ref 5–15)
APTT PPP: 106.4 SECONDS (ref 20–40.3)
APTT PPP: 60.3 SECONDS (ref 20–40.3)
APTT PPP: 88.4 SECONDS (ref 20–40.3)
BASOPHILS # BLD AUTO: 0.02 10*3/MM3 (ref 0–0.2)
BASOPHILS NFR BLD AUTO: 0.1 % (ref 0–1.5)
BUN SERPL-MCNC: 18 MG/DL (ref 8–23)
BUN/CREAT SERPL: 10.4 (ref 7–25)
CALCIUM SPEC-SCNC: 8.5 MG/DL (ref 8.6–10.5)
CHLORIDE SERPL-SCNC: 110 MMOL/L (ref 98–107)
CO2 SERPL-SCNC: 23 MMOL/L (ref 22–29)
CREAT SERPL-MCNC: 1.73 MG/DL (ref 0.76–1.27)
DEPRECATED RDW RBC AUTO: 46 FL (ref 37–54)
EOSINOPHIL # BLD AUTO: 0 10*3/MM3 (ref 0–0.4)
EOSINOPHIL NFR BLD AUTO: 0 % (ref 0.3–6.2)
ERYTHROCYTE [DISTWIDTH] IN BLOOD BY AUTOMATED COUNT: 13.4 % (ref 12.3–15.4)
F5 GENE MUT ANL BLD/T: NORMAL
FACTOR II, DNA ANALYSIS: NORMAL
GFR SERPL CREATININE-BSD FRML MDRD: 39 ML/MIN/1.73
GLUCOSE SERPL-MCNC: 107 MG/DL (ref 65–99)
HCT VFR BLD AUTO: 26.6 % (ref 37.5–51)
HGB BLD-MCNC: 9.1 G/DL (ref 13–17.7)
IMM GRANULOCYTES # BLD AUTO: 0.05 10*3/MM3 (ref 0–0.05)
IMM GRANULOCYTES NFR BLD AUTO: 0.3 % (ref 0–0.5)
LYMPHOCYTES # BLD AUTO: 0.57 10*3/MM3 (ref 0.7–3.1)
LYMPHOCYTES NFR BLD AUTO: 4 % (ref 19.6–45.3)
MCH RBC QN AUTO: 32.4 PG (ref 26.6–33)
MCHC RBC AUTO-ENTMCNC: 34.2 G/DL (ref 31.5–35.7)
MCV RBC AUTO: 94.7 FL (ref 79–97)
MONOCYTES # BLD AUTO: 0.66 10*3/MM3 (ref 0.1–0.9)
MONOCYTES NFR BLD AUTO: 4.6 % (ref 5–12)
NEUTROPHILS NFR BLD AUTO: 13.03 10*3/MM3 (ref 1.7–7)
NEUTROPHILS NFR BLD AUTO: 91 % (ref 42.7–76)
NRBC BLD AUTO-RTO: 0 /100 WBC (ref 0–0.2)
PLATELET # BLD AUTO: 144 10*3/MM3 (ref 140–450)
PMV BLD AUTO: 10.8 FL (ref 6–12)
POTASSIUM SERPL-SCNC: 4.2 MMOL/L (ref 3.5–5.2)
QT INTERVAL: 370 MS
QTC INTERVAL: 450 MS
RBC # BLD AUTO: 2.81 10*6/MM3 (ref 4.14–5.8)
SODIUM SERPL-SCNC: 142 MMOL/L (ref 136–145)
WBC # BLD AUTO: 14.33 10*3/MM3 (ref 3.4–10.8)

## 2021-01-27 PROCEDURE — 85025 COMPLETE CBC W/AUTO DIFF WBC: CPT | Performed by: THORACIC SURGERY (CARDIOTHORACIC VASCULAR SURGERY)

## 2021-01-27 PROCEDURE — 99024 POSTOP FOLLOW-UP VISIT: CPT | Performed by: NURSE PRACTITIONER

## 2021-01-27 PROCEDURE — 97110 THERAPEUTIC EXERCISES: CPT

## 2021-01-27 PROCEDURE — 80048 BASIC METABOLIC PNL TOTAL CA: CPT | Performed by: THORACIC SURGERY (CARDIOTHORACIC VASCULAR SURGERY)

## 2021-01-27 PROCEDURE — 25010000002 HEPARIN (PORCINE) PER 1000 UNITS: Performed by: THORACIC SURGERY (CARDIOTHORACIC VASCULAR SURGERY)

## 2021-01-27 PROCEDURE — 85730 THROMBOPLASTIN TIME PARTIAL: CPT | Performed by: THORACIC SURGERY (CARDIOTHORACIC VASCULAR SURGERY)

## 2021-01-27 PROCEDURE — 25010000002 CEFAZOLIN PER 500 MG: Performed by: NURSE PRACTITIONER

## 2021-01-27 PROCEDURE — 25010000002 CEFAZOLIN PER 500 MG: Performed by: THORACIC SURGERY (CARDIOTHORACIC VASCULAR SURGERY)

## 2021-01-27 RX ORDER — HYDROMORPHONE HCL IN 0.9% NACL 0.2 MG/ML
PLASTIC BAG, INJECTION (ML) INTRAVENOUS CONTINUOUS
Status: DISCONTINUED | OUTPATIENT
Start: 2021-01-27 | End: 2021-01-28

## 2021-01-27 RX ADMIN — NITROGLYCERIN 0.5 INCH: 20 OINTMENT TOPICAL at 08:47

## 2021-01-27 RX ADMIN — CEFAZOLIN SODIUM 2 G: 10 INJECTION, POWDER, FOR SOLUTION INTRAVENOUS at 17:47

## 2021-01-27 RX ADMIN — ALVIMOPAN 12 MG: 12 CAPSULE ORAL at 10:19

## 2021-01-27 RX ADMIN — GABAPENTIN 300 MG: 300 CAPSULE ORAL at 08:47

## 2021-01-27 RX ADMIN — ALVIMOPAN 12 MG: 12 CAPSULE ORAL at 20:59

## 2021-01-27 RX ADMIN — SODIUM CHLORIDE 75 ML/HR: 9 INJECTION, SOLUTION INTRAVENOUS at 10:10

## 2021-01-27 RX ADMIN — CEFAZOLIN SODIUM 2 G: 10 INJECTION, POWDER, FOR SOLUTION INTRAVENOUS at 01:18

## 2021-01-27 RX ADMIN — FAMOTIDINE 20 MG: 20 TABLET, FILM COATED ORAL at 17:47

## 2021-01-27 RX ADMIN — TAMSULOSIN HYDROCHLORIDE 0.4 MG: 0.4 CAPSULE ORAL at 08:47

## 2021-01-27 RX ADMIN — FAMOTIDINE 20 MG: 20 TABLET, FILM COATED ORAL at 08:47

## 2021-01-27 RX ADMIN — GABAPENTIN 300 MG: 300 CAPSULE ORAL at 21:02

## 2021-01-27 RX ADMIN — ACETAMINOPHEN 650 MG: 325 TABLET, FILM COATED ORAL at 20:59

## 2021-01-27 RX ADMIN — CEFAZOLIN SODIUM 2 G: 10 INJECTION, POWDER, FOR SOLUTION INTRAVENOUS at 10:10

## 2021-01-27 RX ADMIN — HEPARIN SODIUM 2000 UNITS: 5000 INJECTION, SOLUTION INTRAVENOUS; SUBCUTANEOUS at 14:42

## 2021-01-27 NOTE — ANESTHESIA POSTPROCEDURE EVALUATION
Patient: Bar Crockett    Procedure Summary     Date: 01/26/21 Room / Location: [facility] OR 05 / [facility] OR    Anesthesia Start: 1614 Anesthesia Stop: 2019    Procedure: femoral to femoral bypass, bi-femoral bypass (N/A ) Diagnosis:       Critical lower limb ischemia (CMS/HCC)      (Critical lower limb ischemia (CMS/HCC) [I70.229])    Surgeon: Sukhwinder Steele MD Provider: John Paul Delgado MD    Anesthesia Type: general ASA Status: 4 - Emergent          Anesthesia Type: general    Vitals  Vitals Value Taken Time   /68 01/26/21 2017   Temp 97.9 °F (36.6 °C) 01/26/21 2017   Pulse 89 01/26/21 2017   Resp 20 01/26/21 2017   SpO2 98 % 01/26/21 2017           Post Anesthesia Care and Evaluation    Patient location during evaluation: PACU  Patient participation: complete - patient participated  Level of consciousness: sleepy but conscious  Pain score: 0  Pain management: adequate  Airway patency: patent  Anesthetic complications: No anesthetic complications  PONV Status: none  Cardiovascular status: hemodynamically stable  Respiratory status: spontaneous ventilation and room air  Hydration status: acceptable

## 2021-01-28 LAB
ANION GAP SERPL CALCULATED.3IONS-SCNC: 9 MMOL/L (ref 5–15)
APTT PPP: 60.4 SECONDS (ref 20–40.3)
APTT SCREEN TO CONFIRM RATIO: 0.72 RATIO (ref 0–1.4)
AT III ACT/NOR PPP CHRO: 63 % (ref 75–135)
BUN SERPL-MCNC: 26 MG/DL (ref 8–23)
BUN/CREAT SERPL: 10.1 (ref 7–25)
CALCIUM SPEC-SCNC: 8.3 MG/DL (ref 8.6–10.5)
CHLORIDE SERPL-SCNC: 107 MMOL/L (ref 98–107)
CO2 SERPL-SCNC: 24 MMOL/L (ref 22–29)
CONFIRM APTT/NORMAL: 35 SEC (ref 0–55)
CREAT SERPL-MCNC: 2.58 MG/DL (ref 0.76–1.27)
GFR SERPL CREATININE-BSD FRML MDRD: 25 ML/MIN/1.73
GLUCOSE SERPL-MCNC: 114 MG/DL (ref 65–99)
HOLD SPECIMEN: NORMAL
LA 2 SCREEN W REFLEX-IMP: NORMAL
POTASSIUM SERPL-SCNC: 3.4 MMOL/L (ref 3.5–5.2)
SCREEN APTT: 42.5 SEC (ref 0–51.9)
SCREEN DRVVT: 35.6 SEC (ref 0–47)
SODIUM SERPL-SCNC: 140 MMOL/L (ref 136–145)
THROMBIN TIME: 15.8 SEC (ref 0–23)
WHOLE BLOOD HOLD SPECIMEN: NORMAL

## 2021-01-28 PROCEDURE — 94799 UNLISTED PULMONARY SVC/PX: CPT

## 2021-01-28 PROCEDURE — 85730 THROMBOPLASTIN TIME PARTIAL: CPT | Performed by: SURGERY

## 2021-01-28 PROCEDURE — 97535 SELF CARE MNGMENT TRAINING: CPT

## 2021-01-28 PROCEDURE — 80048 BASIC METABOLIC PNL TOTAL CA: CPT | Performed by: NURSE PRACTITIONER

## 2021-01-28 PROCEDURE — 97110 THERAPEUTIC EXERCISES: CPT

## 2021-01-28 PROCEDURE — 99024 POSTOP FOLLOW-UP VISIT: CPT | Performed by: NURSE PRACTITIONER

## 2021-01-28 PROCEDURE — 25010000002 HEPARIN (PORCINE) PER 1000 UNITS: Performed by: NURSE PRACTITIONER

## 2021-01-28 PROCEDURE — 97530 THERAPEUTIC ACTIVITIES: CPT

## 2021-01-28 PROCEDURE — 94760 N-INVAS EAR/PLS OXIMETRY 1: CPT

## 2021-01-28 RX ORDER — SODIUM CHLORIDE, SODIUM LACTATE, POTASSIUM CHLORIDE, CALCIUM CHLORIDE 600; 310; 30; 20 MG/100ML; MG/100ML; MG/100ML; MG/100ML
100 INJECTION, SOLUTION INTRAVENOUS CONTINUOUS
Status: DISCONTINUED | OUTPATIENT
Start: 2021-01-28 | End: 2021-02-03

## 2021-01-28 RX ORDER — ACETAMINOPHEN 500 MG
1000 TABLET ORAL EVERY 6 HOURS
Status: DISCONTINUED | OUTPATIENT
Start: 2021-01-28 | End: 2021-02-03 | Stop reason: HOSPADM

## 2021-01-28 RX ORDER — CLOPIDOGREL BISULFATE 75 MG/1
75 TABLET ORAL DAILY
Status: DISCONTINUED | OUTPATIENT
Start: 2021-01-28 | End: 2021-02-03 | Stop reason: HOSPADM

## 2021-01-28 RX ORDER — OXYCODONE HYDROCHLORIDE 5 MG/1
5 TABLET ORAL EVERY 4 HOURS PRN
Status: DISCONTINUED | OUTPATIENT
Start: 2021-01-28 | End: 2021-02-03 | Stop reason: HOSPADM

## 2021-01-28 RX ORDER — OXYCODONE HYDROCHLORIDE 5 MG/1
10 TABLET ORAL EVERY 4 HOURS PRN
Status: DISCONTINUED | OUTPATIENT
Start: 2021-01-28 | End: 2021-02-03 | Stop reason: HOSPADM

## 2021-01-28 RX ADMIN — HEPARIN SODIUM 2000 UNITS: 5000 INJECTION, SOLUTION INTRAVENOUS; SUBCUTANEOUS at 05:24

## 2021-01-28 RX ADMIN — ALVIMOPAN 12 MG: 12 CAPSULE ORAL at 11:36

## 2021-01-28 RX ADMIN — SODIUM CHLORIDE, POTASSIUM CHLORIDE, SODIUM LACTATE AND CALCIUM CHLORIDE 100 ML/HR: 600; 310; 30; 20 INJECTION, SOLUTION INTRAVENOUS at 11:36

## 2021-01-28 RX ADMIN — TAMSULOSIN HYDROCHLORIDE 0.4 MG: 0.4 CAPSULE ORAL at 08:17

## 2021-01-28 RX ADMIN — GABAPENTIN 300 MG: 300 CAPSULE ORAL at 20:50

## 2021-01-28 RX ADMIN — ALBUTEROL SULFATE 2.5 MG: 2.5 SOLUTION RESPIRATORY (INHALATION) at 07:07

## 2021-01-28 RX ADMIN — APIXABAN 5 MG: 5 TABLET, FILM COATED ORAL at 20:50

## 2021-01-28 RX ADMIN — NITROGLYCERIN 0.5 INCH: 20 OINTMENT TOPICAL at 08:19

## 2021-01-28 RX ADMIN — OXYCODONE HYDROCHLORIDE 10 MG: 5 TABLET ORAL at 13:11

## 2021-01-28 RX ADMIN — NITROGLYCERIN 0.5 INCH: 20 OINTMENT TOPICAL at 20:50

## 2021-01-28 RX ADMIN — FAMOTIDINE 20 MG: 20 TABLET, FILM COATED ORAL at 17:39

## 2021-01-28 RX ADMIN — GABAPENTIN 300 MG: 300 CAPSULE ORAL at 08:17

## 2021-01-28 RX ADMIN — HEPARIN SODIUM 15 UNITS/KG/HR: 10000 INJECTION, SOLUTION INTRAVENOUS at 03:11

## 2021-01-28 RX ADMIN — ACETAMINOPHEN 1000 MG: 500 TABLET ORAL at 11:35

## 2021-01-28 RX ADMIN — SODIUM CHLORIDE, POTASSIUM CHLORIDE, SODIUM LACTATE AND CALCIUM CHLORIDE 100 ML/HR: 600; 310; 30; 20 INJECTION, SOLUTION INTRAVENOUS at 20:50

## 2021-01-28 RX ADMIN — ACETAMINOPHEN 1000 MG: 500 TABLET ORAL at 20:49

## 2021-01-28 RX ADMIN — APIXABAN 5 MG: 5 TABLET, FILM COATED ORAL at 11:35

## 2021-01-28 RX ADMIN — NITROGLYCERIN 0.5 INCH: 20 OINTMENT TOPICAL at 08:18

## 2021-01-28 RX ADMIN — ACETAMINOPHEN 1000 MG: 500 TABLET ORAL at 17:39

## 2021-01-28 RX ADMIN — CLOPIDOGREL BISULFATE 75 MG: 75 TABLET ORAL at 11:35

## 2021-01-28 RX ADMIN — FAMOTIDINE 20 MG: 20 TABLET, FILM COATED ORAL at 08:18

## 2021-01-29 LAB
ANION GAP SERPL CALCULATED.3IONS-SCNC: 8 MMOL/L (ref 5–15)
BUN SERPL-MCNC: 30 MG/DL (ref 8–23)
BUN/CREAT SERPL: 10.7 (ref 7–25)
CALCIUM SPEC-SCNC: 8.4 MG/DL (ref 8.6–10.5)
CHLORIDE SERPL-SCNC: 107 MMOL/L (ref 98–107)
CO2 SERPL-SCNC: 24 MMOL/L (ref 22–29)
CREAT SERPL-MCNC: 2.81 MG/DL (ref 0.76–1.27)
DEPRECATED RDW RBC AUTO: 46.5 FL (ref 37–54)
ERYTHROCYTE [DISTWIDTH] IN BLOOD BY AUTOMATED COUNT: 13.1 % (ref 12.3–15.4)
GFR SERPL CREATININE-BSD FRML MDRD: 22 ML/MIN/1.73
GLUCOSE SERPL-MCNC: 84 MG/DL (ref 65–99)
HCT VFR BLD AUTO: 24.2 % (ref 37.5–51)
HGB BLD-MCNC: 8.3 G/DL (ref 13–17.7)
LAB AP CASE REPORT: NORMAL
MCH RBC QN AUTO: 33.9 PG (ref 26.6–33)
MCHC RBC AUTO-ENTMCNC: 34.3 G/DL (ref 31.5–35.7)
MCV RBC AUTO: 98.8 FL (ref 79–97)
PATH REPORT.FINAL DX SPEC: NORMAL
PLATELET # BLD AUTO: 131 10*3/MM3 (ref 140–450)
PMV BLD AUTO: 10.8 FL (ref 6–12)
POTASSIUM SERPL-SCNC: 3.5 MMOL/L (ref 3.5–5.2)
PROT C AG ACT/NOR PPP IA: 55 % (ref 60–150)
PROT S ACT/NOR PPP: 45 % (ref 63–140)
PROT S AG ACT/NOR PPP IA: 52 % (ref 60–150)
RBC # BLD AUTO: 2.45 10*6/MM3 (ref 4.14–5.8)
SODIUM SERPL-SCNC: 139 MMOL/L (ref 136–145)
WBC # BLD AUTO: 6.46 10*3/MM3 (ref 3.4–10.8)

## 2021-01-29 PROCEDURE — 97110 THERAPEUTIC EXERCISES: CPT

## 2021-01-29 PROCEDURE — 99024 POSTOP FOLLOW-UP VISIT: CPT | Performed by: SURGERY

## 2021-01-29 PROCEDURE — 97530 THERAPEUTIC ACTIVITIES: CPT

## 2021-01-29 PROCEDURE — 85027 COMPLETE CBC AUTOMATED: CPT | Performed by: NURSE PRACTITIONER

## 2021-01-29 PROCEDURE — 99024 POSTOP FOLLOW-UP VISIT: CPT | Performed by: NURSE PRACTITIONER

## 2021-01-29 PROCEDURE — 80048 BASIC METABOLIC PNL TOTAL CA: CPT | Performed by: NURSE PRACTITIONER

## 2021-01-29 RX ORDER — PRENATAL VIT/IRON FUM/FOLIC AC 27MG-0.8MG
1 TABLET ORAL DAILY
Status: DISCONTINUED | OUTPATIENT
Start: 2021-01-29 | End: 2021-02-03 | Stop reason: HOSPADM

## 2021-01-29 RX ADMIN — GABAPENTIN 300 MG: 300 CAPSULE ORAL at 21:11

## 2021-01-29 RX ADMIN — NITROGLYCERIN 0.5 INCH: 20 OINTMENT TOPICAL at 21:11

## 2021-01-29 RX ADMIN — OXYCODONE HYDROCHLORIDE 10 MG: 5 TABLET ORAL at 09:47

## 2021-01-29 RX ADMIN — NITROGLYCERIN 0.5 INCH: 20 OINTMENT TOPICAL at 08:24

## 2021-01-29 RX ADMIN — ACETAMINOPHEN 1000 MG: 500 TABLET ORAL at 11:20

## 2021-01-29 RX ADMIN — PRENATAL VIT W/ FE FUMARATE-FA TAB 27-0.8 MG 1 TABLET: 27-0.8 TAB at 11:20

## 2021-01-29 RX ADMIN — NITROGLYCERIN 0.5 INCH: 20 OINTMENT TOPICAL at 08:25

## 2021-01-29 RX ADMIN — FAMOTIDINE 20 MG: 20 TABLET, FILM COATED ORAL at 08:24

## 2021-01-29 RX ADMIN — ACETAMINOPHEN 1000 MG: 500 TABLET ORAL at 17:15

## 2021-01-29 RX ADMIN — NITROGLYCERIN 0.5 INCH: 20 OINTMENT TOPICAL at 21:10

## 2021-01-29 RX ADMIN — GABAPENTIN 300 MG: 300 CAPSULE ORAL at 08:23

## 2021-01-29 RX ADMIN — CLOPIDOGREL BISULFATE 75 MG: 75 TABLET ORAL at 08:24

## 2021-01-29 RX ADMIN — SODIUM CHLORIDE, POTASSIUM CHLORIDE, SODIUM LACTATE AND CALCIUM CHLORIDE 100 ML/HR: 600; 310; 30; 20 INJECTION, SOLUTION INTRAVENOUS at 21:11

## 2021-01-29 RX ADMIN — TAMSULOSIN HYDROCHLORIDE 0.4 MG: 0.4 CAPSULE ORAL at 08:24

## 2021-01-29 RX ADMIN — APIXABAN 5 MG: 5 TABLET, FILM COATED ORAL at 08:24

## 2021-01-29 RX ADMIN — APIXABAN 5 MG: 5 TABLET, FILM COATED ORAL at 21:11

## 2021-01-29 RX ADMIN — FAMOTIDINE 20 MG: 20 TABLET, FILM COATED ORAL at 17:15

## 2021-01-29 RX ADMIN — SODIUM CHLORIDE, POTASSIUM CHLORIDE, SODIUM LACTATE AND CALCIUM CHLORIDE 100 ML/HR: 600; 310; 30; 20 INJECTION, SOLUTION INTRAVENOUS at 08:23

## 2021-01-30 LAB
ALBUMIN SERPL-MCNC: 2.8 G/DL (ref 3.5–5.2)
ALBUMIN/GLOB SERPL: 1 G/DL
ALP SERPL-CCNC: 66 U/L (ref 39–117)
ALT SERPL W P-5'-P-CCNC: <5 U/L (ref 1–41)
ANION GAP SERPL CALCULATED.3IONS-SCNC: 13 MMOL/L (ref 5–15)
AST SERPL-CCNC: 185 U/L (ref 1–40)
BILIRUB SERPL-MCNC: 0.3 MG/DL (ref 0–1.2)
BUN SERPL-MCNC: 31 MG/DL (ref 8–23)
BUN/CREAT SERPL: 9.5 (ref 7–25)
CALCIUM SPEC-SCNC: 8.6 MG/DL (ref 8.6–10.5)
CHLORIDE SERPL-SCNC: 107 MMOL/L (ref 98–107)
CO2 SERPL-SCNC: 22 MMOL/L (ref 22–29)
CREAT SERPL-MCNC: 3.26 MG/DL (ref 0.76–1.27)
DEPRECATED RDW RBC AUTO: 46.1 FL (ref 37–54)
ERYTHROCYTE [DISTWIDTH] IN BLOOD BY AUTOMATED COUNT: 13 % (ref 12.3–15.4)
GFR SERPL CREATININE-BSD FRML MDRD: 19 ML/MIN/1.73
GLOBULIN UR ELPH-MCNC: 2.8 GM/DL
GLUCOSE SERPL-MCNC: 86 MG/DL (ref 65–99)
HCT VFR BLD AUTO: 28.9 % (ref 37.5–51)
HGB BLD-MCNC: 9.7 G/DL (ref 13–17.7)
MCH RBC QN AUTO: 32.9 PG (ref 26.6–33)
MCHC RBC AUTO-ENTMCNC: 33.6 G/DL (ref 31.5–35.7)
MCV RBC AUTO: 98 FL (ref 79–97)
PLATELET # BLD AUTO: 174 10*3/MM3 (ref 140–450)
PMV BLD AUTO: 10.5 FL (ref 6–12)
POTASSIUM SERPL-SCNC: 3.6 MMOL/L (ref 3.5–5.2)
PROT SERPL-MCNC: 5.6 G/DL (ref 6–8.5)
RBC # BLD AUTO: 2.95 10*6/MM3 (ref 4.14–5.8)
SODIUM SERPL-SCNC: 142 MMOL/L (ref 136–145)
WBC # BLD AUTO: 7.53 10*3/MM3 (ref 3.4–10.8)

## 2021-01-30 PROCEDURE — 97116 GAIT TRAINING THERAPY: CPT

## 2021-01-30 PROCEDURE — 97110 THERAPEUTIC EXERCISES: CPT

## 2021-01-30 PROCEDURE — 97530 THERAPEUTIC ACTIVITIES: CPT

## 2021-01-30 PROCEDURE — 85027 COMPLETE CBC AUTOMATED: CPT | Performed by: NURSE PRACTITIONER

## 2021-01-30 PROCEDURE — 94799 UNLISTED PULMONARY SVC/PX: CPT

## 2021-01-30 PROCEDURE — 97535 SELF CARE MNGMENT TRAINING: CPT

## 2021-01-30 PROCEDURE — 80053 COMPREHEN METABOLIC PANEL: CPT | Performed by: NURSE PRACTITIONER

## 2021-01-30 RX ADMIN — NITROGLYCERIN 0.5 INCH: 20 OINTMENT TOPICAL at 08:29

## 2021-01-30 RX ADMIN — FAMOTIDINE 20 MG: 20 TABLET, FILM COATED ORAL at 08:28

## 2021-01-30 RX ADMIN — PRENATAL VIT W/ FE FUMARATE-FA TAB 27-0.8 MG 1 TABLET: 27-0.8 TAB at 08:29

## 2021-01-30 RX ADMIN — SODIUM CHLORIDE, POTASSIUM CHLORIDE, SODIUM LACTATE AND CALCIUM CHLORIDE 100 ML/HR: 600; 310; 30; 20 INJECTION, SOLUTION INTRAVENOUS at 21:20

## 2021-01-30 RX ADMIN — TAMSULOSIN HYDROCHLORIDE 0.4 MG: 0.4 CAPSULE ORAL at 08:28

## 2021-01-30 RX ADMIN — SODIUM CHLORIDE 1000 ML: 900 INJECTION, SOLUTION INTRAVENOUS at 15:00

## 2021-01-30 RX ADMIN — SODIUM CHLORIDE, POTASSIUM CHLORIDE, SODIUM LACTATE AND CALCIUM CHLORIDE 100 ML/HR: 600; 310; 30; 20 INJECTION, SOLUTION INTRAVENOUS at 07:11

## 2021-01-30 RX ADMIN — NITROGLYCERIN 0.5 INCH: 20 OINTMENT TOPICAL at 21:22

## 2021-01-30 RX ADMIN — OXYCODONE 5 MG: 5 TABLET ORAL at 00:54

## 2021-01-30 RX ADMIN — GABAPENTIN 300 MG: 300 CAPSULE ORAL at 08:28

## 2021-01-30 RX ADMIN — CLOPIDOGREL BISULFATE 75 MG: 75 TABLET ORAL at 08:29

## 2021-01-30 RX ADMIN — APIXABAN 5 MG: 5 TABLET, FILM COATED ORAL at 21:22

## 2021-01-30 RX ADMIN — APIXABAN 5 MG: 5 TABLET, FILM COATED ORAL at 08:29

## 2021-01-30 RX ADMIN — ACETAMINOPHEN 1000 MG: 500 TABLET ORAL at 17:35

## 2021-01-30 RX ADMIN — FAMOTIDINE 20 MG: 20 TABLET, FILM COATED ORAL at 17:35

## 2021-01-30 RX ADMIN — ACETAMINOPHEN 1000 MG: 500 TABLET ORAL at 10:21

## 2021-01-31 LAB
ANION GAP SERPL CALCULATED.3IONS-SCNC: 10 MMOL/L (ref 5–15)
BUN SERPL-MCNC: 33 MG/DL (ref 8–23)
BUN/CREAT SERPL: 10.5 (ref 7–25)
CALCIUM SPEC-SCNC: 9 MG/DL (ref 8.6–10.5)
CHLORIDE SERPL-SCNC: 109 MMOL/L (ref 98–107)
CO2 SERPL-SCNC: 24 MMOL/L (ref 22–29)
CREAT SERPL-MCNC: 3.14 MG/DL (ref 0.76–1.27)
CREAT UR-MCNC: 83.1 MG/DL
GFR SERPL CREATININE-BSD FRML MDRD: 20 ML/MIN/1.73
GLUCOSE SERPL-MCNC: 98 MG/DL (ref 65–99)
HANSEL STAIN: NEGATIVE
POTASSIUM SERPL-SCNC: 3.7 MMOL/L (ref 3.5–5.2)
PROT C ACT/NOR PPP CHRO: 67 %
SODIUM SERPL-SCNC: 143 MMOL/L (ref 136–145)
SODIUM UR-SCNC: 56 MMOL/L

## 2021-01-31 PROCEDURE — 97535 SELF CARE MNGMENT TRAINING: CPT

## 2021-01-31 PROCEDURE — 97110 THERAPEUTIC EXERCISES: CPT

## 2021-01-31 PROCEDURE — 84300 ASSAY OF URINE SODIUM: CPT | Performed by: INTERNAL MEDICINE

## 2021-01-31 PROCEDURE — 97530 THERAPEUTIC ACTIVITIES: CPT

## 2021-01-31 PROCEDURE — 87205 SMEAR GRAM STAIN: CPT | Performed by: INTERNAL MEDICINE

## 2021-01-31 PROCEDURE — 97116 GAIT TRAINING THERAPY: CPT

## 2021-01-31 PROCEDURE — 82570 ASSAY OF URINE CREATININE: CPT | Performed by: INTERNAL MEDICINE

## 2021-01-31 PROCEDURE — 80048 BASIC METABOLIC PNL TOTAL CA: CPT | Performed by: NURSE PRACTITIONER

## 2021-01-31 RX ADMIN — TAMSULOSIN HYDROCHLORIDE 0.4 MG: 0.4 CAPSULE ORAL at 09:01

## 2021-01-31 RX ADMIN — ACETAMINOPHEN 1000 MG: 500 TABLET ORAL at 17:39

## 2021-01-31 RX ADMIN — APIXABAN 5 MG: 5 TABLET, FILM COATED ORAL at 21:26

## 2021-01-31 RX ADMIN — NITROGLYCERIN 0.5 INCH: 20 OINTMENT TOPICAL at 21:25

## 2021-01-31 RX ADMIN — APIXABAN 5 MG: 5 TABLET, FILM COATED ORAL at 09:02

## 2021-01-31 RX ADMIN — NITROGLYCERIN 0.5 INCH: 20 OINTMENT TOPICAL at 09:02

## 2021-01-31 RX ADMIN — SODIUM CHLORIDE, POTASSIUM CHLORIDE, SODIUM LACTATE AND CALCIUM CHLORIDE 100 ML/HR: 600; 310; 30; 20 INJECTION, SOLUTION INTRAVENOUS at 06:48

## 2021-01-31 RX ADMIN — FAMOTIDINE 20 MG: 20 TABLET, FILM COATED ORAL at 09:02

## 2021-01-31 RX ADMIN — FAMOTIDINE 20 MG: 20 TABLET, FILM COATED ORAL at 17:39

## 2021-01-31 RX ADMIN — SODIUM CHLORIDE, POTASSIUM CHLORIDE, SODIUM LACTATE AND CALCIUM CHLORIDE 100 ML/HR: 600; 310; 30; 20 INJECTION, SOLUTION INTRAVENOUS at 18:10

## 2021-01-31 RX ADMIN — PRENATAL VIT W/ FE FUMARATE-FA TAB 27-0.8 MG 1 TABLET: 27-0.8 TAB at 09:01

## 2021-01-31 RX ADMIN — CLOPIDOGREL BISULFATE 75 MG: 75 TABLET ORAL at 09:02

## 2021-02-01 LAB
ANION GAP SERPL CALCULATED.3IONS-SCNC: 11 MMOL/L (ref 5–15)
BUN SERPL-MCNC: 31 MG/DL (ref 8–23)
BUN/CREAT SERPL: 9.8 (ref 7–25)
CALCIUM SPEC-SCNC: 8.3 MG/DL (ref 8.6–10.5)
CHLORIDE SERPL-SCNC: 108 MMOL/L (ref 98–107)
CO2 SERPL-SCNC: 23 MMOL/L (ref 22–29)
CREAT SERPL-MCNC: 3.16 MG/DL (ref 0.76–1.27)
GFR SERPL CREATININE-BSD FRML MDRD: 19 ML/MIN/1.73
GLUCOSE SERPL-MCNC: 102 MG/DL (ref 65–99)
POTASSIUM SERPL-SCNC: 3.2 MMOL/L (ref 3.5–5.2)
SARS-COV-2 N GENE RESP QL NAA+PROBE: NOT DETECTED
SODIUM SERPL-SCNC: 142 MMOL/L (ref 136–145)

## 2021-02-01 PROCEDURE — 97116 GAIT TRAINING THERAPY: CPT

## 2021-02-01 PROCEDURE — 87635 SARS-COV-2 COVID-19 AMP PRB: CPT | Performed by: SURGERY

## 2021-02-01 PROCEDURE — 97110 THERAPEUTIC EXERCISES: CPT

## 2021-02-01 PROCEDURE — 97535 SELF CARE MNGMENT TRAINING: CPT

## 2021-02-01 PROCEDURE — 80048 BASIC METABOLIC PNL TOTAL CA: CPT | Performed by: NURSE PRACTITIONER

## 2021-02-01 PROCEDURE — 99024 POSTOP FOLLOW-UP VISIT: CPT | Performed by: NURSE PRACTITIONER

## 2021-02-01 PROCEDURE — 97530 THERAPEUTIC ACTIVITIES: CPT

## 2021-02-01 RX ORDER — POTASSIUM CHLORIDE 750 MG/1
40 CAPSULE, EXTENDED RELEASE ORAL ONCE
Status: COMPLETED | OUTPATIENT
Start: 2021-02-01 | End: 2021-02-01

## 2021-02-01 RX ORDER — PRAVASTATIN SODIUM 20 MG
20 TABLET ORAL NIGHTLY
Status: DISCONTINUED | OUTPATIENT
Start: 2021-02-01 | End: 2021-02-03 | Stop reason: HOSPADM

## 2021-02-01 RX ADMIN — ACETAMINOPHEN 1000 MG: 500 TABLET ORAL at 17:24

## 2021-02-01 RX ADMIN — SODIUM CHLORIDE, POTASSIUM CHLORIDE, SODIUM LACTATE AND CALCIUM CHLORIDE 100 ML/HR: 600; 310; 30; 20 INJECTION, SOLUTION INTRAVENOUS at 20:29

## 2021-02-01 RX ADMIN — FAMOTIDINE 20 MG: 20 TABLET, FILM COATED ORAL at 17:24

## 2021-02-01 RX ADMIN — NITROGLYCERIN 0.5 INCH: 20 OINTMENT TOPICAL at 08:52

## 2021-02-01 RX ADMIN — NITROGLYCERIN 0.5 INCH: 20 OINTMENT TOPICAL at 20:27

## 2021-02-01 RX ADMIN — PRAVASTATIN SODIUM 20 MG: 20 TABLET ORAL at 20:28

## 2021-02-01 RX ADMIN — OXYCODONE 5 MG: 5 TABLET ORAL at 11:22

## 2021-02-01 RX ADMIN — NITROGLYCERIN 0.5 INCH: 20 OINTMENT TOPICAL at 20:28

## 2021-02-01 RX ADMIN — POTASSIUM CHLORIDE 40 MEQ: 10 CAPSULE, COATED, EXTENDED RELEASE ORAL at 11:22

## 2021-02-01 RX ADMIN — SODIUM CHLORIDE, POTASSIUM CHLORIDE, SODIUM LACTATE AND CALCIUM CHLORIDE 100 ML/HR: 600; 310; 30; 20 INJECTION, SOLUTION INTRAVENOUS at 04:28

## 2021-02-01 RX ADMIN — FAMOTIDINE 20 MG: 20 TABLET, FILM COATED ORAL at 08:51

## 2021-02-01 RX ADMIN — APIXABAN 5 MG: 5 TABLET, FILM COATED ORAL at 08:51

## 2021-02-01 RX ADMIN — CLOPIDOGREL BISULFATE 75 MG: 75 TABLET ORAL at 08:51

## 2021-02-01 RX ADMIN — PRENATAL VIT W/ FE FUMARATE-FA TAB 27-0.8 MG 1 TABLET: 27-0.8 TAB at 10:49

## 2021-02-01 RX ADMIN — ACETAMINOPHEN 1000 MG: 500 TABLET ORAL at 20:28

## 2021-02-01 RX ADMIN — APIXABAN 5 MG: 5 TABLET, FILM COATED ORAL at 20:28

## 2021-02-01 RX ADMIN — TAMSULOSIN HYDROCHLORIDE 0.4 MG: 0.4 CAPSULE ORAL at 08:51

## 2021-02-01 NOTE — PLAN OF CARE
Goal Outcome Evaluation:  Plan of Care Reviewed With: patient     Outcome Summary: pt declined rx in pm due to nausea;am rx: sup-sit-sup mod of 1,sit-stand-sit min of 1 and vc/s for hand placement;amb 16; with rw and min of 1 and occasional assist to advance r le;aa/prom to r le,arom l le

## 2021-02-01 NOTE — THERAPY TREATMENT NOTE
Patient Name: Bar Crockett  : 1950    MRN: [id number]                              Today's Date: 2021       Admit Date: 2021    Visit Dx:     ICD-10-CM ICD-9-CM   1. Critical lower limb ischemia (CMS/HCC)  I70.229 459.9   2. Moderate protein-calorie malnutrition (CMS/HCC)  E44.0 263.0   3. Ileostomy in place (CMS/HCC)  Z93.2 V44.2   4. Impaired functional mobility, balance, gait, and endurance  Z74.09 V49.89   5. Impaired mobility and ADLs  Z74.09 V49.89    Z78.9      Patient Active Problem List   Diagnosis   • Sigmoid diverticulitis   • Pancolitis (CMS/HCC)   • Stage 3a chronic kidney disease (CMS/HCC)   • Sepsis without acute organ dysfunction (CMS/HCC)   • Severe malnutrition (CMS/HCC)   • Edema of both ankles   • Tachycardia   • YOBANY (acute kidney injury) (CMS/HCC)   • Moderate episode of recurrent major depressive disorder (CMS/HCC)   • Ileostomy in place (CMS/HCC)   • Critical lower limb ischemia (CMS/HCC)     Past Medical History:   Diagnosis Date   • Hyperlipidemia    • Hypertension    • Rectal abnormality     Rectum came out - er put back in.     • TIA (transient ischemic attack)      Past Surgical History:   Procedure Laterality Date   • COLON RESECTION N/A 10/26/2020    Procedure: SUBTOTAL COLECTOMY,  ILEOSTOMY, PLACEMENT OF GASTROSTOMY AND JEJEUNOSTOMY TUBES;  Surgeon: Nahum Herzog MD;  Location: [facility] OR;  Service: General;  Laterality: N/A;   • COLONOSCOPY N/A 12/10/2018    Procedure: COLONOSCOPY;  Surgeon: Jay Wilson DO;  Location: [facility] ENDOSCOPY;  Service: Gastroenterology   • COLONOSCOPY Left 10/25/2020    Procedure: COLONOSCOPY WITH ENDOSCOPIC FECAL MICROBIOTA TRANSPLANT;  Surgeon: Jay Wilson DO;  Location: [facility] OR;  Service: Gastroenterology;  Laterality: Left;   • COLONOSCOPY N/A 10/23/2020    Procedure: COLONOSCOPY WITH ENDOSCOPIC FECAL MICROBIOTA TRANSPLANT;  Surgeon: Jay Wilson DO;  Location: [facility] ENDOSCOPY;  Service: Gastroenterology;   Laterality: N/A;   • ILEOSTOMY CLOSURE N/A 1/25/2021    Procedure: ILEOSTOMY CLOSURE WITH LOOP ILEOSTOMY PLACEMENT;  Surgeon: Nahum Herzog MD;  Location: [facility];  Service: General;  Laterality: N/A;   • KNEE MENISCAL REPAIR Left    • LEG THROMBECTOMY/EMBOLECTOMY N/A 1/26/2021    Procedure: femoral to femoral bypass, bi-femoral bypass;  Surgeon: Sukhwinder Steele MD;  Location: [facility];  Service: Vascular;  Laterality: N/A;   • OTHER SURGICAL HISTORY      Loop recorder     General Information     Row Name 02/01/21 0705          OT Time and Intention    Document Type  therapy note (daily note)  -BB     Mode of Treatment  occupational therapy  -     Row Name 02/01/21 0705          General Information    Existing Precautions/Restrictions  fall  -     Row Name 02/01/21 0705          Cognition    Orientation Status (Cognition)  oriented x 4  -BB       User Key  (r) = Recorded By, (t) = Taken By, (c) = Cosigned By    Initials Name Provider Type    BB Robyn Dumont COTA/L Occupational Therapy Assistant          Mobility/ADL's     Row Name 02/01/21 0705          Bed Mobility    Bed Mobility  supine-sit;sit-supine  -BB     Rolling Left Tate (Bed Mobility)  moderate assist (50% patient effort)  -BB     Rolling Right Tate (Bed Mobility)  independent  -BB     Supine-Sit Tate (Bed Mobility)  moderate assist (50% patient effort)  -BB     Sit-Supine Tate (Bed Mobility)  minimum assist (75% patient effort)  -BB     Bed Mobility, Safety Issues  decreased use of legs for bridging/pushing  -BB     Assistive Device (Bed Mobility)  bed rails;head of bed elevated  -BB     Row Name 02/01/21 0705          Transfers    Chair-Bed Tate (Transfers)  moderate assist (50% patient effort)  -BB     Sit-Stand Tate (Transfers)  minimum assist (75% patient effort);moderate assist (50% patient effort)  -     Row Name 02/01/21 0705          Sit-Stand Transfer    Assistive Device  (Sit-Stand Transfers)  walker, front-wheeled  -BB     Row Name 02/01/21 0705          Functional Mobility    Functional Mobility- Ind. Level  minimum assist (75% patient effort);nonverbal cues required (demo/gesture);verbal cues required  -BB     Functional Mobility- Device  rolling walker  -BB     Row Name 02/01/21 0705          Self-Feeding Assessment/Training    Price Level (Feeding)  feeding skills;independent;set up  -BB     Position (Self-Feeding)  supported sitting  -BB     Row Name 02/01/21 0705          Bathing Assessment/Intervention    Price Level (Bathing)  bathing skills;upper body;standby assist  -BB     Position (Bathing)  long sitting  -BB     Row Name 02/01/21 0705          Upper Body Dressing Assessment/Training    Price Level (Upper Body Dressing)  upper body dressing skills;doff;don;contact guard assist  -BB     Position (Upper Body Dressing)  long sitting  -BB     Row Name 02/01/21 0705          Grooming Assessment/Training    Price Level (Grooming)  grooming skills;oral care regimen;wash face, hands;set up  -BB     Position (Grooming)  supported sitting  -BB     Row Name 02/01/21 0705          Chair-Bed Transfer    Assistive Device (Chair-Bed Transfers)  walker, front-wheeled  -BB       User Key  (r) = Recorded By, (t) = Taken By, (c) = Cosigned By    Initials Name Provider Type    Robyn Grande COTA/L Occupational Therapy Assistant        Obj/Interventions     Row Name 02/01/21 0705          Vision Assessment/Intervention    Visual Impairment/Limitations  corrective lenses full-time  -BB     Row Name 02/01/21 0705          Range of Motion Comprehensive    General Range of Motion  bilateral lower extremity ROM WNL  -BB     Row Name 02/01/21 0705          Strength Comprehensive (MMT)    General Manual Muscle Testing (MMT) Assessment  other (see comments)  -BB       User Key  (r) = Recorded By, (t) = Taken By, (c) = Cosigned By    Initials Name Provider Type     Robyn Grande COTA/L Occupational Therapy Assistant        Goals/Plan     Row Name 02/01/21 0705          Transfer Goal 1 (OT)    Activity/Assistive Device (Transfer Goal 1, OT)  toilet  -BB     [place] Level/Cues Needed (Transfer Goal 1, OT)  supervision required  -BB     Time Frame (Transfer Goal 1, OT)  long term goal (LTG);by discharge  -BB     Progress/Outcome (Transfer Goal 1, OT)  goal not met  -JUANIS     Row Name 02/01/21 0705          Bathing Goal 1 (OT)    Activity/Device (Bathing Goal 1, OT)  lower body bathing AD as needed  -BB     [place] Level/Cues Needed (Bathing Goal 1, OT)  supervision required  -BB     Time Frame (Bathing Goal 1, OT)  long term goal (LTG);by discharge  -BB     Progress/Outcomes (Bathing Goal 1, OT)  goal not met  -JUANIS     Row Name 02/01/21 0705          Dressing Goal 1 (OT)    Activity/Device (Dressing Goal 1, OT)  lower body dressing AD as needed  -BB     [place]/Cues Needed (Dressing Goal 1, OT)  supervision required  -BB     Time Frame (Dressing Goal 1, OT)  long term goal (LTG);by discharge  -BB     Progress/Outcome (Dressing Goal 1, OT)  goal not met  -BB       User Key  (r) = Recorded By, (t) = Taken By, (c) = Cosigned By    Initials Name Provider Type    Robyn Grande COTA/L Occupational Therapy Assistant        Clinical Impression     Row Name 02/01/21 0705          Pain Scale: Numbers Pre/Post-Treatment    Pretreatment Pain Rating  0/10 - no pain  -BB     Posttreatment Pain Rating  0/10 - no pain  -BB     Row Name 02/01/21 0705          Plan of Care Review    Plan of Care Reviewed With  patient  -BB     Progress  improving  -BB     Outcome Summary  Pt agrees to OT this am. Pt Mod A for supine to sit and Min A for sit to supine. Pt is SBA for UB bathing and CGA for UB dressing. Pt states he will work on LB ADL later after breakfast. Pt completed grooming tasks this am. No new goals met. Continue OT POC.  -JUANIS     Row Name 02/01/21 0705           Therapy Assessment/Plan (OT)    Rehab Potential (OT)  good, to achieve stated therapy goals  -BB     Criteria for Skilled Therapeutic Interventions Met (OT)  yes;meets criteria;skilled treatment is necessary  -BB     Therapy Frequency (OT)  2 times/day  -BB     Row Name 02/01/21 0705          Therapy Plan Review/Discharge Plan (OT)    Anticipated Discharge Disposition (OT)  home;home with assist;home with 24/7 care  -BB     Row Name 02/01/21 0705          Vital Signs    Pre Systolic BP Rehab  126  -BB     Pre Treatment Diastolic BP  78  -BB     Pretreatment Heart Rate (beats/min)  83  -BB     Posttreatment Heart Rate (beats/min)  86  -BB     Pre SpO2 (%)  93  -BB     O2 Delivery Pre Treatment  room air  -BB     Post SpO2 (%)  94  -BB     O2 Delivery Post Treatment  room air  -BB     Pre Patient Position  Supine  -BB     Post Patient Position  -- long sitting  -BB     Row Name 02/01/21 0705          Positioning and Restraints    Pre-Treatment Position  in bed  -BB     Post Treatment Position  bed  -BB     In Bed  fowlers;call light within reach;encouraged to call for assist;exit alarm on  -BB       User Key  (r) = Recorded By, (t) = Taken By, (c) = Cosigned By    Initials Name Provider Type    Robyn Grande COTA/L Occupational Therapy Assistant        Outcome Measures     Row Name 02/01/21 0705          How much help from another is currently needed...    Putting on and taking off regular lower body clothing?  1  -BB     Bathing (including washing, rinsing, and drying)  2  -BB     Toileting (which includes using toilet bed pan or urinal)  2  -BB     Putting on and taking off regular upper body clothing  2  -BB     Taking care of personal grooming (such as brushing teeth)  4  -BB     Eating meals  4  -BB     AM-PAC 6 Clicks Score (OT)  15  -BB       User Key  (r) = Recorded By, (t) = Taken By, (c) = Cosigned By    Initials Name Provider Type    Robyn Grande COTA/L Occupational Therapy Assistant         Occupational Therapy Education                 Title: PT OT SLP Therapies (In Progress)     Topic: Occupational Therapy (In Progress)     Point: ADL training (In Progress)     Description:   Instruct learner(s) on proper safety adaptation and remediation techniques during self care or transfers.   Instruct in proper use of assistive devices.              Learning Progress Summary           Patient Acceptance, E, NR by BB at 2/1/2021 1137                   Point: Home exercise program (Not Started)     Description:   Instruct learner(s) on appropriate technique for monitoring, assisting and/or progressing therapeutic exercises/activities.              Learner Progress:  Not documented in this visit.          Point: Precautions (Done)     Description:   Instruct learner(s) on prescribed precautions during self-care and functional transfers.              Learning Progress Summary           Patient Acceptance, E, VU,NR by ME at 1/26/2021 1234    Comment: Educated on OT and POC. Educated to call for assistance. Educated on safety precautions.                   Point: Body mechanics (In Progress)     Description:   Instruct learner(s) on proper positioning and spine alignment during self-care, functional mobility activities and/or exercises.              Learning Progress Summary           Patient Acceptance, E, NR by BB at 2/1/2021 1137    Acceptance, E, VU,NR by ME at 1/26/2021 1234    Comment: Educated on OT and POC. Educated to call for assistance. Educated on safety precautions.                               User Key     Initials Effective Dates Name Provider Type Discipline     03/07/18 -  Robyn Dumont COTA/L Occupational Therapy Assistant OT    ME 08/24/20 -  Ragini Proctor OTR/L Occupational Therapist OT              OT Recommendation and Plan  Therapy Frequency (OT): 2 times/day  Plan of Care Review  Plan of Care Reviewed With: patient  Progress: improving  Outcome Summary: Pt agrees to OT this  am. Pt Mod A for supine to sit and Min A for sit to supine. Pt is SBA for UB bathing and CGA for UB dressing. Pt states he will work on LB ADL later after breakfast. Pt completed grooming tasks this am. No new goals met. Continue OT POC.     Time Calculation:   Time Calculation- OT     Row Name 02/01/21 1138             Time Calculation- OT    OT Start Time  0705  -BB      OT Stop Time  0800  -BB      OT Time Calculation (min)  55 min  -BB      Total Timed Code Minutes- OT  55 minute(s)  -BB      OT Received On  02/01/21  -BB        User Key  (r) = Recorded By, (t) = Taken By, (c) = Cosigned By    Initials Name Provider Type    BB Robyn Dumont COTA/L Occupational Therapy Assistant        Therapy Charges for Today     Code Description Service Date Service Provider Modifiers Qty    64474086594 HC OT SELF CARE/MGMT/TRAIN EA 15 MIN 2/1/2021 Robyn Dumont COTA/L GO 4               LEONA Oliver  2/1/2021

## 2021-02-01 NOTE — NURSING NOTE
Spoke with Matilda from case management.  Patient has been accepted to rehab facility.  Facility requesting covid swab since patient has not had one this hospitalization, only pre-op before surgery.  Patient also signed medical record release form. Covid swab complete and sent to lab.

## 2021-02-01 NOTE — PLAN OF CARE
Goal Outcome Evaluation:        Outcome Summary: Pt rested well overnight; repositioning self in bed; wound vac's in place; voiding; vital signs stable

## 2021-02-01 NOTE — PROGRESS NOTES
"[facility]  [address] [place] [address]. [address]  T - [phone]  F - [phone]     Progress Note          PATIENT  DEMOGRAPHICS   PATIENT NAME: Bar Crockett                      PHYSICIAN: Hugh Luu MD  : 1950  MRN: [id number]   LOS: 7 days    Patient Care Team:  Satnam Troncoso, ZACKARY as PCP - General (Emergency Medicine)  Subjective   SUBJECTIVE   Feels well - resting          Objective   OBJECTIVE   Vital Signs  Temp:  [96.9 °F (36.1 °C)-97.9 °F (36.6 °C)] 96.9 °F (36.1 °C)  Heart Rate:  [] 75  Resp:  [18] 18  BP: (104-130)/(57-81) 119/63    Flowsheet Rows      First Filed Value   Admission Height  167.6 cm (66\") Documented at 2021 0945   Admission Weight  49.6 kg (109 lb 5.6 oz) Documented at 2021 0945           I/O last 3 completed shifts:  In: 3000 [I.V.:3000]  Out: 3765 [Urine:1325; Drains:15; Stool:2425]    PHYSICAL EXAM    Physical Exam  Constitutional:       Appearance: He is well-developed. He is ill-appearing.   HENT:      Head: Normocephalic.      Mouth/Throat:      Pharynx: No oropharyngeal exudate or posterior oropharyngeal erythema.   Eyes:      Pupils: Pupils are equal, round, and reactive to light.   Cardiovascular:      Rate and Rhythm: Normal rate and regular rhythm.      Heart sounds: Normal heart sounds.   Pulmonary:      Effort: Pulmonary effort is normal.      Breath sounds: Normal breath sounds.   Abdominal:      General: Bowel sounds are normal.      Palpations: Abdomen is soft.   Musculoskeletal:      Right lower leg: Edema present.      Left lower leg: Edema present.   Skin:     Coloration: Skin is not jaundiced or pale.   Neurological:      General: No focal deficit present.      Mental Status: He is alert and oriented to person, place, and time.         RESULTS   Results Review:    Results from last 7 days   Lab Units 21  0601 21  1148 21  0620   SODIUM mmol/L 142 143 142   POTASSIUM mmol/L 3.2* 3.7 " 3.6   CHLORIDE mmol/L 108* 109* 107   CO2 mmol/L 23.0 24.0 22.0   BUN mg/dL 31* 33* 31*   CREATININE mg/dL 3.16* 3.14* 3.26*   CALCIUM mg/dL 8.3* 9.0 8.6   BILIRUBIN mg/dL  --   --  0.3   ALK PHOS U/L  --   --  66   ALT (SGPT) U/L  --   --  <5   AST (SGOT) U/L  --   --  185*   GLUCOSE mg/dL 102* 98 86       Estimated Creatinine Clearance: 18.2 mL/min (A) (by C-G formula based on SCr of 3.16 mg/dL (H)).                Results from last 7 days   Lab Units 01/30/21  0620 01/29/21  0536 01/27/21  0323 01/26/21  1255 01/26/21  0501   WBC 10*3/mm3 7.53 6.46 14.33* 13.93* 18.02*   HEMOGLOBIN g/dL 9.7* 8.3* 9.1* 10.8* 11.5*   PLATELETS 10*3/mm3 174 131* 144 168 187       Results from last 7 days   Lab Units 01/26/21  1255   INR  1.33*         Imaging Results (Last 24 Hours)     ** No results found for the last 24 hours. **           MEDICATIONS    acetaminophen, 1,000 mg, Oral, Q6H  apixaban, 5 mg, Oral, Q12H  clopidogrel, 75 mg, Oral, Daily  famotidine, 20 mg, Oral, BID AC  nitroglycerin, 0.5 inch, Topical, Q12H  nitroglycerin, 0.5 inch, Topical, Q12H  prenatal vitamin, 1 tablet, Oral, Daily  tamsulosin, 0.4 mg, Oral, Daily      lactated ringers, 100 mL/hr, Last Rate: 100 mL/hr (02/01/21 7553)        Assessment/Plan   ASSESSMENT / PLAN      Ileostomy in place (CMS/HCC)    Critical lower limb ischemia (CMS/[facility])       1.  YOBANY on CKD 3- baseline creatinine seems to be around 1.5, creatinine up to 3.1, stable in last 2-3 days and close to 3.     Creatinine was 2.2 on admission and improved to 1.6 after hydration.  Suspect initial acute kidney injury was prerenal due to volume depletion and subsequent acute kidney injury likely secondary to multiple dye exposures with CTA, thrombectomy, femorofemoral bypass.      At present we will keep him on LR.  Maintain hemodynamics, avoid NSAIDs. FENa is consistent with pre renal azotemia. Large stool output is tapering off, UO reasonable.      2.  Hypokalemia- replace today     3.   Ischemic limb- failed thrombectomy and ultimately placed femorofemoral bypass     5.  History of C. difficile colitis- status post colectomy and ileostomy     6.  Anemia- hemoglobin is stable         This document has been electronically signed by Hugh Luu MD on February 1, 2021 10:59 CST

## 2021-02-01 NOTE — PLAN OF CARE
Problem: Adult Inpatient Plan of Care  Goal: Plan of Care Review  Flowsheets  Taken 2/1/2021 1137  Progress: improving  Plan of Care Reviewed With: patient  Taken 2/1/2021 0705  Progress: improving  Plan of Care Reviewed With: patient  Outcome Summary: Pt agrees to OT this am. Pt Mod A for supine to sit and Min A for sit to supine. Pt is SBA for UB bathing and CGA for UB dressing. Pt states he will work on LB ADL later after breakfast. Pt completed grooming tasks this am. No new goals met. Continue OT POC.   Goal Outcome Evaluation:  Plan of Care Reviewed With: patient  Progress: improving  Outcome Summary: Pt agrees to OT this am. Pt Mod A for supine to sit and Min A for sit to supine. Pt is SBA for UB bathing and CGA for UB dressing. Pt states he will work on LB ADL later after breakfast. Pt completed grooming tasks this am. No new goals met. Continue OT POC.

## 2021-02-01 NOTE — SIGNIFICANT NOTE
Pt c/o nausea this pm and defers OT.   02/01/21 1343   OTHER   Discipline occupational therapy assistant   Rehab Time/Intention   Session Not Performed patient/family declined treatment

## 2021-02-01 NOTE — PROGRESS NOTES
"  Cardiothoracic - Vascular Surgery Daily Note        LOS: 7 days   Patient Care Team:  Satnam Troncoso APRN as PCP - General (Emergency Medicine)     POD6  Emergent salvage Right Iliac artery thrombectomy, fem-fem bypass PTFE    Chief Complaint: Post surgical pain      Subjective     The following portions of the patient's history were reviewed and updated as appropriate: allergies, current medications, past family history, past medical history, past social history, past surgical history and problem list.     Subjective:  Symptoms:  Stable.  No shortness of breath or chest pain.    Diet:  Adequate intake.  No nausea or vomiting.    Activity level: Impaired due to pain.    Pain:  He complains of pain that is moderate.  He reports pain is improving.  Pain is well controlled and requiring pain medication.          Objective     Vital Signs  Temp:  [96.9 °F (36.1 °C)-98 °F (36.7 °C)] 98 °F (36.7 °C)  Heart Rate:  [] 78  Resp:  [18] 18  BP: (101-130)/(48-81) 101/48  Body mass index is 21.63 kg/m².    Intake/Output Summary (Last 24 hours) at 2/1/2021 1141  Last data filed at 2/1/2021 0901  Gross per 24 hour   Intake 1240 ml   Output 2625 ml   Net -1385 ml     I/O this shift:  In: 240 [P.O.:240]  Out: 350 [Urine:150; Stool:200]    Wt Readings from Last 3 Encounters:   02/01/21 60.8 kg (134 lb)   01/11/21 52.2 kg (115 lb)   12/11/20 49.4 kg (109 lb)         Physical Exam   Objective:  General Appearance:  Comfortable, well-appearing, in no acute distress and in pain.    Vital signs: (most recent): Blood pressure 101/48, pulse 78, temperature 98 °F (36.7 °C), temperature source Oral, resp. rate 18, height 167.6 cm (66\"), weight 60.8 kg (134 lb), SpO2 91 %.  Vital signs are normal.    Output: Producing urine and producing stool.    HEENT: Normal HEENT exam.    Lungs:  Normal effort and normal respiratory rate.  Breath sounds clear to auscultation.    Heart: Normal rate.  Regular rhythm.    Chest: No chest wall " tenderness.    Abdomen: Abdomen is soft.  Bowel sounds are normal.   There is no abdominal tenderness.     Extremities: Normal range of motion.  There is no dependent edema.  ((R) Foot paresthesia. No movement. Minimal with toes)  Pulses: Distal pulses are intact.  (Triphasic DP, biphasic PT RLE)    Neurological: Patient is alert and oriented to person, place and time.  GCS score is 15.    Pupils:  Pupils are equal, round, and reactive to light.  Pupils are equal.   Skin:  Warm and dry.  (Bilateral groin incision CDI with prevena wound vac. )              Results Review:    Lab Results   Component Value Date    WBC 7.53 01/30/2021    HGB 9.7 (L) 01/30/2021    HCT 28.9 (L) 01/30/2021    MCV 98.0 (H) 01/30/2021     01/30/2021     Lab Results   Component Value Date    GLUCOSE 102 (H) 02/01/2021    BUN 31 (H) 02/01/2021    CREATININE 3.16 (H) 02/01/2021    EGFRIFNONA 19 (L) 02/01/2021    EGFRIFAFRI 66 08/17/2018    BCR 9.8 02/01/2021    K 3.2 (L) 02/01/2021    CO2 23.0 02/01/2021    CALCIUM 8.3 (L) 02/01/2021    ALBUMIN 2.80 (L) 01/30/2021     (H) 01/30/2021    ALT <5 01/30/2021       Calcium Calcium   Date/Time Value Ref Range Status   02/01/2021 0601 8.3 (L) 8.6 - 10.5 mg/dL Final   01/31/2021 1148 9.0 8.6 - 10.5 mg/dL Final   01/30/2021 0620 8.6 8.6 - 10.5 mg/dL Final      Magnesium No results found for: MG     Imaging Results (Last 24 Hours)     ** No results found for the last 24 hours. **                              acetaminophen, 1,000 mg, Oral, Q6H  apixaban, 5 mg, Oral, Q12H  clopidogrel, 75 mg, Oral, Daily  famotidine, 20 mg, Oral, BID AC  nitroglycerin, 0.5 inch, Topical, Q12H  nitroglycerin, 0.5 inch, Topical, Q12H  prenatal vitamin, 1 tablet, Oral, Daily  tamsulosin, 0.4 mg, Oral, Daily      lactated ringers, 100 mL/hr, Last Rate: 100 mL/hr (02/01/21 0428)          ASSESSMENT/PLAN     Satisfactory Post op  Progressing well, participating with PT,  tolerating PO, advance diet.     AAA without  rupture  BP control, follow up in office with ultrasound. Plavix, anticoagulation with large intramural thrombus.     Peripheral Vascular Disease  Status post emergent iliac embolectomy, fem-fem bypass (ptfe).  Good triphasic signal DP this AM.  Sensation and function of foot remains impaired.  Progressing with PT.    Medical Management: Eliquis, Plavix, Statin     nitro paste to RLE.    Apical hypokinesis on echocardiogram, EKG was without evidence of ischemic changes, follow up with cardiology as outpatient.    Anti-thrombin III deficiency  Anticoagulation: Eliquis    CKD IIIB   Acute on chronic CKD. Stable     Acute blood loss anemia  No blood products intra-op or post op.  H/H AM stable.  Start iron supplementation in the form of prenatal vitamin     Post operative pain  Immediate release oxycodone, acetaminophen    Rehab  PT/OT. Would benefit from continued rehab.     Respiratory  NEBS PRN, wean o2 as tolerated, IS/OPEP     Disposition  Continue care telemetry, anticipate likely need for rehab as outpatient.              This document has been electronically signed by ZACKARY Lu on February 1, 2021 11:47 CST

## 2021-02-01 NOTE — SIGNIFICANT NOTE
"   02/01/21 1515   OTHER   Discipline physical therapy assistant   Rehab Time/Intention   Session Not Performed patient/family declined, not feeling well  (pt declined rx stating he hadn't felt well since pta left this am;\"I don't want to move and get sick again\")     "

## 2021-02-01 NOTE — THERAPY TREATMENT NOTE
"Acute Care - Physical Therapy Treatment Note  [facility]     Patient Name: Bar Crockett  : 1950  MRN: [id number]  Today's Date: 2021           PT Assessment (last 12 hours)      PT Evaluation and Treatment     Row Name 21 0852          Physical Therapy Time and Intention    Subjective Information  complains of not being able to move r le  -LN     Document Type  therapy note (daily note)  -LN     Mode of Treatment  physical therapy;individual therapy  -LN     Patient Effort  adequate  -LN     Comment  wants TL as pta \"He got my leg moving before\"  -LN     Row Name 21 0852          General Information    Patient Profile Reviewed  yes  -LN     Existing Precautions/Restrictions  fall  -LN     Row Name 21 0852          Cognition    Affect/Mental Status (Cognitive)  WFL  -LN     Orientation Status (Cognition)  oriented x 4  -LN     Row Name 21 0852          Pain Scale: Numbers Pre/Post-Treatment    Pretreatment Pain Rating  0/10 - no pain  -LN     Posttreatment Pain Rating  0/10 - no pain  -LN     Row Name 21 0852          Bed Mobility    Bed Mobility  supine-sit;sit-supine  -LN     Scooting/Bridging [place] (Bed Mobility)  standby assist  -LN     Supine-Sit [place] (Bed Mobility)  moderate assist (50% patient effort) for legs  -LN     Sit-Supine [place] (Bed Mobility)  moderate assist (50% patient effort) for legs  -LN     Assistive Device (Bed Mobility)  bed rails;head of bed elevated  -LN     Row Name 21 0852          Transfers    Transfers  sit-stand transfer;stand-sit transfer  -LN     Sit-Stand [place] (Transfers)  minimum assist (75% patient effort);verbal cues for hand placement  -LN     Stand-Sit [place] (Transfers)  minimum assist (75% patient effort);verbal cues for hand placement  -LN     Row Name 21 0852          Sit-Stand Transfer    Assistive Device (Sit-Stand Transfers)  walker, front-wheeled  -LN     Row Name 21 " "0852          Stand-Sit Transfer    Assistive Device (Stand-Sit Transfers)  walker, front-wheeled  -LN     Row Name 02/01/21 0852          Gait/Stairs (Locomotion)    Gait/Stairs Locomotion  gait/ambulation independence;gait/ambulation assistive device;distance ambulated;gait pattern;gait deviations;maintains weight-bearing status;stairs negotiation  -LN     [place] Level (Gait)  minimum assist (75% patient effort)  -LN     Assistive Device (Gait)  walker, front-wheeled  -LN     Distance in Feet (Gait)  16  -LN     Pattern (Gait)  step-to  -LN     Deviations/Abnormal Patterns (Gait)  antalgic;gait speed decreased;stride length decreased pt requires assst to advance R LE at times.   -LN     Bilateral Gait Deviations  hip hiking R  -LN     Right Sided Gait Deviations  -- dec knee flexion  -LN     Comment (Gait/Stairs)  pt had a bm during gt \"sometimes that happens\"  -LN     Row Name 02/01/21 0852          Hip (Therapeutic Exercise)    Hip (Therapeutic Exercise)  AAROM (active assistive range of motion);PROM (passive range of motion)  -LN     Hip AROM (Therapeutic Exercise)  flexion;aBduction;aDduction;right  -LN     Row Name 02/01/21 0852          Knee (Therapeutic Exercise)    Knee (Therapeutic Exercise)  AAROM (active assistive range of motion);PROM (passive range of motion)  -LN     Knee AROM (Therapeutic Exercise)  right;heel slides  -LN     Knee PROM (Therapeutic Exercise)  -- qs  -LN     Row Name 02/01/21 0852          Ankle (Therapeutic Exercise)    Ankle (Therapeutic Exercise)  AAROM (active assistive range of motion);PROM (passive range of motion)  -LN     Ankle AROM (Therapeutic Exercise)  right;bilateral;dorsiflexion  -LN     Row Name             Wound 01/25/21 1316 abdomen Incision    Wound - Properties Group Placement Date: 01/25/21  -MS Placement Time: 1316  -MS Present on Hospital Admission: N  -MS Location: abdomen  -MS Primary Wound Type: Incision  -MS    Retired Wound - Properties Group Date " first assessed: 01/25/21  -MS Time first assessed: 1316  -MS Present on Hospital Admission: N  -MS Location: abdomen  -MS Primary Wound Type: Incision  -MS    Row Name             Wound 01/26/21 1839 Bilateral other (see comments) groin    Wound - Properties Group Placement Date: 01/26/21  -SB Placement Time: 1839  -SB Present on Hospital Admission: N  -SB Side: Bilateral  -SB Orientation: other (see comments)  -SB, bilateral groin  Location: groin  -SB    Retired Wound - Properties Group Date first assessed: 01/26/21  -SB Time first assessed: 1839  -SB Present on Hospital Admission: N  -SB Side: Bilateral  -SB Location: groin  -SB    Row Name             NPWT (Negative Pressure Wound Therapy) 01/26/21 2000 bilateral groin    NPWT (Negative Pressure Wound Therapy) - Properties Group Placement Date: 01/26/21  -BW Placement Time: 2000 -BW Location: bilateral groin  -BW    Retired NPWT (Negative Pressure Wound Therapy) - Properties Group Placement Date: 01/26/21  -BW Placement Time: 2000 -BW Location: bilateral groin  -BW    Row Name 02/01/21 0852          Plan of Care Review    Plan of Care Reviewed With  patient  -LN     Outcome Summary  pt declined rx in pm due to nausea;am rx: sup-sit-sup mod of 1,sit-stand-sit min of 1 and vc/s for hand placement;amb 16; with rw and min of 1 and occasional assist to advance r le;aa/prom to r le,arom l le  -LN     Row Name 02/01/21 0852          Vital Signs    Pre Systolic BP Rehab  134  -LN     Pre Treatment Diastolic BP  73  -LN     Post Systolic BP Rehab  117  -LN     Post Treatment Diastolic BP  72  -LN     Pretreatment Heart Rate (beats/min)  91  -LN     Posttreatment Heart Rate (beats/min)  84  -LN     Pre SpO2 (%)  94  -LN     O2 Delivery Pre Treatment  room air  -LN     Post SpO2 (%)  93  -LN     Pre Patient Position  Supine  -LN     Intra Patient Position  Standing  -LN     Post Patient Position  Supine  -LN     Row Name 02/01/21 0852          Bed Mobility Goal 1 (PT)     Activity/Assistive Device (Bed Mobility Goal 1, PT)  sit to supine/supine to sit  -LN     [place] Level/Cues Needed (Bed Mobility Goal 1, PT)  modified independence  -LN     Time Frame (Bed Mobility Goal 1, PT)  by discharge  -LN     Strategies/Barriers (Bed Mobility Goal 1, PT)  RLE paralysis.  -LN     Progress/Outcomes (Bed Mobility Goal 1, PT)  goal not met  -LN     Row Name 02/01/21 0852          Transfer Goal 1 (PT)    Activity/Assistive Device (Transfer Goal 1, PT)  sit-to-stand/stand-to-sit;bed-to-chair/chair-to-bed;walker, rolling  -LN     [place] Level/Cues Needed (Transfer Goal 1, PT)  contact guard assist  -LN     Time Frame (Transfer Goal 1, PT)  by discharge  -LN     Strategies/Barriers (Transfers Goal 1, PT)  RLE paralysis, impulsive.  -LN     Progress/Outcome (Transfer Goal 1, PT)  goal not met  -LN     Row Name 02/01/21 0852          Gait Training Goal 1 (PT)    Activity/Assistive Device (Gait Training Goal 1, PT)  walker, rolling  -LN     [place] Level (Gait Training Goal 1, PT)  contact guard assist  -LN     Distance (Gait Training Goal 1, PT)  25'x1.  -LN     Time Frame (Gait Training Goal 1, PT)  by discharge  -LN     Strategies/Barriers (Gait Training Goal 1, PT)  RLE paralysis, impulsive.  -LN     Progress/Outcome (Gait Training Goal 1, PT)  goal not met  -LN     Row Name 02/01/21 0852          Stairs Goal 1 (PT)    Activity/Assistive Device (Stairs Goal 1, PT)  walker, rolling  -LN     [place] Level/Cues Needed (Stairs Goal 1, PT)  contact guard assist  -LN     Number of Stairs (Stairs Goal 1, PT)  1 steps, may use FWW.  -LN     Time Frame (Stairs Goal 1, PT)  by discharge  -LN     Strategies/Barriers (Stairs Goal 1, PT)  RLE paralysis, impulsive.  -LN     Progress/Outcome (Stairs Goal 1, PT)  goal not met  -LN     Row Name 02/01/21 0852          Positioning and Restraints    Post Treatment Position  bed  -LN     In Bed  notified nsg;supine;call light within  reach;encouraged to call for assist;exit alarm on  -LN     Row Name 02/01/21 0852          Therapy Assessment/Plan (PT)    Rehab Potential (PT)  good, to achieve stated therapy goals  -LN       User Key  (r) = Recorded By, (t) = Taken By, (c) = Cosigned By    Initials Name Provider Type    LN Courtney Rice PTA Physical Therapy Assistant    Mallory Nguyen, RN Registered Nurse    Liz Persaud RN Registered Nurse    Cat Spence RN Registered Nurse        Physical Therapy Education                 Title: PT OT SLP Therapies (In Progress)     Topic: Physical Therapy (In Progress)     Point: Mobility training (In Progress)     Learning Progress Summary           Patient Acceptance, E, NR by KODAK at 1/31/2021 1202    Acceptance, E, NR by KODAK at 1/30/2021 1301    Acceptance, E, VU,NR by  at 1/26/2021 1048    Comment: Educated on proper gait mechanics and attending to R LE to ensure hip/knee extension during SLS.                   Point: Home exercise program (Not Started)     Learner Progress:  Not documented in this visit.          Point: Body mechanics (Not Started)     Learner Progress:  Not documented in this visit.          Point: Precautions (Not Started)     Learner Progress:  Not documented in this visit.                      User Key     Initials Effective Dates Name Provider Type Discipline    KODAK 03/07/18 -  Mirza Kennedy PTA Physical Therapy Assistant PT    CZ 04/03/18 -  Ismael Morillo PT Physical Therapist PT              PT Recommendation and Plan  Anticipated Discharge Disposition (PT): home with 24/7 care, inpatient rehabilitation facility, home with assist  Plan of Care Reviewed With: patient  Outcome Summary: pt declined rx in pm due to nausea;am rx: sup-sit-sup mod of 1,sit-stand-sit min of 1 and vc/s for hand placement;amb 16; with rw and min of 1 and occasional assist to advance r le;aa/prom to r le,arom l le  Outcome Measures     Row Name 01/30/21 0950             How much  help from another person do you currently need...    Turning from your back to your side while in flat bed without using bedrails?  3  -KODAK      Moving from lying on back to sitting on the side of a flat bed without bedrails?  2  -KODAK      Moving to and from a bed to a chair (including a wheelchair)?  2  -KODAK      Standing up from a chair using your arms (e.g., wheelchair, bedside chair)?  2  -KODAK      Climbing 3-5 steps with a railing?  2  -KODAK      To walk in hospital room?  3  -KODAK      AM-PAC 6 Clicks Score (PT)  14  -KODAK         Functional Assessment    Outcome Measure Options  AM-PAC 6 Clicks Basic Mobility (PT)  -KODAK        User Key  (r) = Recorded By, (t) = Taken By, (c) = Cosigned By    Initials Name Provider Type    Mirza Salinas PTA Physical Therapy Assistant           Time Calculation:   PT Charges     Row Name 02/01/21 1308             Time Calculation    Start Time  0852  -LN      Stop Time  0946  -LN      Time Calculation (min)  54 min  -LN      PT Received On  02/01/21  -LN         Time Calculation- PT    Total Timed Code Minutes- PT  54 minute(s)  -LN        User Key  (r) = Recorded By, (t) = Taken By, (c) = Cosigned By    Initials Name Provider Type    Courtney Hodges PTA Physical Therapy Assistant        Therapy Charges for Today     Code Description Service Date Service Provider Modifiers Qty    30465832831 HC GAIT TRAINING EA 15 MIN 2/1/2021 Courtney Rice PTA GP 1    21320260198 HC PT THER PROC EA 15 MIN 2/1/2021 Courtney Rice PTA GP 1    44196303904 HC PT THERAPEUTIC ACT EA 15 MIN 2/1/2021 Courtney Rice PTA GP 2          PT G-Codes  Outcome Measure Options: AM-PAC 6 Clicks Basic Mobility (PT)  AM-PAC 6 Clicks Score (PT): 14  AM-PAC 6 Clicks Score (OT): 15    Courtney Rice PTA  2/1/2021

## 2021-02-02 LAB
ANION GAP SERPL CALCULATED.3IONS-SCNC: 7 MMOL/L (ref 5–15)
BUN SERPL-MCNC: 31 MG/DL (ref 8–23)
BUN/CREAT SERPL: 10.7 (ref 7–25)
CALCIUM SPEC-SCNC: 8.7 MG/DL (ref 8.6–10.5)
CHLORIDE SERPL-SCNC: 108 MMOL/L (ref 98–107)
CO2 SERPL-SCNC: 25 MMOL/L (ref 22–29)
CREAT SERPL-MCNC: 2.89 MG/DL (ref 0.76–1.27)
GFR SERPL CREATININE-BSD FRML MDRD: 22 ML/MIN/1.73
GLUCOSE SERPL-MCNC: 94 MG/DL (ref 65–99)
MAGNESIUM SERPL-MCNC: 1.6 MG/DL (ref 1.6–2.4)
PHOSPHATE SERPL-MCNC: 4 MG/DL (ref 2.5–4.5)
POTASSIUM SERPL-SCNC: 3.4 MMOL/L (ref 3.5–5.2)
SODIUM SERPL-SCNC: 140 MMOL/L (ref 136–145)
WHOLE BLOOD HOLD SPECIMEN: NORMAL

## 2021-02-02 PROCEDURE — 97535 SELF CARE MNGMENT TRAINING: CPT

## 2021-02-02 PROCEDURE — 99024 POSTOP FOLLOW-UP VISIT: CPT | Performed by: SURGERY

## 2021-02-02 PROCEDURE — 99024 POSTOP FOLLOW-UP VISIT: CPT | Performed by: NURSE PRACTITIONER

## 2021-02-02 PROCEDURE — 25010000002 MAGNESIUM SULFATE IN D5W 1G/100ML (PREMIX) 1-5 GM/100ML-% SOLUTION: Performed by: INTERNAL MEDICINE

## 2021-02-02 PROCEDURE — 97116 GAIT TRAINING THERAPY: CPT

## 2021-02-02 PROCEDURE — 97110 THERAPEUTIC EXERCISES: CPT

## 2021-02-02 PROCEDURE — 97530 THERAPEUTIC ACTIVITIES: CPT

## 2021-02-02 PROCEDURE — 84100 ASSAY OF PHOSPHORUS: CPT | Performed by: INTERNAL MEDICINE

## 2021-02-02 PROCEDURE — 83735 ASSAY OF MAGNESIUM: CPT | Performed by: INTERNAL MEDICINE

## 2021-02-02 PROCEDURE — 80048 BASIC METABOLIC PNL TOTAL CA: CPT | Performed by: NURSE PRACTITIONER

## 2021-02-02 RX ORDER — MAGNESIUM SULFATE 1 G/100ML
1 INJECTION INTRAVENOUS ONCE
Status: COMPLETED | OUTPATIENT
Start: 2021-02-02 | End: 2021-02-02

## 2021-02-02 RX ORDER — POTASSIUM CHLORIDE 750 MG/1
40 CAPSULE, EXTENDED RELEASE ORAL ONCE
Status: COMPLETED | OUTPATIENT
Start: 2021-02-02 | End: 2021-02-02

## 2021-02-02 RX ORDER — POTASSIUM CHLORIDE 750 MG/1
20 CAPSULE, EXTENDED RELEASE ORAL 2 TIMES DAILY WITH MEALS
Status: DISCONTINUED | OUTPATIENT
Start: 2021-02-02 | End: 2021-02-03 | Stop reason: HOSPADM

## 2021-02-02 RX ADMIN — APIXABAN 5 MG: 5 TABLET, FILM COATED ORAL at 08:21

## 2021-02-02 RX ADMIN — TAMSULOSIN HYDROCHLORIDE 0.4 MG: 0.4 CAPSULE ORAL at 08:21

## 2021-02-02 RX ADMIN — ACETAMINOPHEN 1000 MG: 500 TABLET ORAL at 10:47

## 2021-02-02 RX ADMIN — NITROGLYCERIN 0.5 INCH: 20 OINTMENT TOPICAL at 21:03

## 2021-02-02 RX ADMIN — PRAVASTATIN SODIUM 20 MG: 20 TABLET ORAL at 21:03

## 2021-02-02 RX ADMIN — NITROGLYCERIN 0.5 INCH: 20 OINTMENT TOPICAL at 21:04

## 2021-02-02 RX ADMIN — POTASSIUM CHLORIDE 20 MEQ: 10 CAPSULE, COATED, EXTENDED RELEASE ORAL at 16:51

## 2021-02-02 RX ADMIN — SODIUM CHLORIDE, POTASSIUM CHLORIDE, SODIUM LACTATE AND CALCIUM CHLORIDE 100 ML/HR: 600; 310; 30; 20 INJECTION, SOLUTION INTRAVENOUS at 21:04

## 2021-02-02 RX ADMIN — FAMOTIDINE 20 MG: 20 TABLET, FILM COATED ORAL at 16:52

## 2021-02-02 RX ADMIN — POTASSIUM CHLORIDE 40 MEQ: 10 CAPSULE, COATED, EXTENDED RELEASE ORAL at 10:47

## 2021-02-02 RX ADMIN — CLOPIDOGREL BISULFATE 75 MG: 75 TABLET ORAL at 08:21

## 2021-02-02 RX ADMIN — PRENATAL VIT W/ FE FUMARATE-FA TAB 27-0.8 MG 1 TABLET: 27-0.8 TAB at 08:21

## 2021-02-02 RX ADMIN — ACETAMINOPHEN 1000 MG: 500 TABLET ORAL at 21:03

## 2021-02-02 RX ADMIN — OXYCODONE 5 MG: 5 TABLET ORAL at 04:03

## 2021-02-02 RX ADMIN — NITROGLYCERIN 0.5 INCH: 20 OINTMENT TOPICAL at 09:28

## 2021-02-02 RX ADMIN — NITROGLYCERIN 0.5 INCH: 20 OINTMENT TOPICAL at 08:22

## 2021-02-02 RX ADMIN — MAGNESIUM SULFATE HEPTAHYDRATE 1 G: 1 INJECTION, SOLUTION INTRAVENOUS at 10:47

## 2021-02-02 RX ADMIN — OXYCODONE HYDROCHLORIDE 10 MG: 5 TABLET ORAL at 16:51

## 2021-02-02 RX ADMIN — APIXABAN 5 MG: 5 TABLET, FILM COATED ORAL at 21:03

## 2021-02-02 RX ADMIN — FAMOTIDINE 20 MG: 20 TABLET, FILM COATED ORAL at 08:21

## 2021-02-02 NOTE — PLAN OF CARE
Goal Outcome Evaluation:  Plan of Care Reviewed With: patient  Progress: no change  Outcome Summary: VSS, approved to SNF in [place], covid test negative, sammy rodriguezc ba

## 2021-02-02 NOTE — PLAN OF CARE
Goal Outcome Evaluation:  Plan of Care Reviewed With: patient, spouse, caregiver  Progress: improving  Outcome Summary: Diet advanced.  Overall intake averages 75%--limited documentation.  Menu suggestions and alteranatives provided.  He is drinking his milk

## 2021-02-02 NOTE — PLAN OF CARE
Goal Outcome Evaluation:  Plan of Care Reviewed With: patient  Progress: improving  Outcome Summary: pt responded well to PT w. increased gait to 42 ft min A w/ RW. pt able to advance R LE in his own this PM but hip hiking to do so. pt lacking hip flexion, knee extension, and DF w/ R LE during gait. pt may benefit from AFO or foot up brace. pt participated in LE ther ex that incorporated stretching, manual resistance, and eccentric control. non ew goals met at this time. pt would continue to benefit from PT services.

## 2021-02-02 NOTE — THERAPY TREATMENT NOTE
Acute Care - Physical Therapy Treatment Note  [facility]     Patient Name: Bar Crockett  : 1950  MRN: [id number]  Today's Date: 2021           PT Assessment (last 12 hours)      PT Evaluation and Treatment     Row Name 21 1435 21 0950       Physical Therapy Time and Intention    Document Type  therapy note (daily note)  -  therapy note (daily note)  -    Mode of Treatment  physical therapy;individual therapy  -  physical therapy;individual therapy  -    Patient Effort  adequate  -  adequate  -    Row Name 21 1435 21 0950       General Information    Patient Profile Reviewed  yes  -  yes  -    Existing Precautions/Restrictions  fall  -  fall  -    Row Name 21 1435 21 0950       Cognition    Affect/Mental Status (Cognitive)  WFL  -  WFL  -    Orientation Status (Cognition)  oriented x 4  -KODAK  oriented x 4  -    Personal Safety Interventions  fall prevention program maintained;gait belt;muscle strengthening facilitated;nonskid shoes/slippers when out of bed;supervised activity  -  safety round/check completed;elopement precautions initiated;fall prevention program maintained;gait belt;muscle strengthening facilitated  -    Row Name 21 1435 21 0950       Pain Scale: Numbers Pre/Post-Treatment    Pretreatment Pain Rating  --  -  1/10  -    Posttreatment Pain Rating  --  -  1/10  -    Pain Location - Side  --  Right  -    Pain Location  --  foot  -[facility] Name 21 0950          Pain Scale: Word Pre/Post-Treatment    Pain Intervention(s)  Repositioned;Ambulation/increased activity  -     Row Name 21 1435 21 0950       Bed Mobility    Bed Mobility  supine-sit;sit-supine;scooting/bridging  -  supine-sit;sit-supine  -    Scooting/Bridging [place] (Bed Mobility)  standby assist  -  standby assist  -    Supine-Sit [place] (Bed Mobility)  minimum assist (75% patient effort)  -   minimum assist (75% patient effort)  -    Sit-Supine [place] (Bed Mobility)  minimum assist (75% patient effort)  -  minimum assist (75% patient effort)  -    Assistive Device (Bed Mobility)  bed rails;head of bed elevated  -  bed rails;head of bed elevated  -    Comment (Bed Mobility)  pt emptied colostomy while sitting  -  pt did better sitting up on R side of the bed. pt emptied his own colostomy while sitting EOB.    -    Row Name 02/02/21 1435 02/02/21 0950       Transfers    Transfers  sit-stand transfer;stand-sit transfer  -  sit-stand transfer;stand-sit transfer  -    Comment (Transfers)  --  sit-stands x3. pt defers OOB in recliner at this time.   -    Sit-Stand [place] (Transfers)  contact guard;minimum assist (75% patient effort);verbal cues  -  contact guard;minimum assist (75% patient effort)  -    Stand-Sit [place] (Transfers)  contact guard;minimum assist (75% patient effort);verbal cues  -  contact guard;minimum assist (75% patient effort)  -    Row Name 02/02/21 1435 02/02/21 0950       Sit-Stand Transfer    Assistive Device (Sit-Stand Transfers)  walker, front-wheeled  -  walker, front-wheeled  -KODAK    Row Name 02/02/21 1435 02/02/21 0950       Stand-Sit Transfer    Assistive Device (Stand-Sit Transfers)  walker, front-wheeled  -  walker, front-wheeled  -KODAK    Row Name 02/02/21 1435 02/02/21 0950       Gait/Stairs (Locomotion)    Gait/Stairs Locomotion  gait/ambulation independence;gait/ambulation assistive device;distance ambulated;gait pattern;gait deviations;maintains weight-bearing status;stairs negotiation  -  gait/ambulation independence;gait/ambulation assistive device;distance ambulated;gait pattern;gait deviations;maintains weight-bearing status;stairs negotiation  -    [place] Level (Gait)  minimum assist (75% patient effort)  -  minimum assist (75% patient effort)  -    Assistive Device (Gait)  walker, front-wheeled  -  walker,  front-wheeled  -KODAK    Distance in Feet (Gait)  42  -KODAK  30  -KODAK    Pattern (Gait)  step-to  -KODAK  step-to  -KODAK    Deviations/Abnormal Patterns (Gait)  antalgic;gait speed decreased;stride length decreased  -KODAK  antalgic;gait speed decreased;stride length decreased pt requires assst to advance R LE or first few steps.   -KODAK    Bilateral Gait Deviations  hip hiking R  -KODAK  hip hiking R  -KODAK    Right Sided Gait Deviations  -- dec knee flexion and DF on R with gait. R LE abducted  -KODAK  -- dec knee flexion and DF on R with gait. R LE abducted  -KODAK    Comment (Gait/Stairs)  pt may benefit from AFO or foot up brace  -KODAK  --    Row Name 02/02/21 1435 02/02/21 0950       Motor Skills    Therapeutic Exercise  -- AP, SAQ, hip Ab/Ad HS- 10x1 niurka. resistance and stretching..  -KODAK  -- DF/PF, HS, hip Ab/Ad, SAQ. S at end range and resistance..  -KODAK    Additional Documentation  -- .. incorporated  -KODAK  -- eccentrically with AP/HS. controlled eccentrics with SAQ.    -KODAK    Row Name             Wound 01/25/21 1316 abdomen Incision    Wound - Properties Group Placement Date: 01/25/21  -MS Placement Time: 1316  -MS Present on Hospital Admission: N  -MS Location: abdomen  -MS Primary Wound Type: Incision  -MS    Retired Wound - Properties Group Date first assessed: 01/25/21  -MS Time first assessed: 1316  -MS Present on Hospital Admission: N  -MS Location: abdomen  -MS Primary Wound Type: Incision  -MS    Row Name             Wound 01/26/21 1839 Bilateral other (see comments) groin    Wound - Properties Group Placement Date: 01/26/21  -SB Placement Time: 1839  -SB Present on Hospital Admission: N  -SB Side: Bilateral  -SB Orientation: other (see comments)  -SB, bilateral groin  Location: groin  -SB    Retired Wound - Properties Group Date first assessed: 01/26/21  -SB Time first assessed: 1839  -SB Present on Hospital Admission: N  -SB Side: Bilateral  -SB Location: groin  -SB    Row Name             NPWT (Negative Pressure Wound  Therapy) 01/26/21 2000 bilateral groin    NPWT (Negative Pressure Wound Therapy) - Properties Group Placement Date: 01/26/21  -BW Placement Time: 2000 -BW Location: bilateral groin  -BW    Retired NPWT (Negative Pressure Wound Therapy) - Properties Group Placement Date: 01/26/21  -BW Placement Time: 2000 -BW Location: bilateral groin  -BW    Row Name 02/02/21 1435 02/02/21 0950       Vital Signs    Pre Systolic BP Rehab  140  -KODAK  136  -KODAK    Pre Treatment Diastolic BP  80  -KODAK  78  -KODAK    Post Systolic BP Rehab  139  -KODAK  142  -KODAK    Post Treatment Diastolic BP  69  -KODAK  79  -KODAK    Pretreatment Heart Rate (beats/min)  81  -KODAK  83  -KODAK    Posttreatment Heart Rate (beats/min)  84  -KODAK  85  -KODAK    Pre SpO2 (%)  95  -KODAK  97  -KODAK    O2 Delivery Pre Treatment  room air  -KODAK  room air  -KODAK    Post SpO2 (%)  100  -KODAK  93  -KODAK    O2 Delivery Post Treatment  room air  -KODAK  room air  -KODAK    Pre Patient Position  Supine  -KODAK  Supine  -KODAK    Post Patient Position  Supine  -KODAK  Supine  -KODAK    Row Name 02/02/21 1435 02/02/21 0950       Bed Mobility Goal 1 (PT)    Activity/Assistive Device (Bed Mobility Goal 1, PT)  sit to supine/supine to sit  -KODAK  sit to supine/supine to sit  -KODAK    [place] Level/Cues Needed (Bed Mobility Goal 1, PT)  modified independence  -KODAK  modified independence  -KODAK    Time Frame (Bed Mobility Goal 1, PT)  by discharge  -KODAK  by discharge  -KODAK    Strategies/Barriers (Bed Mobility Goal 1, PT)  RLE paralysis.  -KODAK  RLE paralysis.  -KODAK    Progress/Outcomes (Bed Mobility Goal 1, PT)  goal not met  -KODAK  goal not met  -KODAK    Row Name 02/02/21 1435 02/02/21 0950       Transfer Goal 1 (PT)    Activity/Assistive Device (Transfer Goal 1, PT)  sit-to-stand/stand-to-sit;bed-to-chair/chair-to-bed;walker, rolling  -KODAK  sit-to-stand/stand-to-sit;bed-to-chair/chair-to-bed;walker, rolling  -KODAK    [place] Level/Cues Needed (Transfer Goal 1, PT)  contact guard assist  -KODAK  contact guard assist  -KODAK    Time Frame  (Transfer Goal 1, PT)  by discharge  -KODAK  by discharge  -KODAK    Strategies/Barriers (Transfers Goal 1, PT)  RLE paralysis, impulsive.  -KODAK  RLE paralysis, impulsive.  -KODAK    Progress/Outcome (Transfer Goal 1, PT)  goal not met  -KODAK  goal not met  -KODAK    Row Name 02/02/21 1435 02/02/21 0950       Gait Training Goal 1 (PT)    Activity/Assistive Device (Gait Training Goal 1, PT)  walker, rolling  -KODAK  walker, rolling  -KODAK    [place] Level (Gait Training Goal 1, PT)  contact guard assist  -KODAK  contact guard assist  -KODAK    Distance (Gait Training Goal 1, PT)  25'x1.  -KODAK  25'x1.  -KODAK    Time Frame (Gait Training Goal 1, PT)  by discharge  -KODAK  by discharge  -KODAK    Strategies/Barriers (Gait Training Goal 1, PT)  RLE paralysis, impulsive.  -KODAK  RLE paralysis, impulsive.  -KODAK    Progress/Outcome (Gait Training Goal 1, PT)  goal not met  -KODAK  goal not met  -KODAK    Row Name 02/02/21 1435 02/02/21 0950       Stairs Goal 1 (PT)    Activity/Assistive Device (Stairs Goal 1, PT)  walker, rolling  -KODAK  walker, rolling  -KODAK    [place] Level/Cues Needed (Stairs Goal 1, PT)  contact guard assist  -KODAK  contact guard assist  -KODAK    Number of Stairs (Stairs Goal 1, PT)  1 steps, may use FWW.  -KODAK  1 steps, may use FWW.  -KODAK    Time Frame (Stairs Goal 1, PT)  by discharge  -KODAK  by discharge  -KODAK    Strategies/Barriers (Stairs Goal 1, PT)  RLE paralysis, impulsive.  -KODAK  RLE paralysis, impulsive.  -KODAK    Progress/Outcome (Stairs Goal 1, PT)  goal not met  -KODAK  goal not met  -KODAK    Row Name 02/02/21 1435 02/02/21 0950       Positioning and Restraints    Pre-Treatment Position  in bed  -KODAK  in bed  -KODAK    Post Treatment Position  bed  -KODAK  bed  -KODAK    In Bed  fowlers;call light within reach;encouraged to call for assist;exit alarm on all needs met.   -KODAK  fowlers;call light within reach;encouraged to call for assist;exit alarm on  -KODAK    Row Name 02/02/21 1435 02/02/21 0950       Therapy Assessment/Plan (PT)    Rehab Potential (PT)   good, to achieve stated therapy goals  -KODAK  good, to achieve stated therapy goals  -KODAK      User Key  (r) = Recorded By, (t) = Taken By, (c) = Cosigned By    Initials Name Provider Type    KODAK Mirza Kennedy PTA Physical Therapy Assistant    Mallory Nguyen, RN Registered Nurse    Liz Persaud RN Registered Nurse    Cat Spence RN Registered Nurse        Physical Therapy Education                 Title: PT OT SLP Therapies (In Progress)     Topic: Physical Therapy (In Progress)     Point: Mobility training (In Progress)     Learning Progress Summary           Patient Acceptance, E, NR by KODAK at 2/2/2021 1255    Acceptance, E, NR by KODAK at 1/31/2021 1202    Acceptance, E, NR by KODAK at 1/30/2021 1301    Acceptance, E, VU,NR by  at 1/26/2021 1048    Comment: Educated on proper gait mechanics and attending to R LE to ensure hip/knee extension during SLS.                   Point: Home exercise program (Not Started)     Learner Progress:  Not documented in this visit.          Point: Body mechanics (Not Started)     Learner Progress:  Not documented in this visit.          Point: Precautions (Not Started)     Learner Progress:  Not documented in this visit.                      User Key     Initials Effective Dates Name Provider Type Discipline    KODAK 03/07/18 -  Mirza Kennedy PTA Physical Therapy Assistant PT    PAN 04/03/18 -  Ismael Morillo, PT Physical Therapist PT              PT Recommendation and Plan  Anticipated Discharge Disposition (PT): home with 24/7 care, inpatient rehabilitation facility, home with assist  Plan of Care Reviewed With: patient  Progress: improving  Outcome Summary: pt responded well to PT w. increased gait to 42 ft min A w/ RW. pt able to advance R LE in his own this PM but hip hiking to do so. pt lacking hip flexion, knee extension, and DF w/ R LE during gait. pt may benefit from AFO or foot up brace. pt participated in LE ther ex that incorporated stretching, manual  resistance, and eccentric control. non ew goals met at this time. pt would continue to benefit from PT services.  Outcome Measures     Row Name 02/02/21 0950             How much help from another person do you currently need...    Turning from your back to your side while in flat bed without using bedrails?  3  -KODAK      Moving from lying on back to sitting on the side of a flat bed without bedrails?  3  -KODAK      Moving to and from a bed to a chair (including a wheelchair)?  3  -KODAK      Standing up from a chair using your arms (e.g., wheelchair, bedside chair)?  3  -KODAK      Climbing 3-5 steps with a railing?  2  -KODAK      To walk in hospital room?  3  -KODAK      AM-PAC 6 Clicks Score (PT)  17  -KODAK         Functional Assessment    Outcome Measure Options  AM-PAC 6 Clicks Basic Mobility (PT)  -KODAK        User Key  (r) = Recorded By, (t) = Taken By, (c) = Cosigned By    Initials Name Provider Type    Mirza Salinas PTA Physical Therapy Assistant           Time Calculation:   PT Charges     Row Name 02/02/21 1604 02/02/21 1259          Time Calculation    Start Time  1435  -KODAK  0950  -KODAK     Stop Time  1522  -KODAK  1047  -KODAK     Time Calculation (min)  47 min  -KODAK  57 min  -KODAK     PT Non-Billable Time (min)  --  4 min  -KODAK        Time Calculation- PT    Total Timed Code Minutes- PT  47 minute(s)  -KODAK  53 minute(s)  -KODAK       User Key  (r) = Recorded By, (t) = Taken By, (c) = Cosigned By    Initials Name Provider Type    KODAK Mirza Kennedy PTA Physical Therapy Assistant        Therapy Charges for Today     Code Description Service Date Service Provider Modifiers Qty    06140894393 HC GAIT TRAINING EA 15 MIN 2/2/2021 Mirza Kennedy, PTA GP 1    28436439413 HC PT THER PROC EA 15 MIN 2/2/2021 Mirza Kennedy, PTA GP 1    48551136959 HC PT THERAPEUTIC ACT EA 15 MIN 2/2/2021 Mirza Kennedy, PTA GP 2    45491223887 HC GAIT TRAINING EA 15 MIN 2/2/2021 Mirza Kennedy, PTA GP 1    33545233597 HC PT THER PROC EA 15 MIN  2/2/2021 Mirza Kennedy, PTA GP 2          PT G-Codes  Outcome Measure Options: AM-PAC 6 Clicks Basic Mobility (PT)  AM-PAC 6 Clicks Score (PT): 17  AM-PAC 6 Clicks Score (OT): 16    Mirza Kennedy PTA  2/2/2021

## 2021-02-02 NOTE — PROGRESS NOTES
"[facility]  [address] [place] [address]. [address]  T - [phone]  F - [phone]     Progress Note          PATIENT  DEMOGRAPHICS   PATIENT NAME: Bar Crockett                      PHYSICIAN: Hugh Luu MD  : 1950  MRN: [id number]   LOS: 8 days    Patient Care Team:  Satnam Trocnoso, ZACKARY as PCP - General (Emergency Medicine)  Subjective   SUBJECTIVE   Feels well - no soa          Objective   OBJECTIVE   Vital Signs  Temp:  [97.5 °F (36.4 °C)-98.9 °F (37.2 °C)] 97.5 °F (36.4 °C)  Heart Rate:  [71-94] 86  Resp:  [18] 18  BP: (101-137)/(48-79) 137/79    Flowsheet Rows      First Filed Value   Admission Height  167.6 cm (66\") Documented at 2021 0945   Admission Weight  49.6 kg (109 lb 5.6 oz) Documented at 2021 0945           I/O last 3 completed shifts:  In: 1480 [P.O.:480; I.V.:1000]  Out: 4300 [Urine:1950; Stool:2350]    PHYSICAL EXAM    Physical Exam  Constitutional:       Appearance: He is well-developed. He is ill-appearing.   HENT:      Head: Normocephalic.      Mouth/Throat:      Pharynx: No oropharyngeal exudate or posterior oropharyngeal erythema.   Eyes:      Pupils: Pupils are equal, round, and reactive to light.   Cardiovascular:      Rate and Rhythm: Normal rate and regular rhythm.      Heart sounds: Normal heart sounds.   Pulmonary:      Effort: Pulmonary effort is normal.      Breath sounds: Normal breath sounds.   Abdominal:      General: Bowel sounds are normal.      Palpations: Abdomen is soft.   Musculoskeletal:      Right lower leg: Edema present.      Left lower leg: Edema present.   Skin:     Coloration: Skin is not jaundiced or pale.   Neurological:      General: No focal deficit present.      Mental Status: He is alert and oriented to person, place, and time.         RESULTS   Results Review:    Results from last 7 days   Lab Units 21  0602 21  0601 21  1148 21  0620   SODIUM mmol/L 140 142 143 142   POTASSIUM " mmol/L 3.4* 3.2* 3.7 3.6   CHLORIDE mmol/L 108* 108* 109* 107   CO2 mmol/L 25.0 23.0 24.0 22.0   BUN mg/dL 31* 31* 33* 31*   CREATININE mg/dL 2.89* 3.16* 3.14* 3.26*   CALCIUM mg/dL 8.7 8.3* 9.0 8.6   BILIRUBIN mg/dL  --   --   --  0.3   ALK PHOS U/L  --   --   --  66   ALT (SGPT) U/L  --   --   --  <5   AST (SGOT) U/L  --   --   --  185*   GLUCOSE mg/dL 94 102* 98 86       Estimated Creatinine Clearance: 20 mL/min (A) (by C-G formula based on SCr of 2.89 mg/dL (H)).    Results from last 7 days   Lab Units 02/02/21  0602   MAGNESIUM mg/dL 1.6   PHOSPHORUS mg/dL 4.0             Results from last 7 days   Lab Units 01/30/21  0620 01/29/21  0536 01/27/21  0323 01/26/21  1255   WBC 10*3/mm3 7.53 6.46 14.33* 13.93*   HEMOGLOBIN g/dL 9.7* 8.3* 9.1* 10.8*   PLATELETS 10*3/mm3 174 131* 144 168       Results from last 7 days   Lab Units 01/26/21  1255   INR  1.33*         Imaging Results (Last 24 Hours)     ** No results found for the last 24 hours. **           MEDICATIONS    acetaminophen, 1,000 mg, Oral, Q6H  apixaban, 5 mg, Oral, Q12H  clopidogrel, 75 mg, Oral, Daily  famotidine, 20 mg, Oral, BID AC  nitroglycerin, 0.5 inch, Topical, Q12H  nitroglycerin, 0.5 inch, Topical, Q12H  pravastatin, 20 mg, Oral, Nightly  prenatal vitamin, 1 tablet, Oral, Daily  tamsulosin, 0.4 mg, Oral, Daily      lactated ringers, 100 mL/hr, Last Rate: 100 mL/hr (02/01/21 2029)        Assessment/Plan   ASSESSMENT / PLAN      Ileostomy in place (CMS/HCC)    Critical lower limb ischemia (CMS/HCC)       1.  YOBANY on CKD 3- baseline creatinine seems to be around 1.5, creatinine up to 3.1, and today down to 2.9     Creatinine was 2.2 on admission and improved to 1.6 after hydration.  Suspect initial acute kidney injury was prerenal due to volume depletion and subsequent acute kidney injury likely secondary to multiple dye exposures with CTA, thrombectomy, femorofemoral bypass.      At present we will keep him on LR.  Maintain hemodynamics, avoid  NSAIDs. FENa is consistent with pre renal azotemia, UO reasonable.      2.  Hypokalemia- replace today     3.  Ischemic limb- failed thrombectomy and ultimately placed femorofemoral bypass     5.  History of C. difficile colitis- status post colectomy and ileostomy     6.  Anemia- hemoglobin is stable         This document has been electronically signed by Hugh Luu MD on February 2, 2021 09:28 CST

## 2021-02-02 NOTE — THERAPY TREATMENT NOTE
Duplicate message Patient Name: Bar Crockett  : 1950    MRN: [id number]                              Today's Date: 2021       Admit Date: 2021    Visit Dx:     ICD-10-CM ICD-9-CM   1. Critical lower limb ischemia (CMS/HCC)  I70.229 459.9   2. Moderate protein-calorie malnutrition (CMS/HCC)  E44.0 263.0   3. Ileostomy in place (CMS/HCC)  Z93.2 V44.2   4. Impaired functional mobility, balance, gait, and endurance  Z74.09 V49.89   5. Impaired mobility and ADLs  Z74.09 V49.89    Z78.9      Patient Active Problem List   Diagnosis   • Sigmoid diverticulitis   • Pancolitis (CMS/HCC)   • Stage 3a chronic kidney disease (CMS/HCC)   • Sepsis without acute organ dysfunction (CMS/HCC)   • Severe malnutrition (CMS/HCC)   • Edema of both ankles   • Tachycardia   • YOBANY (acute kidney injury) (CMS/HCC)   • Moderate episode of recurrent major depressive disorder (CMS/HCC)   • Ileostomy in place (CMS/HCC)   • Critical lower limb ischemia (CMS/HCC)     Past Medical History:   Diagnosis Date   • Hyperlipidemia    • Hypertension    • Rectal abnormality     Rectum came out - er put back in.     • TIA (transient ischemic attack)      Past Surgical History:   Procedure Laterality Date   • COLON RESECTION N/A 10/26/2020    Procedure: SUBTOTAL COLECTOMY,  ILEOSTOMY, PLACEMENT OF GASTROSTOMY AND JEJEUNOSTOMY TUBES;  Surgeon: Nahum Herzog MD;  Location: [facility] OR;  Service: General;  Laterality: N/A;   • COLONOSCOPY N/A 12/10/2018    Procedure: COLONOSCOPY;  Surgeon: Jay Wilson DO;  Location: [facility] ENDOSCOPY;  Service: Gastroenterology   • COLONOSCOPY Left 10/25/2020    Procedure: COLONOSCOPY WITH ENDOSCOPIC FECAL MICROBIOTA TRANSPLANT;  Surgeon: Jay Wilson DO;  Location: [facility] OR;  Service: Gastroenterology;  Laterality: Left;   • COLONOSCOPY N/A 10/23/2020    Procedure: COLONOSCOPY WITH ENDOSCOPIC FECAL MICROBIOTA TRANSPLANT;  Surgeon: Jay Wilson DO;  Location: [facility] ENDOSCOPY;  Service: Gastroenterology;   Laterality: N/A;   • ILEOSTOMY CLOSURE N/A 1/25/2021    Procedure: ILEOSTOMY CLOSURE WITH LOOP ILEOSTOMY PLACEMENT;  Surgeon: Nahum Herzog MD;  Location: [facility];  Service: General;  Laterality: N/A;   • KNEE MENISCAL REPAIR Left    • LEG THROMBECTOMY/EMBOLECTOMY N/A 1/26/2021    Procedure: femoral to femoral bypass, bi-femoral bypass;  Surgeon: Sukhwinder Steele MD;  Location: [facility];  Service: Vascular;  Laterality: N/A;   • OTHER SURGICAL HISTORY      Loop recorder     General Information     Row Name 02/02/21 0700          OT Time and Intention    Document Type  therapy note (daily note)  -BB     Mode of Treatment  individual therapy;occupational therapy  -BB     Row Name 02/02/21 0700          General Information    Patient Profile Reviewed  yes  -BB     Existing Precautions/Restrictions  fall  -BB     Row Name 02/02/21 0700          Cognition    Orientation Status (Cognition)  oriented x 4  -BB     Row Name 02/02/21 0700          Safety Issues, Functional Mobility    Impairments Affecting Function (Mobility)  balance;coordination;endurance/activity tolerance;pain;postural/trunk control;strength;motor control;motor planning  -BB       User Key  (r) = Recorded By, (t) = Taken By, (c) = Cosigned By    Initials Name Provider Type    BB Robyn Dumont COTA/L Occupational Therapy Assistant          Mobility/ADL's     Row Name 02/02/21 0700          Bed Mobility    Bed Mobility  supine-sit;sit-supine  -BB     Rolling Left [place] (Bed Mobility)  moderate assist (50% patient effort)  -BB     Scooting/Bridging [place] (Bed Mobility)  standby assist  -BB     Supine-Sit [place] (Bed Mobility)  moderate assist (50% patient effort) for legs  -BB     Sit-Supine [place] (Bed Mobility)  moderate assist (50% patient effort) for legs  -BB     Bed Mobility, Safety Issues  decreased use of legs for bridging/pushing  -BB     Assistive Device (Bed Mobility)  bed rails;head of bed elevated  -BB      Row Name 02/02/21 0700          Transfers    Sit-Stand [place] (Transfers)  minimum assist (75% patient effort);verbal cues for hand placement  -BB     Row Name 02/02/21 0700          Sit-Stand Transfer    Assistive Device (Sit-Stand Transfers)  walker, front-wheeled  -BB     Row Name 02/02/21 0700          Functional Mobility    Functional Mobility- Device  rolling walker  -     Row Name 02/02/21 0700          Activities of Daily Living    BADL Assessment/Intervention  bathing;upper body dressing;lower body dressing;grooming  -BB     Row Name 02/02/21 0700          Bathing Assessment/Intervention    [place] Level (Bathing)  bathing skills;upper body;standby assist;lower body;maximum assist (25% patient effort) Max A to wash and dry R knee to foot. Pt is SBA from hip to knee wo wash and dry.  -BB     Position (Bathing)  long sitting  -BB     Row Name 02/02/21 0700          Lower Body Dressing Assessment/Training    [place] Level (Lower Body Dressing)  lower body dressing skills;doff;don;socks;standby assist;dependent (less than 25% patient effort) SBA for L sock and dependent for R sock  -BB     Position (Lower Body Dressing)  long sitting  -BB     Row Name 02/02/21 0700          Upper Body Dressing Assessment/Training    [place] Level (Upper Body Dressing)  upper body dressing skills;doff;don;contact guard assist  -BB     Position (Upper Body Dressing)  long sitting  -BB     Row Name 02/02/21 0700          Grooming Assessment/Training    [place] Level (Grooming)  grooming skills;oral care regimen;wash face, hands;set up  -BB     Position (Grooming)  supported sitting  -BB       User Key  (r) = Recorded By, (t) = Taken By, (c) = Cosigned By    Initials Name Provider Type    BB Robyn Dumont COTA/L Occupational Therapy Assistant        Obj/Interventions     Row Name 02/02/21 0700          Vision Assessment/Intervention    Visual Impairment/Limitations  corrective lenses full-time   -BB     Row Name 02/02/21 0700          Range of Motion Comprehensive    General Range of Motion  bilateral lower extremity ROM WNL  -BB     Row Name 02/02/21 0700          Strength Comprehensive (MMT)    General Manual Muscle Testing (MMT) Assessment  other (see comments)  -BB       User Key  (r) = Recorded By, (t) = Taken By, (c) = Cosigned By    Initials Name Provider Type    Robyn Grande COTA/L Occupational Therapy Assistant        Goals/Plan     Row Name 02/02/21 0700          Transfer Goal 1 (OT)    Activity/Assistive Device (Transfer Goal 1, OT)  toilet  -BB     [place] Level/Cues Needed (Transfer Goal 1, OT)  supervision required  -BB     Time Frame (Transfer Goal 1, OT)  long term goal (LTG);by discharge  -BB     Progress/Outcome (Transfer Goal 1, OT)  goal not met  -BB     Row Name 02/02/21 0700          Bathing Goal 1 (OT)    Activity/Device (Bathing Goal 1, OT)  lower body bathing AD as needed  -BB     [place] Level/Cues Needed (Bathing Goal 1, OT)  supervision required  -BB     Time Frame (Bathing Goal 1, OT)  long term goal (LTG);by discharge  -BB     Progress/Outcomes (Bathing Goal 1, OT)  goal not met  -BB     Row Name 02/02/21 0700          Dressing Goal 1 (OT)    Activity/Device (Dressing Goal 1, OT)  lower body dressing AD as needed  -BB     [place]/Cues Needed (Dressing Goal 1, OT)  supervision required  -BB     Time Frame (Dressing Goal 1, OT)  long term goal (LTG);by discharge  -BB     Progress/Outcome (Dressing Goal 1, OT)  goal not met  -BB       User Key  (r) = Recorded By, (t) = Taken By, (c) = Cosigned By    Initials Name Provider Type    Robyn Grande COTA/L Occupational Therapy Assistant        Clinical Impression     Row Name 02/02/21 0700          Pain Scale: Numbers Pre/Post-Treatment    Pretreatment Pain Rating  2/10  -BB     Posttreatment Pain Rating  2/10  -BB     Pain Location - Side  Right  -BB     Pain Location - Orientation  lower  -BB      Pain Location  extremity  -BB     Pain Intervention(s)  Repositioned  -BB     Row Name 02/02/21 0700          Plan of Care Review    Plan of Care Reviewed With  patient  -BB     Progress  no change  -BB     Outcome Summary  Pt agrees to ADL with OT this am. Pt is Mod  A for supine<>sit. Pt is SBA for UB and LB bathing to knee and Max A from knee to R foot., Pt is CGA for UB dressing and SBA for L sock and dependent for R sock. Pt Min A  for sit<>stand and took 2 steps toward HOB. Continue OT POC.  -BB     Row Name 02/02/21 0700          Therapy Assessment/Plan (OT)    Rehab Potential (OT)  good, to achieve stated therapy goals  -BB     Criteria for Skilled Therapeutic Interventions Met (OT)  yes;meets criteria;skilled treatment is necessary  -BB     Therapy Frequency (OT)  2 times/day  -BB     Row Name 02/02/21 0700          Therapy Plan Review/Discharge Plan (OT)    Anticipated Discharge Disposition (OT)  home;home with assist;home with 24/7 care  -BB     Row Name 02/02/21 0700          Vital Signs    Pre Systolic BP Rehab  133  -BB     Pre Treatment Diastolic BP  76  -BB     Pretreatment Heart Rate (beats/min)  86  -BB     Intratreatment Heart Rate (beats/min)  92  -BB     Posttreatment Heart Rate (beats/min)  83  -BB     Pre SpO2 (%)  94  -BB     O2 Delivery Pre Treatment  room air  -BB     Intra SpO2 (%)  90  -BB     O2 Delivery Intra Treatment  room air  -BB     Post SpO2 (%)  93  -BB     O2 Delivery Post Treatment  room air  -BB     Pre Patient Position  Supine  -BB     Intra Patient Position  Sitting  -BB     Post Patient Position  Sitting  -BB     Row Name 02/02/21 0700          Positioning and Restraints    Pre-Treatment Position  in bed  -BB     Post Treatment Position  bed  -BB     In Bed  fowlers;call light within reach;encouraged to call for assist;exit alarm on  -BB       User Key  (r) = Recorded By, (t) = Taken By, (c) = Cosigned By    Initials Name Provider Type    Robyn Grande COTA/MARTELL  Occupational Therapy Assistant        Outcome Measures     Row Name 02/02/21 0700          How much help from another is currently needed...    Putting on and taking off regular lower body clothing?  1  -BB     Bathing (including washing, rinsing, and drying)  2  -BB     Toileting (which includes using toilet bed pan or urinal)  2  -BB     Putting on and taking off regular upper body clothing  3  -BB     Taking care of personal grooming (such as brushing teeth)  4  -BB     Eating meals  4  -BB     AM-PAC 6 Clicks Score (OT)  16  -BB       User Key  (r) = Recorded By, (t) = Taken By, (c) = Cosigned By    Initials Name Provider Type    BB Robyn Dumont COTA/L Occupational Therapy Assistant        Occupational Therapy Education                 Title: PT OT SLP Therapies (In Progress)     Topic: Occupational Therapy (In Progress)     Point: ADL training (In Progress)     Description:   Instruct learner(s) on proper safety adaptation and remediation techniques during self care or transfers.   Instruct in proper use of assistive devices.              Learning Progress Summary           Patient Acceptance, E, NR by BB at 2/2/2021 1106    Acceptance, E, NR by BB at 2/1/2021 1137                   Point: Home exercise program (Not Started)     Description:   Instruct learner(s) on appropriate technique for monitoring, assisting and/or progressing therapeutic exercises/activities.              Learner Progress:  Not documented in this visit.          Point: Precautions (Done)     Description:   Instruct learner(s) on prescribed precautions during self-care and functional transfers.              Learning Progress Summary           Patient Acceptance, E, VU,NR by ME at 1/26/2021 1234    Comment: Educated on OT and POC. Educated to call for assistance. Educated on safety precautions.                   Point: Body mechanics (In Progress)     Description:   Instruct learner(s) on proper positioning and spine alignment  during self-care, functional mobility activities and/or exercises.              Learning Progress Summary           Patient Acceptance, E, NR by BB at 2/2/2021 1106    Acceptance, E, NR by BB at 2/1/2021 1137    Acceptance, E, VU,NR by ME at 1/26/2021 1234    Comment: Educated on OT and POC. Educated to call for assistance. Educated on safety precautions.                               User Key     Initials Effective Dates Name Provider Type Discipline     03/07/18 -  Robyn Dumont COTA/L Occupational Therapy Assistant OT    ME 08/24/20 -  Ragini Proctor OTR/L Occupational Therapist OT              OT Recommendation and Plan  Therapy Frequency (OT): 2 times/day  Plan of Care Review  Plan of Care Reviewed With: patient  Progress: no change  Outcome Summary: Pt agrees to ADL with OT this am. Pt is Mod  A for supine<>sit. Pt is SBA for UB and LB bathing to knee and Max A from knee to R foot., Pt is CGA for UB dressing and SBA for L sock and dependent for R sock. Pt Min A  for sit<>stand and took 2 steps toward HOB. Continue OT POC.     Time Calculation:   Time Calculation- OT     Row Name 02/02/21 1107             Time Calculation- OT    OT Start Time  0700  -BB      OT Stop Time  0740  -      OT Time Calculation (min)  40 min  -      Total Timed Code Minutes- OT  40 minute(s)  -      OT Received On  02/02/21  -        User Key  (r) = Recorded By, (t) = Taken By, (c) = Cosigned By    Initials Name Provider Type    BB Robyn Dumont COTA/L Occupational Therapy Assistant        Therapy Charges for Today     Code Description Service Date Service Provider Modifiers Qty    49643750901 HC OT SELF CARE/MGMT/TRAIN EA 15 MIN 2/1/2021 Robyn Dumont COTA/L GO 4    16161082353 HC OT SELF CARE/MGMT/TRAIN EA 15 MIN 2/2/2021 Robyn Dumont COTA/L GO 3               LEONA Oliver  2/2/2021

## 2021-02-02 NOTE — PLAN OF CARE
Problem: Adult Inpatient Plan of Care  Goal: Plan of Care Review  Flowsheets  Taken 2/2/2021 1106  Progress: no change  Plan of Care Reviewed With: patient  Taken 2/2/2021 0700  Progress: no change  Plan of Care Reviewed With: patient  Outcome Summary: Pt agrees to ADL with OT this am. Pt is Mod  A for supine<>sit. Pt is SBA for UB and LB bathing to knee and Max A from knee to R foot., Pt is CGA for UB dressing and SBA for L sock and dependent for R sock. Pt Min A  for sit<>stand and took 2 steps toward HOB. Continue OT POC.   Goal Outcome Evaluation:  Plan of Care Reviewed With: patient  Progress: no change  Outcome Summary: Pt agrees to ADL with OT this am. Pt is Mod  A for supine<>sit. Pt is SBA for UB and LB bathing to knee and Max A from knee to R foot., Pt is CGA for UB dressing and SBA for L sock and dependent for R sock. Pt Min A  for sit<>stand and took 2 steps toward HOB. Continue OT POC.

## 2021-02-02 NOTE — PROGRESS NOTES
"  Cardiothoracic - Vascular Surgery Daily Note        LOS: 8 days   Patient Care Team:  Satnam Troncoso APRN as PCP - General (Emergency Medicine)     POD7  Emergent salvage Right Iliac artery thrombectomy, fem-fem bypass PTFE    Chief Complaint: Post surgical pain      Subjective     The following portions of the patient's history were reviewed and updated as appropriate: allergies, current medications, past family history, past medical history, past social history, past surgical history and problem list.     Subjective:  Symptoms:  Stable.  No shortness of breath or chest pain.    Diet:  Adequate intake.  No nausea or vomiting.    Activity level: Impaired due to pain.    Pain:  He complains of pain that is moderate.  He reports pain is improving.  Pain is well controlled and requiring pain medication.          Objective     Vital Signs  Temp:  [97.5 °F (36.4 °C)-98.9 °F (37.2 °C)] 97.5 °F (36.4 °C)  Heart Rate:  [71-94] 83  Resp:  [18] 18  BP: (101-137)/(48-79) 136/78  Body mass index is 21.82 kg/m².    Intake/Output Summary (Last 24 hours) at 2/2/2021 1050  Last data filed at 2/2/2021 0821  Gross per 24 hour   Intake 240 ml   Output 2875 ml   Net -2635 ml     I/O this shift:  In: -   Out: 200 [Stool:200]    Wt Readings from Last 3 Encounters:   02/02/21 61.3 kg (135 lb 3.2 oz)   01/11/21 52.2 kg (115 lb)   12/11/20 49.4 kg (109 lb)         Physical Exam   Objective:  General Appearance:  Comfortable, well-appearing, in no acute distress and in pain.    Vital signs: (most recent): Blood pressure 136/78, pulse 83, temperature 97.5 °F (36.4 °C), temperature source Oral, resp. rate 18, height 167.6 cm (66\"), weight 61.3 kg (135 lb 3.2 oz), SpO2 97 %.  Vital signs are normal.    Output: Producing urine and producing stool.    HEENT: Normal HEENT exam.    Lungs:  Normal effort and normal respiratory rate.  Breath sounds clear to auscultation.    Heart: Normal rate.  Regular rhythm.    Chest: No chest wall tenderness. "    Abdomen: Abdomen is soft.  Bowel sounds are normal.   There is no abdominal tenderness.     Extremities: Normal range of motion.  There is no dependent edema.  ((R) Foot paresthesia. No movement. Minimal with toes)  Pulses: Distal pulses are intact.  (Triphasic DP, biphasic PT RLE)    Neurological: Patient is alert and oriented to person, place and time.  GCS score is 15.    Pupils:  Pupils are equal, round, and reactive to light.  Pupils are equal.   Skin:  Warm and dry.  (Bilateral groin incision CDI with prevena wound vac. )              Results Review:    Lab Results   Component Value Date    WBC 7.53 01/30/2021    HGB 9.7 (L) 01/30/2021    HCT 28.9 (L) 01/30/2021    MCV 98.0 (H) 01/30/2021     01/30/2021     Lab Results   Component Value Date    GLUCOSE 94 02/02/2021    BUN 31 (H) 02/02/2021    CREATININE 2.89 (H) 02/02/2021    EGFRIFNONA 22 (L) 02/02/2021    EGFRIFAFRI 66 08/17/2018    BCR 10.7 02/02/2021    K 3.4 (L) 02/02/2021    CO2 25.0 02/02/2021    CALCIUM 8.7 02/02/2021    ALBUMIN 2.80 (L) 01/30/2021     (H) 01/30/2021    ALT <5 01/30/2021       Calcium Calcium   Date/Time Value Ref Range Status   02/02/2021 0602 8.7 8.6 - 10.5 mg/dL Final   02/01/2021 0601 8.3 (L) 8.6 - 10.5 mg/dL Final   01/31/2021 1148 9.0 8.6 - 10.5 mg/dL Final      Magnesium Magnesium   Date/Time Value Ref Range Status   02/02/2021 0602 1.6 1.6 - 2.4 mg/dL Final        Imaging Results (Last 24 Hours)     ** No results found for the last 24 hours. **                              acetaminophen, 1,000 mg, Oral, Q6H  apixaban, 5 mg, Oral, Q12H  clopidogrel, 75 mg, Oral, Daily  famotidine, 20 mg, Oral, BID AC  magnesium sulfate, 1 g, Intravenous, Once  nitroglycerin, 0.5 inch, Topical, Q12H  nitroglycerin, 0.5 inch, Topical, Q12H  potassium chloride, 20 mEq, Oral, BID With Meals  pravastatin, 20 mg, Oral, Nightly  prenatal vitamin, 1 tablet, Oral, Daily  tamsulosin, 0.4 mg, Oral, Daily      lactated ringers, 100 mL/hr,  Last Rate: 100 mL/hr (02/01/21 2029)          ASSESSMENT/PLAN     Satisfactory Post op  Progressing well, participating with PT,  tolerating PO, advance diet.     AAA without rupture  BP control, follow up in office with ultrasound. Plavix, anticoagulation with large intramural thrombus.     Peripheral Vascular Disease  Status post emergent iliac embolectomy, fem-fem bypass (ptfe). Good triphasic signal DP this AM.  Sensation and function of foot remains impaired.  Progressing with PT.    Medical Management: Eliquis, Plavix, Statin     nitro paste to RLE.    Apical hypokinesis on echocardiogram, EKG was without evidence of ischemic changes, follow up with cardiology as outpatient.    Anti-thrombin III deficiency  Anticoagulation: Eliquis    CKD IIIB   Acute on chronic CKD. Stable     Acute blood loss anemia  No blood products intra-op or post op.  H/H AM stable.  Start iron supplementation in the form of prenatal vitamin     Post operative pain  Immediate release oxycodone, acetaminophen    Rehab  PT/OT. Would benefit from continued rehab.     Respiratory  NEBS PRN, wean o2 as tolerated, IS/OPEP     Disposition  Continue care telemetry, anticipate likely need for rehab as outpatient. Will need office vascular imaging 6 weeks.               This document has been electronically signed by ZACKARY Lu on February 2, 2021 10:50 CST

## 2021-02-02 NOTE — THERAPY TREATMENT NOTE
Patient Name: Bar Crockett  : 1950    MRN: [id number]                              Today's Date: 2021       Admit Date: 2021    Visit Dx:     ICD-10-CM ICD-9-CM   1. Critical lower limb ischemia (CMS/HCC)  I70.229 459.9   2. Moderate protein-calorie malnutrition (CMS/HCC)  E44.0 263.0   3. Ileostomy in place (CMS/HCC)  Z93.2 V44.2   4. Impaired functional mobility, balance, gait, and endurance  Z74.09 V49.89   5. Impaired mobility and ADLs  Z74.09 V49.89    Z78.9      Patient Active Problem List   Diagnosis   • Sigmoid diverticulitis   • Pancolitis (CMS/HCC)   • Stage 3a chronic kidney disease (CMS/HCC)   • Sepsis without acute organ dysfunction (CMS/HCC)   • Severe malnutrition (CMS/HCC)   • Edema of both ankles   • Tachycardia   • YOBANY (acute kidney injury) (CMS/HCC)   • Moderate episode of recurrent major depressive disorder (CMS/HCC)   • Ileostomy in place (CMS/HCC)   • Critical lower limb ischemia (CMS/HCC)     Past Medical History:   Diagnosis Date   • Hyperlipidemia    • Hypertension    • Rectal abnormality     Rectum came out - er put back in.     • TIA (transient ischemic attack)      Past Surgical History:   Procedure Laterality Date   • COLON RESECTION N/A 10/26/2020    Procedure: SUBTOTAL COLECTOMY,  ILEOSTOMY, PLACEMENT OF GASTROSTOMY AND JEJEUNOSTOMY TUBES;  Surgeon: Nahum Herzog MD;  Location: [facility] OR;  Service: General;  Laterality: N/A;   • COLONOSCOPY N/A 12/10/2018    Procedure: COLONOSCOPY;  Surgeon: Jay Wilson DO;  Location: [facility] ENDOSCOPY;  Service: Gastroenterology   • COLONOSCOPY Left 10/25/2020    Procedure: COLONOSCOPY WITH ENDOSCOPIC FECAL MICROBIOTA TRANSPLANT;  Surgeon: Jay Wilson DO;  Location: [facility] OR;  Service: Gastroenterology;  Laterality: Left;   • COLONOSCOPY N/A 10/23/2020    Procedure: COLONOSCOPY WITH ENDOSCOPIC FECAL MICROBIOTA TRANSPLANT;  Surgeon: Jay Wilson DO;  Location: [facility] ENDOSCOPY;  Service: Gastroenterology;   Laterality: N/A;   • ILEOSTOMY CLOSURE N/A 1/25/2021    Procedure: ILEOSTOMY CLOSURE WITH LOOP ILEOSTOMY PLACEMENT;  Surgeon: Nahum Herzog MD;  Location: [facility];  Service: General;  Laterality: N/A;   • KNEE MENISCAL REPAIR Left    • LEG THROMBECTOMY/EMBOLECTOMY N/A 1/26/2021    Procedure: femoral to femoral bypass, bi-femoral bypass;  Surgeon: Sukhwinder Steele MD;  Location: [facility];  Service: Vascular;  Laterality: N/A;   • OTHER SURGICAL HISTORY      Loop recorder     General Information     Row Name 02/02/21 1235 02/02/21 0700       OT Time and Intention    Document Type  therapy note (daily note)  -BB  therapy note (daily note)  -BB    Mode of Treatment  individual therapy;occupational therapy  -BB  individual therapy;occupational therapy  -BB    Row Name 02/02/21 1235 02/02/21 0700       General Information    Patient Profile Reviewed  yes  -BB  yes  -BB    Existing Precautions/Restrictions  fall  -BB  fall  -BB    Row Name 02/02/21 1235 02/02/21 0700       Cognition    Orientation Status (Cognition)  oriented x 4  -BB  oriented x 4  -BB    Row Name 02/02/21 1235 02/02/21 0700       Safety Issues, Functional Mobility    Impairments Affecting Function (Mobility)  balance;coordination;endurance/activity tolerance;pain;postural/trunk control;strength;motor control;motor planning  -BB  balance;coordination;endurance/activity tolerance;pain;postural/trunk control;strength;motor control;motor planning  -BB      User Key  (r) = Recorded By, (t) = Taken By, (c) = Cosigned By    Initials Name Provider Type    BB Robyn Dumont COTA/L Occupational Therapy Assistant          Mobility/ADL's     Row Name 02/02/21 1235 02/02/21 0700       Bed Mobility    Bed Mobility  supine-sit;sit-supine  -BB  supine-sit;sit-supine  -BB    Rolling Left [place] (Bed Mobility)  moderate assist (50% patient effort)  -BB  moderate assist (50% patient effort)  -BB    Scooting/Bridging [place] (Bed Mobility)   supervision  -BB  standby assist  -BB    Supine-Sit [place] (Bed Mobility)  minimum assist (75% patient effort)  -BB  moderate assist (50% patient effort) for legs  -BB    Sit-Supine [place] (Bed Mobility)  minimum assist (75% patient effort)  -BB  moderate assist (50% patient effort) for legs  -BB    Bed Mobility, Safety Issues  decreased use of legs for bridging/pushing  -BB  decreased use of legs for bridging/pushing  -BB    Assistive Device (Bed Mobility)  bed rails;head of bed elevated  -BB  bed rails;head of bed elevated  -BB    Row Name 02/02/21 0700          Transfers    Sit-Stand [place] (Transfers)  minimum assist (75% patient effort);verbal cues for hand placement  -BB     Row Name 02/02/21 0700          Sit-Stand Transfer    Assistive Device (Sit-Stand Transfers)  walker, front-wheeled  -BB     Row Name 02/02/21 0700          Functional Mobility    Functional Mobility- Device  rolling walker  -     Row Name 02/02/21 1235 02/02/21 0700       Activities of Daily Living    BADL Assessment/Intervention  grooming;upper body dressing  -BB  bathing;upper body dressing;lower body dressing;grooming  -BB    Row Name 02/02/21 1235 02/02/21 0700       Bathing Assessment/Intervention    [place] Level (Bathing)  -- Max A to wash and dry R knee to foot. Pt is SBA from hip to knee wo wash and dry.  -BB  bathing skills;upper body;standby assist;lower body;maximum assist (25% patient effort) Max A to wash and dry R knee to foot. Pt is SBA from hip to knee wo wash and dry.  -BB    Position (Bathing)  --  long sitting  -BB    Row Name 02/02/21 1235 02/02/21 0700       Lower Body Dressing Assessment/Training    [place] Level (Lower Body Dressing)  -- SBA for L sock and dependent for R sock  -BB  lower body dressing skills;doff;don;socks;standby assist;dependent (less than 25% patient effort) SBA for L sock and dependent for R sock  -BB    Position (Lower Body Dressing)  --  long sitting  -BB    Row  Name 02/02/21 1235 02/02/21 0700       Upper Body Dressing Assessment/Training    [place] Level (Upper Body Dressing)  upper body dressing skills;doff;don;contact guard assist pt changed gown x2 this pm  -BB  upper body dressing skills;doff;don;contact guard assist  -BB    Position (Upper Body Dressing)  long sitting  -BB  long sitting  -BB    Row Name 02/02/21 1235 02/02/21 0700       Grooming Assessment/Training    [place] Level (Grooming)  grooming skills;wash face, hands;set up;oral care regimen;shave face;modified independence  -BB  grooming skills;oral care regimen;wash face, hands;set up  -BB    Position (Grooming)  supported sitting  -BB  supported sitting  -BB    Comment (Grooming)  Pt required extensive time to complete grooming tasks.  -BB  --      User Key  (r) = Recorded By, (t) = Taken By, (c) = Cosigned By    Initials Name Provider Type    Robyn Grande COTA/L Occupational Therapy Assistant        Obj/Interventions     Row Name 02/02/21 1235 02/02/21 0700       Vision Assessment/Intervention    Visual Impairment/Limitations  corrective lenses full-time  -BB  corrective lenses full-time  -BB    Row Name 02/02/21 1235 02/02/21 0700       Range of Motion Comprehensive    General Range of Motion  bilateral lower extremity ROM WNL  -BB  bilateral lower extremity ROM WNL  -BB    Row Name 02/02/21 1235 02/02/21 0700       Strength Comprehensive (MMT)    General Manual Muscle Testing (MMT) Assessment  other (see comments)  -BB  other (see comments)  -BB      User Key  (r) = Recorded By, (t) = Taken By, (c) = Cosigned By    Initials Name Provider Type    Robyn Grande COTA/L Occupational Therapy Assistant        Goals/Plan     Row Name 02/02/21 1235 02/02/21 0700       Transfer Goal 1 (OT)    Activity/Assistive Device (Transfer Goal 1, OT)  toilet  -BB  toilet  -BB    [place] Level/Cues Needed (Transfer Goal 1, OT)  supervision required  -BB  supervision required  -BB    Time  Frame (Transfer Goal 1, OT)  long term goal (LTG);by discharge  -BB  long term goal (LTG);by discharge  -BB    Progress/Outcome (Transfer Goal 1, OT)  goal not met  -BB  goal not met  -BB    Row Name 02/02/21 1235 02/02/21 0700       Bathing Goal 1 (OT)    Activity/Device (Bathing Goal 1, OT)  lower body bathing AD as needed  -BB  lower body bathing AD as needed  -BB    [place] Level/Cues Needed (Bathing Goal 1, OT)  supervision required  -BB  supervision required  -BB    Time Frame (Bathing Goal 1, OT)  long term goal (LTG);by discharge  -BB  long term goal (LTG);by discharge  -BB    Progress/Outcomes (Bathing Goal 1, OT)  goal not met  -BB  goal not met  -BB    Row Name 02/02/21 1235 02/02/21 0700       Dressing Goal 1 (OT)    Activity/Device (Dressing Goal 1, OT)  lower body dressing AD as needed  -BB  lower body dressing AD as needed  -BB    [place]/Cues Needed (Dressing Goal 1, OT)  supervision required  -BB  supervision required  -BB    Time Frame (Dressing Goal 1, OT)  long term goal (LTG);by discharge  -BB  long term goal (LTG);by discharge  -BB    Progress/Outcome (Dressing Goal 1, OT)  goal not met  -BB  goal not met  -BB      User Key  (r) = Recorded By, (t) = Taken By, (c) = Cosigned By    Initials Name Provider Type    BB Robyn Dumont COTA/L Occupational Therapy Assistant        Clinical Impression     Row Name 02/02/21 1235 02/02/21 0700       Pain Scale: Numbers Pre/Post-Treatment    Pretreatment Pain Rating  1/10  -BB  2/10  -BB    Posttreatment Pain Rating  1/10  -BB  2/10  -BB    Pain Location - Side  Right  -BB  Right  -BB    Pain Location - Orientation  lower  -BB  lower  -BB    Pain Location  extremity  -BB  extremity  -BB    Pain Intervention(s)  Repositioned  -BB  Repositioned  -BB    Row Name 02/02/21 1235 02/02/21 0700       Plan of Care Review    Plan of Care Reviewed With  patient  -BB  patient  -BB    Progress  --  no change  -BB    Outcome Summary  --  Pt agrees to ADL  with OT this am. Pt is Mod  A for supine<>sit. Pt is SBA for UB and LB bathing to knee and Max A from knee to R foot., Pt is CGA for UB dressing and SBA for L sock and dependent for R sock. Pt Min A  for sit<>stand and took 2 steps toward HOB. Continue OT POC.  -BB    Row Name 02/02/21 1235 02/02/21 0700       Therapy Assessment/Plan (OT)    Rehab Potential (OT)  good, to achieve stated therapy goals  -BB  good, to achieve stated therapy goals  -BB    Criteria for Skilled Therapeutic Interventions Met (OT)  yes;meets criteria;skilled treatment is necessary  -BB  yes;meets criteria;skilled treatment is necessary  -BB    Therapy Frequency (OT)  2 times/day  -BB  2 times/day  -BB    Row Name 02/02/21 1235 02/02/21 0700       Therapy Plan Review/Discharge Plan (OT)    Anticipated Discharge Disposition (OT)  home;home with assist;home with 24/7 care  -BB  home;home with assist;home with 24/7 care  -BB    Row Name 02/02/21 1235 02/02/21 0700       Vital Signs    Pre Systolic BP Rehab  --  133  -BB    Pre Treatment Diastolic BP  --  76  -BB    Pretreatment Heart Rate (beats/min)  82  -BB  86  -BB    Intratreatment Heart Rate (beats/min)  --  92  -BB    Posttreatment Heart Rate (beats/min)  83  -BB  83  -BB    Pre SpO2 (%)  96  -BB  94  -BB    O2 Delivery Pre Treatment  room air  -BB  room air  -BB    Intra SpO2 (%)  --  90  -BB    O2 Delivery Intra Treatment  --  room air  -BB    Post SpO2 (%)  93  -BB  93  -BB    O2 Delivery Post Treatment  room air  -BB  room air  -BB    Pre Patient Position  Sitting  -BB  Supine  -BB    Intra Patient Position  --  Sitting  -BB    Post Patient Position  Supine  -BB  Sitting  -BB    Row Name 02/02/21 1235 02/02/21 0700       Positioning and Restraints    Pre-Treatment Position  in bed  -BB  in bed  -BB    Post Treatment Position  bed  -BB  bed  -BB    In Bed  fowlers;call light within reach;encouraged to call for assist;exit alarm on  -BB  fowlers;call light within reach;encouraged to  call for assist;exit alarm on  -BB      User Key  (r) = Recorded By, (t) = Taken By, (c) = Cosigned By    Initials Name Provider Type    Robyn Grande COTA/L Occupational Therapy Assistant        Outcome Measures     Row Name 02/02/21 0700          How much help from another is currently needed...    Putting on and taking off regular lower body clothing?  1  -BB     Bathing (including washing, rinsing, and drying)  2  -BB     Toileting (which includes using toilet bed pan or urinal)  2  -BB     Putting on and taking off regular upper body clothing  3  -BB     Taking care of personal grooming (such as brushing teeth)  4  -BB     Eating meals  4  -BB     AM-PAC 6 Clicks Score (OT)  16  -BB       User Key  (r) = Recorded By, (t) = Taken By, (c) = Cosigned By    Initials Name Provider Type    Robyn Grande COTA/L Occupational Therapy Assistant        Occupational Therapy Education                 Title: PT OT SLP Therapies (In Progress)     Topic: Occupational Therapy (In Progress)     Point: ADL training (In Progress)     Description:   Instruct learner(s) on proper safety adaptation and remediation techniques during self care or transfers.   Instruct in proper use of assistive devices.              Learning Progress Summary           Patient Acceptance, E, NR by BB at 2/2/2021 1106    Acceptance, E, NR by BB at 2/1/2021 1137                   Point: Home exercise program (Not Started)     Description:   Instruct learner(s) on appropriate technique for monitoring, assisting and/or progressing therapeutic exercises/activities.              Learner Progress:  Not documented in this visit.          Point: Precautions (Done)     Description:   Instruct learner(s) on prescribed precautions during self-care and functional transfers.              Learning Progress Summary           Patient Acceptance, E, VU,NR by ME at 1/26/2021 1234    Comment: Educated on OT and POC. Educated to call for assistance.  Educated on safety precautions.                   Point: Body mechanics (In Progress)     Description:   Instruct learner(s) on proper positioning and spine alignment during self-care, functional mobility activities and/or exercises.              Learning Progress Summary           Patient Acceptance, E, NR by BB at 2/2/2021 1106    Acceptance, E, NR by BB at 2/1/2021 1137    Acceptance, E, VU,NR by ME at 1/26/2021 1234    Comment: Educated on OT and POC. Educated to call for assistance. Educated on safety precautions.                               User Key     Initials Effective Dates Name Provider Type Discipline    BB 03/07/18 -  Robyn Dumont COTA/L Occupational Therapy Assistant OT    ME 08/24/20 -  Ragini Proctor OTR/L Occupational Therapist OT              OT Recommendation and Plan  Therapy Frequency (OT): 2 times/day  Plan of Care Review  Plan of Care Reviewed With: patient  Progress: no change  Outcome Summary: Pt agrees to ADL with OT this am. Pt is Mod  A for supine<>sit. Pt is SBA for UB and LB bathing to knee and Max A from knee to R foot., Pt is CGA for UB dressing and SBA for L sock and dependent for R sock. Pt Min A  for sit<>stand and took 2 steps toward HOB. Continue OT POC.     Time Calculation:   Time Calculation- OT     Row Name 02/02/21 1424 02/02/21 1107          Time Calculation- OT    OT Start Time  1235  -BB  0700  -BB     OT Stop Time  1348  -BB  0740  -BB     OT Time Calculation (min)  73 min  -BB  40 min  -BB     Total Timed Code Minutes- OT  73 minute(s)  -BB  40 minute(s)  -BB     OT Received On  02/02/21  -BB  02/02/21  -BB       User Key  (r) = Recorded By, (t) = Taken By, (c) = Cosigned By    Initials Name Provider Type    BB Robyn Dumont COTA/L Occupational Therapy Assistant        Therapy Charges for Today     Code Description Service Date Service Provider Modifiers Qty    67702965839 HC OT SELF CARE/MGMT/TRAIN EA 15 MIN 2/1/2021 Robyn Dumont COTA/MARTELL GO 4     41201399798 HC OT SELF CARE/MGMT/TRAIN EA 15 MIN 2/2/2021 Robyn Dumont COTA/L GO 3    81808106992 HC OT SELF CARE/MGMT/TRAIN EA 15 MIN 2/2/2021 Robyn Dumont COTA/L GO 5               LEONA Oliver  2/2/2021

## 2021-02-02 NOTE — THERAPY TREATMENT NOTE
Acute Care - Physical Therapy Treatment Note  [facility]     Patient Name: Bar Crockett  : 1950  MRN: [id number]  Today's Date: 2021           PT Assessment (last 12 hours)      PT Evaluation and Treatment     Row Name 21 0950          Physical Therapy Time and Intention    Document Type  therapy note (daily note)  -     Mode of Treatment  physical therapy;individual therapy  -     Patient Effort  adequate  -     Row Name 21 0950          General Information    Patient Profile Reviewed  yes  -     Existing Precautions/Restrictions  fall  -     Row Name 21 0950          Cognition    Affect/Mental Status (Cognitive)  WFL  -     Orientation Status (Cognition)  oriented x 4  -     Personal Safety Interventions  safety round/check completed;elopement precautions initiated;fall prevention program maintained;gait belt;muscle strengthening facilitated  -     Row Name 21 0950          Pain Scale: Numbers Pre/Post-Treatment    Pretreatment Pain Rating  1/10  -     Posttreatment Pain Rating  1/10  -     Pain Location - Side  Right  -     Pain Location  foot  -     Row Name 21 0950          Pain Scale: Word Pre/Post-Treatment    Pain Intervention(s)  Repositioned;Ambulation/increased activity  -     Row Name 21 0950          Bed Mobility    Bed Mobility  supine-sit;sit-supine  -     Scooting/Bridging [place] (Bed Mobility)  --  -     Supine-Sit [place] (Bed Mobility)  minimum assist (75% patient effort)  -     Sit-Supine [place] (Bed Mobility)  minimum assist (75% patient effort)  -     Assistive Device (Bed Mobility)  bed rails;head of bed elevated  -     Comment (Bed Mobility)  pt did better sitting up on R side of the bed. pt emptied his own colostomy while sitting EOB.    -     Row Name 21 0950          Transfers    Transfers  sit-stand transfer;stand-sit transfer  -     Comment (Transfers)  sit-stands x3.  pt defers OOB in recliner at this time.   -KODAK     Sit-Stand [place] (Transfers)  contact guard;minimum assist (75% patient effort)  -     Stand-Sit [place] (Transfers)  contact guard;minimum assist (75% patient effort)  -KODAK     Row Name 02/02/21 0950          Sit-Stand Transfer    Assistive Device (Sit-Stand Transfers)  walker, front-wheeled  -KODAK     Row Name 02/02/21 0950          Stand-Sit Transfer    Assistive Device (Stand-Sit Transfers)  walker, front-wheeled  -KODAK     Row Name 02/02/21 0950          Gait/Stairs (Locomotion)    Gait/Stairs Locomotion  gait/ambulation independence;gait/ambulation assistive device;distance ambulated;gait pattern;gait deviations;maintains weight-bearing status;stairs negotiation  -     [place] Level (Gait)  minimum assist (75% patient effort)  -     Assistive Device (Gait)  walker, front-wheeled  -KODAK     Distance in Feet (Gait)  30  -KODAK     Pattern (Gait)  step-to  -KODAK     Deviations/Abnormal Patterns (Gait)  antalgic;gait speed decreased;stride length decreased pt requires assst to advance R LE or first few steps.   -KODAK     Bilateral Gait Deviations  hip hiking R  -KODAK     Right Sided Gait Deviations  -- dec knee flexion and DF on R with gait. R LE abducted  -     Row Name 02/02/21 0950          Motor Skills    Therapeutic Exercise  -- DF/PF, HS, hip Ab/Ad, SAQ. S at end range and resistance..  -     Additional Documentation  -- eccentrically with AP/HS. controlled eccentrics with SAQ.    -     Row Name             Wound 01/25/21 1316 abdomen Incision    Wound - Properties Group Placement Date: 01/25/21  -MS Placement Time: 1316  -MS Present on Hospital Admission: N  -MS Location: abdomen  -MS Primary Wound Type: Incision  -MS    Retired Wound - Properties Group Date first assessed: 01/25/21  -MS Time first assessed: 1316  -MS Present on Hospital Admission: N  -MS Location: abdomen  -MS Primary Wound Type: Incision  -MS    Row Name             Wound  01/26/21 1839 Bilateral other (see comments) groin    Wound - Properties Group Placement Date: 01/26/21  -SB Placement Time: 1839  -SB Present on Hospital Admission: N  -SB Side: Bilateral  -SB Orientation: other (see comments)  -SB, bilateral groin  Location: groin  -SB    Retired Wound - Properties Group Date first assessed: 01/26/21  -SB Time first assessed: 1839  -SB Present on Hospital Admission: N  -SB Side: Bilateral  -SB Location: groin  -SB    Row Name             NPWT (Negative Pressure Wound Therapy) 01/26/21 2000 bilateral groin    NPWT (Negative Pressure Wound Therapy) - Properties Group Placement Date: 01/26/21  -BW Placement Time: 2000 -BW Location: bilateral groin  -BW    Retired NPWT (Negative Pressure Wound Therapy) - Properties Group Placement Date: 01/26/21  -BW Placement Time: 2000 -BW Location: bilateral groin  -BW    Row Name 02/02/21 0950          Vital Signs    Pre Systolic BP Rehab  136  -KODAK     Pre Treatment Diastolic BP  78  -KODAK     Post Systolic BP Rehab  142  -KODAK     Post Treatment Diastolic BP  79  -KODAK     Pretreatment Heart Rate (beats/min)  83  -KODAK     Posttreatment Heart Rate (beats/min)  85  -KODAK     Pre SpO2 (%)  97  -KODAK     O2 Delivery Pre Treatment  room air  -KODAK     Post SpO2 (%)  93  -KODAK     O2 Delivery Post Treatment  room air  -KODAK     Pre Patient Position  Supine  -KODAK     Post Patient Position  Supine  -KODAK     Row Name 02/02/21 0950          Bed Mobility Goal 1 (PT)    Activity/Assistive Device (Bed Mobility Goal 1, PT)  sit to supine/supine to sit  -KODAK     [place] Level/Cues Needed (Bed Mobility Goal 1, PT)  modified independence  -KODAK     Time Frame (Bed Mobility Goal 1, PT)  by discharge  -KODAK     Strategies/Barriers (Bed Mobility Goal 1, PT)  RLE paralysis.  -KODAK     Progress/Outcomes (Bed Mobility Goal 1, PT)  goal not met  -KODAK     Row Name 02/02/21 0950          Transfer Goal 1 (PT)    Activity/Assistive Device (Transfer Goal 1, PT)   sit-to-stand/stand-to-sit;bed-to-chair/chair-to-bed;walker, rolling  -KODAK     [place] Level/Cues Needed (Transfer Goal 1, PT)  contact guard assist  -KODAK     Time Frame (Transfer Goal 1, PT)  by discharge  -KODAK     Strategies/Barriers (Transfers Goal 1, PT)  RLE paralysis, impulsive.  -KODAK     Progress/Outcome (Transfer Goal 1, PT)  goal not met  -KODAK     Row Name 02/02/21 0950          Gait Training Goal 1 (PT)    Activity/Assistive Device (Gait Training Goal 1, PT)  walker, rolling  -KODAK     [place] Level (Gait Training Goal 1, PT)  contact guard assist  -KODAK     Distance (Gait Training Goal 1, PT)  25'x1.  -KODAK     Time Frame (Gait Training Goal 1, PT)  by discharge  -KODAK     Strategies/Barriers (Gait Training Goal 1, PT)  RLE paralysis, impulsive.  -KODAK     Progress/Outcome (Gait Training Goal 1, PT)  goal not met  -KODAK     Row Name 02/02/21 0950          Stairs Goal 1 (PT)    Activity/Assistive Device (Stairs Goal 1, PT)  walker, rolling  -KODAK     [place] Level/Cues Needed (Stairs Goal 1, PT)  contact guard assist  -KODAK     Number of Stairs (Stairs Goal 1, PT)  1 steps, may use FWW.  -KODAK     Time Frame (Stairs Goal 1, PT)  by discharge  -KODAK     Strategies/Barriers (Stairs Goal 1, PT)  RLE paralysis, impulsive.  -KODAK     Progress/Outcome (Stairs Goal 1, PT)  goal not met  -KODAK     Row Name 02/02/21 0950          Positioning and Restraints    Pre-Treatment Position  in bed  -KODAK     Post Treatment Position  bed  -KODAK     In Bed  fowlers;call light within reach;encouraged to call for assist;exit alarm on  -KODAK     Row Name 02/02/21 0950          Therapy Assessment/Plan (PT)    Rehab Potential (PT)  good, to achieve stated therapy goals  -KODAK       User Key  (r) = Recorded By, (t) = Taken By, (c) = Cosigned By    Initials Name Provider Type    KODAK Mirza Kennedy, PTA Physical Therapy Assistant    Mallory Nguyen RN Registered Nurse    Liz Persaud RN Registered Nurse    Cat Spence, LEONARDO Registered  Nurse        Physical Therapy Education                 Title: PT OT SLP Therapies (In Progress)     Topic: Physical Therapy (In Progress)     Point: Mobility training (In Progress)     Learning Progress Summary           Patient Acceptance, E, NR by KODAK at 2/2/2021 1255    Acceptance, E, NR by KODAK at 1/31/2021 1202    Acceptance, E, NR by KODAK at 1/30/2021 1301    Acceptance, E, VU,NR by  at 1/26/2021 1048    Comment: Educated on proper gait mechanics and attending to R LE to ensure hip/knee extension during SLS.                   Point: Home exercise program (Not Started)     Learner Progress:  Not documented in this visit.          Point: Body mechanics (Not Started)     Learner Progress:  Not documented in this visit.          Point: Precautions (Not Started)     Learner Progress:  Not documented in this visit.                      User Key     Initials Effective Dates Name Provider Type Discipline    KODAK 03/07/18 -  Mirza Kennedy, PTA Physical Therapy Assistant PT    PAN 04/03/18 -  Ismael Morillo, PT Physical Therapist PT              PT Recommendation and Plan  Anticipated Discharge Disposition (PT): home with 24/7 care, inpatient rehabilitation facility, home with assist  Plan of Care Reviewed With: patient  Progress: improving  Outcome Summary: pt responded well to PT w/ increased gait from previous tx to 30 ft min A w/ RW. multiple gait deviations with R LE and pt required assist to advance R LE with first few steps. pt participated in LE ther ex with stretch at end range and manual resistance with eccentric contraction. pt emptied his own colostomy at EOB. pvitals WFL throughout tx. no new goals met at this time. pt would continue to benefit from PT services.  Outcome Measures     Row Name 02/02/21 0950             How much help from another person do you currently need...    Turning from your back to your side while in flat bed without using bedrails?  3  -KODAK      Moving from lying on back to sitting on  the side of a flat bed without bedrails?  3  -KODAK      Moving to and from a bed to a chair (including a wheelchair)?  3  -KODAK      Standing up from a chair using your arms (e.g., wheelchair, bedside chair)?  3  -KODAK      Climbing 3-5 steps with a railing?  2  -KODAK      To walk in hospital room?  3  -KODAK      AM-PAC 6 Clicks Score (PT)  17  -KODAK         Functional Assessment    Outcome Measure Options  AM-PAC 6 Clicks Basic Mobility (PT)  -KODAK        User Key  (r) = Recorded By, (t) = Taken By, (c) = Cosigned By    Initials Name Provider Type    Mirza Salinas PTA Physical Therapy Assistant           Time Calculation:   PT Charges     Row Name 02/02/21 1259             Time Calculation    Start Time  0950  -KODAK      Stop Time  1047  -KODAK      Time Calculation (min)  57 min  -KODAK      PT Non-Billable Time (min)  4 min  -KODAK         Time Calculation- PT    Total Timed Code Minutes- PT  53 minute(s)  -KODAK        User Key  (r) = Recorded By, (t) = Taken By, (c) = Cosigned By    Initials Name Provider Type    Mirza Salinas PTA Physical Therapy Assistant        Therapy Charges for Today     Code Description Service Date Service Provider Modifiers Qty    33987915029 HC GAIT TRAINING EA 15 MIN 2/2/2021 Mirza Kennedy PTA GP 1    40524104747 HC PT THER PROC EA 15 MIN 2/2/2021 Mirza Kennedy PTA GP 1    52613962968 HC PT THERAPEUTIC ACT EA 15 MIN 2/2/2021 Mirza Kennedy PTA GP 2          PT G-Codes  Outcome Measure Options: AM-PAC 6 Clicks Basic Mobility (PT)  AM-PAC 6 Clicks Score (PT): 17  AM-PAC 6 Clicks Score (OT): 16    Mirza Kennedy PTA  2/2/2021

## 2021-02-02 NOTE — CONSULTS
"Adult Nutrition  Assessment    Patient Name:  Bar Crockett  YOB: 1950  MRN: [id number]  Admit Date:  1/25/2021    Assessment Date:  2/2/2021    Comments:  Pt receiving therapy.  Diet advanced to REgular.  No Gi distress.  Per his wife he is eating well but she is bringing some meals in due to the fact he does not like the foods.  She is aware of options that are available as so is he.  She reports \"he is a very picky eater.\"  He is drinking his milk.  Protein foods and beverages highly emphasized.  Reminded them that snacks are available in the pantry prn. Call RD with any requests.     Reason for Assessment     Row Name 02/02/21 1626          Reason for Assessment    Reason For Assessment  follow-up protocol         Nutrition/Diet History     Row Name 02/02/21 1626          Nutrition/Diet History    Typical Food/Fluid Intake  Pt getting ready to work with therapy.  His wife reports that he will eat some meals well but others he does not.  She goes and gets him something.  They both know that well will send anything that he wants \"but he is picky\"  Drinking the milk           Labs/Tests/Procedures/Meds     Row Name 02/02/21 1627          Labs/Procedures/Meds    Lab Results Reviewed  reviewed, pertinent     Lab Results Comments  K+ 3.4; Bun 31; Creat 2.89; Alb 2.80;        Diagnostic Tests/Procedures    Diagnostic Test/Procedure Reviewed  reviewed, pertinent     Diagnostic Test/Procedures Comments  PT        Medications    Pertinent Medications Reviewed  reviewed, pertinent     Pertinent Medications Comments  LR; PreNatal Vit         Physical Findings     Row Name 02/02/21 1628          Physical Findings    Overall Physical Appearance  on oxygen therapy;loss of muscle mass;loss of subcutaneous fat     Gastrointestinal  ileostomy     Skin  surgical incision           Nutrition Prescription Ordered     Row Name 02/02/21 1629          Nutrition Prescription PO    Current PO Diet  Regular     Fluid " Consistency  Thin     Supplement  Milk     Supplement Frequency  3 times a day         Evaluation of Received Nutrient/Fluid Intake     Row Name 02/02/21 1632          PO Evaluation    Number of Meals  3     % PO Intake  75% average               Electronically signed by:  Keily Bauer RD  02/02/21 16:39 CST

## 2021-02-03 VITALS
TEMPERATURE: 98 F | HEIGHT: 66 IN | RESPIRATION RATE: 18 BRPM | BODY MASS INDEX: 21.73 KG/M2 | WEIGHT: 135.2 LBS | OXYGEN SATURATION: 95 % | DIASTOLIC BLOOD PRESSURE: 73 MMHG | HEART RATE: 86 BPM | SYSTOLIC BLOOD PRESSURE: 125 MMHG

## 2021-02-03 LAB
ANION GAP SERPL CALCULATED.3IONS-SCNC: 9 MMOL/L (ref 5–15)
BUN SERPL-MCNC: 25 MG/DL (ref 8–23)
BUN/CREAT SERPL: 9.4 (ref 7–25)
CALCIUM SPEC-SCNC: 8.2 MG/DL (ref 8.6–10.5)
CHLORIDE SERPL-SCNC: 110 MMOL/L (ref 98–107)
CO2 SERPL-SCNC: 27 MMOL/L (ref 22–29)
CREAT SERPL-MCNC: 2.67 MG/DL (ref 0.76–1.27)
GFR SERPL CREATININE-BSD FRML MDRD: 24 ML/MIN/1.73
GLUCOSE SERPL-MCNC: 90 MG/DL (ref 65–99)
POTASSIUM SERPL-SCNC: 4 MMOL/L (ref 3.5–5.2)
SODIUM SERPL-SCNC: 146 MMOL/L (ref 136–145)

## 2021-02-03 PROCEDURE — 97530 THERAPEUTIC ACTIVITIES: CPT

## 2021-02-03 PROCEDURE — 25010000002 ONDANSETRON PER 1 MG: Performed by: NURSE PRACTITIONER

## 2021-02-03 PROCEDURE — 99024 POSTOP FOLLOW-UP VISIT: CPT | Performed by: SURGERY

## 2021-02-03 PROCEDURE — 99024 POSTOP FOLLOW-UP VISIT: CPT | Performed by: NURSE PRACTITIONER

## 2021-02-03 PROCEDURE — 97116 GAIT TRAINING THERAPY: CPT

## 2021-02-03 PROCEDURE — 97110 THERAPEUTIC EXERCISES: CPT

## 2021-02-03 PROCEDURE — 97535 SELF CARE MNGMENT TRAINING: CPT

## 2021-02-03 PROCEDURE — 80048 BASIC METABOLIC PNL TOTAL CA: CPT | Performed by: NURSE PRACTITIONER

## 2021-02-03 RX ORDER — ONDANSETRON 4 MG/1
4 TABLET, FILM COATED ORAL EVERY 6 HOURS PRN
Qty: 20 TABLET | Refills: 0 | Status: SHIPPED | OUTPATIENT
Start: 2021-02-03

## 2021-02-03 RX ORDER — CLOPIDOGREL BISULFATE 75 MG/1
75 TABLET ORAL DAILY
Qty: 30 TABLET | Refills: 1 | Status: SHIPPED | OUTPATIENT
Start: 2021-02-04 | End: 2021-04-14 | Stop reason: SDUPTHER

## 2021-02-03 RX ORDER — POTASSIUM CHLORIDE 750 MG/1
20 CAPSULE, EXTENDED RELEASE ORAL 2 TIMES DAILY WITH MEALS
Qty: 60 CAPSULE | Refills: 1 | Status: SHIPPED | OUTPATIENT
Start: 2021-02-03 | End: 2022-01-17

## 2021-02-03 RX ORDER — PRAVASTATIN SODIUM 20 MG
20 TABLET ORAL NIGHTLY
Qty: 30 TABLET | Refills: 1 | Status: SHIPPED | OUTPATIENT
Start: 2021-02-03 | End: 2021-04-14 | Stop reason: SDUPTHER

## 2021-02-03 RX ORDER — FAMOTIDINE 20 MG/1
20 TABLET, FILM COATED ORAL
Qty: 60 TABLET | Refills: 0 | Status: SHIPPED | OUTPATIENT
Start: 2021-02-03 | End: 2021-02-03 | Stop reason: HOSPADM

## 2021-02-03 RX ORDER — OXYCODONE HYDROCHLORIDE 10 MG/1
10 TABLET ORAL EVERY 4 HOURS PRN
Qty: 20 TABLET | Refills: 0 | Status: SHIPPED | OUTPATIENT
Start: 2021-02-03 | End: 2021-02-07

## 2021-02-03 RX ORDER — TAMSULOSIN HYDROCHLORIDE 0.4 MG/1
0.4 CAPSULE ORAL DAILY
Qty: 30 CAPSULE | Refills: 0 | Status: SHIPPED | OUTPATIENT
Start: 2021-02-04 | End: 2021-02-03 | Stop reason: HOSPADM

## 2021-02-03 RX ADMIN — NITROGLYCERIN 0.5 INCH: 20 OINTMENT TOPICAL at 08:03

## 2021-02-03 RX ADMIN — CLOPIDOGREL BISULFATE 75 MG: 75 TABLET ORAL at 08:04

## 2021-02-03 RX ADMIN — APIXABAN 5 MG: 5 TABLET, FILM COATED ORAL at 08:04

## 2021-02-03 RX ADMIN — SODIUM CHLORIDE, POTASSIUM CHLORIDE, SODIUM LACTATE AND CALCIUM CHLORIDE 100 ML/HR: 600; 310; 30; 20 INJECTION, SOLUTION INTRAVENOUS at 07:58

## 2021-02-03 RX ADMIN — NITROGLYCERIN 0.5 INCH: 20 OINTMENT TOPICAL at 08:57

## 2021-02-03 RX ADMIN — PRENATAL VIT W/ FE FUMARATE-FA TAB 27-0.8 MG 1 TABLET: 27-0.8 TAB at 08:04

## 2021-02-03 RX ADMIN — TAMSULOSIN HYDROCHLORIDE 0.4 MG: 0.4 CAPSULE ORAL at 08:04

## 2021-02-03 RX ADMIN — FAMOTIDINE 20 MG: 20 TABLET, FILM COATED ORAL at 08:04

## 2021-02-03 RX ADMIN — ONDANSETRON 4 MG: 2 INJECTION INTRAMUSCULAR; INTRAVENOUS at 11:08

## 2021-02-03 RX ADMIN — POTASSIUM CHLORIDE 20 MEQ: 10 CAPSULE, COATED, EXTENDED RELEASE ORAL at 08:03

## 2021-02-03 NOTE — DISCHARGE PLACEMENT REQUEST
"Leandra Cheney (70 y.o. Male)     Date of Birth Social Security Number Address Home Phone MRN    1950  [id number]   [facility] [address] [phone] [id number]    [employer] Marital Status          Presbyterian        Admission Date Admission Type Admitting Provider Attending Provider Department, Room/Bed    1/25/21 Elective Nahum Herzog MD Rao, Mohan K, MD [address], 309/1    Discharge Date Discharge Disposition Discharge Destination                       Attending Provider: Nahum Herzog MD    Allergies: Morphine    Isolation: None   Infection: None   Code Status: CPR    Ht: 167.6 cm (66\")   Wt: 61.3 kg (135 lb 3.2 oz)    Admission Cmt: None   Principal Problem: Ileostomy in place (CMS/[facility]) [Z93.2]                 Active Insurance as of 1/25/2021     Primary Coverage     Payor Plan Insurance Group Employer/Plan Group    MEDICARE MEDICARE A & B      Payor Plan Address Payor Plan Phone Number Payor Plan Fax Number Effective Dates    [address] [phone]  4/1/2013 - None Entered    [facility] [id number]       Subscriber Name Subscriber Birth Date Member ID       LEANDRA CHENEY 1950 [id number]           Secondary Coverage     Payor Plan Insurance Group Employer/Plan Group     LIFE MC SUP   LIFE MC SUPP PLAN F     Payor Plan Address Payor Plan Phone Number Payor Plan Fax Number Effective Dates    [address] [address] [phone]  10/6/2018 - None Entered    [facility] [id number]       Subscriber Name Subscriber Birth Date Member ID       LEANDRA CHENEY 1950 [id number]                 Emergency Contacts      (Rel.) Home Phone Work Phone Mobile Phone    BonKisha (Spouse) [phone] -- [phone]    BonMaureen (Daughter) [phone] -- [phone]            Insurance Information                MEDICARE/MEDICARE A & B Phone: [phone]    Subscriber: Leandra Cheney Sawyer Subscriber#: [id number]    " Group#:  Precert#:          LIFE  SUP / [facility] SUPP Phone: [phone]    Subscriber: Bar Crockett Subscriber#: [id number]    Group#: [id number] Precert#:              History & Physical      Nahum Herzog MD at 01/25/21 1128        H&P reviewed. The patient was examined and there are no changes to the H&P.      Temp:  [97.6 °F (36.4 °C)] 97.6 °F (36.4 °C)  Heart Rate:  [74] 74  Resp:  [18] 18  BP: (116)/(79) 116/79      Electronically signed by Nahum Herzog MD at 01/25/21 1128   Source Note          Chief Complaint   Patient presents with   • Follow-up     Recheck G & J tube        HPI  70-year-old man who underwent a subtotal colectomy for severe C. difficile colitis.  An ileostomy was left in place and gastrostomy and jejunostomy tubes were placed which has subsequently been removed.  He has done well with good appetite and good bowel function and is gaining weight appropriately though he is still not quite up to his preillness weight.  Overall, his performance status has been excellent.  Past Medical History:   Diagnosis Date   • Hyperlipidemia    • Hypertension    • Rectal abnormality     Rectum came out - er put back in.  9/18   • TIA (transient ischemic attack)        Past Surgical History:   Procedure Laterality Date   • COLON RESECTION N/A 10/26/2020    Procedure: SUBTOTAL COLECTOMY,  ILEOSTOMY, PLACEMENT OF GASTROSTOMY AND JEJEUNOSTOMY TUBES;  Surgeon: Nahum Herzog MD;  Location: [facility] OR;  Service: General;  Laterality: N/A;   • COLONOSCOPY N/A 12/10/2018    Procedure: COLONOSCOPY;  Surgeon: Jay Wilson DO;  Location: [facility] ENDOSCOPY;  Service: Gastroenterology   • COLONOSCOPY Left 10/25/2020    Procedure: COLONOSCOPY WITH ENDOSCOPIC FECAL MICROBIOTA TRANSPLANT;  Surgeon: Jay Wilson DO;  Location: [facility] OR;  Service: Gastroenterology;  Laterality: Left;   • COLONOSCOPY N/A 10/23/2020    Procedure: COLONOSCOPY WITH ENDOSCOPIC FECAL MICROBIOTA TRANSPLANT;   Surgeon: Jay Wilson DO;  Location: [facility] ENDOSCOPY;  Service: Gastroenterology;  Laterality: N/A;   • KNEE MENISCAL REPAIR Left    • OTHER SURGICAL HISTORY      Loop recorder         Current Outpatient Medications:   •  Bacillus Coagulans-Inulin (ALIGN PREBIOTIC-PROBIOTIC PO), Take 1 tablet by mouth Every Night., Disp: , Rfl:   •  chlorthalidone (HYGROTEN) 50 MG tablet, Take 50 mg by mouth Daily., Disp: , Rfl:   •  famotidine (PEPCID) 40 MG tablet, Take 40 mg by mouth Daily., Disp: , Rfl:   •  tamsulosin (FLOMAX) 0.4 MG capsule 24 hr capsule, Take 1 capsule by mouth Daily., Disp: , Rfl:   •  vitamin B-12 (CYANOCOBALAMIN) 500 MCG tablet, Take 500 mcg by mouth Daily., Disp: , Rfl:   •  vitamin C (ASCORBIC ACID) 500 MG tablet, Take 500 mg by mouth Daily., Disp: , Rfl:   •  vitamin D (ERGOCALCIFEROL) 1.25 MG (06233 UT) capsule capsule, Take 50,000 Units by mouth 1 (One) Time Per Week., Disp: , Rfl:   •  chlorhexidine (HIBICLENS) 4 % external liquid, Apply  topically to the appropriate area as directed 2 (Two) Times a Day. Shower With Hibiclens Solution Twice The Day Before Surgery, Disp: 236 mL, Rfl: 0    No Known Allergies    Family History   Problem Relation Age of Onset   • Glaucoma Mother        Social History     Socioeconomic History   • Marital status:      Spouse name: Not on file   • Number of children: Not on file   • Years of education: Not on file   • Highest education level: Not on file   Tobacco Use   • Smoking status: Former Smoker     Quit date: 2018     Years since quitting: 3.0   • Smokeless tobacco: Former User   Substance and Sexual Activity   • Alcohol use: Yes     Alcohol/week: 1.0 standard drinks     Types: 1 Cans of beer per week     Comment: rarely   • Drug use: No   • Sexual activity: Defer       Review of Systems   Constitutional: Negative for activity change, appetite change, chills and fever.   HENT: Negative for hearing loss, nosebleeds and trouble swallowing.     Cardiovascular: Negative for chest pain, palpitations and leg swelling.   Gastrointestinal: Negative for abdominal distention, abdominal pain, anal bleeding, blood in stool, constipation, diarrhea, nausea, rectal pain and vomiting.   Endocrine: Negative for cold intolerance, heat intolerance, polydipsia and polyuria.   Genitourinary: Negative for decreased urine volume, difficulty urinating, dysuria, enuresis, frequency, hematuria and urgency.   Musculoskeletal: Negative for arthralgias, back pain, gait problem, myalgias and neck pain.   Skin: Negative for pallor, rash and wound.   Allergic/Immunologic: Negative for immunocompromised state.   Neurological: Negative for dizziness, seizures, weakness, light-headedness, numbness and headaches.   Psychiatric/Behavioral: Negative for agitation and behavioral problems. The patient is not nervous/anxious.        Physical Exam  Vitals signs reviewed.   Constitutional:       Appearance: He is well-developed. He is not diaphoretic.   HENT:      Head: Normocephalic and atraumatic.      Nose: Nose normal.   Eyes:      General:         Right eye: No discharge.         Left eye: No discharge.      Conjunctiva/sclera: Conjunctivae normal.   Neck:      Musculoskeletal: Normal range of motion and neck supple. No edema.      Thyroid: No thyromegaly.      Vascular: No JVD.      Trachea: Trachea and phonation normal. No tracheal deviation.   Cardiovascular:      Rate and Rhythm: Normal rate and regular rhythm.      Heart sounds: Normal heart sounds. No murmur. No friction rub. No gallop.    Pulmonary:      Effort: Pulmonary effort is normal. No accessory muscle usage or respiratory distress.      Breath sounds: Normal breath sounds. No decreased breath sounds, wheezing or rales.   Chest:      Chest wall: No tenderness.   Abdominal:      General: There is no distension.      Palpations: Abdomen is soft. There is no fluid wave, mass or pulsatile mass.      Tenderness: There is no  abdominal tenderness. There is no guarding or rebound.      Hernia: No hernia is present.       Musculoskeletal: Normal range of motion.         General: No tenderness or deformity.   Lymphadenopathy:      Cervical: No cervical adenopathy.      Upper Body:      Left upper body: No supraclavicular adenopathy.   Skin:     General: Skin is warm and dry.      Coloration: Skin is not pale.      Findings: No erythema or rash.   Neurological:      Mental Status: He is alert and oriented to person, place, and time.      Cranial Nerves: No cranial nerve deficit.   Psychiatric:         Speech: Speech normal.         Behavior: Behavior normal.         Thought Content: Thought content normal.         Judgment: Judgment normal.           ASSESSMENT    Diagnoses and all orders for this visit:    1. Moderate protein-calorie malnutrition (CMS/HCC) (Primary)  -     CBC & Differential; Future  -     Comprehensive Metabolic Panel; Future  -     Magnesium; Future  -     Phosphorus; Future  -     Prealbumin; Future    2. Ileostomy in place (CMS/HCC)  -     Case Request; Standing  -     Type & Screen; Future  -     ECG 12 Lead; Future  -     Chest X-Ray PA & Lateral; Future  -     acetaminophen (TYLENOL) tablet 1,000 mg  -     meloxicam (MOBIC) tablet 15 mg  -     gabapentin (NEURONTIN) capsule 600 mg  -     Scopolamine (TRANSDERM-SCOP) 1.5 MG/3DAYS patch 1 patch  -     alvimopan (ENTEREG) capsule 12 mg  -     ceFOXitin (MEFOXIN) 2 g in sodium chloride 0.9 % 100 mL IVPB  -     Case Request    Other orders  -     Follow Anesthesia Guidelines / Standing Orders; Future  -     Provide Hydration Instructions to Patient; Future  -     Provide NPO Instructions to Patient; Future  -     Provide Patient With Enhanced Recovery Booklet(s) or Handout; Future  -     Provide Chlorhexidine Skin Prep Wipes & Instructions; Future  -     Nurse to Contact Surgeon for Cardiology Consult if Indicated; Future  -     Nurse to Consult  Anesthesia if Indicated;  Future  -     chlorhexidine (HIBICLENS) 4 % external liquid; Apply  topically to the appropriate area as directed 2 (Two) Times a Day. Shower With Hibiclens Solution Twice The Day Before Surgery  Dispense: 236 mL; Refill: 0  -     Follow Anesthesia Guidelines / Standing Orders; Standing  -     Provide Patient With Enhanced Recovery Booklet(s) OR Handout; Standing  -     Obtain Informed Consent; Standing  -     Place Sequential Compression Device on Patient in Pre-Op; Standing  -     Insert Peripheral IV x2; Standing  -     Clip Operative Site; Standing  -     Verify / Perform Chlorhexidine Skin Prep (If Not Already Done); Standing  -     Verify NPO Status; Standing  -     Verify the Time Patient Completed Gatorade / G2; Standing  -     Inpatient Admission; Standing  -     Discontinue: neomycin (MYCIFRADIN) 500 MG tablet; TAKE 2 TABLETS  AND 1100PM NIGHT BEFORE SURGERY  Dispense: 4 tablet; Refill: 0  -     Discontinue: metroNIDAZOLE (Flagyl) 500 MG tablet; Take two (2) tablets at 7 PM and two (2) tablets at 11 PM the night prior to surgery.  Dispense: 4 tablet; Refill: 0        PLAN    1.  Ileostomy closure is planned.  This will involve an ileorectal anastomosis.    The procedure was explained to the patient including the risks of bleeding, infection, poor wound healing, open operation, postoperative diarrhea, scarring, adhesions, hernia, medical complication.  He understands and agrees.              This document has been electronically signed by Nahum Herzog MD on January 12, 2021 18:57 CST      Electronically signed by Nahum Herzog MD at 01/12/21 1920             Nahum Herzog MD at 01/11/21 1342          Chief Complaint   Patient presents with   • Follow-up     Recheck G & J tube        HPI  70-year-old man who underwent a subtotal colectomy for severe C. difficile colitis.  An ileostomy was left in place and gastrostomy and jejunostomy tubes were placed which has subsequently been removed.  He has done  well with good appetite and good bowel function and is gaining weight appropriately though he is still not quite up to his preillness weight.  Overall, his performance status has been excellent.  Past Medical History:   Diagnosis Date   • Hyperlipidemia    • Hypertension    • Rectal abnormality     Rectum came out - er put back in.  9/18   • TIA (transient ischemic attack)        Past Surgical History:   Procedure Laterality Date   • COLON RESECTION N/A 10/26/2020    Procedure: SUBTOTAL COLECTOMY,  ILEOSTOMY, PLACEMENT OF GASTROSTOMY AND JEJEUNOSTOMY TUBES;  Surgeon: Nahum Herzog MD;  Location: [facility] OR;  Service: General;  Laterality: N/A;   • COLONOSCOPY N/A 12/10/2018    Procedure: COLONOSCOPY;  Surgeon: Jay Wilson DO;  Location: [facility] ENDOSCOPY;  Service: Gastroenterology   • COLONOSCOPY Left 10/25/2020    Procedure: COLONOSCOPY WITH ENDOSCOPIC FECAL MICROBIOTA TRANSPLANT;  Surgeon: Jay Wilson DO;  Location: [facility] OR;  Service: Gastroenterology;  Laterality: Left;   • COLONOSCOPY N/A 10/23/2020    Procedure: COLONOSCOPY WITH ENDOSCOPIC FECAL MICROBIOTA TRANSPLANT;  Surgeon: Jay Wilson DO;  Location: [facility] ENDOSCOPY;  Service: Gastroenterology;  Laterality: N/A;   • KNEE MENISCAL REPAIR Left    • OTHER SURGICAL HISTORY      Loop recorder         Current Outpatient Medications:   •  Bacillus Coagulans-Inulin (ALIGN PREBIOTIC-PROBIOTIC PO), Take 1 tablet by mouth Every Night., Disp: , Rfl:   •  chlorthalidone (HYGROTEN) 50 MG tablet, Take 50 mg by mouth Daily., Disp: , Rfl:   •  famotidine (PEPCID) 40 MG tablet, Take 40 mg by mouth Daily., Disp: , Rfl:   •  tamsulosin (FLOMAX) 0.4 MG capsule 24 hr capsule, Take 1 capsule by mouth Daily., Disp: , Rfl:   •  vitamin B-12 (CYANOCOBALAMIN) 500 MCG tablet, Take 500 mcg by mouth Daily., Disp: , Rfl:   •  vitamin C (ASCORBIC ACID) 500 MG tablet, Take 500 mg by mouth Daily., Disp: , Rfl:   •  vitamin D (ERGOCALCIFEROL) 1.25 MG (18881 UT) capsule  capsule, Take 50,000 Units by mouth 1 (One) Time Per Week., Disp: , Rfl:   •  chlorhexidine (HIBICLENS) 4 % external liquid, Apply  topically to the appropriate area as directed 2 (Two) Times a Day. Shower With Hibiclens Solution Twice The Day Before Surgery, Disp: 236 mL, Rfl: 0    No Known Allergies    Family History   Problem Relation Age of Onset   • Glaucoma Mother        Social History     Socioeconomic History   • Marital status:      Spouse name: Not on file   • Number of children: Not on file   • Years of education: Not on file   • Highest education level: Not on file   Tobacco Use   • Smoking status: Former Smoker     Quit date: 2018     Years since quitting: 3.0   • Smokeless tobacco: Former User   Substance and Sexual Activity   • Alcohol use: Yes     Alcohol/week: 1.0 standard drinks     Types: 1 Cans of beer per week     Comment: rarely   • Drug use: No   • Sexual activity: Defer       Review of Systems   Constitutional: Negative for activity change, appetite change, chills and fever.   HENT: Negative for hearing loss, nosebleeds and trouble swallowing.    Cardiovascular: Negative for chest pain, palpitations and leg swelling.   Gastrointestinal: Negative for abdominal distention, abdominal pain, anal bleeding, blood in stool, constipation, diarrhea, nausea, rectal pain and vomiting.   Endocrine: Negative for cold intolerance, heat intolerance, polydipsia and polyuria.   Genitourinary: Negative for decreased urine volume, difficulty urinating, dysuria, enuresis, frequency, hematuria and urgency.   Musculoskeletal: Negative for arthralgias, back pain, gait problem, myalgias and neck pain.   Skin: Negative for pallor, rash and wound.   Allergic/Immunologic: Negative for immunocompromised state.   Neurological: Negative for dizziness, seizures, weakness, light-headedness, numbness and headaches.   Psychiatric/Behavioral: Negative for agitation and behavioral problems. The patient is not  nervous/anxious.        Physical Exam  Vitals signs reviewed.   Constitutional:       Appearance: He is well-developed. He is not diaphoretic.   HENT:      Head: Normocephalic and atraumatic.      Nose: Nose normal.   Eyes:      General:         Right eye: No discharge.         Left eye: No discharge.      Conjunctiva/sclera: Conjunctivae normal.   Neck:      Musculoskeletal: Normal range of motion and neck supple. No edema.      Thyroid: No thyromegaly.      Vascular: No JVD.      Trachea: Trachea and phonation normal. No tracheal deviation.   Cardiovascular:      Rate and Rhythm: Normal rate and regular rhythm.      Heart sounds: Normal heart sounds. No murmur. No friction rub. No gallop.    Pulmonary:      Effort: Pulmonary effort is normal. No accessory muscle usage or respiratory distress.      Breath sounds: Normal breath sounds. No decreased breath sounds, wheezing or rales.   Chest:      Chest wall: No tenderness.   Abdominal:      General: There is no distension.      Palpations: Abdomen is soft. There is no fluid wave, mass or pulsatile mass.      Tenderness: There is no abdominal tenderness. There is no guarding or rebound.      Hernia: No hernia is present.       Musculoskeletal: Normal range of motion.         General: No tenderness or deformity.   Lymphadenopathy:      Cervical: No cervical adenopathy.      Upper Body:      Left upper body: No supraclavicular adenopathy.   Skin:     General: Skin is warm and dry.      Coloration: Skin is not pale.      Findings: No erythema or rash.   Neurological:      Mental Status: He is alert and oriented to person, place, and time.      Cranial Nerves: No cranial nerve deficit.   Psychiatric:         Speech: Speech normal.         Behavior: Behavior normal.         Thought Content: Thought content normal.         Judgment: Judgment normal.           ASSESSMENT    Diagnoses and all orders for this visit:    1. Moderate protein-calorie malnutrition (CMS/HCC)  (Primary)  -     CBC & Differential; Future  -     Comprehensive Metabolic Panel; Future  -     Magnesium; Future  -     Phosphorus; Future  -     Prealbumin; Future    2. Ileostomy in place (CMS/[facility])  -     Case Request; Standing  -     Type & Screen; Future  -     ECG 12 Lead; Future  -     Chest X-Ray PA & Lateral; Future  -     acetaminophen (TYLENOL) tablet 1,000 mg  -     meloxicam (MOBIC) tablet 15 mg  -     gabapentin (NEURONTIN) capsule 600 mg  -     Scopolamine (TRANSDERM-SCOP) 1.5 MG/3DAYS patch 1 patch  -     alvimopan (ENTEREG) capsule 12 mg  -     ceFOXitin (MEFOXIN) 2 g in sodium chloride 0.9 % 100 mL IVPB  -     Case Request    Other orders  -     Follow Anesthesia Guidelines / Standing Orders; Future  -     Provide Hydration Instructions to Patient; Future  -     Provide NPO Instructions to Patient; Future  -     Provide Patient With Enhanced Recovery Booklet(s) or Handout; Future  -     Provide Chlorhexidine Skin Prep Wipes & Instructions; Future  -     Nurse to Contact Surgeon for Cardiology Consult if Indicated; Future  -     Nurse to Consult  Anesthesia if Indicated; Future  -     chlorhexidine (HIBICLENS) 4 % external liquid; Apply  topically to the appropriate area as directed 2 (Two) Times a Day. Shower With Hibiclens Solution Twice The Day Before Surgery  Dispense: 236 mL; Refill: 0  -     Follow Anesthesia Guidelines / Standing Orders; Standing  -     Provide Patient With Enhanced Recovery Booklet(s) OR Handout; Standing  -     Obtain Informed Consent; Standing  -     Place Sequential Compression Device on Patient in Pre-Op; Standing  -     Insert Peripheral IV x2; Standing  -     Clip Operative Site; Standing  -     Verify / Perform Chlorhexidine Skin Prep (If Not Already Done); Standing  -     Verify NPO Status; Standing  -     Verify the Time Patient Completed Gatorade / G2; Standing  -     Inpatient Admission; Standing  -     Discontinue: neomycin (MYCIFRADIN) 500 MG tablet; TAKE 2  TABLETS  AND 1100PM NIGHT BEFORE SURGERY  Dispense: 4 tablet; Refill: 0  -     Discontinue: metroNIDAZOLE (Flagyl) 500 MG tablet; Take two (2) tablets at 7 PM and two (2) tablets at 11 PM the night prior to surgery.  Dispense: 4 tablet; Refill: 0        PLAN    1.  Ileostomy closure is planned.  This will involve an ileorectal anastomosis.    The procedure was explained to the patient including the risks of bleeding, infection, poor wound healing, open operation, postoperative diarrhea, scarring, adhesions, hernia, medical complication.  He understands and agrees.              This document has been electronically signed by Nahum Herzog MD on January 12, 2021 18:57 CST      Electronically signed by Nahum Herzog MD at 01/12/21 1920         Current Facility-Administered Medications   Medication Dose Route Frequency Provider Last Rate Last Admin   • acetaminophen (TYLENOL) tablet 1,000 mg  1,000 mg Oral Q6H Gagandeep Caceres APRN   1,000 mg at 02/02/21 2103   • acetaminophen (TYLENOL) tablet 650 mg  650 mg Oral Q4H PRN Gagandeep Caceres APRN   650 mg at 01/27/21 2059   • apixaban (ELIQUIS) tablet 5 mg  5 mg Oral Q12H Gagandeep Caceres APRN   5 mg at 02/03/21 0804   • bisacodyl (DULCOLAX) suppository 10 mg  10 mg Rectal Daily PRN Gagandeep Caceres APRCECE       • clopidogrel (PLAVIX) tablet 75 mg  75 mg Oral Daily Gagandeep Caceres APRN   75 mg at 02/03/21 0804   • famotidine (PEPCID) tablet 20 mg  20 mg Oral BID AC Gagandeep Caceres APRN   20 mg at 02/03/21 0804   • magnesium hydroxide (MILK OF MAGNESIA) suspension 2400 mg/10mL 10 mL  10 mL Oral Daily PRN Gagandeep Caceres APRN       • nitroglycerin (NITROSTAT) ointment 0.5 inch  0.5 inch Topical Q12H Gagandeep Caceres APRN   0.5 inch at 02/03/21 0857   • nitroglycerin (NITROSTAT) ointment 0.5 inch  0.5 inch Topical Q12H Gagandeep Caceres APRN   0.5 inch at 02/03/21 0803   • ondansetron (ZOFRAN) tablet 4 mg  4 mg Oral Q6H PRN Gagandeep Caceres APRN        Or   •  ondansetron (ZOFRAN) injection 4 mg  4 mg Intravenous Q6H PRN Gagandeep Caceres, APRN   4 mg at 02/03/21 1108   • oxyCODONE (ROXICODONE) immediate release tablet 10 mg  10 mg Oral Q4H PRN Gagandeep Caceres, APRN   10 mg at 02/02/21 1651   • oxyCODONE (ROXICODONE) immediate release tablet 5 mg  5 mg Oral Q4H PRN Gagandeep Caceres, APRN   5 mg at 02/02/21 0403   • potassium chloride (MICRO-K) CR capsule 20 mEq  20 mEq Oral BID With Meals Hugh Luu MD   20 mEq at 02/03/21 0803   • pravastatin (PRAVACHOL) tablet 20 mg  20 mg Oral Nightly Yadira Tate APRN   20 mg at 02/02/21 2103   • prenatal vitamin tablet 1 tablet  1 tablet Oral Daily Gagandeep Caceres, APRN   1 tablet at 02/03/21 0804   • tamsulosin (FLOMAX) 24 hr capsule 0.4 mg  0.4 mg Oral Daily Gagandeep Caceres, APRN   0.4 mg at 02/03/21 0804     Lab Results (most recent)     Procedure Component Value Units Date/Time    Basic Metabolic Panel [[id number]]  (Abnormal) Collected: 02/03/21 0718    Specimen: Blood Updated: 02/03/21 0758     Glucose 90 mg/dL      BUN 25 mg/dL      Creatinine 2.67 mg/dL      Sodium 146 mmol/L      Potassium 4.0 mmol/L      Chloride 110 mmol/L      CO2 27.0 mmol/L      Calcium 8.2 mg/dL      eGFR Non African Amer 24 mL/min/1.73      BUN/Creatinine Ratio 9.4     Anion Gap 9.0 mmol/L     Narrative:      GFR Normal >60  Chronic Kidney Disease <60  Kidney Failure <15      Extra Tubes [[id number]] Collected: 02/02/21 0603    Specimen: Blood, Venous Line Updated: 02/02/21 0745    Narrative:      The following orders were created for panel order Extra Tubes.  Procedure                               Abnormality         Status                     ---------                               -----------         ------                     Lavender Top[[id number]]                                     Final result                 Please view results for these tests on the individual orders.    Lavender Top [[id number]] Collected: 02/02/21 0603     Specimen: Blood Updated: 02/02/21 0745     Extra Tube hold for add-on     Comment: Auto resulted       Basic Metabolic Panel [[id number]]  (Abnormal) Collected: 02/02/21 0602    Specimen: Blood Updated: 02/02/21 0714     Glucose 94 mg/dL      BUN 31 mg/dL      Creatinine 2.89 mg/dL      Sodium 140 mmol/L      Potassium 3.4 mmol/L      Chloride 108 mmol/L      CO2 25.0 mmol/L      Calcium 8.7 mg/dL      eGFR Non African Amer 22 mL/min/1.73      BUN/Creatinine Ratio 10.7     Anion Gap 7.0 mmol/L     Narrative:      GFR Normal >60  Chronic Kidney Disease <60  Kidney Failure <15      Phosphorus [[id number]]  (Normal) Collected: 02/02/21 0602    Specimen: Blood Updated: 02/02/21 0714     Phosphorus 4.0 mg/dL     Magnesium [[id number]]  (Normal) Collected: 02/02/21 0602    Specimen: Blood Updated: 02/02/21 0714     Magnesium 1.6 mg/dL     COVID-19, [facility] IN-HOUSE, NP SWAB IN TRANSPORT MEDIA 8-10 HR TAT - Swab, Nasopharynx [[id number]]  (Normal) Collected: 02/01/21 1543    Specimen: Swab from Nasopharynx Updated: 02/01/21 2136     COVID19 Not Detected    Narrative:      Testing performed by Real Time RT-PCR  This test has not been approved by the [facility] but is authorized under the Emergency Use Act (EUA)    Fact sheet for providers: https://www.fda.gov/media/622531/download    Fact sheet for patients: https://www.fda.gov/media/341943/download        Protein C Activity [[id number]]  (Abnormal) Collected: 01/26/21 1529    Specimen: Blood Updated: 01/31/21 2206     Prt C Activity (Chromogenic) 67 %      Comment: A deficiency of protein C (PC), either congenital or  acquired, increases the risk of thromboembolism. Acquired PC  deficiency occurs more frequently than congenital  deficiency. PC levels can be transiently diminished after a  thrombotic event or surgery or in the presence of certain  anticoagulants. Heparin, direct Xa inhibitor, or thrombin  inhibitor therapy does not alter PC levels physiologically  and does not  interfere with this assay because it is  chromogenic and not clot-based. Vitamin K antagonist therapy  may decrease plasma levels of functional PC as PC is a  vitamin K-dependent protein.  Vitamin K deficiency, due to  dietary insufficiency or malabsorption will also lead to  reduced PC levels. Acquired deficiency can be found in  individuals with disseminated intravascular coagulation  (DIC) and sepsis. Severe hepatic disorders (hepatitis,  cirrhosis, etc.), nephrotic syndrome, malignancy and  inflammatory bowel disease can lead to diminished PC levels.  Drug therapy with L-asparaginase or fluorouracil can also  reduce PC levels. Levels may be decreased in patients with  polycythemia vera, sickle cell disease and essential  thrombocythemia. Repeat evaluation on a new plasma sample to  confirm or refute this result should be considered, after  ruling out acquired causes, depending on the clinical  scenario.  Reference Range:  17 years and older: 73 - 180       Narrative:      Performed at:  [facility]  [address]  [id number]  : Ulysses Tyler MD, Phone:  [phone]    Creatinine, Urine, Random - Urine, Clean Catch [[id number]] Collected: 01/31/21 0159    Specimen: Urine, Clean Catch Updated: 01/31/21 1205     Creatinine, Urine 83.1 mg/dL     Narrative:      Reference intervals for random urine have not been established.  Clinical usage is dependent upon physician's interpretation in combination with other laboratory tests.       Urine Eosinophils, Gonzalez's Stain - Urine, Clean Catch [[id number]]  (Normal) Collected: 01/31/21 0159    Specimen: Urine, Clean Catch Updated: 01/31/21 0924     Gonzalez Stain Negative    Sodium, Urine, Random - Urine, Clean Catch [[id number]] Collected: 01/31/21 0159    Specimen: Urine, Clean Catch Updated: 01/31/21 0320     Sodium, Urine 56 mmol/L     Narrative:      Reference intervals for random urine have not been established.  Clinical  usage is dependent upon physician's interpretation in combination with other laboratory tests.       Comprehensive Metabolic Panel [[id number]]  (Abnormal) Collected: 01/30/21 0620    Specimen: Blood Updated: 01/30/21 0659     Glucose 86 mg/dL      BUN 31 mg/dL      Creatinine 3.26 mg/dL      Sodium 142 mmol/L      Potassium 3.6 mmol/L      Chloride 107 mmol/L      CO2 22.0 mmol/L      Calcium 8.6 mg/dL      Total Protein 5.6 g/dL      Albumin 2.80 g/dL      ALT (SGPT) <5 U/L      AST (SGOT) 185 U/L      Alkaline Phosphatase 66 U/L      Total Bilirubin 0.3 mg/dL      eGFR Non African Amer 19 mL/min/1.73      Globulin 2.8 gm/dL      A/G Ratio 1.0 g/dL      BUN/Creatinine Ratio 9.5     Anion Gap 13.0 mmol/L     Narrative:      GFR Normal >60  Chronic Kidney Disease <60  Kidney Failure <15      CBC (No Diff) [[id number]]  (Abnormal) Collected: 01/30/21 0620    Specimen: Blood Updated: 01/30/21 0650     WBC 7.53 10*3/mm3      RBC 2.95 10*6/mm3      Hemoglobin 9.7 g/dL      Hematocrit 28.9 %      MCV 98.0 fL      MCH 32.9 pg      MCHC 33.6 g/dL      RDW 13.0 %      RDW-SD 46.1 fl      MPV 10.5 fL      Platelets 174 10*3/mm3     Protein S Antigen, Total [[id number]]  (Abnormal) Collected: 01/26/21 1529    Specimen: Blood Updated: 01/29/21 1410     Protein S Ag, Total 52 %      Comment: This test was developed and its performance characteristics  determined by [facility]. It has not been cleared or approved  by the Food and Drug Administration.       Narrative:      Performed at:   - [address]  [id number]  : Shabana Schmid MD, Phone:  [phone]    Protein C Antigen, Total [[id number]]  (Abnormal) Collected: 01/26/21 1529    Specimen: Blood Updated: 01/29/21 1210     Protein C Antigen 55 %      Comment: A deficiency of protein C (PC), either congenital or acquired,  increases the risk of thromboembolism. Congenital deficiencies of PC  are very rare; acquired PC deficiency is  much more common. PC levels  can be transiently diminished after an acute thrombotic event. Oral  anticoagulant therapy with warfarin will lower PC levels as well as  vitamin K deficiency. Acquired deficiency can also occur in  individuals with disseminated intravascular coagulation (DIC),  sepsis, severe liver disease, nephrotic syndrome, and in inflammatory  bowel disease. Levels may be spuriously decreased in individuals  with Factor V Leiden. Drug therapy with L-asparaginse, fluorouracil,  methotrexate, cyclophosphamide or tamoxifen can also reduce PC levels.  It has been suggested that repeat blood sampling and testing after  ruling out acquired causes of deficiency should be performed before  the patient is diagnosed with congenital Protein C deficiency.       Narrative:      Performed at:  [address]  [id number]  : Shabana Schmid MD, Phone:  [phone]    Tissue Pathology Exam [[id number]] Collected: 01/25/21 1458    Specimen: Tissue from Abdomen, Right Updated: 01/29/21 0630     Case Report --     Surgical Pathology Report                         Case: [id number]                                  Authorizing Provider:  Nahum Herzog MD           Collected:           01/25/2021 02:58 PM          Ordering Location:     [facility]             Received:            01/26/2021 06:53 AM                                 [place]                                                              Pathologist:           Antolin Mack MD                                                           Specimen:    Abdomen, Right, OLD ILEOSTOMY AND ANASTOMIC RINGS                                           Final Diagnosis --     SEE SCANNED REPORT        CBC (No Diff) [[id number]]  (Abnormal) Collected: 01/29/21 0536    Specimen: Blood Updated: 01/29/21 0630     WBC 6.46 10*3/mm3      RBC 2.45 10*6/mm3      Hemoglobin 8.3 g/dL      Hematocrit 24.2 %      MCV 98.8 fL       MCH 33.9 pg      MCHC 34.3 g/dL      RDW 13.1 %      RDW-SD 46.5 fl      MPV 10.8 fL      Platelets 131 10*3/mm3     Protein S Functional [[id number]]  (Abnormal) Collected: 01/26/21 1529    Specimen: Blood Updated: 01/29/21 0108     Protein S-Functional 45 %      Comment: A deficiency of protein S (PS), either congenital or acquired,  increases the risk of thromboembolism. PS activity levels may be  falsely low in individuals with APCR/Factor V Leiden. Consider  performing free protein S antigen in those with APCR/Factor V Leiden  before making a diagnosis of protein S deficiency. Acquired PS  deficiency is more common than congenital deficiency. PS values  decrease with normal pregnancy, and are also dependent on age, sex  and hormone status. PS values tend to be lower in a younger age group  and lower in women than in men. Levels may be decreased in  pre-menopausal women on oral contraceptive agents. Acquired deficiency  can occur as a result of vitamin K deficiency or antagonism, severe  hepatic disorders, (hepatitis, cirrhosis, etc.), nephrotic syndrome,  inflammatory bowel disease, certain chemotherapeutic agents,  L-asparaginse therapy, sepsis, disseminated intravascular coagulation  (DIC) and acute thrombosis. Levels may be decreased in patients with  polycythemia vera, sickle cell disease and essential thrombocythemia.  Repeat evaluation on a new plasma sample to confirm or refute this  result should be considered, after ruling out acquired causes,  depending on the clinical scenario.       Narrative:      Performed at:   - [address]  [id number]  : Shabana Schmid MD, Phone:  [phone]    Antithrombin III [[id number]]  (Abnormal) Collected: 01/26/21 1303    Specimen: Blood Updated: 01/28/21 1110     AntiThromb III Func 63 %     Narrative:      Performed at:  [facility]  [id number]  : Shabana  Binu HAMMOND, Phone:  [phone]    Extra Tubes [[id number]] Collected: 01/28/21 0338    Specimen: Blood, Venous Line Updated: 01/28/21 0446    Narrative:      The following orders were created for panel order Extra Tubes.  Procedure                               Abnormality         Status                     ---------                               -----------         ------                     Lavender Top[[id number]]                                     Final result               Green Top (Gel)[[id number]]                                  Final result                 Please view results for these tests on the individual orders.    Lavender Top [[id number]] Collected: 01/28/21 0338    Specimen: Blood Updated: 01/28/21 0446     Extra Tube hold for add-on     Comment: Auto resulted       Green Top (Gel) [[id number]] Collected: 01/28/21 0338    Specimen: Blood Updated: 01/28/21 0446     Extra Tube Hold for add-ons.     Comment: Auto resulted.       aPTT [[id number]]  (Abnormal) Collected: 01/28/21 0325    Specimen: Blood Updated: 01/28/21 0410     PTT 60.4 seconds     Narrative:      The recommended Heparin therapeutic range is 68-97 seconds.    Lupus Anticoagulant [[id number]] Collected: 01/26/21 1303    Specimen: Blood Updated: 01/28/21 0237     Dilute Prothrombin Time(dPT) 35.0 sec      dPT Confirm Ratio 0.72 Ratio      Thrombin Time 15.8 sec      PTT Lupus Anticoagulant 42.5 sec      Dilute Viper Venom Time 35.6 sec      Lupus Anticoagulant Reflex Comment:     Comment: No lupus anticoagulant was detected.       Narrative:      Performed at:  [address] [place] [address]  [id number]  : Shabana Schmid MD, Phone:  [phone]    aPTT [[id number]]  (Abnormal) Collected: 01/27/21 2103    Specimen: Blood Updated: 01/27/21 2132     PTT 88.4 seconds     Narrative:      The recommended Heparin therapeutic range is 68-97 seconds.    Factor 5 Leiden [[id number]]  (Normal) Collected: 01/26/21 1303     Specimen: Blood Updated: 01/27/21 0809     Factor V Leiden Normal    Narrative:      Factor V Leiden is a specific mutation (R506Q) in the factor V gene that is associated with an increased risk of venous thrombosis.  Factor V Leiden is more resistant to inactivation by activated protein C.  Factor V Leiden refers to the G to A transition at nucleotide position 1691 of the Factor V gene, resulting in the substitution of the amino acid arginine by glutamine in the Factor V protein, causing resistance to cleavage by Activated Protein C (APC).    As a result, factor V persists in the circulation leading to a mild hyper-coagulable state.  The Leiden mutation accounts for 90% - 95% of APC resistance.  Factor V Leiden has been reported in patients with deep vein thrombosis, pulmonary embolus, central retinal vein occlusion, cerebral sinus thrombosis and hepatic vein thrombosis.  Other risk factors to be considered in the workup for venous thrombosis include the [id number] mutation in the factor II (prothrombin) gene, protein S and C deficiency, and antithrombin deficiencies. Anticardiolipin antibody and lupus anticoagulant analysis may be appropriate for certain patients, as well as homocysteine levels.    The expression of Factor V Leiden thrombophilia is impacted by coexisting genetic thrombophilic disorders, acquired thrombophilic disorders (malignancy, hyperhomocysteinemia, high factor VIII levels), and circumstances including: pregnancy, oral contraceptive use, hormone replacement therapy, selective estrogen receptor modulators, travel, central venous catheters, surgery, transplantation and advanced age.    In order to derive the most meaningful benefit from this testing, it may be necessary that the results and subsequent options from these complex genetic tests be discussed with patients by a trained genetic professional.    Factor II, DNA Analysis [[id number]]  (Normal) Collected: 01/26/21 1303    Specimen: Blood  Updated: 01/27/21 0809     Factor II, DNA Analysis Normal    Narrative:      A point mutation ([id number]) in the factor II (prothrombin) gene is the second most common cause of inherited thrombophilia.  The Factor II or Prothrombin mutation refers to the G to A transition at nucleotide in the untranslated region of the gene and is associated with increased plasma levels of prothrombin.    The incidence of this mutation in the [place]  population is about 2% and in the  population it is approximately 0.5%. This mutation is rare in the  and  population. Being heterozygous for a prothrombin mutation increases the risk for developing venous thrombosis about 2 to 3 times above the general population risk. Being homozygous for the prothrombin gene mutation increases the relative risk for venous thrombosis further, although it is not yet known how much further the risk is increased. In women heterozygous for the prothrombin gene mutation, the use of estrogen containing oral contraceptives increases the relative risk of venous thrombosis about 16 times and the risk of developing cerebral thrombosis is also significantly increased. In pregnancy, the prothrombin gene mutation increases risk for venous thrombosis and may increase risk for stillbirth, placental abruption, pre-eclampsia and fetal growth restriction.     The expression of Factor II thrombophilia is impacted by coexisting genetic thrombophilic disorders, acquired thrombophilic disorders (eg, malignancy, hyperhomocysteinemia, high factor VIII levels), and circumstances including: pregnancy, oral contraceptive use, hormone replacement therapy, selective estrogen receptor modulators, travel, central venous catheters, surgery, and organ transplantation.    In order to derive the most meaningful benefit from this testing, it may be necessary that the results and subsequent options from these complex genetic tests be discussed  with patients by a trained genetic professional.    CBC & Differential [[id number]]  (Abnormal) Collected: 01/27/21 0323    Specimen: Blood Updated: 01/27/21 0334    Narrative:      The following orders were created for panel order CBC & Differential.  Procedure                               Abnormality         Status                     ---------                               -----------         ------                     CBC Auto Differential[[id number]]        Abnormal            Final result                 Please view results for these tests on the individual orders.    CBC Auto Differential [[id number]]  (Abnormal) Collected: 01/27/21 0323    Specimen: Blood Updated: 01/27/21 0334     WBC 14.33 10*3/mm3      RBC 2.81 10*6/mm3      Hemoglobin 9.1 g/dL      Hematocrit 26.6 %      MCV 94.7 fL      MCH 32.4 pg      MCHC 34.2 g/dL      RDW 13.4 %      RDW-SD 46.0 fl      MPV 10.8 fL      Platelets 144 10*3/mm3      Neutrophil % 91.0 %      Lymphocyte % 4.0 %      Monocyte % 4.6 %      Eosinophil % 0.0 %      Basophil % 0.1 %      Immature Grans % 0.3 %      Neutrophils, Absolute 13.03 10*3/mm3      Lymphocytes, Absolute 0.57 10*3/mm3      Monocytes, Absolute 0.66 10*3/mm3      Eosinophils, Absolute 0.00 10*3/mm3      Basophils, Absolute 0.02 10*3/mm3      Immature Grans, Absolute 0.05 10*3/mm3      nRBC 0.0 /100 WBC     Homocysteine [[id number]]  (Normal) Collected: 01/26/21 1303    Specimen: Blood Updated: 01/26/21 2033     Homocysteine, Plasma (Quant) 8.4 umol/L     Blood Gas, Arterial With Co-Ox [[id number]]  (Abnormal) Collected: 01/26/21 1917    Specimen: Arterial Blood Updated: 01/26/21 1918     Site Arterial Line     Nicholas's Test N/A     pH, Arterial 7.359 pH units      pCO2, Arterial 41.9 mm Hg      pO2, Arterial 209.0 mm Hg      Comment: 83 Value above reference range        HCO3, Arterial 23.6 mmol/L      Base Excess, Arterial -1.8 mmol/L      Comment: 84 Value below reference range        O2 Saturation,  Arterial >100.0 %      Comment: 93 Value above reportable range > 100.0        Hemoglobin, Blood Gas 9.1 g/dL      Comment: 84 Value below reference range        Hematocrit, Blood Gas 28.0 %      Comment: 84 Value below reference range        Oxyhemoglobin >98.5 %      Methemoglobin 0.10 %      Carboxyhemoglobin 1.4 %      A-a Gradiant --     Comment: UNABLE TO CALCULATE        Sodium, Arterial 141 mmol/L      Potassium, Arterial 3.9 mmol/L      Ionized Calcium 4.74 mg/dL      Glucose, Arterial 101 mmol/L      Barometric Pressure for Blood Gas 748 mmHg      Modality N/A     Ventilator Mode NA     Comment: Meter: [id number]     :  [id number]        Note --     pH, Temp Corrected --     pCO2, Temperature Corrected --     pO2, Temperature Corrected --    Blood Gas, Arterial With Co-Ox [[id number]]  (Abnormal) Collected: 01/26/21 1800    Specimen: Arterial Blood Updated: 01/26/21 1805     Site Arterial Line     Nicholas's Test N/A     pH, Arterial 7.416 pH units      pCO2, Arterial 37.3 mm Hg      pO2, Arterial 220.0 mm Hg      Comment: 83 Value above reference range        HCO3, Arterial 24.0 mmol/L      Base Excess, Arterial -0.4 mmol/L      Comment: 84 Value below reference range        O2 Saturation, Arterial 100.0 %      Comment: 83 Value above reference range        Hemoglobin, Blood Gas 9.1 g/dL      Comment: 84 Value below reference range        Hematocrit, Blood Gas 27.8 %      Comment: 84 Value below reference range        Oxyhemoglobin 97.4 %      Methemoglobin 1.30 %      Carboxyhemoglobin 1.3 %      A-a Gradiant --     Comment: UNABLE TO CALCULATE        Sodium, Arterial 141 mmol/L      Potassium, Arterial 3.7 mmol/L      Ionized Calcium 4.94 mg/dL      Glucose, Arterial 97 mmol/L      Barometric Pressure for Blood Gas 748 mmHg      Modality N/A     Ventilator Mode NA     Note --     Collected by OR     Comment: Meter: [id number]     :  [id number]        pH, Temp Corrected --     pCO2,  Temperature Corrected --     pO2, Temperature Corrected --    Green Top (Gel) [[id number]] Collected: 01/26/21 1436    Specimen: Blood Updated: 01/26/21 1545     Extra Tube Hold for add-ons.     Comment: Auto resulted.       Protime-INR [[id number]]  (Abnormal) Collected: 01/26/21 1255    Specimen: Blood Updated: 01/26/21 1345     Protime 17.2 Seconds      INR 1.33    Narrative:      Therapeutic range for most indications is 2.0-3.0 INR,  or 2.5-3.5 for mechanical heart valves.    CBC & Differential [[id number]]  (Abnormal) Collected: 01/26/21 1255    Specimen: Blood Updated: 01/26/21 1317    Narrative:      The following orders were created for panel order CBC & Differential.  Procedure                               Abnormality         Status                     ---------                               -----------         ------                     CBC Auto Differential[[id number]]        Abnormal            Final result                 Please view results for these tests on the individual orders.    CBC Auto Differential [[id number]]  (Abnormal) Collected: 01/26/21 1255    Specimen: Blood Updated: 01/26/21 1317     WBC 13.93 10*3/mm3      RBC 3.35 10*6/mm3      Hemoglobin 10.8 g/dL      Hematocrit 31.9 %      MCV 95.2 fL      MCH 32.2 pg      MCHC 33.9 g/dL      RDW 13.1 %      RDW-SD 44.9 fl      MPV 10.7 fL      Platelets 168 10*3/mm3      Neutrophil % 89.4 %      Lymphocyte % 6.0 %      Monocyte % 4.1 %      Eosinophil % 0.0 %      Basophil % 0.1 %      Immature Grans % 0.4 %      Neutrophils, Absolute 12.46 10*3/mm3      Lymphocytes, Absolute 0.83 10*3/mm3      Monocytes, Absolute 0.57 10*3/mm3      Eosinophils, Absolute 0.00 10*3/mm3      Basophils, Absolute 0.02 10*3/mm3      Immature Grans, Absolute 0.05 10*3/mm3      nRBC 0.0 /100 WBC     Prealbumin [[id number]]  (Abnormal) Collected: 01/25/21 0942    Specimen: Blood Updated: 01/25/21 2000     Prealbumin 40.6 mg/dL     Comprehensive Metabolic Panel [[id number]]   (Abnormal) Collected: 21    Specimen: Blood Updated: 21 1013     Glucose 93 mg/dL      BUN 24 mg/dL      Creatinine 2.22 mg/dL      Sodium 133 mmol/L      Potassium 3.8 mmol/L      Comment: Slight hemolysis detected by analyzer. Results may be affected.        Chloride 100 mmol/L      CO2 15.0 mmol/L      Calcium 10.2 mg/dL      Total Protein 8.8 g/dL      Albumin 4.40 g/dL      ALT (SGPT) 21 U/L      AST (SGOT) 22 U/L      Comment: Slight hemolysis detected by analyzer. Results may be affected.        Alkaline Phosphatase 99 U/L      Total Bilirubin 0.4 mg/dL      eGFR Non African Amer 29 mL/min/1.73      Globulin 4.4 gm/dL      A/G Ratio 1.0 g/dL      BUN/Creatinine Ratio 10.8     Anion Gap 18.0 mmol/L     Narrative:      GFR Normal >60  Chronic Kidney Disease <60  Kidney Failure <15      Magnesium [[id number]]  (Normal) Collected: 21    Specimen: Blood Updated: 21 1012     Magnesium 2.1 mg/dL     Phosphorus [[id number]]  (Abnormal) Collected: 21    Specimen: Blood Updated: 21 1012     Phosphorus 5.1 mg/dL              Physical Therapy Notes (most recent note)      Mirza Kennedy, PTA at 21 1605  Version 1 of 1         Acute Care - Physical Therapy Treatment Note  [facility]     Patient Name: Bar Crockett  : 1950  MRN: [id number]  Today's Date: 2021           PT Assessment (last 12 hours)      PT Evaluation and Treatment     Row Name 21 1435 21 0950       Physical Therapy Time and Intention    Document Type  therapy note (daily note)  -KODAK  therapy note (daily note)  -KODAK    Mode of Treatment  physical therapy;individual therapy  -KODAK  physical therapy;individual therapy  -KODAK    Patient Effort  adequate  -KODAK  adequate  -KODAK    Row Name 21 1435 21 0950       General Information    Patient Profile Reviewed  yes  -KODAK  yes  -KODAK    Existing Precautions/Restrictions  fall  -KODAK  fall  -KODAK    Row Name 21 1435 21  0950       Cognition    Affect/Mental Status (Cognitive)  WFL  -  WFL  -    Orientation Status (Cognition)  oriented x 4  -  oriented x 4  -    Personal Safety Interventions  fall prevention program maintained;gait belt;muscle strengthening facilitated;nonskid shoes/slippers when out of bed;supervised activity  -  safety round/check completed;elopement precautions initiated;fall prevention program maintained;gait belt;muscle strengthening facilitated  -    Row Name 02/02/21 1435 02/02/21 0950       Pain Scale: Numbers Pre/Post-Treatment    Pretreatment Pain Rating  --  -  1/10  -    Posttreatment Pain Rating  --  -  1/10  -    Pain Location - Side  --  Right  -    Pain Location  --  foot  -    Row Name 02/02/21 0950          Pain Scale: Word Pre/Post-Treatment    Pain Intervention(s)  Repositioned;Ambulation/increased activity  -     Row Name 02/02/21 1435 02/02/21 0950       Bed Mobility    Bed Mobility  supine-sit;sit-supine;scooting/bridging  -  supine-sit;sit-supine  -    Scooting/Bridging [place] (Bed Mobility)  standby assist  -  standby assist  -    Supine-Sit [place] (Bed Mobility)  minimum assist (75% patient effort)  -  minimum assist (75% patient effort)  -    Sit-Supine [place] (Bed Mobility)  minimum assist (75% patient effort)  -  minimum assist (75% patient effort)  -    Assistive Device (Bed Mobility)  bed rails;head of bed elevated  -  bed rails;head of bed elevated  -    Comment (Bed Mobility)  pt emptied colostomy while sitting  -  pt did better sitting up on R side of the bed. pt emptied his own colostomy while sitting EOB.    -    Row Name 02/02/21 1435 02/02/21 0950       Transfers    Transfers  sit-stand transfer;stand-sit transfer  -  sit-stand transfer;stand-sit transfer  -    Comment (Transfers)  --  sit-stands x3. pt defers OOB in recliner at this time.   -    Sit-Stand [place] (Transfers)  contact guard;minimum  assist (75% patient effort);verbal cues  -KODAK  contact guard;minimum assist (75% patient effort)  -KODAK    Stand-Sit [place] (Transfers)  contact guard;minimum assist (75% patient effort);verbal cues  -KODAK  contact guard;minimum assist (75% patient effort)  -KODAK    Row Name 02/02/21 1435 02/02/21 0950       Sit-Stand Transfer    Assistive Device (Sit-Stand Transfers)  walker, front-wheeled  -KODAK  walker, front-wheeled  -KODAK    Row Name 02/02/21 1435 02/02/21 0950       Stand-Sit Transfer    Assistive Device (Stand-Sit Transfers)  walker, front-wheeled  -KODAK  walker, front-wheeled  -KODAK    Row Name 02/02/21 1435 02/02/21 0950       Gait/Stairs (Locomotion)    Gait/Stairs Locomotion  gait/ambulation independence;gait/ambulation assistive device;distance ambulated;gait pattern;gait deviations;maintains weight-bearing status;stairs negotiation  -  gait/ambulation independence;gait/ambulation assistive device;distance ambulated;gait pattern;gait deviations;maintains weight-bearing status;stairs negotiation  -    [place] Level (Gait)  minimum assist (75% patient effort)  -KODAK  minimum assist (75% patient effort)  -    Assistive Device (Gait)  walker, front-wheeled  -KODAK  walker, front-wheeled  -KODAK    Distance in Feet (Gait)  42  -KODAK  30  -KODAK    Pattern (Gait)  step-to  -KODAK  step-to  -KODAK    Deviations/Abnormal Patterns (Gait)  antalgic;gait speed decreased;stride length decreased  -KODAK  antalgic;gait speed decreased;stride length decreased pt requires assst to advance R LE or first few steps.   -KODAK    Bilateral Gait Deviations  hip hiking R  -KODAK  hip hiking R  -KODAK    Right Sided Gait Deviations  -- dec knee flexion and DF on R with gait. R LE abducted  -KODAK  -- dec knee flexion and DF on R with gait. R LE abducted  -KODAK    Comment (Gait/Stairs)  pt may benefit from AFO or foot up brace  -KODAK  --    Row Name 02/02/21 1435 02/02/21 0950       Motor Skills    Therapeutic Exercise  -- AP, SAQ, hip Ab/Ad HS- 10x1 niurka.  resistance and stretching..  -KODAK  -- DF/PF, HS, hip Ab/Ad, SAQ. S at end range and resistance..  -KODAK    Additional Documentation  -- .. incorporated  -KODAK  -- eccentrically with AP/HS. controlled eccentrics with SAQ.    -KODAK    Row Name             Wound 01/25/21 1316 abdomen Incision    Wound - Properties Group Placement Date: 01/25/21  -MS Placement Time: 1316  -MS Present on Hospital Admission: N  -MS Location: abdomen  -MS Primary Wound Type: Incision  -MS    Retired Wound - Properties Group Date first assessed: 01/25/21  -MS Time first assessed: 1316  -MS Present on Hospital Admission: N  -MS Location: abdomen  -MS Primary Wound Type: Incision  -MS    Row Name             Wound 01/26/21 1839 Bilateral other (see comments) groin    Wound - Properties Group Placement Date: 01/26/21  -SB Placement Time: 1839  -SB Present on Hospital Admission: N  -SB Side: Bilateral  -SB Orientation: other (see comments)  -SB, bilateral groin  Location: groin  -SB    Retired Wound - Properties Group Date first assessed: 01/26/21  -SB Time first assessed: 1839  -SB Present on Hospital Admission: N  -SB Side: Bilateral  -SB Location: groin  -SB    Row Name             NPWT (Negative Pressure Wound Therapy) 01/26/21 2000 bilateral groin    NPWT (Negative Pressure Wound Therapy) - Properties Group Placement Date: 01/26/21  -BW Placement Time: 2000 -BW Location: bilateral groin  -BW    Retired NPWT (Negative Pressure Wound Therapy) - Properties Group Placement Date: 01/26/21  -BW Placement Time: 2000 -BW Location: bilateral groin  -BW    Row Name 02/02/21 1435 02/02/21 0950       Vital Signs    Pre Systolic BP Rehab  140  -KODAK  136  -KODAK    Pre Treatment Diastolic BP  80  -KODAK  78  -KODAK    Post Systolic BP Rehab  139  -KODAK  142  -KODAK    Post Treatment Diastolic BP  69  -KODAK  79  -KODAK    Pretreatment Heart Rate (beats/min)  81  -KODAK  83  -KODAK    Posttreatment Heart Rate (beats/min)  84  -KODAK  85  -KODAK    Pre SpO2 (%)  95  -KODAK  97  -KODAK    O2  Delivery Pre Treatment  room air  -KODAK  room air  -KODAK    Post SpO2 (%)  100  -KODAK  93  -KODAK    O2 Delivery Post Treatment  room air  -KODAK  room air  -KODAK    Pre Patient Position  Supine  -KODAK  Supine  -KODAK    Post Patient Position  Supine  -KODAK  Supine  -KODAK    Row Name 02/02/21 1435 02/02/21 0950       Bed Mobility Goal 1 (PT)    Activity/Assistive Device (Bed Mobility Goal 1, PT)  sit to supine/supine to sit  -KODAK  sit to supine/supine to sit  -KODAK    [place] Level/Cues Needed (Bed Mobility Goal 1, PT)  modified independence  -KODAK  modified independence  -KODAK    Time Frame (Bed Mobility Goal 1, PT)  by discharge  -KODAK  by discharge  -KODAK    Strategies/Barriers (Bed Mobility Goal 1, PT)  RLE paralysis.  -KODAK  RLE paralysis.  -KODAK    Progress/Outcomes (Bed Mobility Goal 1, PT)  goal not met  -KODAK  goal not met  -KODAK    Row Name 02/02/21 1435 02/02/21 0950       Transfer Goal 1 (PT)    Activity/Assistive Device (Transfer Goal 1, PT)  sit-to-stand/stand-to-sit;bed-to-chair/chair-to-bed;walker, rolling  -KODAK  sit-to-stand/stand-to-sit;bed-to-chair/chair-to-bed;walker, rolling  -KODAK    [place] Level/Cues Needed (Transfer Goal 1, PT)  contact guard assist  -KODAK  contact guard assist  -KODAK    Time Frame (Transfer Goal 1, PT)  by discharge  -KODAK  by discharge  -KODAK    Strategies/Barriers (Transfers Goal 1, PT)  RLE paralysis, impulsive.  -KODAK  RLE paralysis, impulsive.  -KODAK    Progress/Outcome (Transfer Goal 1, PT)  goal not met  -KODAK  goal not met  -KODAK    Row Name 02/02/21 1435 02/02/21 0950       Gait Training Goal 1 (PT)    Activity/Assistive Device (Gait Training Goal 1, PT)  walker, rolling  -KODAK  walker, rolling  -KODAK    [place] Level (Gait Training Goal 1, PT)  contact guard assist  -KODAK  contact guard assist  -KODAK    Distance (Gait Training Goal 1, PT)  25'x1.  -KODAK  25'x1.  -KODAK    Time Frame (Gait Training Goal 1, PT)  by discharge  -KODAK  by discharge  -KODAK    Strategies/Barriers (Gait Training Goal 1, PT)  RLE paralysis, impulsive.   -KODAK  RLE paralysis, impulsive.  -KODAK    Progress/Outcome (Gait Training Goal 1, PT)  goal not met  -KODAK  goal not met  -KODAK    Row Name 02/02/21 1435 02/02/21 0950       Stairs Goal 1 (PT)    Activity/Assistive Device (Stairs Goal 1, PT)  walker, rolling  -KODAK  walker, rolling  -KODAK    [place] Level/Cues Needed (Stairs Goal 1, PT)  contact guard assist  -KODAK  contact guard assist  -KODAK    Number of Stairs (Stairs Goal 1, PT)  1 steps, may use FWW.  -KODAK  1 steps, may use FWW.  -KODAK    Time Frame (Stairs Goal 1, PT)  by discharge  -KODAK  by discharge  -KODAK    Strategies/Barriers (Stairs Goal 1, PT)  RLE paralysis, impulsive.  -KODAK  RLE paralysis, impulsive.  -KODAK    Progress/Outcome (Stairs Goal 1, PT)  goal not met  -KODAK  goal not met  -KODAK    Row Name 02/02/21 1435 02/02/21 0950       Positioning and Restraints    Pre-Treatment Position  in bed  -KODAK  in bed  -KODAK    Post Treatment Position  bed  -KODAK  bed  -KODAK    In Bed  fowlers;call light within reach;encouraged to call for assist;exit alarm on all needs met.   -KODAK  fowlers;call light within reach;encouraged to call for assist;exit alarm on  -KODAK    Row Name 02/02/21 1435 02/02/21 0950       Therapy Assessment/Plan (PT)    Rehab Potential (PT)  good, to achieve stated therapy goals  -KODAK  good, to achieve stated therapy goals  -KODAK      User Key  (r) = Recorded By, (t) = Taken By, (c) = Cosigned By    Initials Name Provider Type    KODAK Mirza Kennedy, PTA Physical Therapy Assistant    Mallory Nguyen, RN Registered Nurse    Liz Persaud, RN Registered Nurse    Cat Spence, RN Registered Nurse        Physical Therapy Education                 Title: PT OT SLP Therapies (In Progress)     Topic: Physical Therapy (In Progress)     Point: Mobility training (In Progress)     Learning Progress Summary           Patient Acceptance, E, NR by KODAK at 2/2/2021 1255    Acceptance, E, NR by KODAK at 1/31/2021 1202    Acceptance, E, NR by KODAK at 1/30/2021 1301    Acceptance, E,  ROQUE,NR by PAN at 1/26/2021 1048    Comment: Educated on proper gait mechanics and attending to R LE to ensure hip/knee extension during SLS.                   Point: Home exercise program (Not Started)     Learner Progress:  Not documented in this visit.          Point: Body mechanics (Not Started)     Learner Progress:  Not documented in this visit.          Point: Precautions (Not Started)     Learner Progress:  Not documented in this visit.                      User Key     Initials Effective Dates Name Provider Type Discipline    KODAK 03/07/18 -  Mirza Kennedy, PTA Physical Therapy Assistant PT    PAN 04/03/18 -  Ismael Morillo, PT Physical Therapist PT              PT Recommendation and Plan  Anticipated Discharge Disposition (PT): home with 24/7 care, inpatient rehabilitation facility, home with assist  Plan of Care Reviewed With: patient  Progress: improving  Outcome Summary: pt responded well to PT w. increased gait to 42 ft min A w/ RW. pt able to advance R LE in his own this PM but hip hiking to do so. pt lacking hip flexion, knee extension, and DF w/ R LE during gait. pt may benefit from AFO or foot up brace. pt participated in LE ther ex that incorporated stretching, manual resistance, and eccentric control. non ew goals met at this time. pt would continue to benefit from PT services.  Outcome Measures     Row Name 02/02/21 0916             How much help from another person do you currently need...    Turning from your back to your side while in flat bed without using bedrails?  3  -KODAK      Moving from lying on back to sitting on the side of a flat bed without bedrails?  3  -KODAK      Moving to and from a bed to a chair (including a wheelchair)?  3  -KODAK      Standing up from a chair using your arms (e.g., wheelchair, bedside chair)?  3  -KODAK      Climbing 3-5 steps with a railing?  2  -KODAK      To walk in hospital room?  3  -KODAK      AM-PAC 6 Clicks Score (PT)  17  -KODAK         Functional Assessment    Outcome  Measure Options  AM-PAC 6 Clicks Basic Mobility (PT)  -KODAK        User Key  (r) = Recorded By, (t) = Taken By, (c) = Cosigned By    Initials Name Provider Type    Mirza Salinas PTA Physical Therapy Assistant           Time Calculation:   PT Charges     Row Name 21 1604 21 1259          Time Calculation    Start Time  1435  -KODAK  0950  -KODAK     Stop Time  1522  -KODAK  1047  -KODAK     Time Calculation (min)  47 min  -KODAK  57 min  -KODAK     PT Non-Billable Time (min)  --  4 min  -KODAK        Time Calculation- PT    Total Timed Code Minutes- PT  47 minute(s)  -KODAK  53 minute(s)  -KODAK       User Key  (r) = Recorded By, (t) = Taken By, (c) = Cosigned By    Initials Name Provider Type    Mirza Salinas PTA Physical Therapy Assistant        Therapy Charges for Today     Code Description Service Date Service Provider Modifiers Qty    58871667856 HC GAIT TRAINING EA 15 MIN 2021 Mirza Kennedy PTA GP 1    28705729164 HC PT THER PROC EA 15 MIN 2021 Mirza Kennedy PTA GP 1    64216174005 HC PT THERAPEUTIC ACT EA 15 MIN 2021 Mirza Kennedy, PTA GP 2    96949037048 HC GAIT TRAINING EA 15 MIN 2021 Mirza Kennedy PTA GP 1    83300745774 HC PT THER PROC EA 15 MIN 2021 Mirza Kennedy PTA GP 2          PT G-Codes  Outcome Measure Options: AM-PAC 6 Clicks Basic Mobility (PT)  AM-PAC 6 Clicks Score (PT): 17  AM-PAC 6 Clicks Score (OT): 16    Mirza Kennedy PTA  2021      Electronically signed by Mirza Kennedy PTA at 21 1605          Occupational Therapy Notes (most recent note)      Robyn Dumont COTA/L at 21 1425          Patient Name: Bar Crockett  : 1950    MRN: [id number]                              Today's Date: 2021       Admit Date: 2021    Visit Dx:     ICD-10-CM ICD-9-CM   1. Critical lower limb ischemia (CMS/HCC)  I70.229 459.9   2. Moderate protein-calorie malnutrition (CMS/HCC)  E44.0 263.0   3. Ileostomy in place (CMS/HCC)  Z93.2  V44.2   4. Impaired functional mobility, balance, gait, and endurance  Z74.09 V49.89   5. Impaired mobility and ADLs  Z74.09 V49.89    Z78.9      Patient Active Problem List   Diagnosis   • Sigmoid diverticulitis   • Pancolitis (CMS/HCC)   • Stage 3a chronic kidney disease (CMS/HCC)   • Sepsis without acute organ dysfunction (CMS/HCC)   • Severe malnutrition (CMS/HCC)   • Edema of both ankles   • Tachycardia   • YOBANY (acute kidney injury) (CMS/HCC)   • Moderate episode of recurrent major depressive disorder (CMS/HCC)   • Ileostomy in place (CMS/HCC)   • Critical lower limb ischemia (CMS/HCC)     Past Medical History:   Diagnosis Date   • Hyperlipidemia    • Hypertension    • Rectal abnormality     Rectum came out - er put back in.  9/18   • TIA (transient ischemic attack)      Past Surgical History:   Procedure Laterality Date   • COLON RESECTION N/A 10/26/2020    Procedure: SUBTOTAL COLECTOMY,  ILEOSTOMY, PLACEMENT OF GASTROSTOMY AND JEJEUNOSTOMY TUBES;  Surgeon: Nahum Herzog MD;  Location: [facility];  Service: General;  Laterality: N/A;   • COLONOSCOPY N/A 12/10/2018    Procedure: COLONOSCOPY;  Surgeon: Jay Wilson DO;  Location: [facility] ENDOSCOPY;  Service: Gastroenterology   • COLONOSCOPY Left 10/25/2020    Procedure: COLONOSCOPY WITH ENDOSCOPIC FECAL MICROBIOTA TRANSPLANT;  Surgeon: Jay Wilson DO;  Location: [facility] OR;  Service: Gastroenterology;  Laterality: Left;   • COLONOSCOPY N/A 10/23/2020    Procedure: COLONOSCOPY WITH ENDOSCOPIC FECAL MICROBIOTA TRANSPLANT;  Surgeon: Jay Wilson DO;  Location: [facility] ENDOSCOPY;  Service: Gastroenterology;  Laterality: N/A;   • ILEOSTOMY CLOSURE N/A 1/25/2021    Procedure: ILEOSTOMY CLOSURE WITH LOOP ILEOSTOMY PLACEMENT;  Surgeon: Nahum Herzog MD;  Location: [facility] OR;  Service: General;  Laterality: N/A;   • KNEE MENISCAL REPAIR Left    • LEG THROMBECTOMY/EMBOLECTOMY N/A 1/26/2021    Procedure: femoral to femoral bypass, bi-femoral bypass;   Surgeon: Sukhwinder Steele MD;  Location: [facility];  Service: Vascular;  Laterality: N/A;   • OTHER SURGICAL HISTORY      Loop recorder     General Information     Row Name 02/02/21 1235 02/02/21 0700       OT Time and Intention    Document Type  therapy note (daily note)  -BB  therapy note (daily note)  -BB    Mode of Treatment  individual therapy;occupational therapy  -BB  individual therapy;occupational therapy  -BB    Row Name 02/02/21 1235 02/02/21 0700       General Information    Patient Profile Reviewed  yes  -BB  yes  -BB    Existing Precautions/Restrictions  fall  -BB  fall  -BB    Row Name 02/02/21 1235 02/02/21 0700       Cognition    Orientation Status (Cognition)  oriented x 4  -BB  oriented x 4  -BB    Row Name 02/02/21 1235 02/02/21 0700       Safety Issues, Functional Mobility    Impairments Affecting Function (Mobility)  balance;coordination;endurance/activity tolerance;pain;postural/trunk control;strength;motor control;motor planning  -BB  balance;coordination;endurance/activity tolerance;pain;postural/trunk control;strength;motor control;motor planning  -BB      User Key  (r) = Recorded By, (t) = Taken By, (c) = Cosigned By    Initials Name Provider Type    BB Robyn Dumont COTA/L Occupational Therapy Assistant          Mobility/ADL's     Row Name 02/02/21 1235 02/02/21 0700       Bed Mobility    Bed Mobility  supine-sit;sit-supine  -BB  supine-sit;sit-supine  -BB    Rolling Left [place] (Bed Mobility)  moderate assist (50% patient effort)  -BB  moderate assist (50% patient effort)  -BB    Scooting/Bridging [place] (Bed Mobility)  supervision  -BB  standby assist  -BB    Supine-Sit [place] (Bed Mobility)  minimum assist (75% patient effort)  -BB  moderate assist (50% patient effort) for legs  -BB    Sit-Supine [place] (Bed Mobility)  minimum assist (75% patient effort)  -BB  moderate assist (50% patient effort) for legs  -BB    Bed Mobility, Safety Issues   decreased use of legs for bridging/pushing  -BB  decreased use of legs for bridging/pushing  -BB    Assistive Device (Bed Mobility)  bed rails;head of bed elevated  -BB  bed rails;head of bed elevated  -BB    Row Name 02/02/21 0700          Transfers    Sit-Stand [place] (Transfers)  minimum assist (75% patient effort);verbal cues for hand placement  -     Row Name 02/02/21 0700          Sit-Stand Transfer    Assistive Device (Sit-Stand Transfers)  walker, front-wheeled  -     Row Name 02/02/21 0700          Functional Mobility    Functional Mobility- Device  rolling walker  -     Row Name 02/02/21 1235 02/02/21 0700       Activities of Daily Living    BADL Assessment/Intervention  grooming;upper body dressing  -  bathing;upper body dressing;lower body dressing;grooming  -    Row Name 02/02/21 1235 02/02/21 0700       Bathing Assessment/Intervention    [place] Level (Bathing)  -- Max A to wash and dry R knee to foot. Pt is SBA from hip to knee wo wash and dry.  -BB  bathing skills;upper body;standby assist;lower body;maximum assist (25% patient effort) Max A to wash and dry R knee to foot. Pt is SBA from hip to knee wo wash and dry.  -BB    Position (Bathing)  --  long sitting  -    Row Name 02/02/21 1235 02/02/21 0700       Lower Body Dressing Assessment/Training    [place] Level (Lower Body Dressing)  -- SBA for L sock and dependent for R sock  -BB  lower body dressing skills;doff;don;socks;standby assist;dependent (less than 25% patient effort) SBA for L sock and dependent for R sock  -BB    Position (Lower Body Dressing)  --  long sitting  -BB    Row Name 02/02/21 1235 02/02/21 0700       Upper Body Dressing Assessment/Training    [place] Level (Upper Body Dressing)  upper body dressing skills;doff;don;contact guard assist pt changed gown x2 this pm  -BB  upper body dressing skills;doff;don;contact guard assist  -BB    Position (Upper Body Dressing)  long sitting  -  long  sitting  -BB    Row Name 02/02/21 [facility] 02/02/21 0700       Grooming Assessment/Training    [place] Level (Grooming)  grooming skills;wash face, hands;set up;oral care regimen;shave face;modified independence  -BB  grooming skills;oral care regimen;wash face, hands;set up  -BB    Position (Grooming)  supported sitting  -BB  supported sitting  -BB    Comment (Grooming)  Pt required extensive time to complete grooming tasks.  -BB  --      User Key  (r) = Recorded By, (t) = Taken By, (c) = Cosigned By    Initials Name Provider Type    Robyn Grande COTA/L Occupational Therapy Assistant        Obj/Interventions     Row Name 02/02/21 1235 02/02/21 0700       Vision Assessment/Intervention    Visual Impairment/Limitations  corrective lenses full-time  -BB  corrective lenses full-time  -BB    Row Name 02/02/21 [facility] 02/02/21 0700       Range of Motion Comprehensive    General Range of Motion  bilateral lower extremity ROM WNL  -BB  bilateral lower extremity ROM WNL  -BB    Row Name 02/02/21 [facility] 02/02/21 0700       Strength Comprehensive (MMT)    General Manual Muscle Testing (MMT) Assessment  other (see comments)  -BB  other (see comments)  -BB      User Key  (r) = Recorded By, (t) = Taken By, (c) = Cosigned By    Initials Name Provider Type    Robyn Grande COTA/L Occupational Therapy Assistant        Goals/Plan     Row Name 02/02/21 [facility] 02/02/21 0700       Transfer Goal 1 (OT)    Activity/Assistive Device (Transfer Goal 1, OT)  toilet  -BB  toilet  -BB    [place] Level/Cues Needed (Transfer Goal 1, OT)  supervision required  -BB  supervision required  -BB    Time Frame (Transfer Goal 1, OT)  long term goal (LTG);by discharge  -BB  long term goal (LTG);by discharge  -BB    Progress/Outcome (Transfer Goal 1, OT)  goal not met  -BB  goal not met  -BB    Row Name 02/02/21 [facility] 02/02/21 0700       Bathing Goal 1 (OT)    Activity/Device (Bathing Goal 1, OT)  lower body bathing AD as needed  -BB   lower body bathing AD as needed  -BB    [place] Level/Cues Needed (Bathing Goal 1, OT)  supervision required  -BB  supervision required  -BB    Time Frame (Bathing Goal 1, OT)  long term goal (LTG);by discharge  -BB  long term goal (LTG);by discharge  -BB    Progress/Outcomes (Bathing Goal 1, OT)  goal not met  -BB  goal not met  -BB    Row Name 02/02/21 1235 02/02/21 0700       Dressing Goal 1 (OT)    Activity/Device (Dressing Goal 1, OT)  lower body dressing AD as needed  -BB  lower body dressing AD as needed  -BB    [place]/Cues Needed (Dressing Goal 1, OT)  supervision required  -BB  supervision required  -BB    Time Frame (Dressing Goal 1, OT)  long term goal (LTG);by discharge  -BB  long term goal (LTG);by discharge  -BB    Progress/Outcome (Dressing Goal 1, OT)  goal not met  -BB  goal not met  -BB      User Key  (r) = Recorded By, (t) = Taken By, (c) = Cosigned By    Initials Name Provider Type    Robyn Grande COTA/L Occupational Therapy Assistant        Clinical Impression     Row Name 02/02/21 1235 02/02/21 0700       Pain Scale: Numbers Pre/Post-Treatment    Pretreatment Pain Rating  1/10  -BB  2/10  -BB    Posttreatment Pain Rating  1/10  -BB  2/10  -BB    Pain Location - Side  Right  -BB  Right  -BB    Pain Location - Orientation  lower  -BB  lower  -BB    Pain Location  extremity  -BB  extremity  -BB    Pain Intervention(s)  Repositioned  -BB  Repositioned  -BB    Row Name 02/02/21 1235 02/02/21 0700       Plan of Care Review    Plan of Care Reviewed With  patient  -BB  patient  -BB    Progress  --  no change  -BB    Outcome Summary  --  Pt agrees to ADL with OT this am. Pt is Mod  A for supine<>sit. Pt is SBA for UB and LB bathing to knee and Max A from knee to R foot., Pt is CGA for UB dressing and SBA for L sock and dependent for R sock. Pt Min A  for sit<>stand and took 2 steps toward HOB. Continue OT POC.  -BB    Row Name 02/02/21 1235 02/02/21 0700       Therapy  Assessment/Plan (OT)    Rehab Potential (OT)  good, to achieve stated therapy goals  -BB  good, to achieve stated therapy goals  -BB    Criteria for Skilled Therapeutic Interventions Met (OT)  yes;meets criteria;skilled treatment is necessary  -BB  yes;meets criteria;skilled treatment is necessary  -BB    Therapy Frequency (OT)  2 times/day  -BB  2 times/day  -BB    Row Name 02/02/21 1235 02/02/21 0700       Therapy Plan Review/Discharge Plan (OT)    Anticipated Discharge Disposition (OT)  home;home with assist;home with 24/7 care  -BB  home;home with assist;home with 24/7 care  -BB    Row Name 02/02/21 1235 02/02/21 0700       Vital Signs    Pre Systolic BP Rehab  --  133  -BB    Pre Treatment Diastolic BP  --  76  -BB    Pretreatment Heart Rate (beats/min)  82  -BB  86  -BB    Intratreatment Heart Rate (beats/min)  --  92  -BB    Posttreatment Heart Rate (beats/min)  83  -BB  83  -BB    Pre SpO2 (%)  96  -BB  94  -BB    O2 Delivery Pre Treatment  room air  -BB  room air  -BB    Intra SpO2 (%)  --  90  -BB    O2 Delivery Intra Treatment  --  room air  -BB    Post SpO2 (%)  93  -BB  93  -BB    O2 Delivery Post Treatment  room air  -BB  room air  -BB    Pre Patient Position  Sitting  -BB  Supine  -BB    Intra Patient Position  --  Sitting  -BB    Post Patient Position  Supine  -BB  Sitting  -BB    Row Name 02/02/21 1235 02/02/21 0700       Positioning and Restraints    Pre-Treatment Position  in bed  -BB  in bed  -BB    Post Treatment Position  bed  -BB  bed  -BB    In Bed  fowlers;call light within reach;encouraged to call for assist;exit alarm on  -BB  fowlers;call light within reach;encouraged to call for assist;exit alarm on  -BB      User Key  (r) = Recorded By, (t) = Taken By, (c) = Cosigned By    Initials Name Provider Type    Robyn Grande COTA/L Occupational Therapy Assistant        Outcome Measures     Row Name 02/02/21 0700          How much help from another is currently needed...    Putting on  and taking off regular lower body clothing?  1  -BB     Bathing (including washing, rinsing, and drying)  2  -BB     Toileting (which includes using toilet bed pan or urinal)  2  -BB     Putting on and taking off regular upper body clothing  3  -BB     Taking care of personal grooming (such as brushing teeth)  4  -BB     Eating meals  4  -BB     AM-PAC 6 Clicks Score (OT)  16  -BB       User Key  (r) = Recorded By, (t) = Taken By, (c) = Cosigned By    Initials Name Provider Type    Robyn Grande COTA/L Occupational Therapy Assistant        Occupational Therapy Education                 Title: PT OT SLP Therapies (In Progress)     Topic: Occupational Therapy (In Progress)     Point: ADL training (In Progress)     Description:   Instruct learner(s) on proper safety adaptation and remediation techniques during self care or transfers.   Instruct in proper use of assistive devices.              Learning Progress Summary           Patient Acceptance, E, NR by BB at 2/2/2021 1106    Acceptance, E, NR by BB at 2/1/2021 1137                   Point: Home exercise program (Not Started)     Description:   Instruct learner(s) on appropriate technique for monitoring, assisting and/or progressing therapeutic exercises/activities.              Learner Progress:  Not documented in this visit.          Point: Precautions (Done)     Description:   Instruct learner(s) on prescribed precautions during self-care and functional transfers.              Learning Progress Summary           Patient Acceptance, E, VU,NR by ME at 1/26/2021 1234    Comment: Educated on OT and POC. Educated to call for assistance. Educated on safety precautions.                   Point: Body mechanics (In Progress)     Description:   Instruct learner(s) on proper positioning and spine alignment during self-care, functional mobility activities and/or exercises.              Learning Progress Summary           Patient Acceptance, E, NR by BB at 2/2/2021  1106    Acceptance, E, NR by BB at 2/1/2021 1137    Acceptance, E, VU,NR by ME at 1/26/2021 1234    Comment: Educated on OT and POC. Educated to call for assistance. Educated on safety precautions.                               User Key     Initials Effective Dates Name Provider Type Discipline    BB 03/07/18 -  oRbyn Dumont COTA/L Occupational Therapy Assistant OT    ME 08/24/20 -  Ragini Proctor OTR/L Occupational Therapist OT              OT Recommendation and Plan  Therapy Frequency (OT): 2 times/day  Plan of Care Review  Plan of Care Reviewed With: patient  Progress: no change  Outcome Summary: Pt agrees to ADL with OT this am. Pt is Mod  A for supine<>sit. Pt is SBA for UB and LB bathing to knee and Max A from knee to R foot., Pt is CGA for UB dressing and SBA for L sock and dependent for R sock. Pt Min A  for sit<>stand and took 2 steps toward HOB. Continue OT POC.     Time Calculation:   Time Calculation- OT     Row Name 02/02/21 1424 02/02/21 1107          Time Calculation- OT    OT Start Time  1235  -BB  0700  -BB     OT Stop Time  1348  -BB  0740  -BB     OT Time Calculation (min)  73 min  -BB  40 min  -BB     Total Timed Code Minutes- OT  73 minute(s)  -BB  40 minute(s)  -BB     OT Received On  02/02/21  -BB  02/02/21  -BB       User Key  (r) = Recorded By, (t) = Taken By, (c) = Cosigned By    Initials Name Provider Type    BB Robyn Dumont COTA/L Occupational Therapy Assistant        Therapy Charges for Today     Code Description Service Date Service Provider Modifiers Qty    51794880400 HC OT SELF CARE/MGMT/TRAIN EA 15 MIN 2/1/2021 Robyn Dumont COTA/L GO 4    94772857753 HC OT SELF CARE/MGMT/TRAIN EA 15 MIN 2/2/2021 Robyn Dumont COTA/L GO 3    66550535618 HC OT SELF CARE/MGMT/TRAIN EA 15 MIN 2/2/2021 Robyn Dumont COTA/L GO 5               LEONA Oliver  2/2/2021    Electronically signed by Robyn Dumont COTA/L at 02/02/21 7574

## 2021-02-03 NOTE — PROGRESS NOTES
"Physicians Statement of Medical Necessity for  Ambulance Transportation    GENERAL INFORMATION     Name: Bar Crockett  YOB: 1950  Medicare #: [id number]  Transport Date: 2/3/21 (Valid for round trips this date, or for scheduled repetitive trips for 60 days from the date signed below.)  Origin: [facility] room 309  Destination: [facility] 7th floor  Is the Patient's stay covered under Medicare Part A (PPS/DRG?)Yes  Closest appropriate facility? Yes  If this a hosp-hosp transfer? No  Is this a hospice patient? No    MEDICAL NECESSITY QUESTIONAIRE    Ambulance Transportation is medically necessary only if other means of transportation are contraindicated or would be potentially harmful to the patient.  To meet this requirement, the patient must be either \"bed confined\" or suffer from a condition such that transport by means other than an ambulance is contraindicated by the patient's condition.  The following questions must be answered by the healthcare professional signing below for this form to be valid:     1) Describe the MEDICAL CONDITION (physical and/or mental) of this patient AT THE TIME OF AMBULANCE TRANSPORT that requires the patient to be transported in an ambulance, and why transport by other means is contraindicated by the patient's condition:Peripheral Vascular Disease S/P emergent iliac embolectomy, fem-fem bypass (foot remains impaired)   Past Medical History:   Diagnosis Date   • Hyperlipidemia    • Hypertension    • Rectal abnormality     Rectum came out - er put back in.  9/18   • TIA (transient ischemic attack)       Past Surgical History:   Procedure Laterality Date   • COLON RESECTION N/A 10/26/2020    Procedure: SUBTOTAL COLECTOMY,  ILEOSTOMY, PLACEMENT OF GASTROSTOMY AND JEJEUNOSTOMY TUBES;  Surgeon: Nahum Herzog MD;  Location: [facility];  Service: General;  Laterality: N/A;   • COLONOSCOPY N/A 12/10/2018    Procedure: COLONOSCOPY;  Surgeon: Jay Wilson DO;  " "Location: [facility] ENDOSCOPY;  Service: Gastroenterology   • COLONOSCOPY Left 10/25/2020    Procedure: COLONOSCOPY WITH ENDOSCOPIC FECAL MICROBIOTA TRANSPLANT;  Surgeon: Jay Wilson DO;  Location: [facility] OR;  Service: Gastroenterology;  Laterality: Left;   • COLONOSCOPY N/A 10/23/2020    Procedure: COLONOSCOPY WITH ENDOSCOPIC FECAL MICROBIOTA TRANSPLANT;  Surgeon: Jay Wilson DO;  Location: [facility] ENDOSCOPY;  Service: Gastroenterology;  Laterality: N/A;   • ILEOSTOMY CLOSURE N/A 1/25/2021    Procedure: ILEOSTOMY CLOSURE WITH LOOP ILEOSTOMY PLACEMENT;  Surgeon: Nahum Herzog MD;  Location: [facility] OR;  Service: General;  Laterality: N/A;   • KNEE MENISCAL REPAIR Left    • LEG THROMBECTOMY/EMBOLECTOMY N/A 1/26/2021    Procedure: femoral to femoral bypass, bi-femoral bypass;  Surgeon: Sukhwinder Steele MD;  Location: [facility] OR;  Service: Vascular;  Laterality: N/A;   • OTHER SURGICAL HISTORY      Loop recorder      2) Is this patient \"bed confined\" as defined below?No    To be \"bed confined\" the patient must satisfy all three of the following criteria:  (1) unable to get up from bed without assistance; AND (2) unable to ambulate;  AND (3) unable to sit in a chair or wheelchair.  3) Can this patient safely be transported by car or wheelchair van (I.e., may safely sit during transport, without an attendant or monitoring?)No   4. In addition to completing questions 1-3 above, please check any of the following conditions that apply*:          *Note: supporting documentation for any boxes checked must be maintained in the patient's medical records Unable to tolerate seated position for time needed to transport      SIGNATURE OF PHYSICIAN OR OTHER AUTHORIZED HEALTHCARE PROFESSIONAL    I certify that the above information is true and correct based on my evaluation of this patient, and represent that the patient requires transport by ambulance and that other forms of transport are contraindicated.  I " understand that this information will be used by the Centers for Medicare and Medicaid Services (CMS) to support the determiniation of medical necessity for ambulance services, and I represent that I have personal knowledge of the patient's condition at the time of transport.    X   If this box is checked, I also certify that the patient is physically or mentally incapable of signing the ambulance service's claim form and that the institution with which I am affiliated has furnished care, services or assistance to the patient.  My signature below is made on behalf of the patient pursuant to 42 .36(b)(4). In accordance with 42 .37, the specific reason(s) that the patient is physically or mentally incapable of signing the claim for is as follows:    Signature of Physician or Healthcare Professional   Ranjit VELASQUEZ Date/Time:   2/3/21 at 11:31     (For Scheduled repetitive transport, this form is not valid for transports performed more than 60 days after this date).                                                                                                                                            --------------------------------------------------------------------------------------------  Printed Name and Credentials of Physician or Authorized Healthcare Professional     *Form must be signed by patient's attending physician for scheduled, repetitive transports,.  For non-repetitive ambulance transports, if unable to obtain the signature of the attending physician, any of the following may sign (please select below):     Physician  Clinical Nurse Specialist  Registered Nurse     Physician Assistant X Discharge Planner  Licensed Practical Nurse     Nurse Practitioner

## 2021-02-03 NOTE — PLAN OF CARE
Goal Outcome Evaluation:  Plan of Care Reviewed With: patient  Progress: improving  Outcome Summary: pt agreeable to PT. pt is slowly progressing with increased gait distance to 45 ft min A w/ RW. pt is inconsistent and lacking in the ability to move R LE. seems questionable how much effort pt puts forth during exercise w/ R LE but difficult to say. pt requires Marilin for bed mobility and CGA/min A for sit-stand-sit. vitals stable throughout tx. pt woould continue to benefit from PT services.

## 2021-02-03 NOTE — DISCHARGE SUMMARY
Discharge Summary  Date: 02/03/21  Service: General Surgery  Attending: Nahum Herzog MD    Procedures: Ileostomy closure with protecting loop ileostomy; femorofemoral bypass  Consults: Dr. Jeff Steele  Discharge Diagnoses: Ileostomy in place  Secondary Diagnosis: 3.5 cm abdominal aortic aneurysm, acute ischemia of the right leg      History of pancolitis 10/14/2020   History of stage 3a chronic kidney disease Unknown   Sepsis without acute organ dysfunction 10/14/2020   Severe malnutrition 10/16/2020   Edema of both ankles 10/18/2020   Fast heart beat 10/18/2020   YOBANY (acute kidney injury) 10/22/2020   Mood problem        Reason for hospitalization: Postoperative care following abdominal surgery  Significant Findings: Acute ischemia of the right leg  Patient Condition on Discharge: Improved  Hospital Course: This gentleman was admitted to the hospital for consideration of ileostomy closure.  His history goes back several months at which point he developed severe pancolitis from C. difficile.  He required subtotal colectomy with ileostomy.  This C. difficile improved and he was able to consider the possibility of ileostomy closure.  His nutrition was maximized as much as possible and he underwent the above procedure.  The operation was difficult and there were extensive adhesions noted within the pelvis.  He did undergo a protecting loop ileostomy with plans to close this finally in 3 months.  On the first postoperative day he was noted to be unable to move his right leg.  It was felt to be acutely ischemic.  He had no small abdominal aortic aneurysm and was felt to be perhaps thrombosed the right common iliac artery.  Was seen by Dr. Steele and underwent femorofemoral bypass graft to restore blood flow to the right leg.  At the time of discharge, he is still walking very poorly but he is tolerating a regular diet with good bowel function.    He is discharged to be followed at the rehab facility.  Disposition:  Home  The following discharge instructions were provided to the patient:  1.  Regular diet  2.  All physical activity is encouraged with the exception of straining and heavy lifting  3.  Ileostomy bridge needs to be removed in 1 week.  Discharge Medications:     Your medication list      START taking these medications      Instructions Last Dose Given Next Dose Due   apixaban 5 MG tablet tablet  Commonly known as: ELIQUIS      Take 1 tablet by mouth Every 12 (Twelve) Hours. Indications: Other - full anticoagulation       clopidogrel 75 MG tablet  Commonly known as: PLAVIX  Start taking on: February 4, 2021      Take 1 tablet by mouth Daily.       ondansetron 4 MG tablet  Commonly known as: ZOFRAN      Take 1 tablet by mouth Every 6 (Six) Hours As Needed for Nausea or Vomiting.       oxyCODONE 10 MG tablet  Commonly known as: ROXICODONE      Take 1 tablet by mouth Every 4 (Four) Hours As Needed for Severe Pain  (Surgical Pain VAS 5-10) for up to 4 days.       potassium chloride 10 MEQ CR capsule  Commonly known as: MICRO-K      Take 2 capsules by mouth 2 (Two) Times a Day With Meals.       pravastatin 20 MG tablet  Commonly known as: PRAVACHOL      Take 1 tablet by mouth Every Night.          CONTINUE taking these medications      Instructions Last Dose Given Next Dose Due   ALIGN PREBIOTIC-PROBIOTIC PO      Take 1 tablet by mouth Every Night.       chlorhexidine 4 % external liquid  Commonly known as: HIBICLENS      Apply  topically to the appropriate area as directed 2 (Two) Times a Day. Shower With Hibiclens Solution Twice The Day Before Surgery       chlorthalidone 50 MG tablet  Commonly known as: HYGROTEN      Take 50 mg by mouth Daily.       famotidine 40 MG tablet  Commonly known as: PEPCID      Take 40 mg by mouth Daily.       furosemide 20 MG tablet  Commonly known as: LASIX      Take 20 mg by mouth 2 (Two) Times a Day.       tamsulosin 0.4 MG capsule 24 hr capsule  Commonly known as: FLOMAX      Take 1  capsule by mouth Daily.       vitamin B-12 500 MCG tablet  Commonly known as: CYANOCOBALAMIN      Take 500 mcg by mouth Daily.       vitamin C 500 MG tablet  Commonly known as: ASCORBIC ACID      Take 500 mg by mouth Daily.       vitamin D 1.25 MG (04710 UT) capsule capsule  Commonly known as: ERGOCALCIFEROL      Take 50,000 Units by mouth 1 (One) Time Per Week.             Where to Get Your Medications      These medications were sent to [facility]  - [place], [place] - [address] [place] [address] - [phone]  - [phone] [address] [address]    Phone: [phone]   · apixaban 5 MG tablet tablet  · clopidogrel 75 MG tablet  · ondansetron 4 MG tablet  · oxyCODONE 10 MG tablet  · potassium chloride 10 MEQ CR capsule  · pravastatin 20 MG tablet         Your Scheduled Appointments    Mar 17, 2021  2:00 PM  VASCULAR ULTRASOUND VISIT with [facility] TAMMI ROOM  [facility] HEART AND VASCULAR ([facility]) [address] [place] [address] [address] [place] [address] [phone]      Mar 17, 2021  2:30 PM  VASCULAR ULTRASOUND VISIT with [facility] TAMMI ROOM  [facility] V[facility] ([facility]) [address] [place] [address] [address] [place] [address] [phone]      Mar 17, 2021  3:00 PM  Post-Op with ZACKARY Crowder  [facility] CARDIOTHORACIC AND VASCULAR SURGERY (--) [address] [place] [address] [address]  [address] [facility] [address] [phone]                   This document has been electronically signed by Nahum Herzog MD on February 3, 2021 12:12 CST

## 2021-02-03 NOTE — NURSING NOTE
Pt prevena wound vac on Right groin was peeping low battery through night. Battery  and prevena was removed and dry gauze dressing was put in place. Pt incision is approximated with some light brusing around incision. Sponge of wound vac remained compressed from dried sanguinous drainage previously noted. Pt tolerated removal poorly and it was noted that the tape from the wound vac did irritate the skin around incision.

## 2021-02-03 NOTE — THERAPY TREATMENT NOTE
Patient Name: Bar Crockett  : 1950    MRN: [id number]                              Today's Date: 2/3/2021       Admit Date: 2021    Visit Dx:     ICD-10-CM ICD-9-CM   1. Critical lower limb ischemia (CMS/HCC)  I70.229 459.9   2. Moderate protein-calorie malnutrition (CMS/HCC)  E44.0 263.0   3. Ileostomy in place (CMS/HCC)  Z93.2 V44.2   4. Impaired functional mobility, balance, gait, and endurance  Z74.09 V49.89   5. Impaired mobility and ADLs  Z74.09 V49.89    Z78.9    6. PVD (peripheral vascular disease) (CMS/HCC)  I73.9 443.9   7. Stage 3a chronic kidney disease (CMS/HCC)  N18.31 585.3   8. YOBANY (acute kidney injury) (CMS/HCC)  N17.9 584.9     Patient Active Problem List   Diagnosis   • Sigmoid diverticulitis   • Pancolitis (CMS/HCC)   • Stage 3a chronic kidney disease (CMS/HCC)   • Sepsis without acute organ dysfunction (CMS/HCC)   • Severe malnutrition (CMS/HCC)   • Edema of both ankles   • Tachycardia   • YOBANY (acute kidney injury) (CMS/HCC)   • Moderate episode of recurrent major depressive disorder (CMS/HCC)   • Ileostomy in place (CMS/HCC)   • Critical lower limb ischemia (CMS/HCC)     Past Medical History:   Diagnosis Date   • Hyperlipidemia    • Hypertension    • Rectal abnormality     Rectum came out - er put back in.     • TIA (transient ischemic attack)      Past Surgical History:   Procedure Laterality Date   • COLON RESECTION N/A 10/26/2020    Procedure: SUBTOTAL COLECTOMY,  ILEOSTOMY, PLACEMENT OF GASTROSTOMY AND JEJEUNOSTOMY TUBES;  Surgeon: Nahum Herzog MD;  Location: [facility] OR;  Service: General;  Laterality: N/A;   • COLONOSCOPY N/A 12/10/2018    Procedure: COLONOSCOPY;  Surgeon: Jay Wilson DO;  Location: [facility] ENDOSCOPY;  Service: Gastroenterology   • COLONOSCOPY Left 10/25/2020    Procedure: COLONOSCOPY WITH ENDOSCOPIC FECAL MICROBIOTA TRANSPLANT;  Surgeon: Jay Wilson DO;  Location: [facility] OR;  Service: Gastroenterology;  Laterality: Left;   • COLONOSCOPY  N/A 10/23/2020    Procedure: COLONOSCOPY WITH ENDOSCOPIC FECAL MICROBIOTA TRANSPLANT;  Surgeon: Jay Wilson DO;  Location: [facility] ENDOSCOPY;  Service: Gastroenterology;  Laterality: N/A;   • ILEOSTOMY CLOSURE N/A 1/25/2021    Procedure: ILEOSTOMY CLOSURE WITH LOOP ILEOSTOMY PLACEMENT;  Surgeon: Nahum Herzog MD;  Location: [facility] OR;  Service: General;  Laterality: N/A;   • KNEE MENISCAL REPAIR Left    • LEG THROMBECTOMY/EMBOLECTOMY N/A 1/26/2021    Procedure: femoral to femoral bypass, bi-femoral bypass;  Surgeon: Sukhwinder Steele MD;  Location: [facility] OR;  Service: Vascular;  Laterality: N/A;   • OTHER SURGICAL HISTORY      Loop recorder     General Information     Row Name 02/03/21 0941          OT Time and Intention    Document Type  therapy note (daily note)  -BB     Mode of Treatment  individual therapy;occupational therapy  -BB     Row Name 02/03/21 0941          General Information    Patient Profile Reviewed  yes  -BB     Existing Precautions/Restrictions  fall  -BB     Row Name 02/03/21 0941          Cognition    Orientation Status (Cognition)  oriented x 4  -BB     Row Name 02/03/21 0941          Safety Issues, Functional Mobility    Impairments Affecting Function (Mobility)  balance;coordination;endurance/activity tolerance;pain;postural/trunk control;strength;motor control;motor planning  -BB       User Key  (r) = Recorded By, (t) = Taken By, (c) = Cosigned By    Initials Name Provider Type    BB Robyn Dumont COTA/L Occupational Therapy Assistant          Mobility/ADL's     Row Name 02/03/21 0941          Bed Mobility    Bed Mobility, Safety Issues  decreased use of legs for bridging/pushing  -BB     Assistive Device (Bed Mobility)  bed rails;head of bed elevated  -BB     Comment (Bed Mobility)  Pt defers EOB/OOB at this time due to nausea.  -BB     Row Name 02/03/21 0941          Bathing Assessment/Intervention    [place] Level (Bathing)  upper body;set up;standby assist Max  A to wash and dry R knee to foot. Pt is SBA from hip to knee wo wash and dry.  -BB     Position (Bathing)  sitting up in bed  -BB     Row Name 02/03/21 0941          Lower Body Dressing Assessment/Training    [place] Level (Lower Body Dressing)  -- SBA for L sock and dependent for R sock  -BB     Row Name 02/03/21 0941          Upper Body Dressing Assessment/Training    [place] Level (Upper Body Dressing)  upper body dressing skills;doff;don;contact guard assist pt changed gown x2 this pm  -BB     Position (Upper Body Dressing)  long sitting  -BB     Row Name 02/03/21 0941          Grooming Assessment/Training    [place] Level (Grooming)  grooming skills;wash face, hands;set up;oral care regimen;modified independence  -BB     Position (Grooming)  sitting up in bed  -BB       User Key  (r) = Recorded By, (t) = Taken By, (c) = Cosigned By    Initials Name Provider Type    BB Robyn Dumont COTA/L Occupational Therapy Assistant        Obj/Interventions     Row Name 02/03/21 0941          Vision Assessment/Intervention    Visual Impairment/Limitations  corrective lenses full-time  -BB     Row Name 02/03/21 0941          Range of Motion Comprehensive    General Range of Motion  bilateral lower extremity ROM WNL  -BB     Row Name 02/03/21 0941          Strength Comprehensive (MMT)    General Manual Muscle Testing (MMT) Assessment  other (see comments)  -BB       User Key  (r) = Recorded By, (t) = Taken By, (c) = Cosigned By    Initials Name Provider Type    BB Robyn Dumont COTA/L Occupational Therapy Assistant        Goals/Plan     Row Name 02/03/21 0941          Transfer Goal 1 (OT)    Activity/Assistive Device (Transfer Goal 1, OT)  toilet  -BB     [place] Level/Cues Needed (Transfer Goal 1, OT)  supervision required  -BB     Time Frame (Transfer Goal 1, OT)  long term goal (LTG);by discharge  -BB     Progress/Outcome (Transfer Goal 1, OT)  goal not met  -BB     Row Name 02/03/21 0941           Bathing Goal 1 (OT)    Activity/Device (Bathing Goal 1, OT)  lower body bathing AD as needed  -BB     [place] Level/Cues Needed (Bathing Goal 1, OT)  supervision required  -BB     Time Frame (Bathing Goal 1, OT)  long term goal (LTG);by discharge  -BB     Progress/Outcomes (Bathing Goal 1, OT)  goal not met  -BB     Row Name 02/03/21 0941          Dressing Goal 1 (OT)    Activity/Device (Dressing Goal 1, OT)  lower body dressing AD as needed  -BB     [employer] [place]/Cues Needed (Dressing Goal 1, OT)  supervision required  -BB     Time Frame (Dressing Goal 1, OT)  long term goal (LTG);by discharge  -BB     Progress/Outcome (Dressing Goal 1, OT)  goal not met  -BB       User Key  (r) = Recorded By, (t) = Taken By, (c) = Cosigned By    Initials Name Provider Type    Robyn Grande COTA/L Occupational Therapy Assistant        Clinical Impression     Row Name 02/03/21 0941          Pain Scale: Numbers Pre/Post-Treatment    Pretreatment Pain Rating  0/10 - no pain  -BB     Posttreatment Pain Rating  0/10 - no pain  -BB     Pre/Posttreatment Pain Comment  Pt states c/o nausea and weird feeling that he can not explain.  -BB     Row Name 02/03/21 0941          Plan of Care Review    Plan of Care Reviewed With  patient  -BB     Progress  no change  -BB     Outcome Summary  Pt defers WOB/OOB due to nausea and not feeling well. Pt with UB bathing SBA and CGA for UB dressing. Pt defers LB ADL at this time. No new goals met. Pt may benefit from contnued OT services.  -BB     Row Name 02/03/21 0941          Therapy Assessment/Plan (OT)    Rehab Potential (OT)  good, to achieve stated therapy goals  -BB     Criteria for Skilled Therapeutic Interventions Met (OT)  yes;meets criteria;skilled treatment is necessary  -BB     Therapy Frequency (OT)  2 times/day  -BB     Row Name 02/03/21 0941          Therapy Plan Review/Discharge Plan (OT)    Anticipated Discharge Disposition (OT)  home;home with assist;home with  24/7 care  -BB     Row Name 02/03/21 0941          Vital Signs    Pretreatment Heart Rate (beats/min)  86  -BB     Posttreatment Heart Rate (beats/min)  84  -BB     Pre SpO2 (%)  96  -BB     O2 Delivery Pre Treatment  room air  -BB     Post SpO2 (%)  94  -BB     O2 Delivery Post Treatment  room air  -BB     Pre Patient Position  Supine  -BB     Post Patient Position  Supine  -BB     Row Name 02/03/21 0941          Positioning and Restraints    Pre-Treatment Position  in bed  -BB     Post Treatment Position  bed  -BB     In Bed  fowlers;sitting EOB;call light within reach;encouraged to call for assist  -BB       User Key  (r) = Recorded By, (t) = Taken By, (c) = Cosigned By    Initials Name Provider Type    Robyn Grande COTA/L Occupational Therapy Assistant        Outcome Measures     Row Name 02/03/21 0941          How much help from another is currently needed...    Putting on and taking off regular lower body clothing?  1  -BB     Bathing (including washing, rinsing, and drying)  2  -BB     Toileting (which includes using toilet bed pan or urinal)  2  -BB     Putting on and taking off regular upper body clothing  3  -BB     Taking care of personal grooming (such as brushing teeth)  4  -BB     Eating meals  4  -BB     AM-PAC 6 Clicks Score (OT)  16  -BB       User Key  (r) = Recorded By, (t) = Taken By, (c) = Cosigned By    Initials Name Provider Type    Robyn Grande COTA/L Occupational Therapy Assistant        Occupational Therapy Education                 Title: PT OT SLP Therapies (In Progress)     Topic: Occupational Therapy (In Progress)     Point: ADL training (Done)     Description:   Instruct learner(s) on proper safety adaptation and remediation techniques during self care or transfers.   Instruct in proper use of assistive devices.              Learning Progress Summary           Patient Acceptance, E, VU by JUANIS at 2/3/2021 1251    Acceptance, E, NR by JUANIS at 2/2/2021 1106     Acceptance, E, NR by BB at 2/1/2021 1137                   Point: Home exercise program (Not Started)     Description:   Instruct learner(s) on appropriate technique for monitoring, assisting and/or progressing therapeutic exercises/activities.              Learner Progress:  Not documented in this visit.          Point: Precautions (Done)     Description:   Instruct learner(s) on prescribed precautions during self-care and functional transfers.              Learning Progress Summary           Patient Acceptance, E, VU,NR by ME at 1/26/2021 1234    Comment: Educated on OT and POC. Educated to call for assistance. Educated on safety precautions.                   Point: Body mechanics (In Progress)     Description:   Instruct learner(s) on proper positioning and spine alignment during self-care, functional mobility activities and/or exercises.              Learning Progress Summary           Patient Acceptance, E, NR by BB at 2/2/2021 1106    Acceptance, E, NR by BB at 2/1/2021 1137    Acceptance, E, VU,NR by ME at 1/26/2021 1234    Comment: Educated on OT and POC. Educated to call for assistance. Educated on safety precautions.                               User Key     Initials Effective Dates Name Provider Type Discipline     03/07/18 -  Robyn Dumont COTA/L Occupational Therapy Assistant OT    ME 08/24/20 -  Ragini Proctor OTR/L Occupational Therapist OT              OT Recommendation and Plan  Therapy Frequency (OT): 2 times/day  Plan of Care Review  Plan of Care Reviewed With: patient  Progress: no change  Outcome Summary: Pt defers WOB/OOB due to nausea and not feeling well. Pt with UB bathing SBA and CGA for UB dressing. Pt defers LB ADL at this time. No new goals met. Pt may benefit from contnued OT services.     Time Calculation:   Time Calculation- OT     Row Name 02/03/21 1252             Time Calculation- OT    OT Start Time  0941  -      OT Stop Time  1004  -      OT Time Calculation (min)   23 min  -JUANIS      Total Timed Code Minutes- OT  23 minute(s)  -JUANIS      OT Received On  02/03/21  -JUANIS        User Key  (r) = Recorded By, (t) = Taken By, (c) = Cosigned By    Initials Name Provider Type    Robyn Grande COTA/L Occupational Therapy Assistant        Therapy Charges for Today     Code Description Service Date Service Provider Modifiers Qty    48198291193 HC OT SELF CARE/MGMT/TRAIN EA 15 MIN 2/2/2021 Robyn Dumont COTA/L GO 3    03618418670 HC OT SELF CARE/MGMT/TRAIN EA 15 MIN 2/2/2021 Robyn Dumont COTA/L GO 5    85356737477 HC OT SELF CARE/MGMT/TRAIN EA 15 MIN 2/3/2021 Robyn Dumont COTA/L GO 2               YAJAIRA Oliver/MARTELL  2/3/2021

## 2021-02-03 NOTE — PROGRESS NOTES
GENERAL SURGERY PROGRESS NOTE     LOS: 9 days     Chief Complaint:  S/P ileostomy closure   Right femoral thromboembolism     Interval History:   Subjective:     Mr. Crockett is a 69 y/o male postop day #9 from ileostomy closure and day #8 from femorofemoral bypass due to right femoral thromboembolism. NAEON. He is tolerating regular PO diet well, ate 50% of lunch and a chili cheese dog for dinner yesterday. Denies N/V. Stool present in his ileostomy bag and passing flatus per rectum. His pain is well controlled this morning (1/10) but still reports numbness and tingling in right leg, most prominent in his foot and toe. Continues to work with physical therapy and is able to ambulate with walker though multiple gait disturbances with R LE are observed.       Medication Review:   acetaminophen, 1,000 mg, Oral, Q6H  apixaban, 5 mg, Oral, Q12H  clopidogrel, 75 mg, Oral, Daily  famotidine, 20 mg, Oral, BID AC  nitroglycerin, 0.5 inch, Topical, Q12H  nitroglycerin, 0.5 inch, Topical, Q12H  potassium chloride, 20 mEq, Oral, BID With Meals  pravastatin, 20 mg, Oral, Nightly  prenatal vitamin, 1 tablet, Oral, Daily  tamsulosin, 0.4 mg, Oral, Daily        lactated ringers, 100 mL/hr, Last Rate: 100 mL/hr (02/02/21 2104)    Objective     Vital Signs:  Temp:  [97.5 °F (36.4 °C)-99.1 °F (37.3 °C)] 99.1 °F (37.3 °C)  Heart Rate:  [74-94] 74  Resp:  [18] 18  BP: (103-140)/(55-80) 103/65    Intake/Output Summary (Last 24 hours) at 2/3/2021 0532  Last data filed at 2/3/2021 0408  Gross per 24 hour   Intake 980 ml   Output 2150 ml   Net -1170 ml     Physical Exam (performed at 4:45am 2/3/21)     General: alert and oriented. Body habitus thin.   Cardiovascular: RRR, no gallops or murmurs, No lower leg edema. Pedal pulse intact.   Pulmonary: CTAB, no wheezes or crackles   GI: right sided ileostomy C/D/I, well healed mid-line incision, bowel sounds present.   Musculoskeletal: decreased sensation in R LE, numbness and tingling in R foot.    Extremities: NPWT intact right groin. R LE warm and well perfused.       Results Review:    Results from last 7 days   Lab Units 02/02/21  0602 02/01/21  0601 01/31/21  1148   SODIUM mmol/L 140 142 143   POTASSIUM mmol/L 3.4* 3.2* 3.7   CHLORIDE mmol/L 108* 108* 109*   CO2 mmol/L 25.0 23.0 24.0   BUN mg/dL 31* 31* 33*   CREATININE mg/dL 2.89* 3.16* 3.14*   GLUCOSE mg/dL 94 102* 98   CALCIUM mg/dL 8.7 8.3* 9.0     Results from last 7 days   Lab Units 01/30/21  0620 01/29/21  0536   WBC 10*3/mm3 7.53 6.46   HEMOGLOBIN g/dL 9.7* 8.3*   HEMATOCRIT % 28.9* 24.2*   PLATELETS 10*3/mm3 174 131*       Assessment:    Ileostomy in place (CMS/[facility])    Critical lower limb ischemia (CMS/[facility])    Mr. Crockett is a 71 y/o male admitted for ileostomy closure S/P subtotal colectomy for C. Diff  infection. Hospital course was complicated by right iliac thromboembolism requiring a right femorofemoral bypass. Today his condition is stable and his ambulation and PO intake are improving.     Plan:    1). Ileostomy closure  - ileostomy maintenance  -advance PO diet as tolerated   -followed by nephrology: continue to trend creatinine and BUN due to prerenal YOBANY    2). Right femoral thromboembolism   -continue NPWT for right femorofemoral bypass  -PT with ambulation   -continue apixaban and clopidogrel anti-coagulation therapy       This document has been electronically signed by TRIP MARES, Medical Student on February 3, 2021 05:32 CST

## 2021-02-03 NOTE — DISCHARGE INSTRUCTIONS
1.  Regular diet  2.  Activity as tolerated with no heavy lifting  3.  Please remove ileostomy bridge in 1 week

## 2021-02-03 NOTE — PROGRESS NOTES
"  Cardiothoracic - Vascular Surgery Daily Note        LOS: 9 days   Patient Care Team:  Satnam Troncoso APRN as PCP - General (Emergency Medicine)     POD8  Emergent salvage Right Iliac artery thrombectomy, fem-fem bypass PTFE    Chief Complaint: Post surgical pain      Subjective     The following portions of the patient's history were reviewed and updated as appropriate: allergies, current medications, past family history, past medical history, past social history, past surgical history and problem list.     Subjective:  Symptoms:  Stable.  No shortness of breath or chest pain.    Diet:  Adequate intake.  No nausea or vomiting.    Activity level: Impaired due to pain.    Pain:  He complains of pain that is moderate.  He reports pain is improving.  Pain is well controlled and requiring pain medication.          Objective     Vital Signs  Temp:  [97.6 °F (36.4 °C)-99.1 °F (37.3 °C)] 98 °F (36.7 °C)  Heart Rate:  [74-87] 81  Resp:  [18] 18  BP: (103-140)/(55-80) 133/74  Body mass index is 21.82 kg/m².    Intake/Output Summary (Last 24 hours) at 2/3/2021 0952  Last data filed at 2/3/2021 0900  Gross per 24 hour   Intake 2310 ml   Output 1950 ml   Net 360 ml     I/O this shift:  In: 1330 [P.O.:240; I.V.:1090]  Out: -     Wt Readings from Last 3 Encounters:   02/02/21 61.3 kg (135 lb 3.2 oz)   01/11/21 52.2 kg (115 lb)   12/11/20 49.4 kg (109 lb)         Physical Exam   Objective:  General Appearance:  Comfortable, well-appearing, in no acute distress and in pain.    Vital signs: (most recent): Blood pressure 133/74, pulse 81, temperature 98 °F (36.7 °C), temperature source Oral, resp. rate 18, height 167.6 cm (66\"), weight 61.3 kg (135 lb 3.2 oz), SpO2 97 %.  Vital signs are normal.    Output: Producing urine and producing stool.    HEENT: Normal HEENT exam.    Lungs:  Normal effort and normal respiratory rate.  Breath sounds clear to auscultation.    Heart: Normal rate.  Regular rhythm.    Chest: No chest wall " tenderness.    Abdomen: Abdomen is soft.  Bowel sounds are normal.   There is no abdominal tenderness.     Extremities: Normal range of motion.  There is no dependent edema.  ((R) Foot paresthesia. No movement. Minimal with toes)  Pulses: Distal pulses are intact.  (Triphasic DP, biphasic PT RLE)    Neurological: Patient is alert and oriented to person, place and time.  GCS score is 15.    Pupils:  Pupils are equal, round, and reactive to light.  Pupils are equal.   Skin:  Warm and dry.  (Bilateral groin incision CDI - Prevena removed, incisions well approximated)              Results Review:    Lab Results   Component Value Date    WBC 7.53 01/30/2021    HGB 9.7 (L) 01/30/2021    HCT 28.9 (L) 01/30/2021    MCV 98.0 (H) 01/30/2021     01/30/2021     Lab Results   Component Value Date    GLUCOSE 90 02/03/2021    BUN 25 (H) 02/03/2021    CREATININE 2.67 (H) 02/03/2021    EGFRIFNONA 24 (L) 02/03/2021    EGFRIFAFRI 66 08/17/2018    BCR 9.4 02/03/2021    K 4.0 02/03/2021    CO2 27.0 02/03/2021    CALCIUM 8.2 (L) 02/03/2021    ALBUMIN 2.80 (L) 01/30/2021     (H) 01/30/2021    ALT <5 01/30/2021       Calcium Calcium   Date/Time Value Ref Range Status   02/03/2021 0718 8.2 (L) 8.6 - 10.5 mg/dL Final   02/02/2021 0602 8.7 8.6 - 10.5 mg/dL Final   02/01/2021 0601 8.3 (L) 8.6 - 10.5 mg/dL Final   01/31/2021 1148 9.0 8.6 - 10.5 mg/dL Final      Magnesium Magnesium   Date/Time Value Ref Range Status   02/02/2021 0602 1.6 1.6 - 2.4 mg/dL Final        Imaging Results (Last 24 Hours)     ** No results found for the last 24 hours. **                              acetaminophen, 1,000 mg, Oral, Q6H  apixaban, 5 mg, Oral, Q12H  clopidogrel, 75 mg, Oral, Daily  famotidine, 20 mg, Oral, BID AC  nitroglycerin, 0.5 inch, Topical, Q12H  nitroglycerin, 0.5 inch, Topical, Q12H  potassium chloride, 20 mEq, Oral, BID With Meals  pravastatin, 20 mg, Oral, Nightly  prenatal vitamin, 1 tablet, Oral, Daily  tamsulosin, 0.4 mg, Oral,  Daily             ASSESSMENT/PLAN     Satisfactory Post op  Progressing well, participating with PT,  tolerating PO, advance diet.     AAA without rupture  BP control, follow up in office with ultrasound. Plavix, anticoagulation with large intramural thrombus.     Peripheral Vascular Disease  Status post emergent iliac embolectomy, fem-fem bypass (ptfe). Good triphasic signal DP this AM.  Sensation and function of foot remains impaired.  Progressing with PT.    Medical Management: Eliquis, Plavix, Statin     nitro paste to RLE.    Apical hypokinesis on echocardiogram, EKG was without evidence of ischemic changes, follow up with cardiology as outpatient.    Anti-thrombin III deficiency  Anticoagulation: Eliquis    CKD IIIB   Acute on chronic CKD. Stable     Acute blood loss anemia  No blood products intra-op or post op.  H/H AM stable.  Start iron supplementation in the form of prenatal vitamin     Post operative pain  Immediate release oxycodone, acetaminophen    Rehab  PT/OT. Would benefit from continued rehab.     Respiratory  NEBS PRN, wean o2 as tolerated, IS/OPEP     Disposition  Continue care telemetry, ready for rehab from vascular standpoint. Will need office vascular imaging 6 weeks-appointment made. .               This document has been electronically signed by ZACKARY Lu on February 3, 2021 09:52 CST

## 2021-02-03 NOTE — PROGRESS NOTES
"[facility]  [address] [place] [address]. [address]  T - [phone]  F - [phone]     Progress Note          PATIENT  DEMOGRAPHICS   PATIENT NAME: Bar Crockett                      PHYSICIAN: Hugh Luu MD  : 1950  MRN: [id number]   LOS: 9 days    Patient Care Team:  Satnam Troncoso, ZACKARY as PCP - General (Emergency Medicine)  Subjective   SUBJECTIVE   Feels sick today - no vomiting         Objective   OBJECTIVE   Vital Signs  Temp:  [97.6 °F (36.4 °C)-99.1 °F (37.3 °C)] 98 °F (36.7 °C)  Heart Rate:  [74-87] 86  Resp:  [18] 18  BP: (103-140)/(55-80) 125/73    Flowsheet Rows      First Filed Value   Admission Height  167.6 cm (66\") Documented at 2021 0945   Admission Weight  49.6 kg (109 lb 5.6 oz) Documented at 2021 0945           I/O last 3 completed shifts:  In: 980 [I.V.:980]  Out: 3425 [Urine:1475; Stool:1950]    PHYSICAL EXAM    Physical Exam  Constitutional:       Appearance: He is well-developed. He is ill-appearing.   HENT:      Head: Normocephalic.      Mouth/Throat:      Pharynx: No oropharyngeal exudate or posterior oropharyngeal erythema.   Eyes:      Pupils: Pupils are equal, round, and reactive to light.   Cardiovascular:      Rate and Rhythm: Normal rate and regular rhythm.      Heart sounds: Normal heart sounds.   Pulmonary:      Effort: Pulmonary effort is normal.      Breath sounds: Normal breath sounds.   Abdominal:      General: Bowel sounds are normal.      Palpations: Abdomen is soft.   Musculoskeletal:      Right lower leg: Edema present.      Left lower leg: Edema present.   Skin:     Coloration: Skin is not jaundiced or pale.   Neurological:      General: No focal deficit present.      Mental Status: He is alert and oriented to person, place, and time.         RESULTS   Results Review:    Results from last 7 days   Lab Units 21  0718 21  0602 21  0601  21  0620   SODIUM mmol/L 146* 140 142   < > 142 "   POTASSIUM mmol/L 4.0 3.4* 3.2*   < > 3.6   CHLORIDE mmol/L 110* 108* 108*   < > 107   CO2 mmol/L 27.0 25.0 23.0   < > 22.0   BUN mg/dL 25* 31* 31*   < > 31*   CREATININE mg/dL 2.67* 2.89* 3.16*   < > 3.26*   CALCIUM mg/dL 8.2* 8.7 8.3*   < > 8.6   BILIRUBIN mg/dL  --   --   --   --  0.3   ALK PHOS U/L  --   --   --   --  66   ALT (SGPT) U/L  --   --   --   --  <5   AST (SGOT) U/L  --   --   --   --  185*   GLUCOSE mg/dL 90 94 102*   < > 86    < > = values in this interval not displayed.       Estimated Creatinine Clearance: 21.7 mL/min (A) (by C-G formula based on SCr of 2.67 mg/dL (H)).    Results from last 7 days   Lab Units 02/02/21  0602   MAGNESIUM mg/dL 1.6   PHOSPHORUS mg/dL 4.0             Results from last 7 days   Lab Units 01/30/21  0620 01/29/21  0536   WBC 10*3/mm3 7.53 6.46   HEMOGLOBIN g/dL 9.7* 8.3*   PLATELETS 10*3/mm3 174 131*               Imaging Results (Last 24 Hours)     ** No results found for the last 24 hours. **           MEDICATIONS    acetaminophen, 1,000 mg, Oral, Q6H  apixaban, 5 mg, Oral, Q12H  clopidogrel, 75 mg, Oral, Daily  famotidine, 20 mg, Oral, BID AC  nitroglycerin, 0.5 inch, Topical, Q12H  nitroglycerin, 0.5 inch, Topical, Q12H  potassium chloride, 20 mEq, Oral, BID With Meals  pravastatin, 20 mg, Oral, Nightly  prenatal vitamin, 1 tablet, Oral, Daily  tamsulosin, 0.4 mg, Oral, Daily           Assessment/Plan   ASSESSMENT / PLAN      Ileostomy in place (CMS/HCC)    Critical lower limb ischemia (CMS/[facility])       1.  YOBANY on CKD 3- baseline creatinine seems to be around 1.5, creatinine up to 3.1, and today down to 2.67     Creatinine was 2.2 on admission and improved to 1.6 after hydration.  Suspect initial acute kidney injury was prerenal due to volume depletion and subsequent acute kidney injury likely secondary to multiple dye exposures with CTA, thrombectomy, femorofemoral bypass.       FENa is consistent with pre renal azotemia, UO reasonable. Plan for discharge to  [place] rehab. He can be followed up with Nephrology over there if needed. F/u in office after discharge from rehab     2.  Hypokalemia- on replacement     3.  Ischemic limb- failed thrombectomy and ultimately placed femorofemoral bypass     5.  History of C. difficile colitis- status post colectomy and ileostomy closure now     6.  Anemia- hemoglobin is stable         This document has been electronically signed by Hugh Luu MD on February 3, 2021 11:27 CST

## 2021-02-03 NOTE — DISCHARGE PLACEMENT REQUEST
"Leandra Cheney (70 y.o. Male)     Date of Birth Social Security Number Address Home Phone MRN    1950  [address]  [address]   [facility] [address] [phone] [id number]    Scientologist Marital Status          Presbyterian        Admission Date Admission Type Admitting Provider Attending Provider Department, Room/Bed    1/25/21 Elective Nahum Herzog MD Rao, Mohan K, MD [address], 309/1    Discharge Date Discharge Disposition Discharge Destination                       Attending Provider: Nahum Herzog MD    Allergies: Morphine    Isolation: None   Infection: None   Code Status: CPR    Ht: 167.6 cm (66\")   Wt: 61.3 kg (135 lb 3.2 oz)    Admission Cmt: None   Principal Problem: Ileostomy in place (CMS/Formerly [facility]) [Z93.2]                 Active Insurance as of 1/25/2021     Primary Coverage     Payor Plan Insurance Group Employer/Plan Group    MEDICARE MEDICARE A & B      Payor Plan Address Payor Plan Phone Number Payor Plan Fax Number Effective Dates    [address] [phone]  4/1/2013 - None Entered    [facility] [id number]       Subscriber Name Subscriber Birth Date Member ID       LEANDRA CHENEY 1950 [id number]           Secondary Coverage     Payor Plan Insurance Group Employer/Plan Group     LIFE MC SUP   LIFE MC SUPP PLAN F     Payor Plan Address Payor Plan Phone Number Payor Plan Fax Number Effective Dates    [address] [address] [phone]  10/6/2018 - None Entered    [facility] [id number]       Subscriber Name Subscriber Birth Date Member ID       LEANDRA CHENEY 1950 [id number]                 Emergency Contacts      (Rel.) Home Phone Work Phone Mobile Phone    BonKisha mims (Spouse) [phone] -- [phone]    BonMaureen mims (Daughter) [phone] -- [phone]              Current Facility-Administered Medications   Medication Dose Route Frequency Provider Last Rate Last Admin   • acetaminophen (TYLENOL) tablet 1,000 " mg  1,000 mg Oral Q6H Gagandeep Caceres APRN   1,000 mg at 02/02/21 2103   • acetaminophen (TYLENOL) tablet 650 mg  650 mg Oral Q4H PRN Gagandeep Caceres APRN   650 mg at 01/27/21 2059   • apixaban (ELIQUIS) tablet 5 mg  5 mg Oral Q12H Gagandeep Caceres, APRN   5 mg at 02/03/21 0804   • bisacodyl (DULCOLAX) suppository 10 mg  10 mg Rectal Daily PRN Gagandeep Caceres, APRN       • clopidogrel (PLAVIX) tablet 75 mg  75 mg Oral Daily Gagandeep Caceres, APRN   75 mg at 02/03/21 0804   • famotidine (PEPCID) tablet 20 mg  20 mg Oral BID AC Gagandeep Caceres APRN   20 mg at 02/03/21 0804   • magnesium hydroxide (MILK OF MAGNESIA) suspension 2400 mg/10mL 10 mL  10 mL Oral Daily PRN Gagandeep Caceres APRN       • nitroglycerin (NITROSTAT) ointment 0.5 inch  0.5 inch Topical Q12H Gagandeep Caceres, APRN   0.5 inch at 02/03/21 0857   • nitroglycerin (NITROSTAT) ointment 0.5 inch  0.5 inch Topical Q12H Gagandeep Caceres, APRN   0.5 inch at 02/03/21 0803   • ondansetron (ZOFRAN) tablet 4 mg  4 mg Oral Q6H PRN Gagandeep Caceres APRCECE        Or   • ondansetron (ZOFRAN) injection 4 mg  4 mg Intravenous Q6H PRN Gagandeep Caceres, APRN       • oxyCODONE (ROXICODONE) immediate release tablet 10 mg  10 mg Oral Q4H PRN Gagandeep Caceres APRN   10 mg at 02/02/21 1651   • oxyCODONE (ROXICODONE) immediate release tablet 5 mg  5 mg Oral Q4H PRN Gagandeep Caceres, APRN   5 mg at 02/02/21 0403   • potassium chloride (MICRO-K) CR capsule 20 mEq  20 mEq Oral BID With Meals Hugh Luu MD   20 mEq at 02/03/21 0803   • pravastatin (PRAVACHOL) tablet 20 mg  20 mg Oral Nightly Yadira Tate APRN   20 mg at 02/02/21 2103   • prenatal vitamin tablet 1 tablet  1 tablet Oral Daily Gagandeep Caceres APRN   1 tablet at 02/03/21 0804   • tamsulosin (FLOMAX) 24 hr capsule 0.4 mg  0.4 mg Oral Daily Gagandeep Caceres APRN   0.4 mg at 02/03/21 0804     Lab Results (last 24 hours)     Procedure Component Value Units Date/Time    Basic Metabolic Panel  [[id number]]  (Abnormal) Collected: 02/03/21 0718    Specimen: Blood Updated: 02/03/21 0758     Glucose 90 mg/dL      BUN 25 mg/dL      Creatinine 2.67 mg/dL      Sodium 146 mmol/L      Potassium 4.0 mmol/L      Chloride 110 mmol/L      CO2 27.0 mmol/L      Calcium 8.2 mg/dL      eGFR Non African Amer 24 mL/min/1.73      BUN/Creatinine Ratio 9.4     Anion Gap 9.0 mmol/L     Narrative:      GFR Normal >60  Chronic Kidney Disease <60  Kidney Failure <15             Physician Progress Notes (last 24 hours) (Notes from 02/02/21 1002 through 02/03/21 1002)      Yadira Tate APRN at 02/03/21 0951            Cardiothoracic - Vascular Surgery Daily Note        LOS: 9 days   Patient Care Team:  Satnam Troncoso APRN as PCP - General (Emergency Medicine)     POD8  Emergent salvage Right Iliac artery thrombectomy, fem-fem bypass PTFE    Chief Complaint: Post surgical pain      Subjective     The following portions of the patient's history were reviewed and updated as appropriate: allergies, current medications, past family history, past medical history, past social history, past surgical history and problem list.     Subjective:  Symptoms:  Stable.  No shortness of breath or chest pain.    Diet:  Adequate intake.  No nausea or vomiting.    Activity level: Impaired due to pain.    Pain:  He complains of pain that is moderate.  He reports pain is improving.  Pain is well controlled and requiring pain medication.          Objective     Vital Signs  Temp:  [97.6 °F (36.4 °C)-99.1 °F (37.3 °C)] 98 °F (36.7 °C)  Heart Rate:  [74-87] 81  Resp:  [18] 18  BP: (103-140)/(55-80) 133/74  Body mass index is 21.82 kg/m².    Intake/Output Summary (Last 24 hours) at 2/3/2021 0952  Last data filed at 2/3/2021 0900  Gross per 24 hour   Intake 2310 ml   Output 1950 ml   Net 360 ml     I/O this shift:  In: 1330 [P.O.:240; I.V.:1090]  Out: -     Wt Readings from Last 3 Encounters:   02/02/21 61.3 kg (135 lb 3.2 oz)   01/11/21 52.2 kg (115  "lb)   12/11/20 49.4 kg (109 lb)         Physical Exam   Objective:  General Appearance:  Comfortable, well-appearing, in no acute distress and in pain.    Vital signs: (most recent): Blood pressure 133/74, pulse 81, temperature 98 °F (36.7 °C), temperature source Oral, resp. rate 18, height 167.6 cm (66\"), weight 61.3 kg (135 lb 3.2 oz), SpO2 97 %.  Vital signs are normal.    Output: Producing urine and producing stool.    HEENT: Normal HEENT exam.    Lungs:  Normal effort and normal respiratory rate.  Breath sounds clear to auscultation.    Heart: Normal rate.  Regular rhythm.    Chest: No chest wall tenderness.    Abdomen: Abdomen is soft.  Bowel sounds are normal.   There is no abdominal tenderness.     Extremities: Normal range of motion.  There is no dependent edema.  ((R) Foot paresthesia. No movement. Minimal with toes)  Pulses: Distal pulses are intact.  (Triphasic DP, biphasic PT RLE)    Neurological: Patient is alert and oriented to person, place and time.  GCS score is 15.    Pupils:  Pupils are equal, round, and reactive to light.  Pupils are equal.   Skin:  Warm and dry.  (Bilateral groin incision CDI - Prevena removed, incisions well approximated)              Results Review:    Lab Results   Component Value Date    WBC 7.53 01/30/2021    HGB 9.7 (L) 01/30/2021    HCT 28.9 (L) 01/30/2021    MCV 98.0 (H) 01/30/2021     01/30/2021     Lab Results   Component Value Date    GLUCOSE 90 02/03/2021    BUN 25 (H) 02/03/2021    CREATININE 2.67 (H) 02/03/2021    EGFRIFNONA 24 (L) 02/03/2021    EGFRIFAFRI 66 08/17/2018    BCR 9.4 02/03/2021    K 4.0 02/03/2021    CO2 27.0 02/03/2021    CALCIUM 8.2 (L) 02/03/2021    ALBUMIN 2.80 (L) 01/30/2021     (H) 01/30/2021    ALT <5 01/30/2021       Calcium Calcium   Date/Time Value Ref Range Status   02/03/2021 0718 8.2 (L) 8.6 - 10.5 mg/dL Final   02/02/2021 0602 8.7 8.6 - 10.5 mg/dL Final   02/01/2021 0601 8.3 (L) 8.6 - 10.5 mg/dL Final   01/31/2021 1148 9.0 " 8.6 - 10.5 mg/dL Final      Magnesium Magnesium   Date/Time Value Ref Range Status   02/02/2021 0602 1.6 1.6 - 2.4 mg/dL Final        Imaging Results (Last 24 Hours)     ** No results found for the last 24 hours. **                              acetaminophen, 1,000 mg, Oral, Q6H  apixaban, 5 mg, Oral, Q12H  clopidogrel, 75 mg, Oral, Daily  famotidine, 20 mg, Oral, BID AC  nitroglycerin, 0.5 inch, Topical, Q12H  nitroglycerin, 0.5 inch, Topical, Q12H  potassium chloride, 20 mEq, Oral, BID With Meals  pravastatin, 20 mg, Oral, Nightly  prenatal vitamin, 1 tablet, Oral, Daily  tamsulosin, 0.4 mg, Oral, Daily             ASSESSMENT/PLAN     Satisfactory Post op  Progressing well, participating with PT,  tolerating PO, advance diet.     AAA without rupture  BP control, follow up in office with ultrasound. Plavix, anticoagulation with large intramural thrombus.     Peripheral Vascular Disease  Status post emergent iliac embolectomy, fem-fem bypass (ptfe). Good triphasic signal DP this AM.  Sensation and function of foot remains impaired.  Progressing with PT.    Medical Management: Eliquis, Plavix, Statin     nitro paste to RLE.    Apical hypokinesis on echocardiogram, EKG was without evidence of ischemic changes, follow up with cardiology as outpatient.    Anti-thrombin III deficiency  Anticoagulation: Eliquis    CKD IIIB   Acute on chronic CKD. Stable     Acute blood loss anemia  No blood products intra-op or post op.  H/H AM stable.  Start iron supplementation in the form of prenatal vitamin     Post operative pain  Immediate release oxycodone, acetaminophen    Rehab  PT/OT. Would benefit from continued rehab.     Respiratory  NEBS PRN, wean o2 as tolerated, IS/OPEP     Disposition  Continue care telemetry, ready for rehab from vascular standpoint. Will need office vascular imaging 6 weeks-appointment made. .               This document has been electronically signed by ZACKARY Lu on February 3, 2021  "09:52 CST        Electronically signed by Yadira Tate APRN at 02/03/21 0953     Nahum Herzog MD at 02/02/21 1804           LOS: 8 days   Patient Care Team:  Satnam Troncoso APRN as PCP - General (Emergency Medicine)    Chief Complaint: S/P ileostomy closure    Subjective     History of Present Illness  Improved but poor diet and nausea  Subjective:  Symptoms:  Stable.  He reports weakness and anorexia.    Diet:  Poor intake.  He reports  nausea.  No vomiting.    Activity level: Returning to normal.    Pain:  He reports no pain.        History taken from: patient chart RN    Objective     Vital Signs  Temp:  [97.5 °F (36.4 °C)-97.9 °F (36.6 °C)] 97.5 °F (36.4 °C)  Heart Rate:  [71-94] 83  Resp:  [18] 18  BP: (121-140)/(66-80) 140/80    Objective:  Vital signs: (most recent): Blood pressure 140/80, pulse 83, temperature 97.5 °F (36.4 °C), temperature source Oral, resp. rate 18, height 167.6 cm (66\"), weight 61.3 kg (135 lb 3.2 oz), SpO2 95 %.  Vital signs are normal.  Fever.    Output: Producing urine and minimal stool output.    Lungs:  Normal effort and normal respiratory rate.    Heart: Normal rate.  Regular rhythm.              Results Review:     I reviewed the patient's new clinical results.    Medication Review: Done    Assessment/Plan       Ileostomy in place (CMS/HCC)    Critical lower limb ischemia (CMS/HCC)      Assessment:    Condition: In stable condition.  Improving.       Plan:   (Still minimal stool and poor appetite  Continue same- not ready for transfer).           This document has been electronically signed by Nahum Herzog MD on February 2, 2021 18:11 CST      Nahum Herzog MD  02/02/21  18:09 CST    Time: 20 mins        Electronically signed by Nahum Herzog MD at 02/02/21 1811     Yadira Tate APRN at 02/02/21 1050     Attestation signed by Sukhwinder Steele MD at 02/03/21 0817    I have reviewed this documentation and agree.                      Cardiothoracic - " "Vascular Surgery Daily Note        LOS: 8 days   Patient Care Team:  Satnam Troncoso APRN as PCP - General (Emergency Medicine)     POD7  Emergent salvage Right Iliac artery thrombectomy, fem-fem bypass PTFE    Chief Complaint: Post surgical pain      Subjective     The following portions of the patient's history were reviewed and updated as appropriate: allergies, current medications, past family history, past medical history, past social history, past surgical history and problem list.     Subjective:  Symptoms:  Stable.  No shortness of breath or chest pain.    Diet:  Adequate intake.  No nausea or vomiting.    Activity level: Impaired due to pain.    Pain:  He complains of pain that is moderate.  He reports pain is improving.  Pain is well controlled and requiring pain medication.          Objective     Vital Signs  Temp:  [97.5 °F (36.4 °C)-98.9 °F (37.2 °C)] 97.5 °F (36.4 °C)  Heart Rate:  [71-94] 83  Resp:  [18] 18  BP: (101-137)/(48-79) 136/78  Body mass index is 21.82 kg/m².    Intake/Output Summary (Last 24 hours) at 2/2/2021 1050  Last data filed at 2/2/2021 0821  Gross per 24 hour   Intake 240 ml   Output 2875 ml   Net -2635 ml     I/O this shift:  In: -   Out: 200 [Stool:200]    Wt Readings from Last 3 Encounters:   02/02/21 61.3 kg (135 lb 3.2 oz)   01/11/21 52.2 kg (115 lb)   12/11/20 49.4 kg (109 lb)         Physical Exam   Objective:  General Appearance:  Comfortable, well-appearing, in no acute distress and in pain.    Vital signs: (most recent): Blood pressure 136/78, pulse 83, temperature 97.5 °F (36.4 °C), temperature source Oral, resp. rate 18, height 167.6 cm (66\"), weight 61.3 kg (135 lb 3.2 oz), SpO2 97 %.  Vital signs are normal.    Output: Producing urine and producing stool.    HEENT: Normal HEENT exam.    Lungs:  Normal effort and normal respiratory rate.  Breath sounds clear to auscultation.    Heart: Normal rate.  Regular rhythm.    Chest: No chest wall tenderness.    Abdomen: " Abdomen is soft.  Bowel sounds are normal.   There is no abdominal tenderness.     Extremities: Normal range of motion.  There is no dependent edema.  ((R) Foot paresthesia. No movement. Minimal with toes)  Pulses: Distal pulses are intact.  (Triphasic DP, biphasic PT RLE)    Neurological: Patient is alert and oriented to person, place and time.  GCS score is 15.    Pupils:  Pupils are equal, round, and reactive to light.  Pupils are equal.   Skin:  Warm and dry.  (Bilateral groin incision CDI with prevena wound vac. )              Results Review:    Lab Results   Component Value Date    WBC 7.53 01/30/2021    HGB 9.7 (L) 01/30/2021    HCT 28.9 (L) 01/30/2021    MCV 98.0 (H) 01/30/2021     01/30/2021     Lab Results   Component Value Date    GLUCOSE 94 02/02/2021    BUN 31 (H) 02/02/2021    CREATININE 2.89 (H) 02/02/2021    EGFRIFNONA 22 (L) 02/02/2021    EGFRIFAFRI 66 08/17/2018    BCR 10.7 02/02/2021    K 3.4 (L) 02/02/2021    CO2 25.0 02/02/2021    CALCIUM 8.7 02/02/2021    ALBUMIN 2.80 (L) 01/30/2021     (H) 01/30/2021    ALT <5 01/30/2021       Calcium Calcium   Date/Time Value Ref Range Status   02/02/2021 0602 8.7 8.6 - 10.5 mg/dL Final   02/01/2021 0601 8.3 (L) 8.6 - 10.5 mg/dL Final   01/31/2021 1148 9.0 8.6 - 10.5 mg/dL Final      Magnesium Magnesium   Date/Time Value Ref Range Status   02/02/2021 0602 1.6 1.6 - 2.4 mg/dL Final        Imaging Results (Last 24 Hours)     ** No results found for the last 24 hours. **                              acetaminophen, 1,000 mg, Oral, Q6H  apixaban, 5 mg, Oral, Q12H  clopidogrel, 75 mg, Oral, Daily  famotidine, 20 mg, Oral, BID AC  magnesium sulfate, 1 g, Intravenous, Once  nitroglycerin, 0.5 inch, Topical, Q12H  nitroglycerin, 0.5 inch, Topical, Q12H  potassium chloride, 20 mEq, Oral, BID With Meals  pravastatin, 20 mg, Oral, Nightly  prenatal vitamin, 1 tablet, Oral, Daily  tamsulosin, 0.4 mg, Oral, Daily      lactated ringers, 100 mL/hr, Last Rate:  100 mL/hr (21)          ASSESSMENT/PLAN     Satisfactory Post op  Progressing well, participating with PT,  tolerating PO, advance diet.     AAA without rupture  BP control, follow up in office with ultrasound. Plavix, anticoagulation with large intramural thrombus.     Peripheral Vascular Disease  Status post emergent iliac embolectomy, fem-fem bypass (ptfe). Good triphasic signal DP this AM.  Sensation and function of foot remains impaired.  Progressing with PT.    Medical Management: Eliquis, Plavix, Statin     nitro paste to RLE.    Apical hypokinesis on echocardiogram, EKG was without evidence of ischemic changes, follow up with cardiology as outpatient.    Anti-thrombin III deficiency  Anticoagulation: Eliquis    CKD IIIB   Acute on chronic CKD. Stable     Acute blood loss anemia  No blood products intra-op or post op.  H/H AM stable.  Start iron supplementation in the form of prenatal vitamin     Post operative pain  Immediate release oxycodone, acetaminophen    Rehab  PT/OT. Would benefit from continued rehab.     Respiratory  NEBS PRN, wean o2 as tolerated, IS/OPEP     Disposition  Continue care telemetry, anticipate likely need for rehab as outpatient. Will need office vascular imaging 6 weeks.               This document has been electronically signed by ZACKARY Lu on 2021 10:50 CST        Electronically signed by Sukhwinder Steele MD at 21 0858          Consult Notes (most recent note)      Hugh Luu MD at 21 1308      Consult Orders    1. Inpatient Nephrology Consult [[id number]] ordered by Gagandeep Caceres APRN at 21 1040               [facility]  [address] [place] [address]. [address]  T - [phone]  F - [phone]     Consultation         PATIENT  DEMOGRAPHICS   PATIENT NAME: Bar Crockett                      PHYSICIAN: ZACKARY Jonas  : 1950  MRN: [id number]    Subjective   SUBJECTIVE    Referring Provider: ZACKARY Mendieta  Reason for Consultation: YOBANY  History of present illness: This is a 70-year-old male with a past medical history significant for hypertension, hyperlipidemia, CKD 3, GERD, TIAs, C. difficile colitis status post colectomy with ileostomy placement who presented to the hospital on the 25th with plans for ileostomy reversal.  He did undergo ileostomy closure with placement of a protective ileostomy.  He did have some issues with low urine output and low volume status during the case and following his procedure.  The next day the patient was found to have inability to move his right lower extremity and loss of sensation and CTA revealed thrombus and occlusion of the proximal iliac on the right side.  Cardiovascular surgery was consulted and they attempted thrombectomy but ultimately resulted in femorofemoral bypass.  Today the patient is found to have elevated creatinine and nephrology is consulted to help manage acute kidney injury.    Past Medical History:   Diagnosis Date   • Hyperlipidemia    • Hypertension    • Rectal abnormality     Rectum came out - er put back in.  9/18   • TIA (transient ischemic attack)      Past Surgical History:   Procedure Laterality Date   • COLON RESECTION N/A 10/26/2020    Procedure: SUBTOTAL COLECTOMY,  ILEOSTOMY, PLACEMENT OF GASTROSTOMY AND JEJEUNOSTOMY TUBES;  Surgeon: Nahum Herzog MD;  Location: [facility] OR;  Service: General;  Laterality: N/A;   • COLONOSCOPY N/A 12/10/2018    Procedure: COLONOSCOPY;  Surgeon: Jay Wilson DO;  Location: [facility] ENDOSCOPY;  Service: Gastroenterology   • COLONOSCOPY Left 10/25/2020    Procedure: COLONOSCOPY WITH ENDOSCOPIC FECAL MICROBIOTA TRANSPLANT;  Surgeon: Jay Wilson DO;  Location: [facility] OR;  Service: Gastroenterology;  Laterality: Left;   • COLONOSCOPY N/A 10/23/2020    Procedure: COLONOSCOPY WITH ENDOSCOPIC FECAL MICROBIOTA TRANSPLANT;  Surgeon: Jay Wilson DO;  Location: [facility]  "ENDOSCOPY;  Service: Gastroenterology;  Laterality: N/A;   • ILEOSTOMY CLOSURE N/A 1/25/2021    Procedure: ILEOSTOMY CLOSURE WITH LOOP ILEOSTOMY PLACEMENT;  Surgeon: Nahum Herzog MD;  Location: [facility];  Service: General;  Laterality: N/A;   • KNEE MENISCAL REPAIR Left    • OTHER SURGICAL HISTORY      Loop recorder     Family History   Problem Relation Age of Onset   • Glaucoma Mother      Social History     Tobacco Use   • Smoking status: Former Smoker     Quit date: 2018     Years since quitting: 3.0   • Smokeless tobacco: Former User   Substance Use Topics   • Alcohol use: Yes     Alcohol/week: 1.0 standard drinks     Types: 1 Cans of beer per week     Comment: rarely   • Drug use: No     Allergies:  Morphine     REVIEW OF SYSTEMS    Review of Systems   Neurological: Positive for numbness (RLE).   All other systems reviewed and are negative.      Objective   OBJECTIVE   Vital Signs  Temp:  [96.4 °F (35.8 °C)-100.2 °F (37.9 °C)] 96.4 °F (35.8 °C)  Heart Rate:  [] 108  Resp:  [14-20] 18  BP: (101-133)/(56-72) 101/56    Flowsheet Rows      First Filed Value   Admission Height  167.6 cm (66\") Documented at 01/25/2021 0945   Admission Weight  49.6 kg (109 lb 5.6 oz) Documented at 01/25/2021 0945           I/O last 3 completed shifts:  In: 2386 [P.O.:480; I.V.:1906]  Out: 5025 [Urine:1045; Drains:130; Stool:3850]    PHYSICAL EXAM    Physical Exam  Constitutional:       Appearance: He is well-developed.   HENT:      Head: Normocephalic and atraumatic.   Eyes:      Conjunctiva/sclera: Conjunctivae normal.      Pupils: Pupils are equal, round, and reactive to light.   Neck:      Musculoskeletal: Neck supple.   Cardiovascular:      Rate and Rhythm: Normal rate and regular rhythm.   Pulmonary:      Effort: Pulmonary effort is normal.      Breath sounds: Normal breath sounds.   Abdominal:      Palpations: Abdomen is soft.      Comments: Ileostomy   Musculoskeletal:      Right lower leg: No edema.      Left lower " leg: No edema.      Comments: Multiple groin dressings/tubes   Skin:     General: Skin is warm and dry.   Neurological:      Mental Status: He is alert and oriented to person, place, and time.   Psychiatric:         Mood and Affect: Mood normal.         Behavior: Behavior normal.         RESULTS   Results Review:    Results from last 7 days   Lab Units 01/28/21  0934 01/27/21  0323 01/26/21  1917  01/26/21  0501  01/25/21  0942   SODIUM mmol/L 140 142  --   --  140  --  133*   SODIUM, ARTERIAL mmol/L  --   --  141   < >  --    < >  --    POTASSIUM mmol/L 3.4* 4.2  --   --  3.5  --  3.8   CHLORIDE mmol/L 107 110*  --   --  105  --  100   CO2 mmol/L 24.0 23.0  --   --  24.0  --  15.0*   BUN mg/dL 26* 18  --   --  20  --  24*   CREATININE mg/dL 2.58* 1.73*  --   --  1.63*  --  2.22*   CALCIUM mg/dL 8.3* 8.5*  --   --  8.6  --  10.2   BILIRUBIN mg/dL  --   --   --   --   --   --  0.4   ALK PHOS U/L  --   --   --   --   --   --  99   ALT (SGPT) U/L  --   --   --   --   --   --  21   AST (SGOT) U/L  --   --   --   --   --   --  22   GLUCOSE mg/dL 114* 107*  --   --  103*  --  93   GLUCOSE, ARTERIAL mmol/L  --   --  101*   < >  --    < >  --     < > = values in this interval not displayed.       Estimated Creatinine Clearance: 18.7 mL/min (A) (by C-G formula based on SCr of 2.58 mg/dL (H)).    Results from last 7 days   Lab Units 01/25/21  0942   MAGNESIUM mg/dL 2.1   PHOSPHORUS mg/dL 5.1*             Results from last 7 days   Lab Units 01/27/21  0323 01/26/21  1255 01/26/21  0501 01/25/21  0942   WBC 10*3/mm3 14.33* 13.93* 18.02* 13.97*   HEMOGLOBIN g/dL 9.1* 10.8* 11.5* 16.6   PLATELETS 10*3/mm3 144 168 187 277       Results from last 7 days   Lab Units 01/26/21  1255   INR  1.33*        MEDICATIONS    acetaminophen, 1,000 mg, Oral, Q6H  albuterol, 2.5 mg, Nebulization, Q8H - RT  alvimopan, 12 mg, Oral, BID  apixaban, 5 mg, Oral, Q12H  clopidogrel, 75 mg, Oral, Daily  famotidine, 20 mg, Oral, BID AC  gabapentin, 300 mg,  Oral, BID  nitroglycerin, 0.5 inch, Topical, Q12H  nitroglycerin, 0.5 inch, Topical, Q12H  tamsulosin, 0.4 mg, Oral, Daily      lactated ringers, 100 mL/hr, Last Rate: 100 mL/hr (01/28/21 1203)      Medications Prior to Admission   Medication Sig Dispense Refill Last Dose   • Bacillus Coagulans-Inulin (ALIGN PREBIOTIC-PROBIOTIC PO) Take 1 tablet by mouth Every Night.   Past Week at Unknown time   • chlorthalidone (HYGROTEN) 50 MG tablet Take 50 mg by mouth Daily.   1/24/2021 at 0800   • famotidine (PEPCID) 40 MG tablet Take 40 mg by mouth Daily.   1/25/2021 at 0530   • furosemide (LASIX) 20 MG tablet Take 20 mg by mouth 2 (Two) Times a Day.   1/24/2021 at 0800   • tamsulosin (FLOMAX) 0.4 MG capsule 24 hr capsule Take 1 capsule by mouth Daily.   1/25/2021 at 0530   • vitamin B-12 (CYANOCOBALAMIN) 500 MCG tablet Take 500 mcg by mouth Daily.   1/24/2021 at 0800   • chlorhexidine (HIBICLENS) 4 % external liquid Apply  topically to the appropriate area as directed 2 (Two) Times a Day. Shower With Hibiclens Solution Twice The Day Before Surgery 236 mL 0 Unknown at Unknown time   • vitamin C (ASCORBIC ACID) 500 MG tablet Take 500 mg by mouth Daily.   More than a month   • vitamin D (ERGOCALCIFEROL) 1.25 MG (92825 UT) capsule capsule Take 50,000 Units by mouth 1 (One) Time Per Week.   More than a month at Unknown time     Assessment/Plan   ASSESSMENT / PLAN      Ileostomy in place (CMS/[facility])    Critical lower limb ischemia (CMS/[facility])    1.  YOBANY on CKD 3- baseline creatinine seems to be around 1.5, creatinine up to 2.5 today.  Creatinine was 2.2 on admission and improved to 1.6 after hydration.  Suspect initial acute kidney injury was prerenal due to volume depletion and subsequent acute kidney injury likely secondary to multiple dye exposures with CTA, thrombectomy, femorofemoral bypass.  At present we will start the patient on IV fluids with lactated Ringer's.  Maintain hemodynamics, avoid NSAIDs.check fena    2.   Hypokalemia- replace potassium    3.  Ischemic limb- failed thrombectomy and ultimately placed femorofemoral bypass    5.  History of C. difficile colitis- status post colectomy and ileostomy    6.  Anemia- hemoglobin has dropped significantly likely due to volume expansion as well as blood loss from surgical procedures, continue to monitor    Thank you for the consult, we will continue to follow this patient.         I discussed the patients findings and my recommendations with patient      This document has been electronically signed by ZACKARY Jonas on January 28, 2021 13:08 CST      For this patient encounter, I have reviewed the Nurse Practitioner's documentation, medical decision making, and treatment plan and personally spent time with the patient.             Electronically signed by Hugh Luu MD at 01/28/21 6573

## 2021-02-03 NOTE — PROGRESS NOTES
"   LOS: 8 days   Patient Care Team:  Satnam Troncoso APRN as PCP - General (Emergency Medicine)    Chief Complaint: S/P ileostomy closure    Subjective     History of Present Illness  Improved but poor diet and nausea  Subjective:  Symptoms:  Stable.  He reports weakness and anorexia.    Diet:  Poor intake.  He reports  nausea.  No vomiting.    Activity level: Returning to normal.    Pain:  He reports no pain.        History taken from: patient chart RN    Objective     Vital Signs  Temp:  [97.5 °F (36.4 °C)-97.9 °F (36.6 °C)] 97.5 °F (36.4 °C)  Heart Rate:  [71-94] 83  Resp:  [18] 18  BP: (121-140)/(66-80) 140/80    Objective:  Vital signs: (most recent): Blood pressure 140/80, pulse 83, temperature 97.5 °F (36.4 °C), temperature source Oral, resp. rate 18, height 167.6 cm (66\"), weight 61.3 kg (135 lb 3.2 oz), SpO2 95 %.  Vital signs are normal.  Fever.    Output: Producing urine and minimal stool output.    Lungs:  Normal effort and normal respiratory rate.    Heart: Normal rate.  Regular rhythm.              Results Review:     I reviewed the patient's new clinical results.    Medication Review: Done    Assessment/Plan       Ileostomy in place (CMS/HCC)    Critical lower limb ischemia (CMS/HCC)      Assessment:    Condition: In stable condition.  Improving.       Plan:   (Still minimal stool and poor appetite  Continue same- not ready for transfer).           This document has been electronically signed by Nahum Herzog MD on February 2, 2021 18:11 CST      Nahum Herzog MD  02/02/21  18:09 CST    Time: 20 mins      "

## 2021-02-03 NOTE — SIGNIFICANT NOTE
Pt states the ambulance will picking him up in 20 minutes and he does not want to be working with therapy when they get to the    02/03/21 2621   OTHER   Discipline occupational therapy assistant   Rehab Time/Intention   Session Not Performed patient/family declined treatment   hospital.

## 2021-02-04 ENCOUNTER — READMISSION MANAGEMENT (OUTPATIENT)
Dept: CALL CENTER | Facility: HOSPITAL | Age: 71
End: 2021-02-04

## 2021-02-04 NOTE — OUTREACH NOTE
Prep Survey      Responses   [facility] facility patient discharged from?  [facility]   Is LACE score < 7 ?  No   Emergency Room discharge w/ pulse ox?  No   Eligibility  Not Eligible   What are the reasons patient is not eligible?  [facility]   Does the patient have one of the following disease processes/diagnoses(primary or secondary)?  General Surgery   Prep survey completed?  Yes          Randee Kingston RN

## 2021-02-07 LAB
QT INTERVAL: 430 MS
QTC INTERVAL: 432 MS

## 2021-02-17 ENCOUNTER — LAB (OUTPATIENT)
Dept: LAB | Facility: HOSPITAL | Age: 71
End: 2021-02-17

## 2021-02-17 ENCOUNTER — OFFICE VISIT (OUTPATIENT)
Dept: SURGERY | Facility: CLINIC | Age: 71
End: 2021-02-17

## 2021-02-17 VITALS
TEMPERATURE: 98 F | BODY MASS INDEX: 15.75 KG/M2 | HEART RATE: 109 BPM | WEIGHT: 98 LBS | SYSTOLIC BLOOD PRESSURE: 130 MMHG | HEIGHT: 66 IN | DIASTOLIC BLOOD PRESSURE: 70 MMHG | OXYGEN SATURATION: 94 %

## 2021-02-17 DIAGNOSIS — D53.8 OTHER SPECIFIED NUTRITIONAL ANEMIAS: ICD-10-CM

## 2021-02-17 DIAGNOSIS — Z93.9 HISTORY OF CREATION OF OSTOMY (HCC): ICD-10-CM

## 2021-02-17 DIAGNOSIS — R11.0 NAUSEA: Primary | ICD-10-CM

## 2021-02-17 LAB
ANION GAP SERPL CALCULATED.3IONS-SCNC: 12 MMOL/L (ref 5–15)
BASOPHILS # BLD AUTO: 0.02 10*3/MM3 (ref 0–0.2)
BASOPHILS NFR BLD AUTO: 0.3 % (ref 0–1.5)
BUN SERPL-MCNC: 37 MG/DL (ref 8–23)
BUN/CREAT SERPL: 12.9 (ref 7–25)
CALCIUM SPEC-SCNC: 11.1 MG/DL (ref 8.6–10.5)
CHLORIDE SERPL-SCNC: 98 MMOL/L (ref 98–107)
CO2 SERPL-SCNC: 29 MMOL/L (ref 22–29)
CREAT SERPL-MCNC: 2.86 MG/DL (ref 0.76–1.27)
DEPRECATED RDW RBC AUTO: 45.6 FL (ref 37–54)
EOSINOPHIL # BLD AUTO: 0.04 10*3/MM3 (ref 0–0.4)
EOSINOPHIL NFR BLD AUTO: 0.6 % (ref 0.3–6.2)
ERYTHROCYTE [DISTWIDTH] IN BLOOD BY AUTOMATED COUNT: 12.9 % (ref 12.3–15.4)
GFR SERPL CREATININE-BSD FRML MDRD: 22 ML/MIN/1.73
GLUCOSE SERPL-MCNC: 124 MG/DL (ref 65–99)
HCT VFR BLD AUTO: 34.4 % (ref 37.5–51)
HGB BLD-MCNC: 11.3 G/DL (ref 13–17.7)
IMM GRANULOCYTES # BLD AUTO: 0.02 10*3/MM3 (ref 0–0.05)
IMM GRANULOCYTES NFR BLD AUTO: 0.3 % (ref 0–0.5)
LYMPHOCYTES # BLD AUTO: 1.09 10*3/MM3 (ref 0.7–3.1)
LYMPHOCYTES NFR BLD AUTO: 15.1 % (ref 19.6–45.3)
MAGNESIUM SERPL-MCNC: 2.5 MG/DL (ref 1.6–2.4)
MCH RBC QN AUTO: 32.2 PG (ref 26.6–33)
MCHC RBC AUTO-ENTMCNC: 32.8 G/DL (ref 31.5–35.7)
MCV RBC AUTO: 98 FL (ref 79–97)
MONOCYTES # BLD AUTO: 0.62 10*3/MM3 (ref 0.1–0.9)
MONOCYTES NFR BLD AUTO: 8.6 % (ref 5–12)
NEUTROPHILS NFR BLD AUTO: 5.42 10*3/MM3 (ref 1.7–7)
NEUTROPHILS NFR BLD AUTO: 75.1 % (ref 42.7–76)
NRBC BLD AUTO-RTO: 0 /100 WBC (ref 0–0.2)
PHOSPHATE SERPL-MCNC: 4.7 MG/DL (ref 2.5–4.5)
PLATELET # BLD AUTO: 389 10*3/MM3 (ref 140–450)
PMV BLD AUTO: 10.1 FL (ref 6–12)
POTASSIUM SERPL-SCNC: 4.6 MMOL/L (ref 3.5–5.2)
RBC # BLD AUTO: 3.51 10*6/MM3 (ref 4.14–5.8)
SODIUM SERPL-SCNC: 139 MMOL/L (ref 136–145)
WBC # BLD AUTO: 7.21 10*3/MM3 (ref 3.4–10.8)

## 2021-02-17 PROCEDURE — 85025 COMPLETE CBC W/AUTO DIFF WBC: CPT

## 2021-02-17 PROCEDURE — 36415 COLL VENOUS BLD VENIPUNCTURE: CPT

## 2021-02-17 PROCEDURE — 80048 BASIC METABOLIC PNL TOTAL CA: CPT

## 2021-02-17 PROCEDURE — 84100 ASSAY OF PHOSPHORUS: CPT

## 2021-02-17 PROCEDURE — 99024 POSTOP FOLLOW-UP VISIT: CPT | Performed by: SURGERY

## 2021-02-17 PROCEDURE — 83735 ASSAY OF MAGNESIUM: CPT

## 2021-02-17 RX ORDER — CHOLESTYRAMINE 4 G/9G
1 POWDER, FOR SUSPENSION ORAL
Qty: 90 PACKET | Refills: 1 | Status: SHIPPED | OUTPATIENT
Start: 2021-02-17 | End: 2021-04-18

## 2021-02-17 RX ORDER — OXYCODONE HYDROCHLORIDE 5 MG/1
10 TABLET ORAL EVERY 4 HOURS PRN
COMMUNITY
Start: 2021-02-12 | End: 2021-02-23 | Stop reason: SDUPTHER

## 2021-02-17 RX ORDER — PROMETHAZINE HYDROCHLORIDE 25 MG/1
12.5 TABLET ORAL EVERY 6 HOURS PRN
Status: SHIPPED | OUTPATIENT
Start: 2021-02-17

## 2021-02-17 NOTE — PATIENT INSTRUCTIONS
"BMI for Adults  What is BMI?  Body mass index (BMI) is a number that is calculated from a person's weight and height. BMI can help estimate how much of a person's weight is composed of fat. BMI does not measure body fat directly. Rather, it is an alternative to procedures that directly measure body fat, which can be difficult and expensive.  BMI can help identify people who may be at higher risk for certain medical problems.  What are BMI measurements used for?  BMI is used as a screening tool to identify possible weight problems. It helps determine whether a person is obese, overweight, a healthy weight, or underweight.  BMI is useful for:  · Identifying a weight problem that may be related to a medical condition or may increase the risk for medical problems.  · Promoting changes, such as changes in diet and exercise, to help reach a healthy weight. BMI screening can be repeated to see if these changes are working.  How is BMI calculated?  BMI involves measuring your weight in relation to your height. Both height and weight are measured, and the BMI is calculated from those numbers. This can be done either in English (U.S.) or metric measurements. Note that charts and online BMI calculators are available to help you find your BMI quickly and easily without having to do these calculations yourself.  To calculate your BMI in English (U.S.) measurements:    1. Measure your weight in pounds (lb).  2. Multiply the number of pounds by 703.  ? For example, for a person who weighs 180 lb, multiply that number by 703, which equals 126,540.  3. Measure your height in inches. Then multiply that number by itself to get a measurement called \"inches squared.\"  ? For example, for a person who is 70 inches tall, the \"inches squared\" measurement is 70 inches x 70 inches, which equals 4,900 inches squared.  4. Divide the total from step 2 (number of lb x 703) by the total from step 3 (inches squared): 126,540 ÷ 4,900 = 25.8. This is " "your BMI.  To calculate your BMI in metric measurements:  1. Measure your weight in kilograms (kg).  2. Measure your height in meters (m). Then multiply that number by itself to get a measurement called \"meters squared.\"  ? For example, for a person who is 1.75 m tall, the \"meters squared\" measurement is 1.75 m x 1.75 m, which is equal to 3.1 meters squared.  3. Divide the number of kilograms (your weight) by the meters squared number. In this example: 70 ÷ 3.1 = 22.6. This is your BMI.  What do the results mean?  BMI charts are used to identify whether you are underweight, normal weight, overweight, or obese. The following guidelines will be used:  · Underweight: BMI less than 18.5.  · Normal weight: BMI between 18.5 and 24.9.  · Overweight: BMI between 25 and 29.9.  · Obese: BMI of 30 or above.  Keep these notes in mind:  · Weight includes both fat and muscle, so someone with a muscular build, such as an athlete, may have a BMI that is higher than 24.9. In cases like these, BMI is not an accurate measure of body fat.  · To determine if excess body fat is the cause of a BMI of 25 or higher, further assessments may need to be done by a health care provider.  · BMI is usually interpreted in the same way for men and women.  Where to find more information  For more information about BMI, including tools to quickly calculate your BMI, go to these websites:  · Centers for Disease Control and Prevention: www.cdc.gov  · American Heart Association: www.heart.org  · National Heart, Lung, and Blood Mercer: www.nhlbi.nih.gov  Summary  · Body mass index (BMI) is a number that is calculated from a person's weight and height.  · BMI may help estimate how much of a person's weight is composed of fat. BMI can help identify those who may be at higher risk for certain medical problems.  · BMI can be measured using English measurements or metric measurements.  · BMI charts are used to identify whether you are underweight, normal " weight, overweight, or obese.  This information is not intended to replace advice given to you by your health care provider. Make sure you discuss any questions you have with your health care provider.  Document Revised: 09/09/2020 Document Reviewed: 07/17/2020  Elsevier Patient Education © 2020 Else[employer] Inc.

## 2021-02-22 ENCOUNTER — TELEPHONE (OUTPATIENT)
Dept: SURGERY | Facility: CLINIC | Age: 71
End: 2021-02-22

## 2021-02-22 NOTE — TELEPHONE ENCOUNTER
DR JOHNSON PATIENT CALLED TO ASK FOR REFILL ON PAIN MEDICATION.   HE IS USING OXYCODONE 10MG.   HE USES [facility].  HOME PHONE [phone]

## 2021-02-22 NOTE — TELEPHONE ENCOUNTER
I will not refill his pain medication as I have never seen him. He will need to see us in office or wait and see Herzog.

## 2021-02-23 DIAGNOSIS — I70.229 CRITICAL LOWER LIMB ISCHEMIA (HCC): Primary | ICD-10-CM

## 2021-02-23 RX ORDER — OXYCODONE HYDROCHLORIDE 5 MG/1
5 TABLET ORAL EVERY 4 HOURS PRN
Qty: 20 TABLET | Refills: 0 | Status: SHIPPED | OUTPATIENT
Start: 2021-02-23 | End: 2021-03-06

## 2021-02-24 ENCOUNTER — OFFICE VISIT (OUTPATIENT)
Dept: SURGERY | Facility: CLINIC | Age: 71
End: 2021-02-24

## 2021-02-24 ENCOUNTER — LAB (OUTPATIENT)
Dept: LAB | Facility: HOSPITAL | Age: 71
End: 2021-02-24

## 2021-02-24 ENCOUNTER — HOSPITAL ENCOUNTER (OUTPATIENT)
Dept: GENERAL RADIOLOGY | Facility: HOSPITAL | Age: 71
Discharge: HOME | End: 2021-02-24

## 2021-02-24 VITALS
DIASTOLIC BLOOD PRESSURE: 66 MMHG | SYSTOLIC BLOOD PRESSURE: 110 MMHG | HEIGHT: 66 IN | BODY MASS INDEX: 16.29 KG/M2 | TEMPERATURE: 97 F | WEIGHT: 101.4 LBS | HEART RATE: 84 BPM

## 2021-02-24 DIAGNOSIS — I48.11 LONGSTANDING PERSISTENT ATRIAL FIBRILLATION (HCC): ICD-10-CM

## 2021-02-24 DIAGNOSIS — N17.9 ACUTE RENAL FAILURE SUPERIMPOSED ON CHRONIC KIDNEY DISEASE, UNSPECIFIED CKD STAGE, UNSPECIFIED ACUTE RENAL FAILURE TYPE (HCC): ICD-10-CM

## 2021-02-24 DIAGNOSIS — I70.229 CRITICAL LOWER LIMB ISCHEMIA (HCC): ICD-10-CM

## 2021-02-24 DIAGNOSIS — N18.9 ACUTE RENAL FAILURE SUPERIMPOSED ON CHRONIC KIDNEY DISEASE, UNSPECIFIED CKD STAGE, UNSPECIFIED ACUTE RENAL FAILURE TYPE (HCC): Primary | ICD-10-CM

## 2021-02-24 DIAGNOSIS — N18.9 ACUTE RENAL FAILURE SUPERIMPOSED ON CHRONIC KIDNEY DISEASE, UNSPECIFIED CKD STAGE, UNSPECIFIED ACUTE RENAL FAILURE TYPE (HCC): ICD-10-CM

## 2021-02-24 DIAGNOSIS — N17.9 ACUTE RENAL FAILURE SUPERIMPOSED ON CHRONIC KIDNEY DISEASE, UNSPECIFIED CKD STAGE, UNSPECIFIED ACUTE RENAL FAILURE TYPE (HCC): Primary | ICD-10-CM

## 2021-02-24 DIAGNOSIS — N18.31 STAGE 3A CHRONIC KIDNEY DISEASE (HCC): ICD-10-CM

## 2021-02-24 DIAGNOSIS — K51.00 PANCOLITIS (HCC): ICD-10-CM

## 2021-02-24 DIAGNOSIS — F33.1 MODERATE EPISODE OF RECURRENT MAJOR DEPRESSIVE DISORDER (HCC): ICD-10-CM

## 2021-02-24 PROBLEM — I48.91 A-FIB (HCC): Status: ACTIVE | Noted: 2018-08-17

## 2021-02-24 LAB
ANION GAP SERPL CALCULATED.3IONS-SCNC: 9.8 MMOL/L (ref 5–15)
BASOPHILS # BLD AUTO: 0.02 10*3/MM3 (ref 0–0.2)
BASOPHILS NFR BLD AUTO: 0.3 % (ref 0–1.5)
BUN SERPL-MCNC: 25 MG/DL (ref 8–23)
BUN/CREAT SERPL: 11.5 (ref 7–25)
CALCIUM SPEC-SCNC: 9.7 MG/DL (ref 8.6–10.5)
CHLORIDE SERPL-SCNC: 104 MMOL/L (ref 98–107)
CO2 SERPL-SCNC: 22.2 MMOL/L (ref 22–29)
CREAT SERPL-MCNC: 2.18 MG/DL (ref 0.76–1.27)
DEPRECATED RDW RBC AUTO: 46.2 FL (ref 37–54)
EOSINOPHIL # BLD AUTO: 0.13 10*3/MM3 (ref 0–0.4)
EOSINOPHIL NFR BLD AUTO: 1.9 % (ref 0.3–6.2)
ERYTHROCYTE [DISTWIDTH] IN BLOOD BY AUTOMATED COUNT: 13.4 % (ref 12.3–15.4)
GFR SERPL CREATININE-BSD FRML MDRD: 30 ML/MIN/1.73
GLUCOSE SERPL-MCNC: 100 MG/DL (ref 65–99)
HCT VFR BLD AUTO: 32.4 % (ref 37.5–51)
HGB BLD-MCNC: 10.9 G/DL (ref 13–17.7)
IMM GRANULOCYTES # BLD AUTO: 0.03 10*3/MM3 (ref 0–0.05)
IMM GRANULOCYTES NFR BLD AUTO: 0.4 % (ref 0–0.5)
LYMPHOCYTES # BLD AUTO: 1.59 10*3/MM3 (ref 0.7–3.1)
LYMPHOCYTES NFR BLD AUTO: 23.5 % (ref 19.6–45.3)
MCH RBC QN AUTO: 32.4 PG (ref 26.6–33)
MCHC RBC AUTO-ENTMCNC: 33.6 G/DL (ref 31.5–35.7)
MCV RBC AUTO: 96.4 FL (ref 79–97)
MONOCYTES # BLD AUTO: 0.59 10*3/MM3 (ref 0.1–0.9)
MONOCYTES NFR BLD AUTO: 8.7 % (ref 5–12)
NEUTROPHILS NFR BLD AUTO: 4.4 10*3/MM3 (ref 1.7–7)
NEUTROPHILS NFR BLD AUTO: 65.2 % (ref 42.7–76)
NRBC BLD AUTO-RTO: 0 /100 WBC (ref 0–0.2)
PLATELET # BLD AUTO: 274 10*3/MM3 (ref 140–450)
PMV BLD AUTO: 10 FL (ref 6–12)
POTASSIUM SERPL-SCNC: 5.4 MMOL/L (ref 3.5–5.2)
PREALB SERPL-MCNC: 31.4 MG/DL (ref 20–40)
RBC # BLD AUTO: 3.36 10*6/MM3 (ref 4.14–5.8)
SODIUM SERPL-SCNC: 136 MMOL/L (ref 136–145)
WBC # BLD AUTO: 6.76 10*3/MM3 (ref 3.4–10.8)

## 2021-02-24 PROCEDURE — 84134 ASSAY OF PREALBUMIN: CPT

## 2021-02-24 PROCEDURE — 71046 X-RAY EXAM CHEST 2 VIEWS: CPT

## 2021-02-24 PROCEDURE — 85025 COMPLETE CBC W/AUTO DIFF WBC: CPT

## 2021-02-24 PROCEDURE — 99024 POSTOP FOLLOW-UP VISIT: CPT | Performed by: SURGERY

## 2021-02-24 PROCEDURE — 36415 COLL VENOUS BLD VENIPUNCTURE: CPT

## 2021-02-24 PROCEDURE — 80048 BASIC METABOLIC PNL TOTAL CA: CPT

## 2021-02-24 NOTE — PROGRESS NOTES
Chief Complaint   Patient presents with   • Post-op Follow-up     Ileostomy closure        HPI  This 70 year old gentleman was admitted to the hospital for consideration of ileostomy closure.  His history goes back several months at which point he developed severe pancolitis from C. difficile.  He required subtotal colectomy with ileostomy.  This C. difficile improved and he was able to consider the possibility of ileostomy closure.  His nutrition was maximized as much as possible and he underwent the above procedure.  The operation was difficult and there were extensive adhesions noted within the pelvis.  He did undergo a protecting loop ileostomy with plans to close this finally in 3 months.  On the first postoperative day he was noted to be unable to move his right leg.  It was felt to be acutely ischemic.  He had no small abdominal aortic aneurysm and was felt to be perhaps thrombosed the right common iliac artery.  Was seen by Dr. Steele and underwent femorofemoral bypass graft to restore blood flow to the right leg.  At the time of discharge, he was still walking very poorly but he was tolerating a regular diet with good bowel function.     He was discharged to be followed at the rehab facility.    Past Medical History:   Diagnosis Date   • Hyperlipidemia    • Hypertension    • Rectal abnormality     Rectum came out - er put back in.  9/18   • TIA (transient ischemic attack)        Past Surgical History:   Procedure Laterality Date   • COLON RESECTION N/A 10/26/2020    Procedure: SUBTOTAL COLECTOMY,  ILEOSTOMY, PLACEMENT OF GASTROSTOMY AND JEJEUNOSTOMY TUBES;  Surgeon: Nahum Herzog MD;  Location: [facility] OR;  Service: General;  Laterality: N/A;   • COLONOSCOPY N/A 12/10/2018    Procedure: COLONOSCOPY;  Surgeon: Jay Wilson DO;  Location: [facility] ENDOSCOPY;  Service: Gastroenterology   • COLONOSCOPY Left 10/25/2020    Procedure: COLONOSCOPY WITH ENDOSCOPIC FECAL MICROBIOTA TRANSPLANT;  Surgeon: Steve  Jay HIGGINS DO;  Location: [facility] OR;  Service: Gastroenterology;  Laterality: Left;   • COLONOSCOPY N/A 10/23/2020    Procedure: COLONOSCOPY WITH ENDOSCOPIC FECAL MICROBIOTA TRANSPLANT;  Surgeon: Jay Wilson DO;  Location: [facility] ENDOSCOPY;  Service: Gastroenterology;  Laterality: N/A;   • ILEOSTOMY CLOSURE N/A 1/25/2021    Procedure: ILEOSTOMY CLOSURE WITH LOOP ILEOSTOMY PLACEMENT;  Surgeon: Nahum Herzog MD;  Location: [facility] OR;  Service: General;  Laterality: N/A;   • KNEE MENISCAL REPAIR Left    • LEG THROMBECTOMY/EMBOLECTOMY N/A 1/26/2021    Procedure: femoral to femoral bypass, bi-femoral bypass;  Surgeon: Sukhwinder Steele MD;  Location: [facility] OR;  Service: Vascular;  Laterality: N/A;   • OTHER SURGICAL HISTORY      Loop recorder         Current Outpatient Medications:   •  apixaban (ELIQUIS) 5 MG tablet tablet, Take 1 tablet by mouth Every 12 (Twelve) Hours. Indications: Other - full anticoagulation, Disp: 60 tablet, Rfl: 1  •  famotidine (PEPCID) 40 MG tablet, Take 40 mg by mouth Daily., Disp: , Rfl:   •  ondansetron (ZOFRAN) 4 MG tablet, Take 1 tablet by mouth Every 6 (Six) Hours As Needed for Nausea or Vomiting., Disp: 20 tablet, Rfl: 0  •  potassium chloride (MICRO-K) 10 MEQ CR capsule, Take 2 capsules by mouth 2 (Two) Times a Day With Meals., Disp: 60 capsule, Rfl: 1  •  pravastatin (PRAVACHOL) 20 MG tablet, Take 1 tablet by mouth Every Night., Disp: 30 tablet, Rfl: 1  •  tamsulosin (FLOMAX) 0.4 MG capsule 24 hr capsule, Take 1 capsule by mouth Daily., Disp: , Rfl:   •  Bacillus Coagulans-Inulin (ALIGN PREBIOTIC-PROBIOTIC PO), Take 1 tablet by mouth Every Night., Disp: , Rfl:   •  chlorhexidine (HIBICLENS) 4 % external liquid, Apply  topically to the appropriate area as directed 2 (Two) Times a Day. Shower With Hibiclens Solution Twice The Day Before Surgery, Disp: 236 mL, Rfl: 0  •  chlorthalidone (HYGROTEN) 50 MG tablet, Take 50 mg by mouth Daily., Disp: , Rfl:   •  cholestyramine  (Questran) 4 g packet, Take 1 packet by mouth 3 (Three) Times a Day With Meals for 60 days., Disp: 90 packet, Rfl: 1  •  clopidogrel (PLAVIX) 75 MG tablet, Take 1 tablet by mouth Daily., Disp: 30 tablet, Rfl: 1  •  furosemide (LASIX) 20 MG tablet, Take 20 mg by mouth 2 (Two) Times a Day., Disp: , Rfl:   •  magnesium oxide (MAGOX) 400 (241.3 Mg) MG tablet tablet, Take 800 mg by mouth 2 (Two) Times a Day., Disp: , Rfl:   •  oxyCODONE (ROXICODONE) 5 MG immediate release tablet, Take 1 tablet by mouth Every 4 (Four) Hours As Needed for Moderate Pain  or Severe Pain  for up to 11 days., Disp: 20 tablet, Rfl: 0  •  vitamin B-12 (CYANOCOBALAMIN) 500 MCG tablet, Take 500 mcg by mouth Daily., Disp: , Rfl:   •  vitamin C (ASCORBIC ACID) 500 MG tablet, Take 500 mg by mouth Daily., Disp: , Rfl:   •  vitamin D (ERGOCALCIFEROL) 1.25 MG (55658 UT) capsule capsule, Take 50,000 Units by mouth 1 (One) Time Per Week., Disp: , Rfl:     Current Facility-Administered Medications:   •  promethazine (PHENERGAN) tablet 12.5 mg, 12.5 mg, Oral, Q6H PRN, Nahum Herzog MD  •  promethazine (PHENERGAN) tablet 12.5 mg, 12.5 mg, Oral, Q6H PRN, Nahum Herzog MD    Allergies   Allergen Reactions   • Morphine Mental Status Change       Family History   Problem Relation Age of Onset   • Glaucoma Mother        Social History     Socioeconomic History   • Marital status:      Spouse name: Not on file   • Number of children: Not on file   • Years of education: Not on file   • Highest education level: Not on file   Tobacco Use   • Smoking status: Former Smoker     Quit date: 2018     Years since quitting: 3.1   • Smokeless tobacco: Former User   Substance and Sexual Activity   • Alcohol use: Yes     Alcohol/week: 1.0 standard drinks     Types: 1 Cans of beer per week     Comment: rarely   • Drug use: No   • Sexual activity: Defer       Physical Exam  Abdominal:      General: Abdomen is flat. There is no distension.      Palpations: Abdomen is soft.  There is no mass.      Tenderness: There is no abdominal tenderness. There is no guarding or rebound.      Hernia: No hernia is present.       Neurological:      General: No focal deficit present.      Mental Status: He is alert.      Motor: Weakness and abnormal muscle tone present.      Gait: Gait abnormal.      Comments: Weak right leg           ASSESSMENT    Diagnoses and all orders for this visit:    1. Nausea (Primary)  -     promethazine (PHENERGAN) tablet 12.5 mg  -     promethazine (PHENERGAN) tablet 12.5 mg    2. History of creation of ostomy (CMS/HCC)  -     Basic Metabolic Panel; Future  -     Magnesium; Future  -     Phosphorus; Future    3. Other specified nutritional anemias  -     CBC & Differential; Future    Other orders  -     cholestyramine (Questran) 4 g packet; Take 1 packet by mouth 3 (Three) Times a Day With Meals for 60 days.  Dispense: 90 packet; Refill: 1        PLAN    1. Recheck in 1 week              This document has been electronically signed by Nahum Herzog MD on February 23, 2021 21:51 CST

## 2021-02-26 NOTE — PROGRESS NOTES
Chief Complaint   Patient presents with   • Follow-up     Reval Ileostomy Closure        HPI  Complicated history of an attempted ileostomy closure, loop ileostomy, and postoperative ischemia of the right leg requiring femorofemoral bypass.  He is generally doing fairly well and his appetite and bowel function has been good.  He is having increasing function of his right leg, he remains numb.  Stool has remained much less watery since last visit.  Appetite is good  Past Medical History:   Diagnosis Date   • Hyperlipidemia    • Hypertension    • Rectal abnormality     Rectum came out - er put back in.  9/18   • TIA (transient ischemic attack)        Past Surgical History:   Procedure Laterality Date   • COLON RESECTION N/A 10/26/2020    Procedure: SUBTOTAL COLECTOMY,  ILEOSTOMY, PLACEMENT OF GASTROSTOMY AND JEJEUNOSTOMY TUBES;  Surgeon: Nahum Herzog MD;  Location: [facility];  Service: General;  Laterality: N/A;   • COLONOSCOPY N/A 12/10/2018    Procedure: COLONOSCOPY;  Surgeon: Jay Wilson DO;  Location: [facility] ENDOSCOPY;  Service: Gastroenterology   • COLONOSCOPY Left 10/25/2020    Procedure: COLONOSCOPY WITH ENDOSCOPIC FECAL MICROBIOTA TRANSPLANT;  Surgeon: Jay Wilson DO;  Location: [facility] OR;  Service: Gastroenterology;  Laterality: Left;   • COLONOSCOPY N/A 10/23/2020    Procedure: COLONOSCOPY WITH ENDOSCOPIC FECAL MICROBIOTA TRANSPLANT;  Surgeon: Jay Wilson DO;  Location: [facility] ENDOSCOPY;  Service: Gastroenterology;  Laterality: N/A;   • ILEOSTOMY CLOSURE N/A 1/25/2021    Procedure: ILEOSTOMY CLOSURE WITH LOOP ILEOSTOMY PLACEMENT;  Surgeon: Nahum Herzog MD;  Location: [facility] OR;  Service: General;  Laterality: N/A;   • KNEE MENISCAL REPAIR Left    • LEG THROMBECTOMY/EMBOLECTOMY N/A 1/26/2021    Procedure: femoral to femoral bypass, bi-femoral bypass;  Surgeon: Sukhwinder Steele MD;  Location: [facility] OR;  Service: Vascular;  Laterality: N/A;   • OTHER SURGICAL HISTORY      Loop  recorder         Current Outpatient Medications:   •  apixaban (ELIQUIS) 5 MG tablet tablet, Take 1 tablet by mouth Every 12 (Twelve) Hours. Indications: Other - full anticoagulation, Disp: 60 tablet, Rfl: 1  •  cholestyramine (Questran) 4 g packet, Take 1 packet by mouth 3 (Three) Times a Day With Meals for 60 days., Disp: 90 packet, Rfl: 1  •  clopidogrel (PLAVIX) 75 MG tablet, Take 1 tablet by mouth Daily., Disp: 30 tablet, Rfl: 1  •  famotidine (PEPCID) 40 MG tablet, Take 40 mg by mouth Daily., Disp: , Rfl:   •  potassium chloride (MICRO-K) 10 MEQ CR capsule, Take 2 capsules by mouth 2 (Two) Times a Day With Meals., Disp: 60 capsule, Rfl: 1  •  pravastatin (PRAVACHOL) 20 MG tablet, Take 1 tablet by mouth Every Night., Disp: 30 tablet, Rfl: 1  •  tamsulosin (FLOMAX) 0.4 MG capsule 24 hr capsule, Take 1 capsule by mouth Daily., Disp: , Rfl:   •  Bacillus Coagulans-Inulin (ALIGN PREBIOTIC-PROBIOTIC PO), Take 1 tablet by mouth Every Night., Disp: , Rfl:   •  chlorhexidine (HIBICLENS) 4 % external liquid, Apply  topically to the appropriate area as directed 2 (Two) Times a Day. Shower With Hibiclens Solution Twice The Day Before Surgery, Disp: 236 mL, Rfl: 0  •  chlorthalidone (HYGROTEN) 50 MG tablet, Take 50 mg by mouth Daily., Disp: , Rfl:   •  furosemide (LASIX) 20 MG tablet, Take 20 mg by mouth 2 (Two) Times a Day., Disp: , Rfl:   •  magnesium oxide (MAGOX) 400 (241.3 Mg) MG tablet tablet, Take 800 mg by mouth 2 (Two) Times a Day., Disp: , Rfl:   •  ondansetron (ZOFRAN) 4 MG tablet, Take 1 tablet by mouth Every 6 (Six) Hours As Needed for Nausea or Vomiting., Disp: 20 tablet, Rfl: 0  •  oxyCODONE (ROXICODONE) 5 MG immediate release tablet, Take 1 tablet by mouth Every 4 (Four) Hours As Needed for Moderate Pain  or Severe Pain  for up to 11 days., Disp: 20 tablet, Rfl: 0  •  vitamin B-12 (CYANOCOBALAMIN) 500 MCG tablet, Take 500 mcg by mouth Daily., Disp: , Rfl:   •  vitamin C (ASCORBIC ACID) 500 MG tablet, Take  500 mg by mouth Daily., Disp: , Rfl:   •  vitamin D (ERGOCALCIFEROL) 1.25 MG (01851 UT) capsule capsule, Take 50,000 Units by mouth 1 (One) Time Per Week., Disp: , Rfl:     Current Facility-Administered Medications:   •  promethazine (PHENERGAN) tablet 12.5 mg, 12.5 mg, Oral, Q6H PRN, Nahum Herzog MD  •  promethazine (PHENERGAN) tablet 12.5 mg, 12.5 mg, Oral, Q6H PRN, Nahum Herzog MD    Allergies   Allergen Reactions   • Morphine Mental Status Change       Family History   Problem Relation Age of Onset   • Glaucoma Mother        Social History     Socioeconomic History   • Marital status:      Spouse name: Not on file   • Number of children: Not on file   • Years of education: Not on file   • Highest education level: Not on file   Tobacco Use   • Smoking status: Former Smoker     Quit date: 2018     Years since quitting: 3.1   • Smokeless tobacco: Former User   Substance and Sexual Activity   • Alcohol use: Yes     Alcohol/week: 1.0 standard drinks     Types: 1 Cans of beer per week     Comment: rarely   • Drug use: No   • Sexual activity: Defer           Physical Exam  Abdominal:      General: Abdomen is flat. Bowel sounds are normal. There is no distension.      Palpations: Abdomen is soft. There is no mass.      Tenderness: There is no abdominal tenderness. There is no guarding or rebound.      Hernia: No hernia is present.      Comments: Well-healing midline incision   Neurological:      Mental Status: He is oriented to person, place, and time.      Motor: Weakness ( Right leg) present.      Gait: Gait abnormal.   Psychiatric:         Mood and Affect: Mood normal.         Behavior: Behavior normal.           ASSESSMENT    Diagnoses and all orders for this visit:    1. Acute renal failure superimposed on chronic kidney disease, unspecified CKD stage, unspecified acute renal failure type (CMS/HCC) (Primary)  -     CBC & Differential; Future  -     Basic Metabolic Panel; Future  -     XR Chest 2 View;  Future  -     Prealbumin; Future    2. Moderate episode of recurrent major depressive disorder (CMS/HCC)    3. Pancolitis (CMS/HCC)    4. Critical lower limb ischemia (CMS/HCC)    5. Longstanding persistent atrial fibrillation (CMS/HCC)    6. Stage 3a chronic kidney disease (CMS/HCC)  -     Prealbumin; Future        PLAN    1.  Recheck in 2 weeks  2.  Laboratories reviewed              This document has been electronically signed by Nahum Herzog MD on February 25, 2021 21:22 CST

## 2021-03-04 ENCOUNTER — TELEPHONE (OUTPATIENT)
Dept: SURGERY | Facility: CLINIC | Age: 71
End: 2021-03-04

## 2021-03-04 DIAGNOSIS — M79.606 PAIN OF LOWER EXTREMITY, UNSPECIFIED LATERALITY: Primary | ICD-10-CM

## 2021-03-04 RX ORDER — GABAPENTIN 300 MG/1
300 CAPSULE ORAL 2 TIMES DAILY
Qty: 90 CAPSULE | Refills: 2 | Status: SHIPPED | OUTPATIENT
Start: 2021-03-04 | End: 2021-03-17

## 2021-03-04 NOTE — TELEPHONE ENCOUNTER
MR CHENEY CALLED STATING HIS PAIN MEDICATION IS NOT HELPING.     HE IS HAVING BURNING AND SHARP PAIN IN HIS FOOT.       PHONE [phone]

## 2021-03-09 ENCOUNTER — TELEPHONE (OUTPATIENT)
Dept: SURGERY | Facility: CLINIC | Age: 71
End: 2021-03-09

## 2021-03-09 NOTE — TELEPHONE ENCOUNTER
NEEDS REFILL ON PEPCID.   HE TAKES 20 MG 2 TIMES DAILY.  HE USES [facility]  PHONE [phone]

## 2021-03-11 RX ORDER — FAMOTIDINE 40 MG/1
20 TABLET, FILM COATED ORAL 2 TIMES DAILY
Qty: 60 TABLET | Refills: 3 | Status: SHIPPED | OUTPATIENT
Start: 2021-03-11

## 2021-03-15 ENCOUNTER — TELEPHONE (OUTPATIENT)
Dept: SURGERY | Facility: CLINIC | Age: 71
End: 2021-03-15

## 2021-03-15 DIAGNOSIS — I70.229 CRITICAL LOWER LIMB ISCHEMIA (HCC): Primary | ICD-10-CM

## 2021-03-15 NOTE — TELEPHONE ENCOUNTER
STILL HAVING PAIN FROM GROIN GOING DOWN LEG .     HE IS OUT OF HIS PAIN MEDS.      PLEASE ADVISE.      PT PH# [phone]

## 2021-03-17 ENCOUNTER — OFFICE VISIT (OUTPATIENT)
Dept: CARDIAC SURGERY | Facility: CLINIC | Age: 71
End: 2021-03-17

## 2021-03-17 VITALS
SYSTOLIC BLOOD PRESSURE: 120 MMHG | HEIGHT: 66 IN | BODY MASS INDEX: 16.37 KG/M2 | DIASTOLIC BLOOD PRESSURE: 72 MMHG | HEART RATE: 72 BPM | OXYGEN SATURATION: 99 %

## 2021-03-17 DIAGNOSIS — E78.2 MIXED HYPERLIPIDEMIA: ICD-10-CM

## 2021-03-17 DIAGNOSIS — Z48.812 SURGICAL AFTERCARE, CIRCULATORY SYSTEM: ICD-10-CM

## 2021-03-17 DIAGNOSIS — I71.40 AAA (ABDOMINAL AORTIC ANEURYSM) WITHOUT RUPTURE (HCC): ICD-10-CM

## 2021-03-17 DIAGNOSIS — I73.9 PVD (PERIPHERAL VASCULAR DISEASE) (HCC): ICD-10-CM

## 2021-03-17 DIAGNOSIS — I73.9 PVD (PERIPHERAL VASCULAR DISEASE) WITH CLAUDICATION (HCC): Primary | ICD-10-CM

## 2021-03-17 PROCEDURE — 99024 POSTOP FOLLOW-UP VISIT: CPT | Performed by: NURSE PRACTITIONER

## 2021-03-17 RX ORDER — OXYCODONE HYDROCHLORIDE AND ACETAMINOPHEN 5; 325 MG/1; MG/1
1 TABLET ORAL EVERY 6 HOURS PRN
Qty: 20 TABLET | Refills: 0 | Status: SHIPPED | OUTPATIENT
Start: 2021-03-17 | End: 2021-03-29 | Stop reason: SDUPTHER

## 2021-03-17 RX ORDER — GABAPENTIN 300 MG/1
300 CAPSULE ORAL 2 TIMES DAILY
COMMUNITY
End: 2021-03-29 | Stop reason: SDUPTHER

## 2021-03-17 NOTE — PATIENT INSTRUCTIONS
The results of your vascular studies of your right leg shows mild disease with good  circulation, in the left leg shows moderatedisease with fair circulation.      If signs and symptoms of ischemia should occur including but not limited to pale/blue discoloration of limb, increasing pain with ambulation or at rest, or a non-healing wound. Patient is to notify [facility] center for immediate evaluation.    Return 12 weeks repeat studies.     Discuss lyrica for nerve pain in left foot with Kp Troncoso

## 2021-03-19 PROBLEM — I71.40 AAA (ABDOMINAL AORTIC ANEURYSM) WITHOUT RUPTURE: Status: ACTIVE | Noted: 2021-03-19

## 2021-03-19 PROBLEM — Z48.812 SURGICAL AFTERCARE, CIRCULATORY SYSTEM: Status: ACTIVE | Noted: 2021-03-19

## 2021-03-19 PROBLEM — I73.9 PVD (PERIPHERAL VASCULAR DISEASE): Status: ACTIVE | Noted: 2021-03-19

## 2021-03-19 NOTE — PROGRESS NOTES
CVTS Office Progress Note     3/19/2021    Bar Crockett  1950    Chief Complaint:    Chief Complaint   Patient presents with   • Peripheral Vascular Disease       HPI:      PCP:  Satnam Troncoso APRN  General Surgery: Dr. Herzog    70 y.o. male with HTN(stable, increased risk stroke, rupture), Hyperlipidemia(stable, increased risk cardiovascular events), COPD(stable, increased risk pulmonary complications) and Chronic Kidney Disease(stable, increased risk renal failure)  Prior TIA 2018, loop recorder placement 2018 [facility], right leg claudication x1 year prior to admit. former smoker and quit 2018.  Prior admit 10/2020 [facility] c-difficile colitis, underwent resection. Patient admitted electively 1/25/2021 for planned colostomy reversal, low UOP during surgery, protective ileostomy placed.  Approx. midnight last night developed loss of sensation to right foot, CTA run off obtained this morning for acute thrombus R iliac artery, with likely source from the infrarenal AAA with intramural thrombus.  No prior TAMMI or vascular evaluation, AAA seen on CT abd/pelvis in 10/2020 with large amount of thrombus, R iliac vessel appears to be patent on that exam.  Acute on chronic PVD.  Underwent emergent salvage endovascular approach, unsuccessful, transitioned to open fem-fem with restoration in RLE perfusion.  Prolonged hospital stay, transitioned to rehab facility.  Now home, overall doing well.  His function of RLE has improved with foot drop brace.  He still continues to have severe neuropathy of RLE.  No other associated symptoms or modifying factors.     8/2018 TIA, [facility]   8/2018 MARCI with bubble: Negative for intracardiac thrombus/PFO     10/2020 CT Abd/Pelv w contrast: AAA infrarenal 32mm, large amount of thrombus distal aorta  1/2021 CTA Run-off:  Infrarenal AAA 32mm with thrombus, Extension into R YOEL, R EIA with complete occlusion.  Distal run-off through collateral flow, R SFA 70% soft plaque.     10/2020  SUBTOTAL COLECTOMY,  ILEOSTOMY, PLACEMENT OF GASTROSTOMY AND JEJEUNOSTOMY TUBES, (Dr. Herzog)  1/2021 ILEOSTOMY CLOSURE WITH LOOP ILEOSTOMY PLACEMENT    The following portions of the patient's history were reviewed and updated as appropriate: allergies, current medications, past family history, past medical history, past social history, past surgical history and problem list.  Recent images independently reviewed.  Available laboratory values reviewed.    PMH:  Past Medical History:   Diagnosis Date   • Hyperlipidemia    • Hypertension    • Rectal abnormality     Rectum came out - er put back in.  9/18   • TIA (transient ischemic attack)      Past Surgical History:   Procedure Laterality Date   • COLON RESECTION N/A 10/26/2020    Procedure: SUBTOTAL COLECTOMY,  ILEOSTOMY, PLACEMENT OF GASTROSTOMY AND JEJEUNOSTOMY TUBES;  Surgeon: Nahum Herzog MD;  Location: [facility];  Service: General;  Laterality: N/A;   • COLONOSCOPY N/A 12/10/2018    Procedure: COLONOSCOPY;  Surgeon: Jay Wilson DO;  Location: [facility] ENDOSCOPY;  Service: Gastroenterology   • COLONOSCOPY Left 10/25/2020    Procedure: COLONOSCOPY WITH ENDOSCOPIC FECAL MICROBIOTA TRANSPLANT;  Surgeon: Jay Wilson DO;  Location: [facility] OR;  Service: Gastroenterology;  Laterality: Left;   • COLONOSCOPY N/A 10/23/2020    Procedure: COLONOSCOPY WITH ENDOSCOPIC FECAL MICROBIOTA TRANSPLANT;  Surgeon: Jay Wilson DO;  Location: [facility] ENDOSCOPY;  Service: Gastroenterology;  Laterality: N/A;   • ILEOSTOMY CLOSURE N/A 1/25/2021    Procedure: ILEOSTOMY CLOSURE WITH LOOP ILEOSTOMY PLACEMENT;  Surgeon: Nahum Herzog MD;  Location: [facility] OR;  Service: General;  Laterality: N/A;   • KNEE MENISCAL REPAIR Left    • LEG THROMBECTOMY/EMBOLECTOMY N/A 1/26/2021    Procedure: femoral to femoral bypass, bi-femoral bypass;  Surgeon: Sukhwinder Steele MD;  Location: [facility] OR;  Service: Vascular;  Laterality: N/A;   • OTHER SURGICAL HISTORY      Loop recorder      Family History   Problem Relation Age of Onset   • Glaucoma Mother      Social History     Tobacco Use   • Smoking status: Former Smoker     Quit date: 2018     Years since quitting: 3.2   • Smokeless tobacco: Former User   Vaping Use   • Vaping Use: Never used   Substance Use Topics   • Alcohol use: Yes     Alcohol/week: 1.0 standard drinks     Types: 1 Cans of beer per week     Comment: rarely   • Drug use: No       ALLERGIES:  Allergies   Allergen Reactions   • Morphine Mental Status Change         MEDICATIONS:    Current Outpatient Medications:   •  apixaban (ELIQUIS) 5 MG tablet tablet, Take 1 tablet by mouth Every 12 (Twelve) Hours. Indications: Other - full anticoagulation, Disp: 60 tablet, Rfl: 1  •  Bacillus Coagulans-Inulin (ALIGN PREBIOTIC-PROBIOTIC PO), Take 1 tablet by mouth Every Night., Disp: , Rfl:   •  chlorhexidine (HIBICLENS) 4 % external liquid, Apply  topically to the appropriate area as directed 2 (Two) Times a Day. Shower With Hibiclens Solution Twice The Day Before Surgery, Disp: 236 mL, Rfl: 0  •  chlorthalidone (HYGROTEN) 50 MG tablet, Take 50 mg by mouth Daily., Disp: , Rfl:   •  cholestyramine (Questran) 4 g packet, Take 1 packet by mouth 3 (Three) Times a Day With Meals for 60 days., Disp: 90 packet, Rfl: 1  •  clopidogrel (PLAVIX) 75 MG tablet, Take 1 tablet by mouth Daily., Disp: 30 tablet, Rfl: 1  •  famotidine (PEPCID) 40 MG tablet, Take 0.5 tablets by mouth 2 (Two) Times a Day., Disp: 60 tablet, Rfl: 3  •  furosemide (LASIX) 20 MG tablet, Take 20 mg by mouth 2 (Two) Times a Day., Disp: , Rfl:   •  gabapentin (NEURONTIN) 300 MG capsule, Take 300 mg by mouth 2 (two) times a day., Disp: , Rfl:   •  magnesium oxide (MAGOX) 400 (241.3 Mg) MG tablet tablet, Take 800 mg by mouth 2 (Two) Times a Day., Disp: , Rfl:   •  ondansetron (ZOFRAN) 4 MG tablet, Take 1 tablet by mouth Every 6 (Six) Hours As Needed for Nausea or Vomiting., Disp: 20 tablet, Rfl: 0  •  potassium chloride (MICRO-K)  10 MEQ CR capsule, Take 2 capsules by mouth 2 (Two) Times a Day With Meals., Disp: 60 capsule, Rfl: 1  •  pravastatin (PRAVACHOL) 20 MG tablet, Take 1 tablet by mouth Every Night., Disp: 30 tablet, Rfl: 1  •  tamsulosin (FLOMAX) 0.4 MG capsule 24 hr capsule, Take 1 capsule by mouth Daily., Disp: , Rfl:   •  vitamin B-12 (CYANOCOBALAMIN) 500 MCG tablet, Take 500 mcg by mouth Daily., Disp: , Rfl:   •  vitamin C (ASCORBIC ACID) 500 MG tablet, Take 500 mg by mouth Daily., Disp: , Rfl:   •  vitamin D (ERGOCALCIFEROL) 1.25 MG (96986 UT) capsule capsule, Take 50,000 Units by mouth 1 (One) Time Per Week., Disp: , Rfl:   •  oxyCODONE-acetaminophen (Percocet) 5-325 MG per tablet, Take 1 tablet by mouth Every 6 (Six) Hours As Needed for Severe Pain ., Disp: 20 tablet, Rfl: 0    Current Facility-Administered Medications:   •  promethazine (PHENERGAN) tablet 12.5 mg, 12.5 mg, Oral, Q6H PRN, Nahum Herzog MD  •  promethazine (PHENERGAN) tablet 12.5 mg, 12.5 mg, Oral, Q6H PRN, Nahum Herzog MD      Review of Systems   Constitutional: Negative for chills, decreased appetite, fever and weight loss.   HENT: Negative for congestion, nosebleeds and sore throat.    Eyes: Negative for blurred vision, visual disturbance and visual halos.   Cardiovascular: Positive for dyspnea on exertion. Negative for chest pain and leg swelling.   Respiratory: Negative for cough, shortness of breath, sputum production and wheezing.    Endocrine: Negative for cold intolerance and polyuria.   Hematologic/Lymphatic: Negative for bleeding problem. Bruises/bleeds easily.   Skin: Positive for unusual hair distribution. Negative for flushing and nail changes.   Musculoskeletal: Positive for arthritis, back pain, joint pain and muscle weakness.   Gastrointestinal: Negative for bloating, abdominal pain, hematemesis, melena, nausea and vomiting.   Genitourinary: Negative for flank pain and hematuria.   Neurological: Positive for paresthesias (right foot). Negative  "for brief paralysis, difficulty with concentration, focal weakness, light-headedness, loss of balance, numbness and weakness.        Severe RLE neuropathy   Psychiatric/Behavioral: Negative for altered mental status, depression, substance abuse and suicidal ideas.   Allergic/Immunologic: Negative for hives and persistent infections.         Vitals:    03/17/21 1501   BP: 120/72   BP Location: Left arm   Patient Position: Sitting   Cuff Size: Adult   Pulse: 72   SpO2: 99%   Height: 167.6 cm (66\")     Physical Exam  Vitals and nursing note reviewed.   Constitutional:       Appearance: Normal appearance.   HENT:      Head: Normocephalic.      Nose: Nose normal.      Mouth/Throat:      Mouth: Mucous membranes are moist.   Eyes:      Pupils: Pupils are equal, round, and reactive to light.   Cardiovascular:      Rate and Rhythm: Normal rate.      Pulses: Normal pulses.   Pulmonary:      Effort: Pulmonary effort is normal.      Breath sounds: Normal breath sounds.   Abdominal:      General: Abdomen is flat.      Palpations: Abdomen is soft.   Musculoskeletal:         General: Tenderness (right foot) present.      Cervical back: Normal range of motion.   Skin:     General: Skin is warm and dry.      Capillary Refill: Capillary refill takes 2 to 3 seconds.      Comments: There is no evidence of skin breakdown of the bilateral lower extremities.  BLE pink, no evidence of ischemia.  Feet are absent of dependent rubor.   Neurological:      Mental Status: He is alert. Mental status is at baseline.      Comments: Severe superficial tenderness right foot   Psychiatric:         Mood and Affect: Mood normal.         Assessment & Plan     Independent Review of Studies  3/2021 TAMMI: 0.85 Right triphasic, mixed bi/tri distally.  Left 0.72 biphasic.  3/2021 Bilateral Art US: Fem-fem graft patent, mPSV 140cm/s proximal anastamosis    1. PVD (peripheral vascular disease) with claudication (CMS/HCC)  Plavix, anticoagulation, Statin.  "   Vascular studies stable, triphasic signals RLE appears well perfused.  LLE is biphasic but is asymptomatic.     May discontinue nitro paste    He continues to have severe neuropathy of RLE.  We had lengthy discussion regarding this.  Neurontin with his renal function is maximally dosed.  He could discuss pregabalin with his PCP for neuropathy.  It is uncertain at this point if his neuropathy is permanent secondary to acute limb ischemia.    We will reassess his persisting symptoms at next visit and discuss additional options at that time if need be.      - Doppler Ankle Brachial Index Single Level CAR; Future  - Duplex Lower Extremity Art / Grafts - Bilateral CAR; Future    2. Surgical aftercare, circulatory system  Clean operative site with antibacterial soap/water, pat dry. Keep open to air unless draining, then may apply dry dressing.  No ointments or creams unless prescribed by provider.    All incisions CDI, healed well      3. PVD (peripheral vascular disease) (CMS/HCC)      4. Mixed hyperlipidemia  Lipid-lowering therapy has been proven beneficial in patients with cardio-vascular disease. Current guidelines recommend statin treatment for all patients with PAD,CAD and carotid stenosis. Statins are beneficial in preventing cardiovascular events, increasing functional capacity and lower the risk of adverse limb loss in PAD. Statins decrease the progression of plaque formation and may improve peripheral vessel lining, and aid in reversing atherosclerosis.    5. AAA (abdominal aortic aneurysm) without rupture (CMS/HCC)  Detailed discussion with Bar Crockett regarding situation and options.  Aneurysm abdominal aorta.  Surgical intervention not indicated at this time.  Will plan to follow with interval imaging.  Smoking cessation, lipid management, BP control in 120 range advised.  Discussed symptoms of rupture, details of surgical repair including endovascular and open repair.  Risks, benefits discussed.   Understands and wishes to proceed with plan    Return in 6 months with US Aorta       Detailed discussion regarding risks, benefits, and treatment plan. Images independently reviewed. Patient understands, agrees, and wishes to proceed with plan.       This document has been electronically signed by RUTHIE Mendieta  On March 19, 2021 17:53 CDT      EMR Dragon/Transcription disclaimer:   Part of this note may be an electronic transcription/translation of spoken language to printed text using the Dragon Dictation System.

## 2021-03-29 ENCOUNTER — OFFICE VISIT (OUTPATIENT)
Dept: SURGERY | Facility: CLINIC | Age: 71
End: 2021-03-29

## 2021-03-29 ENCOUNTER — LAB (OUTPATIENT)
Dept: LAB | Facility: HOSPITAL | Age: 71
End: 2021-03-29

## 2021-03-29 VITALS
WEIGHT: 101 LBS | OXYGEN SATURATION: 98 % | HEIGHT: 66 IN | DIASTOLIC BLOOD PRESSURE: 90 MMHG | TEMPERATURE: 98 F | HEART RATE: 76 BPM | BODY MASS INDEX: 16.23 KG/M2 | SYSTOLIC BLOOD PRESSURE: 130 MMHG

## 2021-03-29 DIAGNOSIS — I70.229 CRITICAL LOWER LIMB ISCHEMIA (HCC): ICD-10-CM

## 2021-03-29 DIAGNOSIS — I70.229 CRITICAL LOWER LIMB ISCHEMIA (HCC): Primary | ICD-10-CM

## 2021-03-29 LAB
ANION GAP SERPL CALCULATED.3IONS-SCNC: 10.3 MMOL/L (ref 5–15)
BASOPHILS # BLD AUTO: 0.01 10*3/MM3 (ref 0–0.2)
BASOPHILS NFR BLD AUTO: 0.2 % (ref 0–1.5)
BUN SERPL-MCNC: 20 MG/DL (ref 8–23)
BUN/CREAT SERPL: 13.4 (ref 7–25)
CALCIUM SPEC-SCNC: 9.1 MG/DL (ref 8.6–10.5)
CHLORIDE SERPL-SCNC: 107 MMOL/L (ref 98–107)
CO2 SERPL-SCNC: 21.7 MMOL/L (ref 22–29)
CREAT SERPL-MCNC: 1.49 MG/DL (ref 0.76–1.27)
DEPRECATED RDW RBC AUTO: 44.8 FL (ref 37–54)
EOSINOPHIL # BLD AUTO: 0.14 10*3/MM3 (ref 0–0.4)
EOSINOPHIL NFR BLD AUTO: 2.5 % (ref 0.3–6.2)
ERYTHROCYTE [DISTWIDTH] IN BLOOD BY AUTOMATED COUNT: 13.1 % (ref 12.3–15.4)
GFR SERPL CREATININE-BSD FRML MDRD: 46 ML/MIN/1.73
GLUCOSE SERPL-MCNC: 92 MG/DL (ref 65–99)
HCT VFR BLD AUTO: 37.6 % (ref 37.5–51)
HGB BLD-MCNC: 12.7 G/DL (ref 13–17.7)
IMM GRANULOCYTES # BLD AUTO: 0.01 10*3/MM3 (ref 0–0.05)
IMM GRANULOCYTES NFR BLD AUTO: 0.2 % (ref 0–0.5)
LYMPHOCYTES # BLD AUTO: 1.02 10*3/MM3 (ref 0.7–3.1)
LYMPHOCYTES NFR BLD AUTO: 18.3 % (ref 19.6–45.3)
MAGNESIUM SERPL-MCNC: 1.8 MG/DL (ref 1.6–2.4)
MCH RBC QN AUTO: 31.9 PG (ref 26.6–33)
MCHC RBC AUTO-ENTMCNC: 33.8 G/DL (ref 31.5–35.7)
MCV RBC AUTO: 94.5 FL (ref 79–97)
MONOCYTES # BLD AUTO: 0.33 10*3/MM3 (ref 0.1–0.9)
MONOCYTES NFR BLD AUTO: 5.9 % (ref 5–12)
NEUTROPHILS NFR BLD AUTO: 4.05 10*3/MM3 (ref 1.7–7)
NEUTROPHILS NFR BLD AUTO: 72.9 % (ref 42.7–76)
NRBC BLD AUTO-RTO: 0 /100 WBC (ref 0–0.2)
PHOSPHATE SERPL-MCNC: 4.3 MG/DL (ref 2.5–4.5)
PLATELET # BLD AUTO: 271 10*3/MM3 (ref 140–450)
PMV BLD AUTO: 10.9 FL (ref 6–12)
POTASSIUM SERPL-SCNC: 4.1 MMOL/L (ref 3.5–5.2)
RBC # BLD AUTO: 3.98 10*6/MM3 (ref 4.14–5.8)
SODIUM SERPL-SCNC: 139 MMOL/L (ref 136–145)
WBC # BLD AUTO: 5.56 10*3/MM3 (ref 3.4–10.8)

## 2021-03-29 PROCEDURE — 83735 ASSAY OF MAGNESIUM: CPT

## 2021-03-29 PROCEDURE — 85025 COMPLETE CBC W/AUTO DIFF WBC: CPT

## 2021-03-29 PROCEDURE — 80048 BASIC METABOLIC PNL TOTAL CA: CPT

## 2021-03-29 PROCEDURE — 84100 ASSAY OF PHOSPHORUS: CPT

## 2021-03-29 PROCEDURE — 99024 POSTOP FOLLOW-UP VISIT: CPT | Performed by: SURGERY

## 2021-03-29 PROCEDURE — 36415 COLL VENOUS BLD VENIPUNCTURE: CPT

## 2021-03-29 RX ORDER — OXYCODONE HYDROCHLORIDE AND ACETAMINOPHEN 5; 325 MG/1; MG/1
1 TABLET ORAL EVERY 6 HOURS PRN
Qty: 20 TABLET | Refills: 0 | Status: SHIPPED | OUTPATIENT
Start: 2021-03-29

## 2021-03-29 RX ORDER — GABAPENTIN 300 MG/1
300 CAPSULE ORAL 3 TIMES DAILY
Qty: 270 CAPSULE | Refills: 3 | Status: SHIPPED | OUTPATIENT
Start: 2021-03-29 | End: 2021-03-29 | Stop reason: SDUPTHER

## 2021-03-29 RX ORDER — GABAPENTIN 300 MG/1
300 CAPSULE ORAL 3 TIMES DAILY
Qty: 270 CAPSULE | Refills: 3 | Status: SHIPPED | OUTPATIENT
Start: 2021-03-29 | End: 2021-06-07 | Stop reason: SDUPTHER

## 2021-03-29 RX ORDER — LOPERAMIDE HYDROCHLORIDE 2 MG/1
2 CAPSULE ORAL 3 TIMES DAILY PRN
Status: SHIPPED | OUTPATIENT
Start: 2021-03-29

## 2021-03-29 NOTE — PATIENT INSTRUCTIONS
"BMI for Adults  What is BMI?  Body mass index (BMI) is a number that is calculated from a person's weight and height. BMI can help estimate how much of a person's weight is composed of fat. BMI does not measure body fat directly. Rather, it is an alternative to procedures that directly measure body fat, which can be difficult and expensive.  BMI can help identify people who may be at higher risk for certain medical problems.  What are BMI measurements used for?  BMI is used as a screening tool to identify possible weight problems. It helps determine whether a person is obese, overweight, a healthy weight, or underweight.  BMI is useful for:  · Identifying a weight problem that may be related to a medical condition or may increase the risk for medical problems.  · Promoting changes, such as changes in diet and exercise, to help reach a healthy weight. BMI screening can be repeated to see if these changes are working.  How is BMI calculated?  BMI involves measuring your weight in relation to your height. Both height and weight are measured, and the BMI is calculated from those numbers. This can be done either in English (U.S[place] or metric measurements. Note that charts and online BMI calculators are available to help you find your BMI quickly and easily without having to do these calculations yourself.  To calculate your BMI in English (U.S[place] measurements:    1. Measure your weight in pounds (lb).  2. Multiply the number of pounds by 703.  ? For example, for a person who weighs 180 lb, multiply that number by 703, which equals 126,540.  3. Measure your height in inches. Then multiply that number by itself to get a measurement called \"inches squared.\"  ? For example, for a person who is 70 inches tall, the \"inches squared\" measurement is 70 inches x 70 inches, which equals 4,900 inches squared.  4. Divide the total from step 2 (number of lb x 703) by the total from step 3 (inches squared): 126,540 ÷ 4,900 = 25.8. This is " "your BMI.  To calculate your BMI in metric measurements:  1. Measure your weight in kilograms (kg).  2. Measure your height in meters (m). Then multiply that number by itself to get a measurement called \"meters squared.\"  ? For example, for a person who is 1.75 m tall, the \"meters squared\" measurement is 1.75 m x 1.75 m, which is equal to 3.1 meters squared.  3. Divide the number of kilograms (your weight) by the meters squared number. In this example: 70 ÷ 3.1 = 22.6. This is your BMI.  What do the results mean?  BMI charts are used to identify whether you are underweight, normal weight, overweight, or obese. The following guidelines will be used:  · Underweight: BMI less than 18.5.  · Normal weight: BMI between 18.5 and 24.9.  · Overweight: BMI between 25 and 29.9.  · Obese: BMI of 30 or above.  Keep these notes in mind:  · Weight includes both fat and muscle, so someone with a muscular build, such as an athlete, may have a BMI that is higher than 24.9. In cases like these, BMI is not an accurate measure of body fat.  · To determine if excess body fat is the cause of a BMI of 25 or higher, further assessments may need to be done by a health care provider.  · BMI is usually interpreted in the same way for men and women.  Where to find more information  For more information about BMI, including tools to quickly calculate your BMI, go to these websites:  · Centers for Disease Control and Prevention: www.cdc.gov  · American Heart Association: www.heart.org  · National Heart, Lung, and Blood [place]: www.nhlbi.nih.gov  Summary  · Body mass index (BMI) is a number that is calculated from a person's weight and height.  · BMI may help estimate how much of a person's weight is composed of fat. BMI can help identify those who may be at higher risk for certain medical problems.  · BMI can be measured using English measurements or metric measurements.  · BMI charts are used to identify whether you are underweight, normal " weight, overweight, or obese.  This information is not intended to replace advice given to you by your health care provider. Make sure you discuss any questions you have with your health care provider.  Document Revised: 09/09/2020 Document Reviewed: 07/17/2020  Elsevier Patient Education © 2021 [employer]

## 2021-03-30 NOTE — PROGRESS NOTES
Chief Complaint   Patient presents with   • Follow-up        HPI  70-year-old man continuing left leg pain following femorofemoral bypass.  He has improved movement in his right leg since last visit.  He is still nonambulatory.  He has been able to maintain his ileostomy reasonably.  Past Medical History:   Diagnosis Date   • Hyperlipidemia    • Hypertension    • Rectal abnormality     Rectum came out - er put back in.  9/18   • TIA (transient ischemic attack)        Past Surgical History:   Procedure Laterality Date   • COLON RESECTION N/A 10/26/2020    Procedure: SUBTOTAL COLECTOMY,  ILEOSTOMY, PLACEMENT OF GASTROSTOMY AND JEJEUNOSTOMY TUBES;  Surgeon: Nahum Herzog MD;  Location: [facility] OR;  Service: General;  Laterality: N/A;   • COLONOSCOPY N/A 12/10/2018    Procedure: COLONOSCOPY;  Surgeon: Jay Wilson DO;  Location: [facility] ENDOSCOPY;  Service: Gastroenterology   • COLONOSCOPY Left 10/25/2020    Procedure: COLONOSCOPY WITH ENDOSCOPIC FECAL MICROBIOTA TRANSPLANT;  Surgeon: Jay Wilson DO;  Location: [facility] OR;  Service: Gastroenterology;  Laterality: Left;   • COLONOSCOPY N/A 10/23/2020    Procedure: COLONOSCOPY WITH ENDOSCOPIC FECAL MICROBIOTA TRANSPLANT;  Surgeon: Jay Wilson DO;  Location: [facility] ENDOSCOPY;  Service: Gastroenterology;  Laterality: N/A;   • ILEOSTOMY CLOSURE N/A 1/25/2021    Procedure: ILEOSTOMY CLOSURE WITH LOOP ILEOSTOMY PLACEMENT;  Surgeon: Nahum Herzog MD;  Location: [facility] OR;  Service: General;  Laterality: N/A;   • KNEE MENISCAL REPAIR Left    • LEG THROMBECTOMY/EMBOLECTOMY N/A 1/26/2021    Procedure: femoral to femoral bypass, bi-femoral bypass;  Surgeon: Sukhwinder Steele MD;  Location: [facility];  Service: Vascular;  Laterality: N/A;   • OTHER SURGICAL HISTORY      Loop recorder         Current Outpatient Medications:   •  apixaban (ELIQUIS) 5 MG tablet tablet, Take 1 tablet by mouth Every 12 (Twelve) Hours. Indications: Other - full anticoagulation,  Disp: 60 tablet, Rfl: 1  •  Bacillus Coagulans-Inulin (ALIGN PREBIOTIC-PROBIOTIC PO), Take 1 tablet by mouth Every Night., Disp: , Rfl:   •  chlorthalidone (HYGROTEN) 50 MG tablet, Take 50 mg by mouth Daily., Disp: , Rfl:   •  cholestyramine (Questran) 4 g packet, Take 1 packet by mouth 3 (Three) Times a Day With Meals for 60 days., Disp: 90 packet, Rfl: 1  •  clopidogrel (PLAVIX) 75 MG tablet, Take 1 tablet by mouth Daily., Disp: 30 tablet, Rfl: 1  •  famotidine (PEPCID) 40 MG tablet, Take 0.5 tablets by mouth 2 (Two) Times a Day., Disp: 60 tablet, Rfl: 3  •  furosemide (LASIX) 20 MG tablet, Take 20 mg by mouth 2 (Two) Times a Day., Disp: , Rfl:   •  gabapentin (NEURONTIN) 300 MG capsule, Take 1 capsule by mouth 3 (Three) Times a Day., Disp: 270 capsule, Rfl: 3  •  magnesium oxide (MAGOX) 400 (241.3 Mg) MG tablet tablet, Take 800 mg by mouth 2 (Two) Times a Day., Disp: , Rfl:   •  ondansetron (ZOFRAN) 4 MG tablet, Take 1 tablet by mouth Every 6 (Six) Hours As Needed for Nausea or Vomiting., Disp: 20 tablet, Rfl: 0  •  oxyCODONE-acetaminophen (Percocet) 5-325 MG per tablet, Take 1 tablet by mouth Every 6 (Six) Hours As Needed for Severe Pain ., Disp: 20 tablet, Rfl: 0  •  potassium chloride (MICRO-K) 10 MEQ CR capsule, Take 2 capsules by mouth 2 (Two) Times a Day With Meals., Disp: 60 capsule, Rfl: 1  •  pravastatin (PRAVACHOL) 20 MG tablet, Take 1 tablet by mouth Every Night., Disp: 30 tablet, Rfl: 1  •  tamsulosin (FLOMAX) 0.4 MG capsule 24 hr capsule, Take 1 capsule by mouth Daily., Disp: , Rfl:   •  vitamin B-12 (CYANOCOBALAMIN) 500 MCG tablet, Take 500 mcg by mouth Daily., Disp: , Rfl:   •  vitamin C (ASCORBIC ACID) 500 MG tablet, Take 500 mg by mouth Daily., Disp: , Rfl:   •  vitamin D (ERGOCALCIFEROL) 1.25 MG (15956 UT) capsule capsule, Take 50,000 Units by mouth 1 (One) Time Per Week., Disp: , Rfl:   •  chlorhexidine (HIBICLENS) 4 % external liquid, Apply  topically to the appropriate area as directed 2  (Two) Times a Day. Shower With Hibiclens Solution Twice The Day Before Surgery, Disp: 236 mL, Rfl: 0    Current Facility-Administered Medications:   •  loperamide (IMODIUM) capsule 2 mg, 2 mg, Oral, TID PRN, Nahum Herzog MD  •  promethazine (PHENERGAN) tablet 12.5 mg, 12.5 mg, Oral, Q6H PRN, Nahum Herzog MD  •  promethazine (PHENERGAN) tablet 12.5 mg, 12.5 mg, Oral, Q6H PRN, Nahum Herzog MD    Allergies   Allergen Reactions   • Morphine Mental Status Change       Family History   Problem Relation Age of Onset   • Glaucoma Mother        Social History     Socioeconomic History   • Marital status:      Spouse name: Not on file   • Number of children: Not on file   • Years of education: Not on file   • Highest education level: Not on file   Tobacco Use   • Smoking status: Former Smoker     Quit date: 2018     Years since quitting: 3.2   • Smokeless tobacco: Former User   Vaping Use   • Vaping Use: Never used   Substance and Sexual Activity   • Alcohol use: Yes     Alcohol/week: 1.0 standard drinks     Types: 1 Cans of beer per week     Comment: rarely   • Drug use: No   • Sexual activity: Defer         Physical Exam  Abdominal:      General: Abdomen is flat. There is no distension.      Palpations: Abdomen is soft. There is no mass.      Tenderness: There is no abdominal tenderness. There is no guarding or rebound.      Hernia: No hernia is present.               ASSESSMENT    Diagnoses and all orders for this visit:    1. Critical lower limb ischemia (CMS/HCC) (Primary)  -     Discontinue: gabapentin (NEURONTIN) 300 MG capsule; Take 1 capsule by mouth 3 (Three) Times a Day.  Dispense: 270 capsule; Refill: 3  -     Ambulatory Referral to Neurology  -     oxyCODONE-acetaminophen (Percocet) 5-325 MG per tablet; Take 1 tablet by mouth Every 6 (Six) Hours As Needed for Severe Pain .  Dispense: 20 tablet; Refill: 0  -     loperamide (IMODIUM) capsule 2 mg  -     CBC & Differential; Future  -     Basic Metabolic  Panel; Future  -     Magnesium; Future  -     Phosphorus; Future  -     gabapentin (NEURONTIN) 300 MG capsule; Take 1 capsule by mouth 3 (Three) Times a Day.  Dispense: 270 capsule; Refill: 3        PLAN    1.  Recheck in 1 month              This document has been electronically signed by Nahum Herzog MD on March 30, 2021 16:31 CDT

## 2021-04-13 ENCOUNTER — TELEPHONE (OUTPATIENT)
Dept: SURGERY | Facility: CLINIC | Age: 71
End: 2021-04-13

## 2021-04-14 ENCOUNTER — TELEPHONE (OUTPATIENT)
Dept: SURGERY | Facility: CLINIC | Age: 71
End: 2021-04-14

## 2021-04-14 RX ORDER — PRAVASTATIN SODIUM 20 MG
20 TABLET ORAL NIGHTLY
Qty: 60 TABLET | Refills: 6 | Status: SHIPPED | OUTPATIENT
Start: 2021-04-14

## 2021-04-14 RX ORDER — CLOPIDOGREL BISULFATE 75 MG/1
75 TABLET ORAL DAILY
Qty: 60 TABLET | Refills: 6 | Status: SHIPPED | OUTPATIENT
Start: 2021-04-14

## 2021-04-14 RX ORDER — TAMSULOSIN HYDROCHLORIDE 0.4 MG/1
1 CAPSULE ORAL DAILY
Qty: 60 CAPSULE | Refills: 6 | Status: SHIPPED | OUTPATIENT
Start: 2021-04-14

## 2021-04-24 ENCOUNTER — HOSPITAL ENCOUNTER (EMERGENCY)
Facility: HOSPITAL | Age: 71
Discharge: HOME | End: 2021-04-24
Attending: EMERGENCY MEDICINE | Admitting: EMERGENCY MEDICINE

## 2021-04-24 VITALS
HEIGHT: 67 IN | OXYGEN SATURATION: 97 % | TEMPERATURE: 97.5 F | HEART RATE: 64 BPM | BODY MASS INDEX: 17.11 KG/M2 | WEIGHT: 109 LBS | SYSTOLIC BLOOD PRESSURE: 163 MMHG | DIASTOLIC BLOOD PRESSURE: 77 MMHG | RESPIRATION RATE: 22 BRPM

## 2021-04-24 DIAGNOSIS — N39.0 ACUTE UTI: ICD-10-CM

## 2021-04-24 DIAGNOSIS — Z43.2 ILEOSTOMY BAG CHANGED (HCC): Primary | ICD-10-CM

## 2021-04-24 LAB
BACTERIA UR QL AUTO: ABNORMAL /HPF
BILIRUB UR QL STRIP: NEGATIVE
CLARITY UR: CLEAR
COLOR UR: YELLOW
GLUCOSE UR STRIP-MCNC: NEGATIVE MG/DL
HGB UR QL STRIP.AUTO: NEGATIVE
HYALINE CASTS UR QL AUTO: ABNORMAL /LPF
KETONES UR QL STRIP: NEGATIVE
LEUKOCYTE ESTERASE UR QL STRIP.AUTO: ABNORMAL
NITRITE UR QL STRIP: NEGATIVE
PH UR STRIP.AUTO: <=5 [PH] (ref 5–9)
PROT UR QL STRIP: NEGATIVE
RBC # UR: ABNORMAL /HPF
REF LAB TEST METHOD: ABNORMAL
SP GR UR STRIP: 1.01 (ref 1–1.03)
SQUAMOUS #/AREA URNS HPF: ABNORMAL /HPF
UROBILINOGEN UR QL STRIP: ABNORMAL
WBC UR QL AUTO: ABNORMAL /HPF

## 2021-04-24 PROCEDURE — 99283 EMERGENCY DEPT VISIT LOW MDM: CPT

## 2021-04-24 PROCEDURE — 81001 URINALYSIS AUTO W/SCOPE: CPT

## 2021-04-24 RX ORDER — CEPHALEXIN 500 MG/1
500 CAPSULE ORAL ONCE
Status: DISCONTINUED | OUTPATIENT
Start: 2021-04-24 | End: 2021-04-25 | Stop reason: HOSPADM

## 2021-04-24 RX ORDER — CEPHALEXIN 500 MG/1
500 CAPSULE ORAL 3 TIMES DAILY
Qty: 15 CAPSULE | Refills: 0 | Status: SHIPPED | OUTPATIENT
Start: 2021-04-24 | End: 2021-05-17

## 2021-04-24 RX ORDER — OXYCODONE AND ACETAMINOPHEN 7.5; 325 MG/1; MG/1
1 TABLET ORAL ONCE
Status: COMPLETED | OUTPATIENT
Start: 2021-04-24 | End: 2021-04-24

## 2021-04-24 RX ADMIN — OXYCODONE HYDROCHLORIDE AND ACETAMINOPHEN 1 TABLET: 7.5; 325 TABLET ORAL at 21:36

## 2021-04-25 NOTE — ED NOTES
Patient arrives via EMS from home for reports of skin breakdown around ostomy site. Patient's daughter says this has been ongoing for approximately four hours. Patient's daughter says stoma will not stop seeping long enough to get ostomy bag applied.      Alicia Cross, RN  04/24/21 4900

## 2021-04-25 NOTE — ED PROVIDER NOTES
Subjective   71 years old male with history of loop ileostomy presented in the ER with inability to apply his ostomy bag in place because of bowel movement which got seepage underneath the bag.  Daughter reports for the last 4-hour he is having continuously small bowel movement and unable to apply bag.  Because of stool contents he is having skin burning sensation around ileostomy site moderate to severe.  No abdominal pain otherwise.  No fever or chills.  No ileoscopy diarrhea.      History provided by:  Patient and relative      Review of Systems   Constitutional: Negative for chills and fatigue.   HENT: Negative for congestion, postnasal drip, sneezing and tinnitus.    Respiratory: Negative for chest tightness and shortness of breath.    Cardiovascular: Negative for chest pain and palpitations.   Gastrointestinal: Negative for diarrhea and nausea.   Genitourinary: Negative for flank pain.   Skin: Positive for rash.   Neurological: Negative for headaches.   Psychiatric/Behavioral: Negative for agitation.       Past Medical History:   Diagnosis Date   • Hyperlipidemia    • Hypertension    • Rectal abnormality     Rectum came out - er put back in.  9/18   • TIA (transient ischemic attack)        Allergies   Allergen Reactions   • Morphine Mental Status Change       Past Surgical History:   Procedure Laterality Date   • COLON RESECTION N/A 10/26/2020    Procedure: SUBTOTAL COLECTOMY,  ILEOSTOMY, PLACEMENT OF GASTROSTOMY AND JEJEUNOSTOMY TUBES;  Surgeon: Nahum Herzog MD;  Location: [facility];  Service: General;  Laterality: N/A;   • COLONOSCOPY N/A 12/10/2018    Procedure: COLONOSCOPY;  Surgeon: Jay Wilson DO;  Location: [facility] ENDOSCOPY;  Service: Gastroenterology   • COLONOSCOPY Left 10/25/2020    Procedure: COLONOSCOPY WITH ENDOSCOPIC FECAL MICROBIOTA TRANSPLANT;  Surgeon: Jay Wilson DO;  Location: [facility] OR;  Service: Gastroenterology;  Laterality: Left;   • COLONOSCOPY N/A 10/23/2020     Procedure: COLONOSCOPY WITH ENDOSCOPIC FECAL MICROBIOTA TRANSPLANT;  Surgeon: Jay Wilson DO;  Location: [facility] ENDOSCOPY;  Service: Gastroenterology;  Laterality: N/A;   • ILEOSTOMY CLOSURE N/A 1/25/2021    Procedure: ILEOSTOMY CLOSURE WITH LOOP ILEOSTOMY PLACEMENT;  Surgeon: Nahum Herzog MD;  Location: [facility] OR;  Service: General;  Laterality: N/A;   • KNEE MENISCAL REPAIR Left    • LEG THROMBECTOMY/EMBOLECTOMY N/A 1/26/2021    Procedure: femoral to femoral bypass, bi-femoral bypass;  Surgeon: Sukhwinder Steele MD;  Location: [facility] OR;  Service: Vascular;  Laterality: N/A;   • OTHER SURGICAL HISTORY      Loop recorder       Family History   Problem Relation Age of Onset   • Glaucoma Mother        Social History     Socioeconomic History   • Marital status:      Spouse name: Not on file   • Number of children: Not on file   • Years of education: Not on file   • Highest education level: Not on file   Tobacco Use   • Smoking status: Former Smoker     Quit date: 2018     Years since quitting: 3.3   • Smokeless tobacco: Former User   Vaping Use   • Vaping Use: Never used   Substance and Sexual Activity   • Alcohol use: Yes     Alcohol/week: 1.0 standard drinks     Types: 1 Cans of beer per week     Comment: rarely   • Drug use: No   • Sexual activity: Defer           Objective   Physical Exam  Vitals and nursing note reviewed.   Constitutional:       Appearance: Normal appearance.   HENT:      Head: Normocephalic.      Right Ear: External ear normal.      Left Ear: External ear normal.      Nose: Nose normal.      Mouth/Throat:      Mouth: Mucous membranes are moist.   Eyes:      Extraocular Movements: Extraocular movements intact.   Cardiovascular:      Rate and Rhythm: Normal rate and regular rhythm.   Pulmonary:      Effort: Pulmonary effort is normal.      Breath sounds: Normal breath sounds.   Abdominal:      General: Abdomen is flat. Bowel sounds are normal.      Palpations: Abdomen is  soft.       Musculoskeletal:      Cervical back: Normal range of motion.   Skin:     General: Skin is warm.      Capillary Refill: Capillary refill takes less than 2 seconds.   Neurological:      General: No focal deficit present.      Mental Status: He is alert.   Psychiatric:         Mood and Affect: Mood normal.         Procedures           ED Course                                           MDM  Number of Diagnoses or Management Options  Diagnosis management comments: Ileostomy area is cleaned.  No excoriation of skin noticed around ileostomy stoma.  Patient does have UTI and started on Keflex.  Bag is well applied.  Recommended outpatient follow-up with general surgery for reevaluation as scheduled.       Amount and/or Complexity of Data Reviewed  Clinical lab tests: ordered and reviewed        Final diagnoses:   Ileostomy bag changed (CMS/[facility])   Acute UTI       ED Disposition  ED Disposition     ED Disposition Condition Comment    Discharge Stable           Satnam Troncoso APRN  [address] [place] [address]   [facility] [address] [phone]    Call in 1 day  for re evaluation, even if feeling better    Nahum Herzog MD  [address] [place] [address] [address]  [address]  [place] [id number] [phone]    Go to   As scheduled         Medication List      New Prescriptions    cephalexin 500 MG capsule  Commonly known as: KEFLEX  Take 1 capsule by mouth 3 (Three) Times a Day.           Where to Get Your Medications      These medications were sent to [facility]/pharmacy #[id number] - [place], [place] - [address] [place] [address] - [phone]  - [phone] [address] [address]    Phone: [phone]   · cephalexin 500 MG capsule          Iván Logan MD  04/25/21 8391

## 2021-04-25 NOTE — ED NOTES
4W called for ostomy supplies and skin preps to use on patient.      Alicia Cross, RN  04/24/21 2110

## 2021-04-25 NOTE — ED NOTES
Patient's ostomy site cleansed with skin barrier wipes. Area allowed to dry completely. No-sting skin prep applied and allowed to dry. Mastisol applied to skin. Ostomy bag placed over stoma. Edges of ostomy wafer secured with additional mastisol. Skin barrier wipes sent with patient's daughter to use during ostomy bag changes at home.     Alicia Cross RN  04/24/21 3014

## 2021-05-03 ENCOUNTER — OFFICE VISIT (OUTPATIENT)
Dept: SURGERY | Facility: CLINIC | Age: 71
End: 2021-05-03

## 2021-05-03 ENCOUNTER — LAB (OUTPATIENT)
Dept: LAB | Facility: HOSPITAL | Age: 71
End: 2021-05-03

## 2021-05-03 VITALS
SYSTOLIC BLOOD PRESSURE: 132 MMHG | OXYGEN SATURATION: 99 % | HEIGHT: 67 IN | HEART RATE: 60 BPM | DIASTOLIC BLOOD PRESSURE: 86 MMHG | TEMPERATURE: 97 F | WEIGHT: 115.4 LBS | BODY MASS INDEX: 18.11 KG/M2

## 2021-05-03 DIAGNOSIS — N39.0 URINARY TRACT INFECTION WITHOUT HEMATURIA, SITE UNSPECIFIED: Primary | ICD-10-CM

## 2021-05-03 DIAGNOSIS — N39.0 URINARY TRACT INFECTION WITHOUT HEMATURIA, SITE UNSPECIFIED: ICD-10-CM

## 2021-05-03 LAB
BACTERIA UR QL AUTO: ABNORMAL /HPF
BILIRUB UR QL STRIP: NEGATIVE
CLARITY UR: CLEAR
COLOR UR: YELLOW
GLUCOSE UR STRIP-MCNC: NEGATIVE MG/DL
GRAN CASTS URNS QL MICRO: ABNORMAL /LPF
HGB UR QL STRIP.AUTO: NEGATIVE
HYALINE CASTS UR QL AUTO: ABNORMAL /LPF
KETONES UR QL STRIP: NEGATIVE
LEUKOCYTE ESTERASE UR QL STRIP.AUTO: ABNORMAL
NITRITE UR QL STRIP: NEGATIVE
PH UR STRIP.AUTO: 5.5 [PH] (ref 5–8)
PROT UR QL STRIP: ABNORMAL
RBC # UR: ABNORMAL /HPF
REF LAB TEST METHOD: ABNORMAL
SP GR UR STRIP: 1.02 (ref 1–1.03)
SQUAMOUS #/AREA URNS HPF: ABNORMAL /HPF
UROBILINOGEN UR QL STRIP: ABNORMAL
WBC UR QL AUTO: ABNORMAL /HPF

## 2021-05-03 PROCEDURE — 81001 URINALYSIS AUTO W/SCOPE: CPT

## 2021-05-03 PROCEDURE — 99212 OFFICE O/P EST SF 10 MIN: CPT | Performed by: SURGERY

## 2021-05-03 NOTE — PATIENT INSTRUCTIONS
MyPlate from USDA    MyPlate is an outline of a general healthy diet based on the 2010 Dietary Guidelines for Americans, from the U.S. Department of Agriculture (USDA). It sets guidelines for how much food you should eat from each food group based on your age, sex, and level of physical activity.  What are tips for following MyPlate?  To follow MyPlate recommendations:  · Eat a wide variety of fruits and vegetables, grains, and protein foods.  · Serve smaller portions and eat less food throughout the day.  · Limit portion sizes to avoid overeating.  · Enjoy your food.  · Get at least 150 minutes of exercise every week. This is about 30 minutes each day, 5 or more days per week.  It can be difficult to have every meal look like MyPlate. Think about MyPlate as eating guidelines for an entire day, rather than each individual meal.  Fruits and vegetables  · Make half of your plate fruits and vegetables.  · Eat many different colors of fruits and vegetables each day.  · For a 2,000 calorie daily food plan, eat:  ? 2½ cups of vegetables every day.  ? 2 cups of fruit every day.  · 1 cup is equal to:  ? 1 cup raw or cooked vegetables.  ? 1 cup raw fruit.  ? 1 medium-sized orange, apple, or banana.  ? 1 cup 100% fruit or vegetable juice.  ? 2 cups raw leafy greens, such as lettuce, spinach, or kale.  ? ½ cup dried fruit.  Grains  · One fourth of your plate should be grains.  · Make at least half of the grains you eat each day whole grains.  · For a 2,000 calorie daily food plan, eat 6 oz of grains every day.  · 1 oz is equal to:  ? 1 slice bread.  ? 1 cup cereal.  ? ½ cup cooked rice, cereal, or pasta.  Protein  · One fourth of your plate should be protein.  · Eat a wide variety of protein foods, including meat, poultry, fish, eggs, beans, nuts, and tofu.  · For a 2,000 calorie daily food plan, eat 5½ oz of protein every day.  · 1 oz is equal to:  ? 1 oz meat, poultry, or fish.  ? ¼ cup cooked beans.  ? 1 egg.  ? ½ oz nuts  or seeds.  ? 1 Tbsp peanut butter.  Dairy  · Drink fat-free or low-fat (1%) milk.  · Eat or drink dairy as a side to meals.  · For a 2,000 calorie daily food plan, eat or drink 3 cups of dairy every day.  · 1 cup is equal to:  ? 1 cup milk, yogurt, cottage cheese, or soy milk (soy beverage).  ? 2 oz processed cheese.  ? 1½ oz natural cheese.  Fats, oils, salt, and sugars  · Only small amounts of oils are recommended.  · Avoid foods that are high in calories and low in nutritional value (empty calories), like foods high in fat or added sugars.  · Choose foods that are low in salt (sodium). Choose foods that have less than 140 milligrams (mg) of sodium per serving.  · Drink water instead of sugary drinks. Drink enough water each day to keep your urine pale yellow.  Where to find support  · Work with your health care provider or a nutrition specialist (dietitian) to develop a customized eating plan that is right for you.  · Download an paramjit (mobile application) to help you track your daily food intake.  Where to find more information  · Go to ChooseMyPlate.gov for more information.  Summary  · MyPlate is a general guideline for healthy eating from the USDA. It is based on the 2010 Dietary Guidelines for Americans.  · In general, fruits and vegetables should take up ½ of your plate, grains should take up ¼ of your plate, and protein should take up ¼ of your plate.  This information is not intended to replace advice given to you by your health care provider. Make sure you discuss any questions you have with your health care provider.  Document Revised: 05/21/2020 Document Reviewed: 03/19/2018  Elsevier Patient Education © 2021 Elsevier Inc.

## 2021-05-04 RX ORDER — CIPROFLOXACIN 500 MG/1
500 TABLET, FILM COATED ORAL 2 TIMES DAILY
Qty: 14 TABLET | Refills: 0 | Status: SHIPPED | OUTPATIENT
Start: 2021-05-04 | End: 2021-05-17

## 2021-05-05 NOTE — PROGRESS NOTES
Chief Complaint   Patient presents with   • Follow-up              HPI  71-year-old man with complex abdominal history that is included subtotal colectomy for C. difficile as well as attempted closure, loop ileostomy, postoperative embolization of the right leg from an abdominal aortic aneurysm requiring femorofemoral bypass.  He initially had no function in his left leg but since the last visit has been able to walk short distances.  The ostomy remains in place and functional with the back having be changed only once a week.  Additionally, he has urinary tract symptoms suggestive of a urinary tract infection.      Past Medical History:   Diagnosis Date   • Hyperlipidemia    • Hypertension    • Rectal abnormality     Rectum came out - er put back in.  9/18   • TIA (transient ischemic attack)        Past Surgical History:   Procedure Laterality Date   • COLON RESECTION N/A 10/26/2020    Procedure: SUBTOTAL COLECTOMY,  ILEOSTOMY, PLACEMENT OF GASTROSTOMY AND JEJEUNOSTOMY TUBES;  Surgeon: Nahum Herzog MD;  Location: [facility] OR;  Service: General;  Laterality: N/A;   • COLONOSCOPY N/A 12/10/2018    Procedure: COLONOSCOPY;  Surgeon: Jay Wilson DO;  Location: [facility] ENDOSCOPY;  Service: Gastroenterology   • COLONOSCOPY Left 10/25/2020    Procedure: COLONOSCOPY WITH ENDOSCOPIC FECAL MICROBIOTA TRANSPLANT;  Surgeon: Jay Wilson DO;  Location: [facility] OR;  Service: Gastroenterology;  Laterality: Left;   • COLONOSCOPY N/A 10/23/2020    Procedure: COLONOSCOPY WITH ENDOSCOPIC FECAL MICROBIOTA TRANSPLANT;  Surgeon: Jay Wilson DO;  Location: [facility] ENDOSCOPY;  Service: Gastroenterology;  Laterality: N/A;   • ILEOSTOMY CLOSURE N/A 1/25/2021    Procedure: ILEOSTOMY CLOSURE WITH LOOP ILEOSTOMY PLACEMENT;  Surgeon: Nahum Herzog MD;  Location: [facility] OR;  Service: General;  Laterality: N/A;   • KNEE MENISCAL REPAIR Left    • LEG THROMBECTOMY/EMBOLECTOMY N/A 1/26/2021    Procedure: femoral to femoral bypass,  bi-femoral bypass;  Surgeon: Sukhwinder Steele MD;  Location: [facility];  Service: Vascular;  Laterality: N/A;   • OTHER SURGICAL HISTORY      Loop recorder         Current Outpatient Medications:   •  apixaban (ELIQUIS) 5 MG tablet tablet, Take 1 tablet by mouth Every 12 (Twelve) Hours. Indications: Other - full anticoagulation, Disp: 60 tablet, Rfl: 1  •  Bacillus Coagulans-Inulin (ALIGN PREBIOTIC-PROBIOTIC PO), Take 1 tablet by mouth Every Night., Disp: , Rfl:   •  cephalexin (KEFLEX) 500 MG capsule, Take 1 capsule by mouth 3 (Three) Times a Day., Disp: 15 capsule, Rfl: 0  •  chlorhexidine (HIBICLENS) 4 % external liquid, Apply  topically to the appropriate area as directed 2 (Two) Times a Day. Shower With Hibiclens Solution Twice The Day Before Surgery, Disp: 236 mL, Rfl: 0  •  chlorthalidone (HYGROTEN) 50 MG tablet, Take 50 mg by mouth Daily., Disp: , Rfl:   •  clopidogrel (PLAVIX) 75 MG tablet, Take 1 tablet by mouth Daily., Disp: 60 tablet, Rfl: 6  •  famotidine (PEPCID) 40 MG tablet, Take 0.5 tablets by mouth 2 (Two) Times a Day., Disp: 60 tablet, Rfl: 3  •  furosemide (LASIX) 20 MG tablet, Take 20 mg by mouth 2 (Two) Times a Day., Disp: , Rfl:   •  gabapentin (NEURONTIN) 300 MG capsule, Take 1 capsule by mouth 3 (Three) Times a Day., Disp: 270 capsule, Rfl: 3  •  magnesium oxide (MAGOX) 400 (241.3 Mg) MG tablet tablet, Take 800 mg by mouth 2 (Two) Times a Day., Disp: , Rfl:   •  ondansetron (ZOFRAN) 4 MG tablet, Take 1 tablet by mouth Every 6 (Six) Hours As Needed for Nausea or Vomiting., Disp: 20 tablet, Rfl: 0  •  oxyCODONE-acetaminophen (Percocet) 5-325 MG per tablet, Take 1 tablet by mouth Every 6 (Six) Hours As Needed for Severe Pain ., Disp: 20 tablet, Rfl: 0  •  potassium chloride (MICRO-K) 10 MEQ CR capsule, Take 2 capsules by mouth 2 (Two) Times a Day With Meals., Disp: 60 capsule, Rfl: 1  •  pravastatin (PRAVACHOL) 20 MG tablet, Take 1 tablet by mouth Every Night., Disp: 60 tablet, Rfl: 6  •   tamsulosin (FLOMAX) 0.4 MG capsule 24 hr capsule, Take 1 capsule by mouth Daily., Disp: 60 capsule, Rfl: 6  •  vitamin B-12 (CYANOCOBALAMIN) 500 MCG tablet, Take 500 mcg by mouth Daily., Disp: , Rfl:   •  vitamin C (ASCORBIC ACID) 500 MG tablet, Take 500 mg by mouth Daily., Disp: , Rfl:   •  vitamin D (ERGOCALCIFEROL) 1.25 MG (68992 UT) capsule capsule, Take 50,000 Units by mouth 1 (One) Time Per Week., Disp: , Rfl:   •  cholestyramine (Questran) 4 g packet, Take 1 packet by mouth 3 (Three) Times a Day With Meals for 60 days., Disp: 90 packet, Rfl: 1  •  ciprofloxacin (CIPRO) 500 MG tablet, Take 1 tablet by mouth 2 (Two) Times a Day., Disp: 14 tablet, Rfl: 0    Current Facility-Administered Medications:   •  loperamide (IMODIUM) capsule 2 mg, 2 mg, Oral, TID PRN, Nahum Herzog MD  •  promethazine (PHENERGAN) tablet 12.5 mg, 12.5 mg, Oral, Q6H PRN, Nahum Herzog MD  •  promethazine (PHENERGAN) tablet 12.5 mg, 12.5 mg, Oral, Q6H PRN, Nahum Herzog MD    Allergies   Allergen Reactions   • Morphine Mental Status Change       Family History   Problem Relation Age of Onset   • Glaucoma Mother        Social History     Socioeconomic History   • Marital status:      Spouse name: Not on file   • Number of children: Not on file   • Years of education: Not on file   • Highest education level: Not on file   Tobacco Use   • Smoking status: Former Smoker     Quit date: 2018     Years since quitting: 3.3   • Smokeless tobacco: Former User   Vaping Use   • Vaping Use: Never used   Substance and Sexual Activity   • Alcohol use: Yes     Alcohol/week: 1.0 standard drinks     Types: 1 Cans of beer per week     Comment: rarely   • Drug use: No   • Sexual activity: Defer           Physical Exam  Cardiovascular:      Rate and Rhythm: Normal rate and regular rhythm.   Pulmonary:      Effort: Pulmonary effort is normal. No respiratory distress.   Abdominal:      General: Abdomen is flat. Bowel sounds are normal. There is no  distension.      Palpations: Abdomen is soft. There is no mass.      Tenderness: There is no abdominal tenderness. There is no guarding or rebound.      Hernia: No hernia is present.               ASSESSMENT    Diagnoses and all orders for this visit:    1. Urinary tract infection without hematuria, site unspecified (Primary)  -     Urinalysis With Microscopic - Urine, Clean Catch; Future    Other orders  -     ciprofloxacin (CIPRO) 500 MG tablet; Take 1 tablet by mouth 2 (Two) Times a Day.  Dispense: 14 tablet; Refill: 0        PLAN    1.  Recheck with me in 1 month  2.  Laboratories ordered for today              This document has been electronically signed by Nahum Herzog MD on May 4, 2021 19:59 CDT      Addendum;    05/03/21 2012     Urinalysis, Microscopic Only - Urine, Clean Catch   Collected: 05/03/21 1315  Final result  Specimen: Urine, Clean Catch    RBC, UA None Seen /HPF Hyaline Casts, UA None Seen /LPF   WBC, UA Too Numerous to CountAbnormal  /HPF Granular Casts, UA 3-6 /LPF   Bacteria, UA TraceAbnormal  /HPF Methodology: Manual Light Microscopy   Squamous Epithelial Cells, UA 0-2 /HPF            05/03/21 1909     Urinalysis without microscopic (no culture) - Urine, Clean Catch   Collected: 05/03/21 1315  Final result  Specimen: Urine, Clean Catch    Color, UA Yellow Bilirubin, UA Negative   Appearance, UA Clear Blood, UA Negative   pH, UA 5.5 Protein, UA 30 mg/dL (1+)Abnormal    Specific Gravity, UA 1.020 Leukocytes, UA Moderate (2+)Abnormal    Glucose Negative Nitrite, UA Negative   Ketones, UA Negative Urobilinogen, UA 0.2 E.U./dL          (important suggestion)  Results (2) from 4/24/2021 ED can be found in Results Review      03/29/21 1927  Basic Metabolic Panel   Collected: 03/29/21 1446  Final result  Specimen: Blood    Glucose 92 mg/dL CO2 21.7Low  mmol/L   BUN 20 mg/dL Calcium 9.1 mg/dL   Creatinine 1.49High  mg/dL eGFR Non  Am 46Low  mL/min/1.73   Sodium 139 mmol/L BUN/Creatinine  Ratio 13.4   Potassium 4.1 mmol/L Anion Gap 10.3 mmol/L   Chloride 107 mmol/L            03/29/21 1927  Magnesium   Collected: 03/29/21 1446  Final result  Specimen: Blood    Magnesium 1.8 mg/dL            03/29/21 1927  Phosphorus   Collected: 03/29/21 1446  Final result  Specimen: Blood    Phosphorus 4.3 mg/dL            03/29/21 1856  CBC & Differential   Collected: 03/29/21 1446  Final result  Specimen: Blood           03/29/21 1856  CBC Auto Differential   Collected: 03/29/21 1446  Final result  Specimen: Blood    WBC 5.56 10*3/mm3 Lymphocyte Rel % 18.3Low  %   RBC 3.98Low  10*6/mm3 Monocyte Rel % 5.9 %   Hemoglobin 12.7Low  g/dL Eosinophil Rel % 2.5 %   Hematocrit 37.6 % Basophil Rel % 0.2 %   MCV 94.5 fL Immature Granulocyte Rel % 0.2 %   MCH 31.9 pg Neutrophils Absolute 4.05 10*3/mm3   MCHC 33.8 g/dL Lymphocytes Absolute 1.02 10*3/mm3   RDW 13.1 % Monocytes Absolute 0.33 10*3/mm3   RDW-SD 44.8 fl Eosinophils Absolute 0.14 10*3/mm3   MPV 10.9 fL Basophils Absolute 0.01 10*3/mm3   Platelets 271 10*3/mm3 Immature Grans, Absolute 0.01 10*3/mm3   Neutrophil Rel % 72.9 % nRBC 0.0 /100 WBC        Patient will be informed with respect to the laboratory results and treatment as required

## 2021-05-16 NOTE — PROGRESS NOTES
Chief complaint: Status post total colectomy and reversal with protecting ileostomy.  Postoperative ischemia of the right leg secondary to aneurysmal disease       HPI  Considerable improvement since last visit.  He is maintaining his ostomy bag several days now and has increased mobility of his right leg.  His pain is also considerably better.  He is having no diarrhea.  Past Medical History:   Diagnosis Date   • Hyperlipidemia    • Hypertension    • Rectal abnormality     Rectum came out - er put back in.  9/18   • TIA (transient ischemic attack)    • UTI (urinary tract infection)        Past Surgical History:   Procedure Laterality Date   • COLON RESECTION N/A 10/26/2020    Procedure: SUBTOTAL COLECTOMY,  ILEOSTOMY, PLACEMENT OF GASTROSTOMY AND JEJEUNOSTOMY TUBES;  Surgeon: Nahum Herzog MD;  Location: [facility] OR;  Service: General;  Laterality: N/A;   • COLONOSCOPY N/A 12/10/2018    Procedure: COLONOSCOPY;  Surgeon: Jay Wilson DO;  Location: [facility] ENDOSCOPY;  Service: Gastroenterology   • COLONOSCOPY Left 10/25/2020    Procedure: COLONOSCOPY WITH ENDOSCOPIC FECAL MICROBIOTA TRANSPLANT;  Surgeon: Jay Wilson DO;  Location: [facility] OR;  Service: Gastroenterology;  Laterality: Left;   • COLONOSCOPY N/A 10/23/2020    Procedure: COLONOSCOPY WITH ENDOSCOPIC FECAL MICROBIOTA TRANSPLANT;  Surgeon: Jay Wilson DO;  Location: [facility] ENDOSCOPY;  Service: Gastroenterology;  Laterality: N/A;   • ILEOSTOMY CLOSURE N/A 1/25/2021    Procedure: ILEOSTOMY CLOSURE WITH LOOP ILEOSTOMY PLACEMENT;  Surgeon: Nahum eHrzog MD;  Location: [facility] OR;  Service: General;  Laterality: N/A;   • KNEE MENISCAL REPAIR Left    • LEG THROMBECTOMY/EMBOLECTOMY N/A 1/26/2021    Procedure: femoral to femoral bypass, bi-femoral bypass;  Surgeon: Sukhwinder Steele MD;  Location: [facility];  Service: Vascular;  Laterality: N/A;   • OTHER SURGICAL HISTORY      Loop recorder         Current Outpatient Medications:   •  apixaban  (ELIQUIS) 5 MG tablet tablet, Take 1 tablet by mouth Every 12 (Twelve) Hours. Indications: Other - full anticoagulation, Disp: 60 tablet, Rfl: 1  •  Bacillus Coagulans-Inulin (ALIGN PREBIOTIC-PROBIOTIC PO), Take 1 tablet by mouth Every Night., Disp: , Rfl:   •  chlorthalidone (HYGROTEN) 50 MG tablet, Take 50 mg by mouth Daily., Disp: , Rfl:   •  clopidogrel (PLAVIX) 75 MG tablet, Take 1 tablet by mouth Daily., Disp: 60 tablet, Rfl: 6  •  famotidine (PEPCID) 40 MG tablet, Take 0.5 tablets by mouth 2 (Two) Times a Day., Disp: 60 tablet, Rfl: 3  •  furosemide (LASIX) 20 MG tablet, Take 20 mg by mouth 2 (Two) Times a Day., Disp: , Rfl:   •  gabapentin (NEURONTIN) 300 MG capsule, Take 1 capsule by mouth 3 (Three) Times a Day., Disp: 270 capsule, Rfl: 3  •  ondansetron (ZOFRAN) 4 MG tablet, Take 1 tablet by mouth Every 6 (Six) Hours As Needed for Nausea or Vomiting., Disp: 20 tablet, Rfl: 0  •  oxyCODONE-acetaminophen (Percocet) 5-325 MG per tablet, Take 1 tablet by mouth Every 6 (Six) Hours As Needed for Severe Pain ., Disp: 20 tablet, Rfl: 0  •  pravastatin (PRAVACHOL) 20 MG tablet, Take 1 tablet by mouth Every Night., Disp: 60 tablet, Rfl: 6  •  tamsulosin (FLOMAX) 0.4 MG capsule 24 hr capsule, Take 1 capsule by mouth Daily., Disp: 60 capsule, Rfl: 6  •  vitamin B-12 (CYANOCOBALAMIN) 500 MCG tablet, Take 500 mcg by mouth Daily., Disp: , Rfl:   •  vitamin C (ASCORBIC ACID) 500 MG tablet, Take 500 mg by mouth Daily. 05/17/2021 - Patient states he only utilizes Vitamin C during Winter months., Disp: , Rfl:   •  vitamin D (ERGOCALCIFEROL) 1.25 MG (34143 UT) capsule capsule, Take 50,000 Units by mouth 1 (One) Time Per Week., Disp: , Rfl:   •  cholestyramine (Questran) 4 g packet, Take 1 packet by mouth 3 (Three) Times a Day With Meals for 60 days., Disp: 90 packet, Rfl: 1  •  magnesium oxide (MAGOX) 400 (241.3 Mg) MG tablet tablet, Take 800 mg by mouth 2 (Two) Times a Day., Disp: , Rfl:   •  potassium chloride (MICRO-K) 10  "MEQ CR capsule, Take 2 capsules by mouth 2 (Two) Times a Day With Meals., Disp: 60 capsule, Rfl: 1    Current Facility-Administered Medications:   •  loperamide (IMODIUM) capsule 2 mg, 2 mg, Oral, TID PRN, Nahum Herzog MD  •  promethazine (PHENERGAN) tablet 12.5 mg, 12.5 mg, Oral, Q6H PRN, Nahum Herzog MD  •  promethazine (PHENERGAN) tablet 12.5 mg, 12.5 mg, Oral, Q6H PRN, Nahum Herzog MD    Allergies   Allergen Reactions   • Morphine Mental Status Change       Family History   Problem Relation Age of Onset   • Glaucoma Mother        Social History     Socioeconomic History   • Marital status:      Spouse name: Not on file   • Number of children: Not on file   • Years of education: Not on file   • Highest education level: Not on file   Tobacco Use   • Smoking status: Former Smoker     Years: 62.00     Types: Cigarettes     Quit date: 2018     Years since quitting: 3.3   • Smokeless tobacco: Former User     Types: Chew, Snuff   • Tobacco comment: 05/17/2021 - Patient states he utilized less than 1 pack of Cigarettes and began utilization of Cigarettes at the age of 5.   Vaping Use   • Vaping Use: Former   • Substances: Patient can not recall if Electronic Cigarette contained Nicotine, THS, CBS, or Flavoring.   • Devices: Disposable   Substance and Sexual Activity   • Alcohol use: Yes     Comment: 05/17/2021 - Patient states he consumes alcoholic beverages \"rarely\".     • Drug use: No   • Sexual activity: Defer     Comment: .       Review of Systems   Gastrointestinal: Negative for abdominal distention, abdominal pain, anal bleeding, blood in stool, constipation, diarrhea, nausea, rectal pain and vomiting.       Physical Exam  Abdominal:      General: Abdomen is flat. Bowel sounds are normal. There is no distension.      Palpations: Abdomen is soft. There is no mass.      Tenderness: There is no abdominal tenderness. There is no guarding or rebound.      Hernia: No hernia is present.       "       Neurological:      Motor: Weakness ( Weakness is noted in the right leg but it is considerably improved since last visit) present.           ASSESSMENT    Diagnoses and all orders for this visit:    1. Ileostomy in place (CMS/[facility]) (Primary)        PLAN    1.  Recheck in 1 month              This document has been electronically signed by Nahum Herzog MD on May 18, 2021 22:26 CDT

## 2021-05-17 ENCOUNTER — OFFICE VISIT (OUTPATIENT)
Dept: SURGERY | Facility: CLINIC | Age: 71
End: 2021-05-17

## 2021-05-17 VITALS
BODY MASS INDEX: 19.16 KG/M2 | WEIGHT: 119.2 LBS | HEART RATE: 63 BPM | SYSTOLIC BLOOD PRESSURE: 137 MMHG | TEMPERATURE: 97.8 F | DIASTOLIC BLOOD PRESSURE: 81 MMHG | HEIGHT: 66 IN

## 2021-05-17 DIAGNOSIS — Z93.2 ILEOSTOMY IN PLACE (HCC): Primary | ICD-10-CM

## 2021-05-17 PROCEDURE — 99024 POSTOP FOLLOW-UP VISIT: CPT | Performed by: SURGERY

## 2021-06-07 ENCOUNTER — TELEPHONE (OUTPATIENT)
Dept: SURGERY | Facility: CLINIC | Age: 71
End: 2021-06-07

## 2021-06-07 DIAGNOSIS — I70.229 CRITICAL LOWER LIMB ISCHEMIA (HCC): ICD-10-CM

## 2021-06-07 RX ORDER — GABAPENTIN 300 MG/1
300 CAPSULE ORAL 3 TIMES DAILY
Qty: 270 CAPSULE | Refills: 3 | Status: SHIPPED | OUTPATIENT
Start: 2021-06-07 | End: 2022-06-14 | Stop reason: SDUPTHER

## 2021-06-07 NOTE — TELEPHONE ENCOUNTER
NEEDS REFILL   gabapentin (NEURONTIN) 300 MG capsule [[id number]]     Order Details  Dose: 300 mg Route: Oral Frequency: 3 Times Daily   Dispense Quantity: 270 capsule Refills: 3          Sig: Take 1 capsule by mouth 3 (Three) Times a Day.         Start Date: 03/29/21 End Date: --   Written Date: 03/29/21 Expiration Date: 09/25/21       Diagnosis Association: Critical lower limb ischemia (CMS/HCC) (I70.229)   Original Order:  gabapentin (NEURONTIN) 300 MG capsule [[id number]]     Pharmacy:   [address] [phone] [facility] [phone]

## 2021-06-25 ENCOUNTER — LAB (OUTPATIENT)
Dept: LAB | Facility: HOSPITAL | Age: 71
End: 2021-06-25

## 2021-06-25 ENCOUNTER — OFFICE VISIT (OUTPATIENT)
Dept: CARDIAC SURGERY | Facility: CLINIC | Age: 71
End: 2021-06-25

## 2021-06-25 VITALS
HEIGHT: 66 IN | OXYGEN SATURATION: 100 % | WEIGHT: 119 LBS | HEART RATE: 61 BPM | SYSTOLIC BLOOD PRESSURE: 138 MMHG | BODY MASS INDEX: 19.13 KG/M2 | DIASTOLIC BLOOD PRESSURE: 76 MMHG

## 2021-06-25 DIAGNOSIS — I73.9 PVD (PERIPHERAL VASCULAR DISEASE) WITH CLAUDICATION (HCC): ICD-10-CM

## 2021-06-25 DIAGNOSIS — E78.2 MIXED HYPERLIPIDEMIA: Primary | ICD-10-CM

## 2021-06-25 DIAGNOSIS — I70.213 ATHEROSCLER OF NATIVE ARTERY OF BOTH LEGS WITH INTERMIT CLAUDICATION (HCC): ICD-10-CM

## 2021-06-25 LAB
ANION GAP SERPL CALCULATED.3IONS-SCNC: 6 MMOL/L (ref 5–15)
BUN SERPL-MCNC: 24 MG/DL (ref 8–23)
BUN/CREAT SERPL: 12.6 (ref 7–25)
CALCIUM SPEC-SCNC: 9.7 MG/DL (ref 8.6–10.5)
CHLORIDE SERPL-SCNC: 106 MMOL/L (ref 98–107)
CO2 SERPL-SCNC: 23 MMOL/L (ref 22–29)
CREAT SERPL-MCNC: 1.9 MG/DL (ref 0.76–1.27)
GFR SERPL CREATININE-BSD FRML MDRD: 35 ML/MIN/1.73
GLUCOSE SERPL-MCNC: 86 MG/DL (ref 65–99)
POTASSIUM SERPL-SCNC: 4.3 MMOL/L (ref 3.5–5.2)
SODIUM SERPL-SCNC: 135 MMOL/L (ref 136–145)

## 2021-06-25 PROCEDURE — 80048 BASIC METABOLIC PNL TOTAL CA: CPT

## 2021-06-25 PROCEDURE — 36415 COLL VENOUS BLD VENIPUNCTURE: CPT

## 2021-06-25 PROCEDURE — 99214 OFFICE O/P EST MOD 30 MIN: CPT | Performed by: NURSE PRACTITIONER

## 2021-06-25 NOTE — PATIENT INSTRUCTIONS
The results of your vascular studies of your right leg shows moderate disease with fair  circulation, in the left leg shows moderatedisease with fair circulation.      If signs and symptoms of ischemia should occur including but not limited to pale/blue discoloration of limb, increasing pain with ambulation or at rest, or a non-healing wound. Patient is to notify [facility] and Vascular center for immediate evaluation.    Worsening circulation in comparison to last films.  Will need CTA run off to determine options to improve blood flow to graft and lower extremities.

## 2021-06-28 ENCOUNTER — PREP FOR SURGERY (OUTPATIENT)
Dept: OTHER | Facility: HOSPITAL | Age: 71
End: 2021-06-28

## 2021-06-28 ENCOUNTER — OFFICE VISIT (OUTPATIENT)
Dept: SURGERY | Facility: CLINIC | Age: 71
End: 2021-06-28

## 2021-06-28 VITALS
WEIGHT: 122 LBS | OXYGEN SATURATION: 96 % | BODY MASS INDEX: 19.61 KG/M2 | SYSTOLIC BLOOD PRESSURE: 168 MMHG | TEMPERATURE: 98 F | HEIGHT: 66 IN | HEART RATE: 57 BPM | DIASTOLIC BLOOD PRESSURE: 100 MMHG

## 2021-06-28 DIAGNOSIS — Z93.2 ILEOSTOMY IN PLACE (HCC): Primary | ICD-10-CM

## 2021-06-28 DIAGNOSIS — I73.9 PVD (PERIPHERAL VASCULAR DISEASE) WITH CLAUDICATION (HCC): Primary | ICD-10-CM

## 2021-06-28 PROCEDURE — 99212 OFFICE O/P EST SF 10 MIN: CPT | Performed by: SURGERY

## 2021-06-28 RX ORDER — SODIUM CHLORIDE 9 MG/ML
200 INJECTION, SOLUTION INTRAVENOUS ONCE
Status: CANCELLED | OUTPATIENT
Start: 2021-06-28 | End: 2021-06-28

## 2021-06-28 NOTE — PROGRESS NOTES
CVTS Office Progress Note     6/28/2021    Bar Crockett  1950    Chief Complaint:    Chief Complaint   Patient presents with   • Peripheral Vascular Disease       HPI:      PCP:  Satnam Troncoso APRN  General Surgery: Dr. Herzog    70 y.o. male with HTN(stable, increased risk stroke, rupture), Hyperlipidemia(stable, increased risk cardiovascular events), COPD(stable, increased risk pulmonary complications) and Chronic Kidney Disease(stable, increased risk renal failure)  Prior TIA 2018, loop recorder placement 2018 [facility], right leg claudication x1 year prior to admit. former smoker and quit 2018.  Prior admit 10/2020 [facility] c-difficile colitis, underwent resection. Patient admitted electively 1/25/2021 for planned colostomy reversal, low UOP during surgery, protective ileostomy placed.  Approx. midnight last night developed loss of sensation to right foot, CTA run off obtained this morning for acute thrombus R iliac artery, with likely source from the infrarenal AAA with intramural thrombus.  No prior TAMMI or vascular evaluation, AAA seen on CT abd/pelvis in 10/2020 with large amount of thrombus, R iliac vessel appears to be patent on that exam.  Acute on chronic PVD.  Underwent emergent salvage endovascular approach, unsuccessful, transitioned to open fem-fem with restoration in RLE perfusion.  Prolonged hospital stay, transitioned to rehab facility.  Now home, overall doing well.  His function of RLE has improved with foot drop brace.  Recent worsening claudication.  No other associated symptoms or modifying factors.     8/2018 TIA, [facility]   8/2018 MARCI with bubble: Negative for intracardiac thrombus/PFO     10/2020 CT Abd/Pelv w contrast: AAA infrarenal 32mm, large amount of thrombus distal aorta  1/2021 CTA Run-off:  Infrarenal AAA 32mm with thrombus, Extension into R YOEL, R EIA with complete occlusion.  Distal run-off through collateral flow, R SFA 70% soft plaque.  1/2021 Emergent salvage fem-fem  bypass  3/2021 TAMMI: 0.85 Right triphasic, mixed bi/tri distally.  Left 0.72 biphasic.  3/2021 Bilateral Art US: Fem-fem graft patent, mPSV 140cm/s proximal anastamosis  6/2021 TAMMI: Right 0.63 biphasic.  Left 0.58 biphasic pop, monophasic distally.    6/2021 Bilateral Art US: Fem fem graft patent, prox anastamosis 312cm/s, turbulent flow distal L CFA 360cm/s     10/2020 SUBTOTAL COLECTOMY,  ILEOSTOMY, PLACEMENT OF GASTROSTOMY AND JEJEUNOSTOMY TUBES, (Dr. Herzog)  1/2021 ILEOSTOMY CLOSURE WITH LOOP ILEOSTOMY PLACEMENT    The following portions of the patient's history were reviewed and updated as appropriate: allergies, current medications, past family history, past medical history, past social history, past surgical history and problem list.  Recent images independently reviewed.  Available laboratory values reviewed.    PMH:  Past Medical History:   Diagnosis Date   • Hyperlipidemia    • Hypertension    • Rectal abnormality     Rectum came out - er put back in.  9/18   • TIA (transient ischemic attack)    • UTI (urinary tract infection)      Past Surgical History:   Procedure Laterality Date   • COLON RESECTION N/A 10/26/2020    Procedure: SUBTOTAL COLECTOMY,  ILEOSTOMY, PLACEMENT OF GASTROSTOMY AND JEJEUNOSTOMY TUBES;  Surgeon: Nahum Herzog MD;  Location: [facility];  Service: General;  Laterality: N/A;   • COLONOSCOPY N/A 12/10/2018    Procedure: COLONOSCOPY;  Surgeon: Jay Wilson DO;  Location: [facility] ENDOSCOPY;  Service: Gastroenterology   • COLONOSCOPY Left 10/25/2020    Procedure: COLONOSCOPY WITH ENDOSCOPIC FECAL MICROBIOTA TRANSPLANT;  Surgeon: Jay Wilson DO;  Location: [facility] OR;  Service: Gastroenterology;  Laterality: Left;   • COLONOSCOPY N/A 10/23/2020    Procedure: COLONOSCOPY WITH ENDOSCOPIC FECAL MICROBIOTA TRANSPLANT;  Surgeon: Jay Wilson DO;  Location: [facility] ENDOSCOPY;  Service: Gastroenterology;  Laterality: N/A;   • ILEOSTOMY CLOSURE N/A 1/25/2021    Procedure: ILEOSTOMY  "CLOSURE WITH LOOP ILEOSTOMY PLACEMENT;  Surgeon: Nahum Herzog MD;  Location: [facility];  Service: General;  Laterality: N/A;   • KNEE MENISCAL REPAIR Left    • LEG THROMBECTOMY/EMBOLECTOMY N/A 1/26/2021    Procedure: femoral to femoral bypass, bi-femoral bypass;  Surgeon: Sukhwinder Steele MD;  Location: [facility];  Service: Vascular;  Laterality: N/A;   • OTHER SURGICAL HISTORY      Loop recorder     Family History   Problem Relation Age of Onset   • Glaucoma Mother      Social History     Tobacco Use   • Smoking status: Former Smoker     Years: 62.00     Types: Cigarettes     Quit date: 2018     Years since quitting: 3.4   • Smokeless tobacco: Former User     Types: Chew, Snuff   • Tobacco comment: 05/17/2021 - Patient states he utilized less than 1 pack of Cigarettes and began utilization of Cigarettes at the age of 5.   Vaping Use   • Vaping Use: Former   • Substances: Patient can not recall if Electronic Cigarette contained Nicotine, THS, CBS, or Flavoring.   • Devices: Disposable   Substance Use Topics   • Alcohol use: Yes     Comment: 05/17/2021 - Patient states he consumes alcoholic beverages \"rarely\".     • Drug use: No       ALLERGIES:  Allergies   Allergen Reactions   • Morphine Mental Status Change         MEDICATIONS:    Current Outpatient Medications:   •  apixaban (ELIQUIS) 5 MG tablet tablet, Take 1 tablet by mouth Every 12 (Twelve) Hours. Indications: Other - full anticoagulation, Disp: 60 tablet, Rfl: 1  •  Bacillus Coagulans-Inulin (ALIGN PREBIOTIC-PROBIOTIC PO), Take 1 tablet by mouth Every Night., Disp: , Rfl:   •  chlorthalidone (HYGROTEN) 50 MG tablet, Take 50 mg by mouth Daily., Disp: , Rfl:   •  clopidogrel (PLAVIX) 75 MG tablet, Take 1 tablet by mouth Daily., Disp: 60 tablet, Rfl: 6  •  famotidine (PEPCID) 40 MG tablet, Take 0.5 tablets by mouth 2 (Two) Times a Day., Disp: 60 tablet, Rfl: 3  •  furosemide (LASIX) 20 MG tablet, Take 20 mg by mouth 2 (Two) Times a Day., Disp: , Rfl: "   •  gabapentin (NEURONTIN) 300 MG capsule, Take 1 capsule by mouth 3 (Three) Times a Day., Disp: 270 capsule, Rfl: 3  •  magnesium oxide (MAGOX) 400 (241.3 Mg) MG tablet tablet, Take 800 mg by mouth 2 (Two) Times a Day., Disp: , Rfl:   •  ondansetron (ZOFRAN) 4 MG tablet, Take 1 tablet by mouth Every 6 (Six) Hours As Needed for Nausea or Vomiting., Disp: 20 tablet, Rfl: 0  •  oxyCODONE-acetaminophen (Percocet) 5-325 MG per tablet, Take 1 tablet by mouth Every 6 (Six) Hours As Needed for Severe Pain ., Disp: 20 tablet, Rfl: 0  •  potassium chloride (MICRO-K) 10 MEQ CR capsule, Take 2 capsules by mouth 2 (Two) Times a Day With Meals., Disp: 60 capsule, Rfl: 1  •  pravastatin (PRAVACHOL) 20 MG tablet, Take 1 tablet by mouth Every Night., Disp: 60 tablet, Rfl: 6  •  tamsulosin (FLOMAX) 0.4 MG capsule 24 hr capsule, Take 1 capsule by mouth Daily., Disp: 60 capsule, Rfl: 6  •  vitamin B-12 (CYANOCOBALAMIN) 500 MCG tablet, Take 500 mcg by mouth Daily., Disp: , Rfl:   •  vitamin C (ASCORBIC ACID) 500 MG tablet, Take 500 mg by mouth Daily. 05/17/2021 - Patient states he only utilizes Vitamin C during Winter months., Disp: , Rfl:   •  vitamin D (ERGOCALCIFEROL) 1.25 MG (11397 UT) capsule capsule, Take 50,000 Units by mouth 1 (One) Time Per Week., Disp: , Rfl:   •  cholestyramine (Questran) 4 g packet, Take 1 packet by mouth 3 (Three) Times a Day With Meals for 60 days., Disp: 90 packet, Rfl: 1    Current Facility-Administered Medications:   •  loperamide (IMODIUM) capsule 2 mg, 2 mg, Oral, TID PRN, Nahum Herzog MD  •  promethazine (PHENERGAN) tablet 12.5 mg, 12.5 mg, Oral, Q6H PRN, Nahum Herzog MD  •  promethazine (PHENERGAN) tablet 12.5 mg, 12.5 mg, Oral, Q6H PRN, Nahum Herzog MD      Review of Systems   Constitutional: Negative for chills, decreased appetite, fever and weight loss.   HENT: Negative for congestion, nosebleeds and sore throat.    Eyes: Negative for blurred vision, visual disturbance and visual halos.  "  Cardiovascular: Positive for dyspnea on exertion. Negative for chest pain and leg swelling.   Respiratory: Negative for cough, shortness of breath, sputum production and wheezing.    Endocrine: Negative for cold intolerance and polyuria.   Hematologic/Lymphatic: Negative for bleeding problem. Bruises/bleeds easily.   Skin: Positive for unusual hair distribution. Negative for flushing and nail changes.   Musculoskeletal: Positive for arthritis, back pain, joint pain and muscle weakness.   Gastrointestinal: Negative for bloating, abdominal pain, hematemesis, melena, nausea and vomiting.   Genitourinary: Negative for flank pain and hematuria.   Neurological: Positive for paresthesias (right foot). Negative for brief paralysis, difficulty with concentration, focal weakness, light-headedness, loss of balance, numbness and weakness.        Severe RLE neuropathy   Psychiatric/Behavioral: Negative for altered mental status, depression, substance abuse and suicidal ideas.   Allergic/Immunologic: Negative for hives and persistent infections.         Vitals:    06/25/21 1402   BP: 138/76   BP Location: Left arm   Patient Position: Sitting   Cuff Size: Adult   Pulse: 61   SpO2: 100%   Weight: 54 kg (119 lb)   Height: 167.6 cm (66\")     Physical Exam  Vitals and nursing note reviewed.   Constitutional:       Appearance: Normal appearance.   HENT:      Head: Normocephalic.      Nose: Nose normal.      Mouth/Throat:      Mouth: Mucous membranes are moist.   Eyes:      Pupils: Pupils are equal, round, and reactive to light.   Cardiovascular:      Rate and Rhythm: Normal rate.      Pulses: Normal pulses.   Pulmonary:      Effort: Pulmonary effort is normal.      Breath sounds: Normal breath sounds.   Abdominal:      General: Abdomen is flat.      Palpations: Abdomen is soft.   Musculoskeletal:         General: Tenderness (right foot) present.      Cervical back: Normal range of motion.   Skin:     General: Skin is warm and dry.    "   Capillary Refill: Capillary refill takes 2 to 3 seconds.      Comments: There is no evidence of skin breakdown of the bilateral lower extremities.  BLE pink, no evidence of ischemia.  Feet are absent of dependent rubor.   Neurological:      Mental Status: He is alert. Mental status is at baseline.      Comments: Severe superficial tenderness right foot   Psychiatric:         Mood and Affect: Mood normal.         Assessment & Plan     Independent Review of Studies  6/2021 TAMMI: Right 0.63 biphasic.  Left 0.58 biphasic pop, monophasic distally.    6/2021 Bilateral Art US: Fem fem graft patent, prox anastamosis 312cm/s, turbulent flow distal L CFA 360cm/s    1. Mixed hyperlipidemia  Lipid-lowering therapy has been proven beneficial in patients with cardio-vascular disease. Current guidelines recommend statin treatment for all patients with PAD,CAD and carotid stenosis. Statins are beneficial in preventing cardiovascular events, increasing functional capacity and lower the risk of adverse limb loss in PAD. Statins decrease the progression of plaque formation and may improve peripheral vessel lining, and aid in reversing atherosclerosis.    2. PVD (peripheral vascular disease) with claudication (CMS/HCC)  Eliquis, plavix, Statin  Severe reduction LLE perfusion, graft U/S is suspicious for both in-flow and out-flow stenosis.      Worsening claudication and rest pain when supine LLE    New biphasic changes at the femoral level.  CTA run-off with renal hydration to avoid ALEXA.  Risks and benefits of contrast exposure discussed.     Understands and wishes to proceed.    - Basic Metabolic Panel; Future  - CT Angio Abdominal Aorta Bilateral Iliofem Runoff With & Without Contrast; Future    3. Atheroscler of native artery of both legs with intermit claudication (CMS/HCC)     - CT Angio Abdominal Aorta Bilateral Iliofem Runoff With & Without Contrast; Future        Detailed discussion regarding risks, benefits, and treatment  plan. Images independently reviewed. Patient understands, agrees, and wishes to proceed with plan.       This document has been electronically signed by ANUJ Mendieta-BC @  On June 28, 2021 13:58 CDT      EMR Dragon/Transcription disclaimer:   Part of this note may be an electronic transcription/translation of spoken language to printed text using the Dragon Dictation System.

## 2021-07-07 ENCOUNTER — HOSPITAL ENCOUNTER (OUTPATIENT)
Dept: CT IMAGING | Facility: HOSPITAL | Age: 71
Discharge: HOME | End: 2021-07-07
Admitting: NURSE PRACTITIONER

## 2021-07-07 VITALS
SYSTOLIC BLOOD PRESSURE: 136 MMHG | BODY MASS INDEX: 19.38 KG/M2 | HEART RATE: 58 BPM | HEIGHT: 66 IN | DIASTOLIC BLOOD PRESSURE: 63 MMHG | RESPIRATION RATE: 18 BRPM | OXYGEN SATURATION: 100 % | TEMPERATURE: 97.4 F | WEIGHT: 120.59 LBS

## 2021-07-07 DIAGNOSIS — I70.213 ATHEROSCLER OF NATIVE ARTERY OF BOTH LEGS WITH INTERMIT CLAUDICATION (HCC): ICD-10-CM

## 2021-07-07 DIAGNOSIS — I73.9 PVD (PERIPHERAL VASCULAR DISEASE) WITH CLAUDICATION (HCC): ICD-10-CM

## 2021-07-07 PROCEDURE — 25010000002 IOPAMIDOL 61 % SOLUTION: Performed by: NURSE PRACTITIONER

## 2021-07-07 PROCEDURE — 75635 CT ANGIO ABDOMINAL ARTERIES: CPT

## 2021-07-07 RX ORDER — SODIUM CHLORIDE 9 MG/ML
200 INJECTION, SOLUTION INTRAVENOUS ONCE
Status: DISCONTINUED | OUTPATIENT
Start: 2021-07-07 | End: 2021-07-08 | Stop reason: HOSPADM

## 2021-07-07 RX ORDER — SODIUM CHLORIDE 9 MG/ML
200 INJECTION, SOLUTION INTRAVENOUS ONCE
Status: COMPLETED | OUTPATIENT
Start: 2021-07-07 | End: 2021-07-07

## 2021-07-07 RX ADMIN — SODIUM CHLORIDE 200 ML/HR: 9 INJECTION, SOLUTION INTRAVENOUS at 08:34

## 2021-07-07 RX ADMIN — IOPAMIDOL 90 ML: 612 INJECTION, SOLUTION INTRAVENOUS at 11:26

## 2021-07-07 NOTE — PROGRESS NOTES
Chief Complaint   Patient presents with   • Follow-up     colectomy and reversal with protecting Ileostomy        HPI  71 year old ostomy closure and protecting ileostomy. Post op embolization of the right leg from an abdominal aneurysm. Fem-fem bypass graft restored flow. Has had much improved function of the right leg and now walks without a cane.       Past Medical History:   Diagnosis Date   • Hyperlipidemia    • Hypertension    • Rectal abnormality     Rectum came out - er put back in.  9/18   • TIA (transient ischemic attack)    • UTI (urinary tract infection)        Past Surgical History:   Procedure Laterality Date   • COLON RESECTION N/A 10/26/2020    Procedure: SUBTOTAL COLECTOMY,  ILEOSTOMY, PLACEMENT OF GASTROSTOMY AND JEJEUNOSTOMY TUBES;  Surgeon: Nahum Herzog MD;  Location: [facility] OR;  Service: General;  Laterality: N/A;   • COLONOSCOPY N/A 12/10/2018    Procedure: COLONOSCOPY;  Surgeon: Jay Wilson DO;  Location: [facility] ENDOSCOPY;  Service: Gastroenterology   • COLONOSCOPY Left 10/25/2020    Procedure: COLONOSCOPY WITH ENDOSCOPIC FECAL MICROBIOTA TRANSPLANT;  Surgeon: Jay Wilson DO;  Location: [facility] OR;  Service: Gastroenterology;  Laterality: Left;   • COLONOSCOPY N/A 10/23/2020    Procedure: COLONOSCOPY WITH ENDOSCOPIC FECAL MICROBIOTA TRANSPLANT;  Surgeon: Jay Wilson DO;  Location: [facility] ENDOSCOPY;  Service: Gastroenterology;  Laterality: N/A;   • ILEOSTOMY CLOSURE N/A 1/25/2021    Procedure: ILEOSTOMY CLOSURE WITH LOOP ILEOSTOMY PLACEMENT;  Surgeon: Nahum Herzog MD;  Location: [facility] OR;  Service: General;  Laterality: N/A;   • KNEE MENISCAL REPAIR Left    • LEG THROMBECTOMY/EMBOLECTOMY N/A 1/26/2021    Procedure: femoral to femoral bypass, bi-femoral bypass;  Surgeon: Sukhwinder Steele MD;  Location: [facility] OR;  Service: Vascular;  Laterality: N/A;   • OTHER SURGICAL HISTORY      Loop recorder         Current Outpatient Medications:   •  apixaban (ELIQUIS) 5 MG  tablet tablet, Take 1 tablet by mouth Every 12 (Twelve) Hours. Indications: Other - full anticoagulation, Disp: 60 tablet, Rfl: 1  •  Bacillus Coagulans-Inulin (ALIGN PREBIOTIC-PROBIOTIC PO), Take 1 tablet by mouth Every Night., Disp: , Rfl:   •  chlorthalidone (HYGROTEN) 50 MG tablet, Take 50 mg by mouth Daily., Disp: , Rfl:   •  clopidogrel (PLAVIX) 75 MG tablet, Take 1 tablet by mouth Daily., Disp: 60 tablet, Rfl: 6  •  famotidine (PEPCID) 40 MG tablet, Take 0.5 tablets by mouth 2 (Two) Times a Day., Disp: 60 tablet, Rfl: 3  •  furosemide (LASIX) 20 MG tablet, Take 20 mg by mouth 2 (Two) Times a Day., Disp: , Rfl:   •  gabapentin (NEURONTIN) 300 MG capsule, Take 1 capsule by mouth 3 (Three) Times a Day., Disp: 270 capsule, Rfl: 3  •  magnesium oxide (MAGOX) 400 (241.3 Mg) MG tablet tablet, Take 800 mg by mouth 2 (Two) Times a Day., Disp: , Rfl:   •  ondansetron (ZOFRAN) 4 MG tablet, Take 1 tablet by mouth Every 6 (Six) Hours As Needed for Nausea or Vomiting., Disp: 20 tablet, Rfl: 0  •  oxyCODONE-acetaminophen (Percocet) 5-325 MG per tablet, Take 1 tablet by mouth Every 6 (Six) Hours As Needed for Severe Pain ., Disp: 20 tablet, Rfl: 0  •  potassium chloride (MICRO-K) 10 MEQ CR capsule, Take 2 capsules by mouth 2 (Two) Times a Day With Meals., Disp: 60 capsule, Rfl: 1  •  pravastatin (PRAVACHOL) 20 MG tablet, Take 1 tablet by mouth Every Night., Disp: 60 tablet, Rfl: 6  •  tamsulosin (FLOMAX) 0.4 MG capsule 24 hr capsule, Take 1 capsule by mouth Daily., Disp: 60 capsule, Rfl: 6  •  vitamin B-12 (CYANOCOBALAMIN) 500 MCG tablet, Take 500 mcg by mouth Daily., Disp: , Rfl:   •  vitamin C (ASCORBIC ACID) 500 MG tablet, Take 500 mg by mouth Daily. 05/17/2021 - Patient states he only utilizes Vitamin C during Winter months., Disp: , Rfl:   •  vitamin D (ERGOCALCIFEROL) 1.25 MG (74306 UT) capsule capsule, Take 50,000 Units by mouth 1 (One) Time Per Week., Disp: , Rfl:   •  cholestyramine (Questran) 4 g packet, Take 1  "packet by mouth 3 (Three) Times a Day With Meals for 60 days., Disp: 90 packet, Rfl: 1    Current Facility-Administered Medications:   •  loperamide (IMODIUM) capsule 2 mg, 2 mg, Oral, TID PRN, Nahum Herzog MD  •  promethazine (PHENERGAN) tablet 12.5 mg, 12.5 mg, Oral, Q6H PRN, Nahum Herzog MD  •  promethazine (PHENERGAN) tablet 12.5 mg, 12.5 mg, Oral, Q6H PRN, Nahum Herzog MD    Allergies   Allergen Reactions   • Morphine Mental Status Change       Family History   Problem Relation Age of Onset   • Glaucoma Mother        Social History     Socioeconomic History   • Marital status:      Spouse name: Not on file   • Number of children: Not on file   • Years of education: Not on file   • Highest education level: Not on file   Tobacco Use   • Smoking status: Former Smoker     Years: 62.00     Types: Cigarettes     Quit date: 2018     Years since quitting: 3.5   • Smokeless tobacco: Former User     Types: Chew, Snuff   • Tobacco comment: 05/17/2021 - Patient states he utilized less than 1 pack of Cigarettes and began utilization of Cigarettes at the age of 5.   Vaping Use   • Vaping Use: Former   • Substances: Patient can not recall if Electronic Cigarette contained Nicotine, THS, CBS, or Flavoring.   • Devices: Disposable   Substance and Sexual Activity   • Alcohol use: Yes     Comment: 05/17/2021 - Patient states he consumes alcoholic beverages \"rarely\".     • Drug use: No   • Sexual activity: Defer     Comment: .       Physical Exam    Abdomen is soft. Ostomy is functioning well. Improved leg strength.    ASSESSMENT    Diagnoses and all orders for this visit:    1. Ileostomy in place (CMS/[facility]) (Primary)        PLAN    1. Recheck in 2 months not ready for ileostomy closure.              This document has been electronically signed by Nahum Herzog MD on July 6, 2021 22:00 CDT    "

## 2021-07-08 ENCOUNTER — OFFICE VISIT (OUTPATIENT)
Dept: CARDIAC SURGERY | Facility: CLINIC | Age: 71
End: 2021-07-08

## 2021-07-08 VITALS
OXYGEN SATURATION: 98 % | DIASTOLIC BLOOD PRESSURE: 60 MMHG | SYSTOLIC BLOOD PRESSURE: 121 MMHG | WEIGHT: 120 LBS | HEIGHT: 66 IN | HEART RATE: 61 BPM | TEMPERATURE: 97.9 F | BODY MASS INDEX: 19.29 KG/M2

## 2021-07-08 DIAGNOSIS — I71.40 AAA (ABDOMINAL AORTIC ANEURYSM) WITHOUT RUPTURE (HCC): ICD-10-CM

## 2021-07-08 DIAGNOSIS — N18.31 STAGE 3A CHRONIC KIDNEY DISEASE (HCC): ICD-10-CM

## 2021-07-08 DIAGNOSIS — Z93.2 ILEOSTOMY IN PLACE (HCC): ICD-10-CM

## 2021-07-08 DIAGNOSIS — I48.11 LONGSTANDING PERSISTENT ATRIAL FIBRILLATION (HCC): ICD-10-CM

## 2021-07-08 DIAGNOSIS — Z86.73 HISTORY OF TIA (TRANSIENT ISCHEMIC ATTACK): ICD-10-CM

## 2021-07-08 DIAGNOSIS — I73.9 PVD (PERIPHERAL VASCULAR DISEASE) (HCC): Primary | ICD-10-CM

## 2021-07-08 DIAGNOSIS — I10 ESSENTIAL HYPERTENSION: ICD-10-CM

## 2021-07-08 DIAGNOSIS — K57.32 SIGMOID DIVERTICULITIS: ICD-10-CM

## 2021-07-08 DIAGNOSIS — E78.2 MIXED HYPERLIPIDEMIA: ICD-10-CM

## 2021-07-08 DIAGNOSIS — E43 SEVERE MALNUTRITION (HCC): ICD-10-CM

## 2021-07-08 PROCEDURE — 99214 OFFICE O/P EST MOD 30 MIN: CPT | Performed by: THORACIC SURGERY (CARDIOTHORACIC VASCULAR SURGERY)

## 2021-07-20 ENCOUNTER — TRANSCRIBE ORDERS (OUTPATIENT)
Dept: LAB | Facility: HOSPITAL | Age: 71
End: 2021-07-20

## 2021-07-20 ENCOUNTER — LAB (OUTPATIENT)
Dept: LAB | Facility: HOSPITAL | Age: 71
End: 2021-07-20

## 2021-07-20 DIAGNOSIS — N18.32 STAGE 3B CHRONIC KIDNEY DISEASE (HCC): ICD-10-CM

## 2021-07-20 DIAGNOSIS — I48.19 OTHER PERSISTENT ATRIAL FIBRILLATION (HCC): ICD-10-CM

## 2021-07-20 DIAGNOSIS — I77.9 PERIPHERAL ARTERIAL OCCLUSIVE DISEASE (HCC): ICD-10-CM

## 2021-07-20 DIAGNOSIS — K57.32 DIVERTICULITIS OF SIGMOID COLON: Primary | ICD-10-CM

## 2021-07-20 DIAGNOSIS — K57.32 DIVERTICULITIS OF SIGMOID COLON: ICD-10-CM

## 2021-07-20 DIAGNOSIS — K57.32 DIVERTICULITIS, COLON: ICD-10-CM

## 2021-07-20 LAB
ALBUMIN SERPL-MCNC: 4 G/DL (ref 3.5–5.2)
ALBUMIN/GLOB SERPL: 1.5 G/DL
ALP SERPL-CCNC: 84 U/L (ref 39–117)
ALT SERPL W P-5'-P-CCNC: 10 U/L (ref 1–41)
ANION GAP SERPL CALCULATED.3IONS-SCNC: 10.2 MMOL/L (ref 5–15)
AST SERPL-CCNC: 13 U/L (ref 1–40)
BILIRUB SERPL-MCNC: 0.4 MG/DL (ref 0–1.2)
BUN SERPL-MCNC: 18 MG/DL (ref 8–23)
BUN/CREAT SERPL: 9.7 (ref 7–25)
CALCIUM SPEC-SCNC: 8.7 MG/DL (ref 8.6–10.5)
CHLORIDE SERPL-SCNC: 110 MMOL/L (ref 98–107)
CO2 SERPL-SCNC: 19.8 MMOL/L (ref 22–29)
CREAT SERPL-MCNC: 1.85 MG/DL (ref 0.76–1.27)
DEPRECATED RDW RBC AUTO: 48.2 FL (ref 37–54)
EOSINOPHIL # BLD MANUAL: 0.27 10*3/MM3 (ref 0–0.4)
EOSINOPHIL NFR BLD MANUAL: 5.2 % (ref 0.3–6.2)
ERYTHROCYTE [DISTWIDTH] IN BLOOD BY AUTOMATED COUNT: 13.7 % (ref 12.3–15.4)
GFR SERPL CREATININE-BSD FRML MDRD: 36 ML/MIN/1.73
GLOBULIN UR ELPH-MCNC: 2.7 GM/DL
GLUCOSE SERPL-MCNC: 87 MG/DL (ref 65–99)
HCT VFR BLD AUTO: 38 % (ref 37.5–51)
HGB BLD-MCNC: 12.6 G/DL (ref 13–17.7)
LYMPHOCYTES # BLD MANUAL: 1.34 10*3/MM3 (ref 0.7–3.1)
LYMPHOCYTES NFR BLD MANUAL: 25.8 % (ref 19.6–45.3)
LYMPHOCYTES NFR BLD MANUAL: 3.1 % (ref 5–12)
MAGNESIUM SERPL-MCNC: 1.9 MG/DL (ref 1.6–2.4)
MCH RBC QN AUTO: 31.7 PG (ref 26.6–33)
MCHC RBC AUTO-ENTMCNC: 33.2 G/DL (ref 31.5–35.7)
MCV RBC AUTO: 95.7 FL (ref 79–97)
MONOCYTES # BLD AUTO: 0.16 10*3/MM3 (ref 0.1–0.9)
NEUTROPHILS # BLD AUTO: 3.43 10*3/MM3 (ref 1.7–7)
NEUTROPHILS NFR BLD MANUAL: 66 % (ref 42.7–76)
PHOSPHATE SERPL-MCNC: 3.4 MG/DL (ref 2.5–4.5)
PLAT MORPH BLD: NORMAL
PLATELET # BLD AUTO: 192 10*3/MM3 (ref 140–450)
PMV BLD AUTO: 11.5 FL (ref 6–12)
POIKILOCYTOSIS BLD QL SMEAR: ABNORMAL
POTASSIUM SERPL-SCNC: 4.5 MMOL/L (ref 3.5–5.2)
PROT SERPL-MCNC: 6.7 G/DL (ref 6–8.5)
RBC # BLD AUTO: 3.97 10*6/MM3 (ref 4.14–5.8)
SODIUM SERPL-SCNC: 140 MMOL/L (ref 136–145)
WBC # BLD AUTO: 5.19 10*3/MM3 (ref 3.4–10.8)
WBC MORPH BLD: NORMAL

## 2021-07-20 PROCEDURE — 80053 COMPREHEN METABOLIC PANEL: CPT

## 2021-07-20 PROCEDURE — 85027 COMPLETE CBC AUTOMATED: CPT

## 2021-07-20 PROCEDURE — 36415 COLL VENOUS BLD VENIPUNCTURE: CPT

## 2021-07-20 PROCEDURE — 84100 ASSAY OF PHOSPHORUS: CPT

## 2021-07-20 PROCEDURE — 85007 BL SMEAR W/DIFF WBC COUNT: CPT

## 2021-07-20 PROCEDURE — 83735 ASSAY OF MAGNESIUM: CPT

## 2021-07-27 NOTE — PROGRESS NOTES
7/8/2021    Bar Crockett  1950    Chief Complaint:    Chief Complaint   Patient presents with   • Peripheral Vascular Disease       HPI:      PCP:  Satnam Troncoso APRN  General Surgery: Dr. Herzog    70 y.o. male with HTN(stable, increased risk stroke, rupture), Hyperlipidemia(stable, increased risk cardiovascular events), COPD(stable, increased risk pulmonary complications) and Chronic Kidney Disease(stable, increased risk renal failure)  Prior TIA 2018, loop recorder placement 2018 [facility], right leg claudication x1 year prior to admit. former smoker and quit 2018.  Prior admit 10/2020 [facility] c-difficile colitis, underwent resection. Patient admitted electively 1/25/2021 for planned colostomy reversal, low UOP during surgery, protective ileostomy placed.  Approx. midnight last night developed loss of sensation to right foot, CTA run off obtained this morning for acute thrombus R iliac artery, with likely source from the infrarenal AAA with intramural thrombus.  No prior TAMMI or vascular evaluation, AAA seen on CT abd/pelvis in 10/2020 with large amount of thrombus, R iliac vessel appears to be patent on that exam.  Acute on chronic PVD.  Underwent emergent salvage endovascular approach, unsuccessful, transitioned to open fem-fem with restoration in RLE perfusion.  Prolonged hospital stay, transitioned to rehab facility.  Now home, overall doing well.  His function of RLE has improved with foot drop brace.  Recent worsening claudication.  No other associated symptoms or modifying factors.     8/2018 TIA, [facility]   8/2018 MARCI with bubble: Negative for intracardiac thrombus/PFO     10/2020 CT Abd/Pelv w contrast: AAA infrarenal 32mm, large amount of thrombus distal aorta  1/2021 CTA Run-off:  Infrarenal AAA 32mm with thrombus, Extension into R YOEL, R EIA with complete occlusion.  Distal run-off through collateral flow, R SFA 70% soft plaque.  1/2021 Emergent salvage fem-fem bypass  3/2021 TAMMI: 0.85 Right triphasic,  mixed bi/tri distally.  Left 0.72 biphasic.  3/2021 Bilateral Art US: Fem-fem graft patent, mPSV 140cm/s proximal anastamosis  6/2021 TAMMI: Right 0.63 biphasic.  Left 0.58 biphasic pop, monophasic distally.    6/2021 Bilateral Art US: Fem fem graft patent, prox anastamosis 312cm/s, turbulent flow distal L CFA 360cm/s  7/2021 CTA Aorta runoff:  Infrarenal AAA 35mm. LCIA 20mm.   Moderate LEFT iliac disease.  RIGHT iliac occlusion.  Fem-fem bypass occluded.  Bilateral SFA moderate disease.     10/2020 SUBTOTAL COLECTOMY,  ILEOSTOMY, PLACEMENT OF GASTROSTOMY AND JEJEUNOSTOMY TUBES, (Dr. Herzog)  1/2021 ILEOSTOMY CLOSURE WITH LOOP ILEOSTOMY PLACEMENT    The following portions of the patient's history were reviewed and updated as appropriate: allergies, current medications, past family history, past medical history, past social history, past surgical history and problem list.  Recent images independently reviewed.  Available laboratory values reviewed.    PMH:  Past Medical History:   Diagnosis Date   • Hyperlipidemia    • Hypertension    • Rectal abnormality     Rectum came out - er put back in.  9/18   • TIA (transient ischemic attack)    • UTI (urinary tract infection)      Past Surgical History:   Procedure Laterality Date   • COLON RESECTION N/A 10/26/2020    Procedure: SUBTOTAL COLECTOMY,  ILEOSTOMY, PLACEMENT OF GASTROSTOMY AND JEJEUNOSTOMY TUBES;  Surgeon: Nahum Herzog MD;  Location: [facility] OR;  Service: General;  Laterality: N/A;   • COLONOSCOPY N/A 12/10/2018    Procedure: COLONOSCOPY;  Surgeon: Jay Wilson DO;  Location: [facility] ENDOSCOPY;  Service: Gastroenterology   • COLONOSCOPY Left 10/25/2020    Procedure: COLONOSCOPY WITH ENDOSCOPIC FECAL MICROBIOTA TRANSPLANT;  Surgeon: Jay Wilson DO;  Location: [facility] OR;  Service: Gastroenterology;  Laterality: Left;   • COLONOSCOPY N/A 10/23/2020    Procedure: COLONOSCOPY WITH ENDOSCOPIC FECAL MICROBIOTA TRANSPLANT;  Surgeon: Jay Wilson DO;   "Location: [facility] ENDOSCOPY;  Service: Gastroenterology;  Laterality: N/A;   • ILEOSTOMY CLOSURE N/A 1/25/2021    Procedure: ILEOSTOMY CLOSURE WITH LOOP ILEOSTOMY PLACEMENT;  Surgeon: Nahum Herzog MD;  Location: [facility] OR;  Service: General;  Laterality: N/A;   • KNEE MENISCAL REPAIR Left    • LEG THROMBECTOMY/EMBOLECTOMY N/A 1/26/2021    Procedure: femoral to femoral bypass, bi-femoral bypass;  Surgeon: Sukhwinder Steele MD;  Location: [facility] OR;  Service: Vascular;  Laterality: N/A;   • OTHER SURGICAL HISTORY      Loop recorder     Family History   Problem Relation Age of Onset   • Glaucoma Mother      Social History     Tobacco Use   • Smoking status: Former Smoker     Years: 62.00     Types: Cigarettes     Quit date: 2018     Years since quitting: 3.5   • Smokeless tobacco: Former User     Types: Chew, Snuff   • Tobacco comment: 05/17/2021 - Patient states he utilized less than 1 pack of Cigarettes and began utilization of Cigarettes at the age of 5.   Vaping Use   • Vaping Use: Former   • Substances: Patient can not recall if Electronic Cigarette contained Nicotine, THS, CBS, or Flavoring.   • Devices: Disposable   Substance Use Topics   • Alcohol use: Yes     Comment: 05/17/2021 - Patient states he consumes alcoholic beverages \"rarely\".     • Drug use: No       ALLERGIES:  Allergies   Allergen Reactions   • Morphine Mental Status Change         MEDICATIONS:    Current Outpatient Medications:   •  apixaban (ELIQUIS) 5 MG tablet tablet, Take 1 tablet by mouth Every 12 (Twelve) Hours. Indications: Other - full anticoagulation, Disp: 60 tablet, Rfl: 1  •  Bacillus Coagulans-Inulin (ALIGN PREBIOTIC-PROBIOTIC PO), Take 1 tablet by mouth Every Night., Disp: , Rfl:   •  chlorthalidone (HYGROTEN) 50 MG tablet, Take 50 mg by mouth Daily., Disp: , Rfl:   •  clopidogrel (PLAVIX) 75 MG tablet, Take 1 tablet by mouth Daily., Disp: 60 tablet, Rfl: 6  •  famotidine (PEPCID) 40 MG tablet, Take 0.5 tablets by mouth 2 " (Two) Times a Day., Disp: 60 tablet, Rfl: 3  •  furosemide (LASIX) 20 MG tablet, Take 20 mg by mouth 2 (Two) Times a Day., Disp: , Rfl:   •  gabapentin (NEURONTIN) 300 MG capsule, Take 1 capsule by mouth 3 (Three) Times a Day., Disp: 270 capsule, Rfl: 3  •  magnesium oxide (MAGOX) 400 (241.3 Mg) MG tablet tablet, Take 800 mg by mouth 2 (Two) Times a Day., Disp: , Rfl:   •  ondansetron (ZOFRAN) 4 MG tablet, Take 1 tablet by mouth Every 6 (Six) Hours As Needed for Nausea or Vomiting., Disp: 20 tablet, Rfl: 0  •  oxyCODONE-acetaminophen (Percocet) 5-325 MG per tablet, Take 1 tablet by mouth Every 6 (Six) Hours As Needed for Severe Pain ., Disp: 20 tablet, Rfl: 0  •  potassium chloride (MICRO-K) 10 MEQ CR capsule, Take 2 capsules by mouth 2 (Two) Times a Day With Meals., Disp: 60 capsule, Rfl: 1  •  pravastatin (PRAVACHOL) 20 MG tablet, Take 1 tablet by mouth Every Night., Disp: 60 tablet, Rfl: 6  •  tamsulosin (FLOMAX) 0.4 MG capsule 24 hr capsule, Take 1 capsule by mouth Daily., Disp: 60 capsule, Rfl: 6  •  vitamin B-12 (CYANOCOBALAMIN) 500 MCG tablet, Take 500 mcg by mouth Daily., Disp: , Rfl:   •  vitamin C (ASCORBIC ACID) 500 MG tablet, Take 500 mg by mouth Daily. 05/17/2021 - Patient states he only utilizes Vitamin C during Winter months., Disp: , Rfl:   •  vitamin D (ERGOCALCIFEROL) 1.25 MG (18254 UT) capsule capsule, Take 50,000 Units by mouth 1 (One) Time Per Week., Disp: , Rfl:   •  cholestyramine (Questran) 4 g packet, Take 1 packet by mouth 3 (Three) Times a Day With Meals for 60 days., Disp: 90 packet, Rfl: 1    Current Facility-Administered Medications:   •  loperamide (IMODIUM) capsule 2 mg, 2 mg, Oral, TID PRN, Nahum Herzog MD  •  promethazine (PHENERGAN) tablet 12.5 mg, 12.5 mg, Oral, Q6H PRN, Nahum Herzog MD  •  promethazine (PHENERGAN) tablet 12.5 mg, 12.5 mg, Oral, Q6H PRN, Nahum Herzog MD    Review of Systems   Review of Systems   Constitutional: Positive for malaise/fatigue. Negative for weight  "loss.   Cardiovascular: Positive for dyspnea on exertion. Negative for chest pain and claudication.   Respiratory: Positive for shortness of breath. Negative for cough.    Skin: Positive for color change and unusual hair distribution. Negative for poor wound healing.   Neurological: Positive for numbness and paresthesias. Negative for dizziness and weakness.       Physical Exam   Vitals:    07/08/21 1455   BP: 121/60   BP Location: Left arm   Patient Position: Sitting   Cuff Size: Adult   Pulse: 61   Temp: 97.9 °F (36.6 °C)   TempSrc: Temporal   SpO2: 98%   Weight: 54.4 kg (120 lb)   Height: 167.6 cm (66\")     Body surface area is 1.61 meters squared.  Body mass index is 19.37 kg/m².  Physical Exam  Constitutional:       General: He is not in acute distress.     Appearance: He is not ill-appearing.   HENT:      Right Ear: Hearing normal.      Left Ear: Hearing normal.      Nose: No nasal deformity.      Mouth/Throat:      Dentition: Normal dentition. Does not have dentures.   Cardiovascular:      Rate and Rhythm: Normal rate and regular rhythm.      Pulses:           Carotid pulses are 2+ on the right side and 2+ on the left side.       Radial pulses are 2+ on the right side and 2+ on the left side.        Dorsalis pedis pulses are 1+ on the right side and 1+ on the left side.        Posterior tibial pulses are 1+ on the right side and 1+ on the left side.      Heart sounds: No murmur heard.     Pulmonary:      Effort: Pulmonary effort is normal.      Breath sounds: Normal breath sounds.   Abdominal:      General: There is no distension.      Palpations: Abdomen is soft. There is no mass.      Tenderness: There is no abdominal tenderness.   Musculoskeletal:         General: No deformity.      Comments: Gait normal.    Skin:     General: Skin is warm and dry.      Coloration: Skin is not pale.      Findings: No erythema.      Comments: No venous staining   Neurological:      Mental Status: He is alert and oriented to " person, place, and time.   Psychiatric:         Speech: Speech normal.         Behavior: Behavior is cooperative.         Thought Content: Thought content normal.         Judgment: Judgment normal.         BUN   Date Value Ref Range Status   07/20/2021 18 8 - 23 mg/dL Final   08/17/2018 30 (H) 7.0 - 18.0 mg/dL Final     Creatinine   Date Value Ref Range Status   07/20/2021 1.85 (H) 0.76 - 1.27 mg/dL Final   08/17/2018 1.3 0.6 - 1.3 mg/dL Final     eGFR Non  Amer   Date Value Ref Range Status   07/20/2021 36 (L) >60 mL/min/1.73 Final     eGFR African Am   Date Value Ref Range Status   08/17/2018 66  Final     Comment:         GFR    WITH KIDNEY DAMAGE     W/O DAMAGE  >=90         STAGE 1            NORMAL  60-89        STAGE 2            DEC GFR  30-59        STAGE 3            STAGE 3  15-29        STAGE 4            STAGE 4  <15          STAGE 5            STAGE 5      The MDRD GFR formula is valid only for adults b/w age17 and 70.       ASSESSMENT:  Diagnoses and all orders for this visit:    1. PVD (peripheral vascular disease) (CMS/HCC) (Primary)  -     Doppler Ankle Brachial Index Single Level CAR; Future  -     Duplex Lower Extremity Art / Grafts - Right CAR; Future    2. Stage 3a chronic kidney disease (CMS/HCC)    3. Sigmoid diverticulitis    4. Severe malnutrition (CMS/HCC)    5. Mixed hyperlipidemia    6. Longstanding persistent atrial fibrillation (CMS/HCC)    7. Ileostomy in place (CMS/HCC)    8. Essential hypertension    9. History of TIA (transient ischemic attack)    10. AAA (abdominal aortic aneurysm) without rupture (CMS/HCC)    PLAN:  Detailed discussion with Bar Crockett regarding situation and options.  Stable moderate PVD, patent femfem bypass..  Multiple risk factors with severe comorbidities.  No intervention indicated at this time.  Will follow with interval imaging.  Risks, benefits discussed.  Understands and wishes to proceed with plan.    Return after above studies  complete  Recommended regular physical activity, progressive walking program.  Continue current medications as directed.  Advance Care Planning   ACP discussion was declined by the patient. Patient has an advance directive in EMR which is still valid.     Thank you for the opportunity to participate in this patient's care.    Copy to primary care provider.

## 2021-08-27 ENCOUNTER — OFFICE VISIT (OUTPATIENT)
Dept: SURGERY | Facility: CLINIC | Age: 71
End: 2021-08-27

## 2021-08-27 VITALS
SYSTOLIC BLOOD PRESSURE: 120 MMHG | HEIGHT: 66 IN | HEART RATE: 59 BPM | BODY MASS INDEX: 20.31 KG/M2 | WEIGHT: 126.4 LBS | DIASTOLIC BLOOD PRESSURE: 70 MMHG | TEMPERATURE: 97.6 F | OXYGEN SATURATION: 98 %

## 2021-08-27 DIAGNOSIS — Z93.9 HISTORY OF CREATION OF OSTOMY (HCC): Primary | ICD-10-CM

## 2021-08-27 PROCEDURE — 99212 OFFICE O/P EST SF 10 MIN: CPT | Performed by: SURGERY

## 2021-08-27 NOTE — PATIENT INSTRUCTIONS
"BMI for Adults  What is BMI?  Body mass index (BMI) is a number that is calculated from a person's weight and height. BMI can help estimate how much of a person's weight is composed of fat. BMI does not measure body fat directly. Rather, it is an alternative to procedures that directly measure body fat, which can be difficult and expensive.  BMI can help identify people who may be at higher risk for certain medical problems.  What are BMI measurements used for?  BMI is used as a screening tool to identify possible weight problems. It helps determine whether a person is obese, overweight, a healthy weight, or underweight.  BMI is useful for:  · Identifying a weight problem that may be related to a medical condition or may increase the risk for medical problems.  · Promoting changes, such as changes in diet and exercise, to help reach a healthy weight. BMI screening can be repeated to see if these changes are working.  How is BMI calculated?  BMI involves measuring your weight in relation to your height. Both height and weight are measured, and the BMI is calculated from those numbers. This can be done either in English ([place] or metric measurements. Note that charts and online BMI calculators are available to help you find your BMI quickly and easily without having to do these calculations yourself.  To calculate your BMI in English ([place] measurements:    1. Measure your weight in pounds (lb).  2. Multiply the number of pounds by 703.  ? For example, for a person who weighs 180 lb, multiply that number by 703, which equals 126,540.  3. Measure your height in inches. Then multiply that number by itself to get a measurement called \"inches squared.\"  ? For example, for a person who is 70 inches tall, the \"inches squared\" measurement is 70 inches x 70 inches, which equals 4,900 inches squared.  4. Divide the total from step 2 (number of lb x 703) by the total from step 3 (inches squared): 126,540 ÷ 4,900 = 25.8. This is " "your BMI.  To calculate your BMI in metric measurements:  1. Measure your weight in kilograms (kg).  2. Measure your height in meters (m). Then multiply that number by itself to get a measurement called \"meters squared.\"  ? For example, for a person who is 1.75 m tall, the \"meters squared\" measurement is 1.75 m x 1.75 m, which is equal to 3.1 meters squared.  3. Divide the number of kilograms (your weight) by the meters squared number. In this example: 70 ÷ 3.1 = 22.6. This is your BMI.  What do the results mean?  BMI charts are used to identify whether you are underweight, normal weight, overweight, or obese. The following guidelines will be used:  · Underweight: BMI less than 18.5.  · Normal weight: BMI between 18.5 and 24.9.  · Overweight: BMI between 25 and 29.9.  · Obese: BMI of 30 or above.  Keep these notes in mind:  · Weight includes both fat and muscle, so someone with a muscular build, such as an athlete, may have a BMI that is higher than 24.9. In cases like these, BMI is not an accurate measure of body fat.  · To determine if excess body fat is the cause of a BMI of 25 or higher, further assessments may need to be done by a health care provider.  · BMI is usually interpreted in the same way for men and women.  Where to find more information  For more information about BMI, including tools to quickly calculate your BMI, go to these websites:  · Centers for Disease Control and Prevention: www.cdc.gov  · American Heart Association: www.heart.org  · National Heart, Lung, and Blood [place]: www.nhlbi.nih.gov  Summary  · Body mass index (BMI) is a number that is calculated from a person's weight and height.  · BMI may help estimate how much of a person's weight is composed of fat. BMI can help identify those who may be at higher risk for certain medical problems.  · BMI can be measured using English measurements or metric measurements.  · BMI charts are used to identify whether you are underweight, normal " weight, overweight, or obese.  This information is not intended to replace advice given to you by your health care provider. Make sure you discuss any questions you have with your health care provider.  Document Revised: 09/09/2020 Document Reviewed: 07/17/2020  Elsevier Patient Education © 2021 [employer]

## 2021-08-27 NOTE — PROGRESS NOTES
Chief Complaint   Patient presents with   • Post-op     ileostomy        HPI  71-year-old man with marked improvement over the last several months with respect to strength and movement of his leg.  He has also gained a significant amount of weight.  He has a loop ileostomy in place in the right lower quadrant is considered for ileostomy reversal.  His colon was removed secondary to C. difficile colitis.  Subsequent ileostomy closure was attempted but as it was extremely difficult removed ileostomy was brought out for protection.  He is actually having bowel function from below.  Past Medical History:   Diagnosis Date   • Hyperlipidemia    • Hypertension    • Rectal abnormality     Rectum came out - er put back in.  9/18   • TIA (transient ischemic attack)    • UTI (urinary tract infection)        Past Surgical History:   Procedure Laterality Date   • COLON RESECTION N/A 10/26/2020    Procedure: SUBTOTAL COLECTOMY,  ILEOSTOMY, PLACEMENT OF GASTROSTOMY AND JEJEUNOSTOMY TUBES;  Surgeon: Nahum Herzog MD;  Location: [facility] OR;  Service: General;  Laterality: N/A;   • COLONOSCOPY N/A 12/10/2018    Procedure: COLONOSCOPY;  Surgeon: Jay Wilson DO;  Location: [facility] ENDOSCOPY;  Service: Gastroenterology   • COLONOSCOPY Left 10/25/2020    Procedure: COLONOSCOPY WITH ENDOSCOPIC FECAL MICROBIOTA TRANSPLANT;  Surgeon: Jay Wilson DO;  Location: [facility] OR;  Service: Gastroenterology;  Laterality: Left;   • COLONOSCOPY N/A 10/23/2020    Procedure: COLONOSCOPY WITH ENDOSCOPIC FECAL MICROBIOTA TRANSPLANT;  Surgeon: Jay Wilson DO;  Location: [facility] ENDOSCOPY;  Service: Gastroenterology;  Laterality: N/A;   • ILEOSTOMY CLOSURE N/A 1/25/2021    Procedure: ILEOSTOMY CLOSURE WITH LOOP ILEOSTOMY PLACEMENT;  Surgeon: Nahum Herzog MD;  Location: [facility] OR;  Service: General;  Laterality: N/A;   • KNEE MENISCAL REPAIR Left    • LEG THROMBECTOMY/EMBOLECTOMY N/A 1/26/2021    Procedure: femoral to femoral bypass,  bi-femoral bypass;  Surgeon: Sukhwinder Steele MD;  Location: [facility];  Service: Vascular;  Laterality: N/A;   • OTHER SURGICAL HISTORY      Loop recorder         Current Outpatient Medications:   •  apixaban (ELIQUIS) 5 MG tablet tablet, Take 1 tablet by mouth Every 12 (Twelve) Hours. Indications: Other - full anticoagulation, Disp: 60 tablet, Rfl: 1  •  Bacillus Coagulans-Inulin (ALIGN PREBIOTIC-PROBIOTIC PO), Take 1 tablet by mouth Every Night., Disp: , Rfl:   •  clopidogrel (PLAVIX) 75 MG tablet, Take 1 tablet by mouth Daily., Disp: 60 tablet, Rfl: 6  •  famotidine (PEPCID) 40 MG tablet, Take 0.5 tablets by mouth 2 (Two) Times a Day., Disp: 60 tablet, Rfl: 3  •  furosemide (LASIX) 20 MG tablet, Take 20 mg by mouth 2 (Two) Times a Day., Disp: , Rfl:   •  gabapentin (NEURONTIN) 300 MG capsule, Take 1 capsule by mouth 3 (Three) Times a Day., Disp: 270 capsule, Rfl: 3  •  oxyCODONE-acetaminophen (Percocet) 5-325 MG per tablet, Take 1 tablet by mouth Every 6 (Six) Hours As Needed for Severe Pain ., Disp: 20 tablet, Rfl: 0  •  pravastatin (PRAVACHOL) 20 MG tablet, Take 1 tablet by mouth Every Night., Disp: 60 tablet, Rfl: 6  •  tamsulosin (FLOMAX) 0.4 MG capsule 24 hr capsule, Take 1 capsule by mouth Daily., Disp: 60 capsule, Rfl: 6  •  vitamin B-12 (CYANOCOBALAMIN) 500 MCG tablet, Take 500 mcg by mouth Daily., Disp: , Rfl:   •  vitamin C (ASCORBIC ACID) 500 MG tablet, Take 500 mg by mouth Daily. 05/17/2021 - Patient states he only utilizes Vitamin C during Winter months., Disp: , Rfl:   •  vitamin D (ERGOCALCIFEROL) 1.25 MG (68093 UT) capsule capsule, Take 50,000 Units by mouth 1 (One) Time Per Week., Disp: , Rfl:   •  chlorthalidone (HYGROTEN) 50 MG tablet, Take 50 mg by mouth Daily., Disp: , Rfl:   •  cholestyramine (Questran) 4 g packet, Take 1 packet by mouth 3 (Three) Times a Day With Meals for 60 days., Disp: 90 packet, Rfl: 1  •  magnesium oxide (MAGOX) 400 (241.3 Mg) MG tablet tablet, Take 800 mg by  mouth 2 (Two) Times a Day., Disp: , Rfl:   •  ondansetron (ZOFRAN) 4 MG tablet, Take 1 tablet by mouth Every 6 (Six) Hours As Needed for Nausea or Vomiting., Disp: 20 tablet, Rfl: 0  •  potassium chloride (MICRO-K) 10 MEQ CR capsule, Take 2 capsules by mouth 2 (Two) Times a Day With Meals., Disp: 60 capsule, Rfl: 1    Current Facility-Administered Medications:   •  loperamide (IMODIUM) capsule 2 mg, 2 mg, Oral, TID PRN, Nahum Herzog MD  •  promethazine (PHENERGAN) tablet 12.5 mg, 12.5 mg, Oral, Q6H PRN, Nahum Herzog MD  •  promethazine (PHENERGAN) tablet 12.5 mg, 12.5 mg, Oral, Q6H PRN, Nahum Herzog MD    Allergies   Allergen Reactions   • Morphine Mental Status Change       Family History   Problem Relation Age of Onset   • Glaucoma Mother        Social History     Socioeconomic History   • Marital status:      Spouse name: Not on file   • Number of children: Not on file   • Years of education: Not on file   • Highest education level: Not on file   Tobacco Use   • Smoking status: Former Smoker     Years: 62.00     Types: Cigarettes     Quit date: 2018     Years since quitting: 3.6   • Smokeless tobacco: Former User     Types: Chew, Snuff   • Tobacco comment: passive   Vaping Use   • Vaping Use: Former   • Substances: Patient can not recall if Electronic Cigarette contained Nicotine, THS, CBS, or Flavoring.   • Devices: Disposable   Substance and Sexual Activity   • Alcohol use: Yes     Comment: one drink per month   • Drug use: Never   • Sexual activity: Defer     Comment: .           Physical Exam  Vitals reviewed.   Constitutional:       Appearance: Normal appearance.   Cardiovascular:      Rate and Rhythm: Normal rate and regular rhythm.   Pulmonary:      Effort: Pulmonary effort is normal. No respiratory distress.   Abdominal:      General: Abdomen is flat.      Palpations: Abdomen is soft.       Musculoskeletal:      Cervical back: Normal range of motion and neck supple.   Neurological:       Mental Status: He is alert.           ASSESSMENT    Diagnoses and all orders for this visit:    1. History of creation of ostomy (CMS/HCC) (Primary)        PLAN    1.  We will schedule her for ostomy closure in the next few weeks              This document has been electronically signed by Nahum Herzog MD on August 27, 2021 15:38 CDT

## 2021-11-23 ENCOUNTER — OFFICE VISIT (OUTPATIENT)
Dept: SURGERY | Facility: CLINIC | Age: 71
End: 2021-11-23

## 2021-11-23 VITALS
DIASTOLIC BLOOD PRESSURE: 80 MMHG | SYSTOLIC BLOOD PRESSURE: 130 MMHG | WEIGHT: 126.4 LBS | HEART RATE: 87 BPM | TEMPERATURE: 97.8 F | OXYGEN SATURATION: 97 % | HEIGHT: 66 IN | BODY MASS INDEX: 20.31 KG/M2

## 2021-11-23 DIAGNOSIS — Z93.2 ILEOSTOMY IN PLACE (HCC): Primary | ICD-10-CM

## 2021-11-23 PROCEDURE — 99213 OFFICE O/P EST LOW 20 MIN: CPT | Performed by: SURGERY

## 2021-11-23 RX ORDER — TRIAMCINOLONE ACETONIDE 1 MG/G
CREAM TOPICAL
COMMUNITY
Start: 2021-11-09

## 2021-11-23 RX ORDER — TRAZODONE HYDROCHLORIDE 50 MG/1
TABLET ORAL EVERY 24 HOURS
COMMUNITY
Start: 2021-10-28 | End: 2023-01-30 | Stop reason: ALTCHOICE

## 2021-11-23 NOTE — PATIENT INSTRUCTIONS
"BMI for Adults  What is BMI?  Body mass index (BMI) is a number that is calculated from a person's weight and height. BMI can help estimate how much of a person's weight is composed of fat. BMI does not measure body fat directly. Rather, it is an alternative to procedures that directly measure body fat, which can be difficult and expensive.  BMI can help identify people who may be at higher risk for certain medical problems.  What are BMI measurements used for?  BMI is used as a screening tool to identify possible weight problems. It helps determine whether a person is obese, overweight, a healthy weight, or underweight.  BMI is useful for:  · Identifying a weight problem that may be related to a medical condition or may increase the risk for medical problems.  · Promoting changes, such as changes in diet and exercise, to help reach a healthy weight. BMI screening can be repeated to see if these changes are working.  How is BMI calculated?  BMI involves measuring your weight in relation to your height. Both height and weight are measured, and the BMI is calculated from those numbers. This can be done either in English (U[place] or metric measurements. Note that charts and online BMI calculators are available to help you find your BMI quickly and easily without having to do these calculations yourself.  To calculate your BMI in English ([place].S[place] measurements:    1. Measure your weight in pounds (lb).  2. Multiply the number of pounds by 703.  ? For example, for a person who weighs 180 lb, multiply that number by 703, which equals 126,540.  3. Measure your height in inches. Then multiply that number by itself to get a measurement called \"inches squared.\"  ? For example, for a person who is 70 inches tall, the \"inches squared\" measurement is 70 inches x 70 inches, which equals 4,900 inches squared.  4. Divide the total from step 2 (number of lb x 703) by the total from step 3 (inches squared): 126,540 ÷ 4,900 = 25.8. This is " "your BMI.    To calculate your BMI in metric measurements:  1. Measure your weight in kilograms (kg).  2. Measure your height in meters (m). Then multiply that number by itself to get a measurement called \"meters squared.\"  ? For example, for a person who is 1.75 m tall, the \"meters squared\" measurement is 1.75 m x 1.75 m, which is equal to 3.1 meters squared.  3. Divide the number of kilograms (your weight) by the meters squared number. In this example: 70 ÷ 3.1 = 22.6. This is your BMI.  What do the results mean?  BMI charts are used to identify whether you are underweight, normal weight, overweight, or obese. The following guidelines will be used:  · Underweight: BMI less than 18.5.  · Normal weight: BMI between 18.5 and 24.9.  · Overweight: BMI between 25 and 29.9.  · Obese: BMI of 30 or above.  Keep these notes in mind:  · Weight includes both fat and muscle, so someone with a muscular build, such as an athlete, may have a BMI that is higher than 24.9. In cases like these, BMI is not an accurate measure of body fat.  · To determine if excess body fat is the cause of a BMI of 25 or higher, further assessments may need to be done by a health care provider.  · BMI is usually interpreted in the same way for men and women.  Where to find more information  For more information about BMI, including tools to quickly calculate your BMI, go to these websites:  · Centers for Disease Control and Prevention: www.cdc.gov  · American Heart Association: www.heart.org  · National Heart, Lung, and [employer] [place]: www.nhlbi.nih.gov  Summary  · Body mass index (BMI) is a number that is calculated from a person's weight and height.  · BMI may help estimate how much of a person's weight is composed of fat. BMI can help identify those who may be at higher risk for certain medical problems.  · BMI can be measured using English measurements or metric measurements.  · BMI charts are used to identify whether you are underweight, normal " weight, overweight, or obese.  This information is not intended to replace advice given to you by your health care provider. Make sure you discuss any questions you have with your health care provider.  Document Revised: 09/09/2020 Document Reviewed: 07/17/2020  Elsevier Patient Education © 2021 [employer]

## 2021-11-24 ENCOUNTER — HOSPITAL ENCOUNTER (OUTPATIENT)
Dept: GENERAL RADIOLOGY | Facility: HOSPITAL | Age: 71
Discharge: HOME | End: 2021-11-24

## 2021-11-24 DIAGNOSIS — Z93.2 ILEOSTOMY IN PLACE (HCC): ICD-10-CM

## 2021-11-24 PROCEDURE — 74270 X-RAY XM COLON 1CNTRST STD: CPT

## 2021-11-24 PROCEDURE — 0 DIATRIZOATE MEGLUMINE & SODIUM PER 1 ML: Performed by: SURGERY

## 2021-11-24 RX ADMIN — DIATRIZOATE MEGLUMINE AND DIATRIZOATE SODIUM 120 ML: 660; 100 LIQUID ORAL; RECTAL at 09:12

## 2021-11-28 NOTE — PROGRESS NOTES
Chief Complaint   Patient presents with   • Follow-up     discuss ostomy closure        HPI  71-year-old man with a complicated history of a subtotal colectomy for C. difficile colitis.  He subsequently had an ileorectal anastomosis with this was protected because of the difficulty with accomplishing this by protecting it with a loop ileostomy.  His postoperative course from that operation several months ago was complicated by embolization of clot and debris from an abdominal aortic aneurysm 3 cm in size.  After many months he was ultimately able to return to function to his leg but had to have a femorofemoral bypass to restore flow.  He now presents for the possibility of ileostomy closure.  He is ambulatory without a cane  Past Medical History:   Diagnosis Date   • Hyperlipidemia    • Hypertension    • Rectal abnormality     Rectum came out - er put back in.  9/18   • TIA (transient ischemic attack)    • UTI (urinary tract infection)        Past Surgical History:   Procedure Laterality Date   • COLON RESECTION N/A 10/26/2020    Procedure: SUBTOTAL COLECTOMY,  ILEOSTOMY, PLACEMENT OF GASTROSTOMY AND JEJEUNOSTOMY TUBES;  Surgeon: Nahum Herzog MD;  Location: [facility];  Service: General;  Laterality: N/A;   • COLONOSCOPY N/A 12/10/2018    Procedure: COLONOSCOPY;  Surgeon: Jay Wilson DO;  Location: [facility] ENDOSCOPY;  Service: Gastroenterology   • COLONOSCOPY Left 10/25/2020    Procedure: COLONOSCOPY WITH ENDOSCOPIC FECAL MICROBIOTA TRANSPLANT;  Surgeon: Jay Wilson DO;  Location: [facility] OR;  Service: Gastroenterology;  Laterality: Left;   • COLONOSCOPY N/A 10/23/2020    Procedure: COLONOSCOPY WITH ENDOSCOPIC FECAL MICROBIOTA TRANSPLANT;  Surgeon: Jay Wilson DO;  Location: [facility] ENDOSCOPY;  Service: Gastroenterology;  Laterality: N/A;   • ILEOSTOMY CLOSURE N/A 1/25/2021    Procedure: ILEOSTOMY CLOSURE WITH LOOP ILEOSTOMY PLACEMENT;  Surgeon: Nahum Herzog MD;  Location: [facility];  Service:  General;  Laterality: N/A;   • KNEE MENISCAL REPAIR Left    • LEG THROMBECTOMY/EMBOLECTOMY N/A 1/26/2021    Procedure: femoral to femoral bypass, bi-femoral bypass;  Surgeon: Sukhwinder Steele MD;  Location: [facility];  Service: Vascular;  Laterality: N/A;   • OTHER SURGICAL HISTORY      Loop recorder         Current Outpatient Medications:   •  apixaban (ELIQUIS) 5 MG tablet tablet, Take 1 tablet by mouth Every 12 (Twelve) Hours. Indications: Other - full anticoagulation, Disp: 60 tablet, Rfl: 1  •  apixaban (Eliquis) 5 MG tablet tablet, Take 1 tablet by mouth Every 12 (Twelve) Hours., Disp: , Rfl:   •  Bacillus Coagulans-Inulin (ALIGN PREBIOTIC-PROBIOTIC PO), Take 1 tablet by mouth Every Night., Disp: , Rfl:   •  chlorthalidone (HYGROTEN) 50 MG tablet, Take 50 mg by mouth Daily., Disp: , Rfl:   •  clopidogrel (PLAVIX) 75 MG tablet, Take 1 tablet by mouth Daily., Disp: 60 tablet, Rfl: 6  •  famotidine (PEPCID) 40 MG tablet, Take 0.5 tablets by mouth 2 (Two) Times a Day., Disp: 60 tablet, Rfl: 3  •  gabapentin (NEURONTIN) 300 MG capsule, Take 1 capsule by mouth 3 (Three) Times a Day., Disp: 270 capsule, Rfl: 3  •  pravastatin (PRAVACHOL) 20 MG tablet, Take 1 tablet by mouth Every Night., Disp: 60 tablet, Rfl: 6  •  tamsulosin (FLOMAX) 0.4 MG capsule 24 hr capsule, Take 1 capsule by mouth Daily., Disp: 60 capsule, Rfl: 6  •  traZODone (DESYREL) 50 MG tablet, Daily., Disp: , Rfl:   •  triamcinolone (KENALOG) 0.1 % cream, APPLY TWICE DAILY TO ECZEMA/RASH FOR UP TO TWO WEEKS A MONTH as needed for ITCHING, Disp: , Rfl:   •  vitamin B-12 (CYANOCOBALAMIN) 500 MCG tablet, Take 500 mcg by mouth Daily., Disp: , Rfl:   •  vitamin C (ASCORBIC ACID) 500 MG tablet, Take 500 mg by mouth Daily. 05/17/2021 - Patient states he only utilizes Vitamin C during Winter months., Disp: , Rfl:   •  vitamin D (ERGOCALCIFEROL) 1.25 MG (92952 UT) capsule capsule, Take 50,000 Units by mouth 1 (One) Time Per Week., Disp: , Rfl:   •   cholestyramine (Questran) 4 g packet, Take 1 packet by mouth 3 (Three) Times a Day With Meals for 60 days., Disp: 90 packet, Rfl: 1  •  furosemide (LASIX) 20 MG tablet, Take 20 mg by mouth 2 (Two) Times a Day., Disp: , Rfl:   •  magnesium oxide (MAGOX) 400 (241.3 Mg) MG tablet tablet, Take 800 mg by mouth 2 (Two) Times a Day., Disp: , Rfl:   •  ondansetron (ZOFRAN) 4 MG tablet, Take 1 tablet by mouth Every 6 (Six) Hours As Needed for Nausea or Vomiting., Disp: 20 tablet, Rfl: 0  •  oxyCODONE-acetaminophen (Percocet) 5-325 MG per tablet, Take 1 tablet by mouth Every 6 (Six) Hours As Needed for Severe Pain ., Disp: 20 tablet, Rfl: 0  •  potassium chloride (MICRO-K) 10 MEQ CR capsule, Take 2 capsules by mouth 2 (Two) Times a Day With Meals., Disp: 60 capsule, Rfl: 1    Current Facility-Administered Medications:   •  loperamide (IMODIUM) capsule 2 mg, 2 mg, Oral, TID PRN, Nahum Herzog MD  •  promethazine (PHENERGAN) tablet 12.5 mg, 12.5 mg, Oral, Q6H PRN, Nahum Herzog MD  •  promethazine (PHENERGAN) tablet 12.5 mg, 12.5 mg, Oral, Q6H PRN, Nahum Herzog MD    Allergies   Allergen Reactions   • Morphine Mental Status Change       Family History   Problem Relation Age of Onset   • Glaucoma Mother        Social History     Socioeconomic History   • Marital status:    Tobacco Use   • Smoking status: Former Smoker     Years: 62.00     Types: Cigarettes     Quit date: 2018     Years since quitting: 3.9   • Smokeless tobacco: Former User     Types: Chew, Snuff   • Tobacco comment: passive   Vaping Use   • Vaping Use: Former   • Substances: Patient can not recall if Electronic Cigarette contained Nicotine, THS, CBS, or Flavoring.   • Devices: Disposable   Substance and Sexual Activity   • Alcohol use: Yes     Comment: one drink per month   • Drug use: Never   • Sexual activity: Defer     Comment: .       Review of Systems   Constitutional: Negative for activity change and appetite change.   HENT: Positive for  dental problem. Negative for congestion, hearing loss, sneezing and sore throat.    Eyes: Negative for discharge and itching.   Respiratory: Negative for apnea, cough and choking.    Cardiovascular: Negative for chest pain and leg swelling.   Gastrointestinal: Negative for abdominal distention, abdominal pain, anal bleeding, blood in stool, constipation and diarrhea.   Genitourinary: Negative for difficulty urinating and dysuria.   Musculoskeletal: Positive for arthralgias and back pain.   Neurological: Negative for dizziness and facial asymmetry.   Hematological: Negative for adenopathy.   Psychiatric/Behavioral: Negative for agitation and behavioral problems.       Physical Exam  Vitals reviewed.   Constitutional:       Appearance: Normal appearance.   HENT:      Head: Normocephalic and atraumatic.      Nose: Nose normal.   Eyes:      Extraocular Movements: Extraocular movements intact.      Pupils: Pupils are equal, round, and reactive to light.   Cardiovascular:      Rate and Rhythm: Normal rate and regular rhythm.   Abdominal:      General: Abdomen is flat. There is no distension.      Palpations: Abdomen is soft. There is no mass.      Tenderness: There is no abdominal tenderness. There is no guarding or rebound.      Hernia: No hernia is present.       Musculoskeletal:      Cervical back: Normal range of motion and neck supple.   Neurological:      Mental Status: He is alert.           ASSESSMENT    Diagnoses and all orders for this visit:    1. Ileostomy in place (HCC) (Primary)  -     Cancel: FL Barium Enema Air Contrast; Future  -     FL Barium Enema Water Soluble; Future        PLAN    1.  Recheck following the above study              This document has been electronically signed by Nahum Herzog MD on November 27, 2021 22:09 CST

## 2021-12-15 ENCOUNTER — TELEPHONE (OUTPATIENT)
Dept: SURGERY | Facility: CLINIC | Age: 71
End: 2021-12-15

## 2021-12-29 ENCOUNTER — TELEPHONE (OUTPATIENT)
Dept: SURGERY | Facility: CLINIC | Age: 71
End: 2021-12-29

## 2022-01-17 ENCOUNTER — OFFICE VISIT (OUTPATIENT)
Dept: CARDIAC SURGERY | Facility: CLINIC | Age: 72
End: 2022-01-17

## 2022-01-17 VITALS
HEIGHT: 66 IN | HEART RATE: 58 BPM | TEMPERATURE: 97.3 F | WEIGHT: 131.4 LBS | SYSTOLIC BLOOD PRESSURE: 134 MMHG | OXYGEN SATURATION: 99 % | BODY MASS INDEX: 21.12 KG/M2 | DIASTOLIC BLOOD PRESSURE: 72 MMHG

## 2022-01-17 DIAGNOSIS — I73.9 PVD (PERIPHERAL VASCULAR DISEASE): Primary | ICD-10-CM

## 2022-01-17 DIAGNOSIS — I71.40 AAA (ABDOMINAL AORTIC ANEURYSM) WITHOUT RUPTURE: ICD-10-CM

## 2022-01-17 DIAGNOSIS — E78.2 MIXED HYPERLIPIDEMIA: ICD-10-CM

## 2022-01-17 PROCEDURE — 99214 OFFICE O/P EST MOD 30 MIN: CPT | Performed by: NURSE PRACTITIONER

## 2022-01-17 NOTE — PATIENT INSTRUCTIONS
The results of your vascular studies of your right leg shows moderate disease with fair  circulation, in the left leg shows moderatedisease with fair circulation.      If signs and symptoms of ischemia should occur including but not limited to pale/blue discoloration of limb, increasing pain with ambulation or at rest, or a non-healing wound. Patient is to notify [facility] and V[facility] center for immediate evaluation.    6 months TAMMI, Aorta US, and Bilateral arterial ultrasound.

## 2022-01-19 NOTE — PROGRESS NOTES
7/8/2021    Bar Crockett  1950    Chief Complaint:    Chief Complaint   Patient presents with   • Peripheral Vascular Disease       HPI:      PCP:  Satnam Troncoso APRN  General Surgery: Dr. Herzog    70 y.o. male with HTN(stable, increased risk stroke, rupture), Hyperlipidemia(stable, increased risk cardiovascular events), COPD(stable, increased risk pulmonary complications) and Chronic Kidney Disease(stable, increased risk renal failure)  Prior TIA 2018, loop recorder placement 2018 [facility], right leg claudication x1 year prior to admit. former smoker and quit 2018.  Prior admit 10/2020 [facility] c-difficile colitis, underwent resection. Patient admitted electively 1/25/2021 for planned colostomy reversal, low UOP during surgery, protective ileostomy placed.  Approx. midnight last night developed loss of sensation to right foot, CTA run off obtained this morning for acute thrombus R iliac artery, with likely source from the infrarenal AAA with intramural thrombus.  No prior TAMMI or vascular evaluation, AAA seen on CT abd/pelvis in 10/2020 with large amount of thrombus, R iliac vessel appears to be patent on that exam.  Acute on chronic PVD.  Underwent emergent salvage endovascular approach, unsuccessful, transitioned to open fem-fem with restoration in RLE perfusion.  Prolonged hospital stay, transitioned to rehab facility.  Now home, overall doing well.  Residual paraesthesias and numbness RLE.      8/2018 TIA, [facility]   8/2018 MARCI with bubble: Negative for intracardiac thrombus/PFO     10/2020 CT Abd/Pelv w contrast: AAA infrarenal 32mm, large amount of thrombus distal aorta  1/2021 CTA Run-off:  Infrarenal AAA 32mm with thrombus, Extension into R YOEL, R EIA with complete occlusion.  Distal run-off through collateral flow, R SFA 70% soft plaque.  1/2021 Emergent salvage fem-fem bypass  3/2021 TAMMI: 0.85 Right triphasic, mixed bi/tri distally.  Left 0.72 biphasic.  3/2021 Bilateral Art US: Fem-fem graft patent, mPSV  140cm/s proximal anastamosis  6/2021 TAMMI: Right 0.63 biphasic.  Left 0.58 biphasic pop, monophasic distally.    6/2021 Bilateral Art US: Fem fem graft patent, prox anastamosis 312cm/s, turbulent flow distal L CFA 360cm/s  7/2021 CTA Aorta runoff:  Infrarenal AAA 35mm. LCIA 20mm.   Moderate LEFT iliac disease.  RIGHT iliac occlusion.  Fem-fem bypass patent.  Bilateral SFA moderate disease.  1/2022 TAMMI: Right 0.64 biphasic.  Left 0.61 biphasic  1/2022  Bilateral Art US: fem-fem graft patent, mPSV 210cm/s R native inflow       10/2020 SUBTOTAL COLECTOMY,  ILEOSTOMY, PLACEMENT OF GASTROSTOMY AND JEJEUNOSTOMY TUBES, (Dr. Herzog)  1/2021 ILEOSTOMY CLOSURE WITH LOOP ILEOSTOMY PLACEMENT    The following portions of the patient's history were reviewed and updated as appropriate: allergies, current medications, past family history, past medical history, past social history, past surgical history and problem list.  Recent images independently reviewed.  Available laboratory values reviewed.    PMH:  Past Medical History:   Diagnosis Date   • Hyperlipidemia    • Hypertension    • Rectal abnormality     Rectum came out - er put back in.  9/18   • TIA (transient ischemic attack)    • UTI (urinary tract infection)      Past Surgical History:   Procedure Laterality Date   • COLON RESECTION N/A 10/26/2020    Procedure: SUBTOTAL COLECTOMY,  ILEOSTOMY, PLACEMENT OF GASTROSTOMY AND JEJEUNOSTOMY TUBES;  Surgeon: Nahum Herzog MD;  Location: [facility];  Service: General;  Laterality: N/A;   • COLONOSCOPY N/A 12/10/2018    Procedure: COLONOSCOPY;  Surgeon: Jay Wilson DO;  Location: [facility] ENDOSCOPY;  Service: Gastroenterology   • COLONOSCOPY Left 10/25/2020    Procedure: COLONOSCOPY WITH ENDOSCOPIC FECAL MICROBIOTA TRANSPLANT;  Surgeon: Jay Wilson DO;  Location: [facility] OR;  Service: Gastroenterology;  Laterality: Left;   • COLONOSCOPY N/A 10/23/2020    Procedure: COLONOSCOPY WITH ENDOSCOPIC FECAL MICROBIOTA TRANSPLANT;   Surgeon: Jay Wilson DO;  Location: [facility] ENDOSCOPY;  Service: Gastroenterology;  Laterality: N/A;   • ILEOSTOMY CLOSURE N/A 2021    Procedure: ILEOSTOMY CLOSURE WITH LOOP ILEOSTOMY PLACEMENT;  Surgeon: Nahum Herzog MD;  Location: [facility] OR;  Service: General;  Laterality: N/A;   • KNEE MENISCAL REPAIR Left    • LEG THROMBECTOMY/EMBOLECTOMY N/A 2021    Procedure: femoral to femoral bypass, bi-femoral bypass;  Surgeon: Sukhwinder Steele MD;  Location: [facility] OR;  Service: Vascular;  Laterality: N/A;   • OTHER SURGICAL HISTORY      Loop recorder     Family History   Problem Relation Age of Onset   • Glaucoma Mother      Social History     Tobacco Use   • Smoking status: Former Smoker     Years: 62.00     Types: Cigarettes     Quit date:      Years since quittin.0   • Smokeless tobacco: Former User     Types: Chew, Snuff   • Tobacco comment: passive   Vaping Use   • Vaping Use: Former   • Substances: Patient can not recall if Electronic Cigarette contained Nicotine, THS, CBS, or Flavoring.   • Devices: Disposable   Substance Use Topics   • Alcohol use: Yes     Comment: one drink per month   • Drug use: Never       ALLERGIES:  Allergies   Allergen Reactions   • Morphine Mental Status Change         MEDICATIONS:    Current Outpatient Medications:   •  apixaban (Eliquis) 5 MG tablet tablet, Take 1 tablet by mouth Every 12 (Twelve) Hours., Disp: , Rfl:   •  Bacillus Coagulans-Inulin (ALIGN PREBIOTIC-PROBIOTIC PO), Take 1 tablet by mouth Every Night., Disp: , Rfl:   •  chlorthalidone (HYGROTEN) 50 MG tablet, Take 50 mg by mouth Daily., Disp: , Rfl:   •  clopidogrel (PLAVIX) 75 MG tablet, Take 1 tablet by mouth Daily., Disp: 60 tablet, Rfl: 6  •  famotidine (PEPCID) 40 MG tablet, Take 0.5 tablets by mouth 2 (Two) Times a Day., Disp: 60 tablet, Rfl: 3  •  furosemide (LASIX) 20 MG tablet, Take 20 mg by mouth 2 (Two) Times a Day., Disp: , Rfl:   •  ondansetron (ZOFRAN) 4 MG tablet, Take 1 tablet  by mouth Every 6 (Six) Hours As Needed for Nausea or Vomiting., Disp: 20 tablet, Rfl: 0  •  oxyCODONE-acetaminophen (Percocet) 5-325 MG per tablet, Take 1 tablet by mouth Every 6 (Six) Hours As Needed for Severe Pain ., Disp: 20 tablet, Rfl: 0  •  tamsulosin (FLOMAX) 0.4 MG capsule 24 hr capsule, Take 1 capsule by mouth Daily., Disp: 60 capsule, Rfl: 6  •  triamcinolone (KENALOG) 0.1 % cream, APPLY TWICE DAILY TO ECZEMA/RASH FOR UP TO TWO WEEKS A MONTH as needed for ITCHING, Disp: , Rfl:   •  vitamin B-12 (CYANOCOBALAMIN) 500 MCG tablet, Take 500 mcg by mouth Daily., Disp: , Rfl:   •  vitamin C (ASCORBIC ACID) 500 MG tablet, Take 500 mg by mouth Daily. 05/17/2021 - Patient states he only utilizes Vitamin C during Winter months., Disp: , Rfl:   •  vitamin D (ERGOCALCIFEROL) 1.25 MG (02822 UT) capsule capsule, Take 50,000 Units by mouth 1 (One) Time Per Week., Disp: , Rfl:   •  cholestyramine (Questran) 4 g packet, Take 1 packet by mouth 3 (Three) Times a Day With Meals for 60 days., Disp: 90 packet, Rfl: 1  •  gabapentin (NEURONTIN) 300 MG capsule, Take 1 capsule by mouth 3 (Three) Times a Day., Disp: 270 capsule, Rfl: 3  •  magnesium oxide (MAGOX) 400 (241.3 Mg) MG tablet tablet, Take 800 mg by mouth 2 (Two) Times a Day., Disp: , Rfl:   •  pravastatin (PRAVACHOL) 20 MG tablet, Take 1 tablet by mouth Every Night., Disp: 60 tablet, Rfl: 6  •  traZODone (DESYREL) 50 MG tablet, Daily., Disp: , Rfl:     Current Facility-Administered Medications:   •  loperamide (IMODIUM) capsule 2 mg, 2 mg, Oral, TID PRN, Nahum Herzog MD  •  promethazine (PHENERGAN) tablet 12.5 mg, 12.5 mg, Oral, Q6H PRN, Nahum Herzog MD  •  promethazine (PHENERGAN) tablet 12.5 mg, 12.5 mg, Oral, Q6H PRN, Nahum Herzog MD    Review of Systems   Review of Systems   Constitutional: Positive for malaise/fatigue. Negative for weight loss.   Cardiovascular: Positive for dyspnea on exertion. Negative for chest pain and claudication.   Respiratory: Positive  "for shortness of breath. Negative for cough.    Skin: Positive for color change and unusual hair distribution. Negative for poor wound healing.   Neurological: Positive for numbness and paresthesias. Negative for dizziness and weakness.       Physical Exam   Vitals:    01/17/22 1343   BP: 134/72   BP Location: Left arm   Pulse: 58   Temp: 97.3 °F (36.3 °C)   TempSrc: Infrared   SpO2: 99%   Weight: 59.6 kg (131 lb 6.4 oz)   Height: 167.6 cm (66\")     Body surface area is 1.67 meters squared.  Body mass index is 21.21 kg/m².  Physical Exam  Constitutional:       General: He is not in acute distress.     Appearance: He is not ill-appearing.   HENT:      Right Ear: Hearing normal.      Left Ear: Hearing normal.      Nose: No nasal deformity.      Mouth/Throat:      Dentition: Normal dentition. Does not have dentures.   Cardiovascular:      Rate and Rhythm: Normal rate and regular rhythm.      Pulses:           Carotid pulses are 2+ on the right side and 2+ on the left side.       Radial pulses are 2+ on the right side and 2+ on the left side.        Dorsalis pedis pulses are 1+ on the right side and 1+ on the left side.        Posterior tibial pulses are 1+ on the right side and 1+ on the left side.      Heart sounds: No murmur heard.      Pulmonary:      Effort: Pulmonary effort is normal.      Breath sounds: Normal breath sounds.   Abdominal:      General: There is no distension.      Palpations: Abdomen is soft. There is no mass.      Tenderness: There is no abdominal tenderness.   Musculoskeletal:         General: No deformity.      Comments: Gait normal.    Skin:     General: Skin is warm and dry.      Coloration: Skin is not pale.      Findings: No erythema.      Comments: No venous staining   Neurological:      Mental Status: He is alert and oriented to person, place, and time.   Psychiatric:         Speech: Speech normal.         Behavior: Behavior is cooperative.         Thought Content: Thought content normal. "         Judgment: Judgment normal.         BUN   Date Value Ref Range Status   07/20/2021 18 8 - 23 mg/dL Final   02/11/2021 22 7 - 25 mg/dL Final     Creatinine   Date Value Ref Range Status   07/20/2021 1.85 (H) 0.76 - 1.27 mg/dL Final   02/11/2021 2.3 (H) 0.7 - 1.3 mg/dL Final     eGFR Non  Amer   Date Value Ref Range Status   07/20/2021 36 (L) >60 mL/min/1.73 Final     eGFR African Am   Date Value Ref Range Status   02/11/2021 34 mL/min Final     Comment:         GFR    WITH KIDNEY DAMAGE     W/O DAMAGE  >=90         STAGE 1            NORMAL  60-89        STAGE 2            DEC GFR  30-59        STAGE 3            STAGE 3  15-29        STAGE 4            STAGE 4  <15          STAGE 5            STAGE 5      The MDRD GFR formula is valid only for adults between age 18 and 70.       ASSESSMENT/PLAN:  1/2022 TAMMI: Right 0.64 biphasic.  Left 0.61 biphasic  1/2022  Bilateral Art US: fem-fem graft patent, mPSV 210cm/s R native inflow    1. PVD (peripheral vascular disease) (HCC)  Moderate reduction to bilateral lower extremity perfusion.   Plavix, eliquis, Statin    Patent, fem-fem graft, moderate bilateral femoral disease.  Stable symptoms.     Repeat TAMMI in graft duplex in 6 months    - Doppler Ankle Brachial Index Single Level CAR; Future  - Duplex Lower Extremity Art / Grafts - Bilateral CAR; Future    2. AAA (abdominal aortic aneurysm) without rupture (HCC)  Detailed discussion with Bar Crockett regarding situation and options.  Aneurysm abdominal aorta.  Surgical intervention not indicated at this time.  Will plan to follow with interval imaging.  Smoking cessation, lipid management, BP control in 120 range advised.  Discussed symptoms of rupture, details of surgical repair including endovascular and open repair.  Risks, benefits discussed.  Understands and wishes to proceed with plan    Return in 6 months with US Aorta    - Duplex Aorta IVC Iliac Graft Limited CAR; Future    3. Mixed  hyperlipidemia  Lipid-lowering therapy has been proven beneficial in patients with cardio-vascular disease. Current guidelines recommend statin treatment for all patients with PAD,CAD and carotid stenosis. Statins are beneficial in preventing cardiovascular events, increasing functional capacity and lower the risk of adverse limb loss in PAD. Statins decrease the progression of plaque formation and may improve peripheral vessel lining, and aid in reversing atherosclerosis.          This document has been electronically signed by Jone Caceres [id number]-BC @  On January 19, 2022 15:14 CST

## 2022-01-28 ENCOUNTER — LAB REQUISITION (OUTPATIENT)
Dept: LAB | Facility: HOSPITAL | Age: 72
End: 2022-01-28

## 2022-01-28 DIAGNOSIS — K51.00 ULCERATIVE (CHRONIC) PANCOLITIS WITHOUT COMPLICATIONS: ICD-10-CM

## 2022-01-28 DIAGNOSIS — N17.0 ACUTE KIDNEY FAILURE WITH TUBULAR NECROSIS: ICD-10-CM

## 2022-01-28 DIAGNOSIS — I77.9 DISORDER OF ARTERIES AND ARTERIOLES, UNSPECIFIED: ICD-10-CM

## 2022-01-28 DIAGNOSIS — I48.0 PAROXYSMAL ATRIAL FIBRILLATION: ICD-10-CM

## 2022-01-28 DIAGNOSIS — I10 ESSENTIAL (PRIMARY) HYPERTENSION: ICD-10-CM

## 2022-01-28 DIAGNOSIS — N18.32 CHRONIC KIDNEY DISEASE, STAGE 3B: ICD-10-CM

## 2022-01-28 LAB
25(OH)D3 SERPL-MCNC: 57.3 NG/ML (ref 30–100)
ANION GAP SERPL CALCULATED.3IONS-SCNC: 8 MMOL/L (ref 5–15)
BUN SERPL-MCNC: 21 MG/DL (ref 8–23)
BUN/CREAT SERPL: 11.1 (ref 7–25)
CALCIUM SPEC-SCNC: 9.5 MG/DL (ref 8.6–10.5)
CHLORIDE SERPL-SCNC: 106 MMOL/L (ref 98–107)
CO2 SERPL-SCNC: 27 MMOL/L (ref 22–29)
CREAT SERPL-MCNC: 1.9 MG/DL (ref 0.76–1.27)
CREAT UR-MCNC: 179.5 MG/DL
GFR SERPL CREATININE-BSD FRML MDRD: 35 ML/MIN/1.73
GLUCOSE SERPL-MCNC: 125 MG/DL (ref 65–99)
MAGNESIUM SERPL-MCNC: 1.9 MG/DL (ref 1.6–2.4)
PHOSPHATE SERPL-MCNC: 3.1 MG/DL (ref 2.5–4.5)
POTASSIUM SERPL-SCNC: 4.4 MMOL/L (ref 3.5–5.2)
PROT ?TM UR-MCNC: 33 MG/DL
PROT/CREAT UR: 183.8 MG/G CREA (ref 0–200)
SODIUM SERPL-SCNC: 141 MMOL/L (ref 136–145)

## 2022-01-28 PROCEDURE — 83735 ASSAY OF MAGNESIUM: CPT | Performed by: INTERNAL MEDICINE

## 2022-01-28 PROCEDURE — 36415 COLL VENOUS BLD VENIPUNCTURE: CPT | Performed by: INTERNAL MEDICINE

## 2022-01-28 PROCEDURE — 80048 BASIC METABOLIC PNL TOTAL CA: CPT | Performed by: INTERNAL MEDICINE

## 2022-01-28 PROCEDURE — 82306 VITAMIN D 25 HYDROXY: CPT | Performed by: INTERNAL MEDICINE

## 2022-01-28 PROCEDURE — 84156 ASSAY OF PROTEIN URINE: CPT | Performed by: INTERNAL MEDICINE

## 2022-01-28 PROCEDURE — 82570 ASSAY OF URINE CREATININE: CPT | Performed by: INTERNAL MEDICINE

## 2022-01-28 PROCEDURE — 84100 ASSAY OF PHOSPHORUS: CPT | Performed by: INTERNAL MEDICINE

## 2022-02-02 ENCOUNTER — OFFICE VISIT (OUTPATIENT)
Dept: SURGERY | Facility: CLINIC | Age: 72
End: 2022-02-02

## 2022-02-02 VITALS
OXYGEN SATURATION: 98 % | SYSTOLIC BLOOD PRESSURE: 124 MMHG | HEIGHT: 66 IN | HEART RATE: 65 BPM | DIASTOLIC BLOOD PRESSURE: 70 MMHG | BODY MASS INDEX: 21.31 KG/M2 | WEIGHT: 132.6 LBS

## 2022-02-02 DIAGNOSIS — A04.72 C. DIFFICILE COLITIS: ICD-10-CM

## 2022-02-02 DIAGNOSIS — F33.1 MODERATE EPISODE OF RECURRENT MAJOR DEPRESSIVE DISORDER: ICD-10-CM

## 2022-02-02 DIAGNOSIS — N18.31 STAGE 3A CHRONIC KIDNEY DISEASE: ICD-10-CM

## 2022-02-02 DIAGNOSIS — I48.11 LONGSTANDING PERSISTENT ATRIAL FIBRILLATION: ICD-10-CM

## 2022-02-02 DIAGNOSIS — I70.229 CRITICAL LOWER LIMB ISCHEMIA: ICD-10-CM

## 2022-02-02 DIAGNOSIS — I71.40 AAA (ABDOMINAL AORTIC ANEURYSM) WITHOUT RUPTURE: ICD-10-CM

## 2022-02-02 DIAGNOSIS — I73.9 PVD (PERIPHERAL VASCULAR DISEASE): ICD-10-CM

## 2022-02-02 DIAGNOSIS — Z93.2 ILEOSTOMY IN PLACE: Primary | ICD-10-CM

## 2022-02-02 PROCEDURE — 99213 OFFICE O/P EST LOW 20 MIN: CPT | Performed by: SURGERY

## 2022-02-02 NOTE — PATIENT INSTRUCTIONS
"BMI for Adults  What is BMI?  Body mass index (BMI) is a number that is calculated from a person's weight and height. BMI can help estimate how much of a person's weight is composed of fat. BMI does not measure body fat directly. Rather, it is an alternative to procedures that directly measure body fat, which can be difficult and expensive.  BMI can help identify people who may be at higher risk for certain medical problems.  What are BMI measurements used for?  BMI is used as a screening tool to identify possible weight problems. It helps determine whether a person is obese, overweight, a healthy weight, or underweight.  BMI is useful for:  · Identifying a weight problem that may be related to a medical condition or may increase the risk for medical problems.  · Promoting changes, such as changes in diet and exercise, to help reach a healthy weight. BMI screening can be repeated to see if these changes are working.  How is BMI calculated?  BMI involves measuring your weight in relation to your height. Both height and weight are measured, and the BMI is calculated from those numbers. This can be done either in English ([place] or metric measurements. Note that charts and online BMI calculators are available to help you find your BMI quickly and easily without having to do these calculations yourself.  To calculate your BMI in English ([place] measurements:    1. Measure your weight in pounds (lb).  2. Multiply the number of pounds by 703.  ? For example, for a person who weighs 180 lb, multiply that number by 703, which equals 126,540.  3. Measure your height in inches. Then multiply that number by itself to get a measurement called \"inches squared.\"  ? For example, for a person who is 70 inches tall, the \"inches squared\" measurement is 70 inches x 70 inches, which equals 4,900 inches squared.  4. Divide the total from step 2 (number of lb x 703) by the total from step 3 (inches squared): 126,540 ÷ 4,900 = 25.8. This is " "your BMI.    To calculate your BMI in metric measurements:  1. Measure your weight in kilograms (kg).  2. Measure your height in meters (m). Then multiply that number by itself to get a measurement called \"meters squared.\"  ? For example, for a person who is 1.75 m tall, the \"meters squared\" measurement is 1.75 m x 1.75 m, which is equal to 3.1 meters squared.  3. Divide the number of kilograms (your weight) by the meters squared number. In this example: 70 ÷ 3.1 = 22.6. This is your BMI.  What do the results mean?  BMI charts are used to identify whether you are underweight, normal weight, overweight, or obese. The following guidelines will be used:  · Underweight: BMI less than 18.5.  · Normal weight: BMI between 18.5 and 24.9.  · Overweight: BMI between 25 and 29.9.  · Obese: BMI of 30 or above.  Keep these notes in mind:  · Weight includes both fat and muscle, so someone with a muscular build, such as an athlete, may have a BMI that is higher than 24.9. In cases like these, BMI is not an accurate measure of body fat.  · To determine if excess body fat is the cause of a BMI of 25 or higher, further assessments may need to be done by a health care provider.  · BMI is usually interpreted in the same way for men and women.  Where to find more information  For more information about BMI, including tools to quickly calculate your BMI, go to these websites:  · Centers for Disease Control and Prevention: www.cdc.gov  · American Heart Association: www.heart.org  · National Heart, Lung, and Blood [place]: www.nhlbi.nih.gov  Summary  · Body mass index (BMI) is a number that is calculated from a person's weight and height.  · BMI may help estimate how much of a person's weight is composed of fat. BMI can help identify those who may be at higher risk for certain medical problems.  · BMI can be measured using English measurements or metric measurements.  · BMI charts are used to identify whether you are underweight, normal " weight, overweight, or obese.  This information is not intended to replace advice given to you by your health care provider. Make sure you discuss any questions you have with your health care provider.  Document Revised: 09/09/2020 Document Reviewed: 07/17/2020  Elsevier Patient Education © 2021 [employer]

## 2022-02-04 NOTE — PROGRESS NOTES
Chief Complaint   Patient presents with   • Follow-up        HPI  71-year-old man with a complicated medical history that began last September when he developed C. difficile colitis and toxic megacolon after a round of antibiotics.  He required total abdominal colectomy with ileostomy.  Postoperatively, he improved to the point of being able to have an ileostomy closure.  This proved to be extremely difficult because of severe adhesions and a temporary right transverse loop colostomy was also performed.  In the immediate postoperative period from that procedure, he developed complete paralysis of the right leg likely secondary to emboli from a 3 cm abdominal aortic aneurysm.  He required femorofemoral bypass to revascularize his right lower extremity and ultimately has regained almost 100% function in that leg.  He was worked up several months ago for the possibility of ileostomy closure.  He was studied with a Hypaque enema that demonstrated poor flow to the level of the ostomy.  Subsequent to that he began having small pieces of plastic pass from the rectum where a balloon likely ruptured during the Hypaque enema.  This is otherwise been asymptomatic.  And desires to be considered for the possibility of ileostomy closure.    Most recent imaging:    Study Result    Narrative & Impression   Water-soluble enema.     HISTORY: Assess rectal stump.     Water-soluble contrast is introduced into the rectum. There is  obstruction at the rectosigmoid junction approximately 7 cm  proximal to the anal verge.     There is no evidence of extravasation.     These findings discussed with Dr. Herzog at 11:41 AM.     IMPRESSION:  As above.     Electronically signed by:  Howard Douglas MD  11/24/2021 11:42 AM  [facility] Workstation: [id number]     I have personally reviewed the imaging and concur with the radiologist's findings.    Past Medical History:   Diagnosis Date   • Hyperlipidemia    • Hypertension    • Rectal abnormality     Rectum  came out - er put back in.  9/18   • TIA (transient ischemic attack)    • UTI (urinary tract infection)        Past Surgical History:   Procedure Laterality Date   • COLON RESECTION N/A 10/26/2020    Procedure: SUBTOTAL COLECTOMY,  ILEOSTOMY, PLACEMENT OF GASTROSTOMY AND JEJEUNOSTOMY TUBES;  Surgeon: Nahum Herzog MD;  Location: [facility];  Service: General;  Laterality: N/A;   • COLONOSCOPY N/A 12/10/2018    Procedure: COLONOSCOPY;  Surgeon: Jay Wilson DO;  Location: [facility] ENDOSCOPY;  Service: Gastroenterology   • COLONOSCOPY Left 10/25/2020    Procedure: COLONOSCOPY WITH ENDOSCOPIC FECAL MICROBIOTA TRANSPLANT;  Surgeon: Jay Wilson DO;  Location: [facility] OR;  Service: Gastroenterology;  Laterality: Left;   • COLONOSCOPY N/A 10/23/2020    Procedure: COLONOSCOPY WITH ENDOSCOPIC FECAL MICROBIOTA TRANSPLANT;  Surgeon: Jay Wilson DO;  Location: [facility] ENDOSCOPY;  Service: Gastroenterology;  Laterality: N/A;   • ILEOSTOMY CLOSURE N/A 1/25/2021    Procedure: ILEOSTOMY CLOSURE WITH LOOP ILEOSTOMY PLACEMENT;  Surgeon: Nahum Herzog MD;  Location: [facility] OR;  Service: General;  Laterality: N/A;   • KNEE MENISCAL REPAIR Left    • LEG THROMBECTOMY/EMBOLECTOMY N/A 1/26/2021    Procedure: femoral to femoral bypass, bi-femoral bypass;  Surgeon: Sukhwinder Steele MD;  Location: [facility];  Service: Vascular;  Laterality: N/A;   • OTHER SURGICAL HISTORY      Loop recorder         Current Outpatient Medications:   •  apixaban (Eliquis) 5 MG tablet tablet, Take 1 tablet by mouth Every 12 (Twelve) Hours., Disp: , Rfl:   •  Bacillus Coagulans-Inulin (ALIGN PREBIOTIC-PROBIOTIC PO), Take 1 tablet by mouth Every Night., Disp: , Rfl:   •  chlorthalidone (HYGROTEN) 50 MG tablet, Take 50 mg by mouth Daily., Disp: , Rfl:   •  clopidogrel (PLAVIX) 75 MG tablet, Take 1 tablet by mouth Daily., Disp: 60 tablet, Rfl: 6  •  famotidine (PEPCID) 40 MG tablet, Take 0.5 tablets by mouth 2 (Two) Times a Day., Disp: 60  tablet, Rfl: 3  •  furosemide (LASIX) 20 MG tablet, Take 20 mg by mouth 2 (Two) Times a Day., Disp: , Rfl:   •  gabapentin (NEURONTIN) 300 MG capsule, Take 1 capsule by mouth 3 (Three) Times a Day., Disp: 270 capsule, Rfl: 3  •  magnesium oxide (MAGOX) 400 (241.3 Mg) MG tablet tablet, Take 800 mg by mouth 2 (Two) Times a Day., Disp: , Rfl:   •  pravastatin (PRAVACHOL) 20 MG tablet, Take 1 tablet by mouth Every Night., Disp: 60 tablet, Rfl: 6  •  tamsulosin (FLOMAX) 0.4 MG capsule 24 hr capsule, Take 1 capsule by mouth Daily., Disp: 60 capsule, Rfl: 6  •  traZODone (DESYREL) 50 MG tablet, Daily., Disp: , Rfl:   •  triamcinolone (KENALOG) 0.1 % cream, APPLY TWICE DAILY TO ECZEMA/RASH FOR UP TO TWO WEEKS A MONTH as needed for ITCHING, Disp: , Rfl:   •  vitamin B-12 (CYANOCOBALAMIN) 500 MCG tablet, Take 500 mcg by mouth Daily., Disp: , Rfl:   •  vitamin C (ASCORBIC ACID) 500 MG tablet, Take 500 mg by mouth Daily. 05/17/2021 - Patient states he only utilizes Vitamin C during Winter months., Disp: , Rfl:   •  vitamin D (ERGOCALCIFEROL) 1.25 MG (39666 UT) capsule capsule, Take 50,000 Units by mouth 1 (One) Time Per Week., Disp: , Rfl:   •  cholestyramine (Questran) 4 g packet, Take 1 packet by mouth 3 (Three) Times a Day With Meals for 60 days., Disp: 90 packet, Rfl: 1  •  ondansetron (ZOFRAN) 4 MG tablet, Take 1 tablet by mouth Every 6 (Six) Hours As Needed for Nausea or Vomiting., Disp: 20 tablet, Rfl: 0  •  oxyCODONE-acetaminophen (Percocet) 5-325 MG per tablet, Take 1 tablet by mouth Every 6 (Six) Hours As Needed for Severe Pain ., Disp: 20 tablet, Rfl: 0    Current Facility-Administered Medications:   •  loperamide (IMODIUM) capsule 2 mg, 2 mg, Oral, TID PRN, Nahum Herzog MD  •  promethazine (PHENERGAN) tablet 12.5 mg, 12.5 mg, Oral, Q6H PRN, Nahum Herzog MD  •  promethazine (PHENERGAN) tablet 12.5 mg, 12.5 mg, Oral, Q6H PRN, Nahum Herzog MD    Allergies   Allergen Reactions   • Morphine Mental Status Change        Family History   Problem Relation Age of Onset   • Glaucoma Mother        Social History     Socioeconomic History   • Marital status:    Tobacco Use   • Smoking status: Former Smoker     Years: 62.00     Types: Cigarettes     Quit date:      Years since quittin.0   • Smokeless tobacco: Former User     Types: Chew, Snuff   • Tobacco comment: passive   Vaping Use   • Vaping Use: Former   • Substances: Patient can not recall if Electronic Cigarette contained Nicotine, THS, CBS, or Flavoring.   • Devices: Disposable   Substance and Sexual Activity   • Alcohol use: Yes     Comment: one drink per month   • Drug use: Never   • Sexual activity: Defer     Comment: .       Review of Systems   Gastrointestinal: Positive for diarrhea. Negative for abdominal distention, abdominal pain, anal bleeding, blood in stool, constipation, nausea, rectal pain and vomiting.       Physical Exam  Abdominal:      General: Abdomen is flat. There is no distension.      Palpations: Abdomen is soft. There is no mass.      Tenderness: There is no abdominal tenderness. There is no guarding or rebound.      Hernia: No hernia is present.               ASSESSMENT    Diagnoses and all orders for this visit:    1. Ileostomy in place ([facility]) (Primary)    2. PVD (peripheral vascular disease) ([facility])    3. Longstanding persistent atrial fibrillation ([facility])    4. History of critical lower limb ischemia ([facility])    5. AAA (abdominal aortic aneurysm) without rupture ([facility])    6. Stage 3a chronic kidney disease ([facility])    7. Moderate episode of recurrent major depressive disorder ([facility])    8.  History of C. difficile colitis        PLAN    1.  I think his multiple medical problems make him a prohibitive operative risk.  Moreover, with the small amount of colon and rectum that have been left, the likelihood of a large amount of diarrhea after closure would be high.  Even closure would be difficult because of the possibility of persistent  obstruction at the rectosigmoid anastomosis.  He is maintaining his weight currently and although he clearly does not like his ostomy, I think it is his best option.  2.  I will gladly see him in 4 months              This document has been electronically signed by Nahum Herzog MD on February 3, 2022 19:51 CST

## 2022-04-26 ENCOUNTER — TELEPHONE (OUTPATIENT)
Dept: SURGERY | Facility: CLINIC | Age: 72
End: 2022-04-26

## 2022-05-26 NOTE — THERAPY TREATMENT NOTE
Acute Care - Physical Therapy Treatment Note  [facility]     Patient Name: Bar Crockett  : 1950  MRN: [id number]  Today's Date: 2/3/2021           PT Assessment (last 12 hours)      PT Evaluation and Treatment     Row Name 21 1004          Physical Therapy Time and Intention    Document Type  therapy note (daily note)  -     Mode of Treatment  physical therapy;individual therapy  -     Patient Effort  adequate  -     Row Name 21 1004          General Information    Patient Profile Reviewed  yes  -     Existing Precautions/Restrictions  fall  -     Row Name 21 1004          Cognition    Affect/Mental Status (Cognitive)  WFL  -     Orientation Status (Cognition)  oriented x 4  -     Personal Safety Interventions  fall prevention program maintained;gait belt;muscle strengthening facilitated;nonskid shoes/slippers when out of bed;supervised activity  -[facility] Name 21 1004          Pain Scale: Numbers Pre/Post-Treatment    Pretreatment Pain Rating  0/10 - no pain  -     Posttreatment Pain Rating  0/10 - no pain  -[facility] Name 21 1004          Bed Mobility    Bed Mobility  supine-sit;sit-supine;scooting/bridging  -     Scooting/Bridging [place] (Bed Mobility)  standby assist  -     Supine-Sit [place] (Bed Mobility)  minimum assist (75% patient effort)  -     Sit-Supine [place] (Bed Mobility)  minimum assist (75% patient effort)  -     Assistive Device (Bed Mobility)  bed rails;head of bed elevated  -[facility] Name 21 1004          Transfers    Transfers  sit-stand transfer;stand-sit transfer  -     Sit-Stand [place] (Transfers)  contact guard;minimum assist (75% patient effort);verbal cues  -     Stand-Sit [place] (Transfers)  contact guard;minimum assist (75% patient effort);verbal cues  -     Row Name 21 1004          Sit-Stand Transfer    Assistive Device (Sit-Stand Transfers)  walker, front-wheeled   -KODAK     Row Name 02/03/21 1004          Stand-Sit Transfer    Assistive Device (Stand-Sit Transfers)  walker, front-wheeled  -KODAK     Row Name 02/03/21 1004          Gait/Stairs (Locomotion)    Gait/Stairs Locomotion  gait/ambulation independence;gait/ambulation assistive device;distance ambulated;gait pattern;gait deviations;maintains weight-bearing status;stairs negotiation  -KODAK     [place] Level (Gait)  minimum assist (75% patient effort)  -KODAK     Assistive Device (Gait)  walker, front-wheeled  -KODAK     Distance in Feet (Gait)  45  -KODAK     Pattern (Gait)  step-to  -KODAK     Deviations/Abnormal Patterns (Gait)  antalgic;gait speed decreased;stride length decreased  -KODAK     Bilateral Gait Deviations  hip hiking R  -KODAK     Right Sided Gait Deviations  -- dec knee flexion and DF on R with gait. R LE abducted  -KODAK     Row Name 02/03/21 1004          Motor Skills    Therapeutic Exercise  -- ankle DF/PF, hip Ab/AD, HS, SAQ, hip IR/ER- 10x1  -KODAK     Additional Documentation  -- manual resistance and stretching incorporated in ther ex  -KODAK     Row Name             Wound 01/25/21 1316 abdomen Incision    Wound - Properties Group Placement Date: 01/25/21  -MS Placement Time: 1316  -MS Present on Hospital Admission: N  -MS Location: abdomen  -MS Primary Wound Type: Incision  -MS    Retired Wound - Properties Group Date first assessed: 01/25/21  -MS Time first assessed: 1316  -MS Present on Hospital Admission: N  -MS Location: abdomen  -MS Primary Wound Type: Incision  -MS    Row Name             Wound 01/26/21 1839 Bilateral other (see comments) groin    Wound - Properties Group Placement Date: 01/26/21  -SB Placement Time: 1839  -SB Present on Hospital Admission: N  -SB Side: Bilateral  -SB Orientation: other (see comments)  -SB, bilateral groin  Location: groin  -SB    Retired Wound - Properties Group Date first assessed: 01/26/21  -SB Time first assessed: 1839  -SB Present on Hospital Admission: N  -SB Side: Bilateral   -SB Location: groin  -SB    Row Name             [REMOVED] NPWT (Negative Pressure Wound Therapy) 01/26/21 2000 bilateral groin    NPWT (Negative Pressure Wound Therapy) - Properties Group Placement Date: 01/26/21  -BW Placement Time: 2000 -BW Location: bilateral groin  -BW Removal Date: 02/03/21  -LC Removal Time: 1238  -LC    Retired NPWT (Negative Pressure Wound Therapy) - Properties Group Placement Date: 01/26/21  -BW Placement Time: 2000 -BW Location: bilateral groin  -BW Removal Date: 02/03/21  -LC Removal Time: 1238  -LC    Row Name 02/03/21 1004          Plan of Care Review    Plan of Care Reviewed With  patient  -KODAK     Progress  improving  -KODAK     Outcome Summary  pt agreeable to PT. pt is slowly progressing with increased gait distance to 45 ft min A w/ RW. pt is inconsistent and lacking in the ability to move R LE. seems questionable how much effort pt puts forth during exercise w/ R LE but difficult to say. pt requires Marilin for bed mobility and CGA/min A for sit-stand-sit. vitals stable throughout tx. pt woould continue to benefit from PT services.   -KODAK     Row Name 02/03/21 1004          Vital Signs    Pre Systolic BP Rehab  123  -KODAK     Pre Treatment Diastolic BP  76  -KODAK     Post Systolic BP Rehab  133  -KODAK     Post Treatment Diastolic BP  73  -KODAK     Pretreatment Heart Rate (beats/min)  86  -KODAK     Posttreatment Heart Rate (beats/min)  84  -KOADK     Pre SpO2 (%)  96  -KODAK     O2 Delivery Pre Treatment  room air  -KODAK     Post SpO2 (%)  94  -KODAK     O2 Delivery Post Treatment  room air  -KODAK     Pre Patient Position  Supine  -KODAK     Post Patient Position  Supine  -KODAK     Row Name 02/03/21 1004          Bed Mobility Goal 1 (PT)    Activity/Assistive Device (Bed Mobility Goal 1, PT)  sit to supine/supine to sit  -KODAK     [place] Level/Cues Needed (Bed Mobility Goal 1, PT)  modified independence  -KODAK     Time Frame (Bed Mobility Goal 1, PT)  by discharge  -KODAK     Strategies/Barriers (Bed Mobility Goal  1, PT)  RLE paralysis.  -KODAK     Progress/Outcomes (Bed Mobility Goal 1, PT)  goal not met  -     Row Name 02/03/21 1004          Transfer Goal 1 (PT)    Activity/Assistive Device (Transfer Goal 1, PT)  sit-to-stand/stand-to-sit;bed-to-chair/chair-to-bed;walker, rolling  -KODAK     Sac Level/Cues Needed (Transfer Goal 1, PT)  contact guard assist  -KODAK     Time Frame (Transfer Goal 1, PT)  by discharge  -KODAK     Strategies/Barriers (Transfers Goal 1, PT)  RLE paralysis, impulsive.  -KODAK     Progress/Outcome (Transfer Goal 1, PT)  goal not met  -     Row Name 02/03/21 1004          Gait Training Goal 1 (PT)    Activity/Assistive Device (Gait Training Goal 1, PT)  walker, rolling  -KODAK     Sac Level (Gait Training Goal 1, PT)  contact guard assist  -KODAK     Distance (Gait Training Goal 1, PT)  25'x1.  -KODAK     Time Frame (Gait Training Goal 1, PT)  by discharge  -KODAK     Strategies/Barriers (Gait Training Goal 1, PT)  RLE paralysis, impulsive.  -KODAK     Progress/Outcome (Gait Training Goal 1, PT)  goal not met  -KODAK     Row Name 02/03/21 1004          Stairs Goal 1 (PT)    Activity/Assistive Device (Stairs Goal 1, PT)  walker, rolling  -KODAK     [place]ac Level/Cues Needed (Stairs Goal 1, PT)  contact guard assist  -KODAK     Number of Stairs (Stairs Goal 1, PT)  1 steps, may use FWW.  -KODAK     Time Frame (Stairs Goal 1, PT)  by discharge  -KODAK     Strategies/Barriers (Stairs Goal 1, PT)  RLE paralysis, impulsive.  -KODAK     Progress/Outcome (Stairs Goal 1, PT)  goal not met  -KODAK     Row Name 02/03/21 1004          Positioning and Restraints    Pre-Treatment Position  in bed  -KODAK     Post Treatment Position  bed  -KODAK     In Bed  fowlers;encouraged to call for assist;call light within reach;exit alarm on all needs met.   -     Row Name 02/03/21 1004          Therapy Assessment/Plan (PT)    Rehab Potential (PT)  good, to achieve stated therapy goals  -KODAK       User Key  (r) = Recorded By, (t) = Taken By, (c) =  Cosigned By    Initials Name Provider Type    LC Yadi Patel RN Registered Nurse    Mirza Salinas PTA Physical Therapy Assistant    Mallory Nguyen, RN Registered Nurse    Liz Persaud RN Registered Nurse    Cat Spence, RN Registered Nurse        Physical Therapy Education                 Title: PT OT SLP Therapies (In Progress)     Topic: Physical Therapy (In Progress)     Point: Mobility training (In Progress)     Learning Progress Summary           Patient Acceptance, E, NR by KODAK at 2/3/2021 1244    Acceptance, E, NR by KODAK at 2/2/2021 1255    Acceptance, E, NR by KODAK at 1/31/2021 1202    Acceptance, E, NR by KODAK at 1/30/2021 1301    Acceptance, E, VU,NR by  at 1/26/2021 1048    Comment: Educated on proper gait mechanics and attending to R LE to ensure hip/knee extension during SLS.                   Point: Home exercise program (Not Started)     Learner Progress:  Not documented in this visit.          Point: Body mechanics (Not Started)     Learner Progress:  Not documented in this visit.          Point: Precautions (Not Started)     Learner Progress:  Not documented in this visit.                      User Key     Initials Effective Dates Name Provider Type Discipline     03/07/18 -  Mirza Kennedy PTA Physical Therapy Assistant PT    CZ 04/03/18 -  Ismael Morillo PT Physical Therapist PT              PT Recommendation and Plan  Anticipated Discharge Disposition (PT): home with 24/7 care, inpatient rehabilitation facility, home with assist  Plan of Care Reviewed With: patient  Progress: improving  Outcome Summary: pt agreeable to PT. pt is slowly progressing with increased gait distance to 45 ft min A w/ RW. pt is inconsistent and lacking in the ability to move R LE. seems questionable how much effort pt puts forth during exercise w/ R LE but difficult to say. pt requires Marilin for bed mobility and CGA/min A for sit-stand-sit. vitals stable throughout tx. pt woould  continue to benefit from PT services.   Outcome Measures     Row Name 02/03/21 1200 02/02/21 0950          How much help from another person do you currently need...    Turning from your back to your side while in flat bed without using bedrails?  3  -KODAK  3  -KODAK     Moving from lying on back to sitting on the side of a flat bed without bedrails?  3  -KODAK  3  -KODAK     Moving to and from a bed to a chair (including a wheelchair)?  3  -KODAK  3  -KODAK     Standing up from a chair using your arms (e.g., wheelchair, bedside chair)?  3  -KODAK  3  -KODAK     Climbing 3-5 steps with a railing?  2  -KODAK  2  -KODAK     To walk in hospital room?  2  -KODAK  3  -KODAK     AM-PAC 6 Clicks Score (PT)  16  -KODAK  17  -KODAK        Functional Assessment    Outcome Measure Options  AM-PAC 6 Clicks Basic Mobility (PT)  -KODAK  AM-PAC 6 Clicks Basic Mobility (PT)  -KODAK       User Key  (r) = Recorded By, (t) = Taken By, (c) = Cosigned By    Initials Name Provider Type    Mirza Salinas PTA Physical Therapy Assistant           Time Calculation:   PT Charges     Row Name 02/03/21 1248             Time Calculation    Start Time  1004  -KODAK      Stop Time  1047  -KODAK      Time Calculation (min)  43 min  -KODAK         Time Calculation- PT    Total Timed Code Minutes- PT  43 minute(s)  -KODAK        User Key  (r) = Recorded By, (t) = Taken By, (c) = Cosigned By    Initials Name Provider Type    Mirza Salinas PTA Physical Therapy Assistant        Therapy Charges for Today     Code Description Service Date Service Provider Modifiers Qty    96874122984 HC GAIT TRAINING EA 15 MIN 2/2/2021 Mirza Kennedy PTA GP 1    27743547237 HC PT THER PROC EA 15 MIN 2/2/2021 Mirza Kennedy, PTA GP 1    20420985931 HC PT THERAPEUTIC ACT EA 15 MIN 2/2/2021 Mirza Kennedy, PTA GP 2    70171410998 HC GAIT TRAINING EA 15 MIN 2/2/2021 Mirza Kennedy, PTA GP 1    01799846860 HC PT THER PROC EA 15 MIN 2/2/2021 Mirza Kennedy, PTA GP 2    59683527010 HC GAIT TRAINING EA 15 MIN  2/3/2021 Mirza Kennedy, PTA GP 1    71362695389 HC PT THER PROC EA 15 MIN 2/3/2021 Mirza Kennedy, KACI GP 1    56139863995 HC PT THERAPEUTIC ACT EA 15 MIN 2/3/2021 Mirza Kennedy, KACI GP 1          PT G-Codes  Outcome Measure Options: AM-PAC 6 Clicks Basic Mobility (PT)  AM-PAC 6 Clicks Score (PT): 16  AM-PAC 6 Clicks Score (OT): 16    Mirza Kennedy PTA  2/3/2021     Tissue Cultured Epidermal Autograft Text: The defect edges were debeveled with a #15c scalpel blade.  Given the location of the defect, shape of the defect and the proximity to free margins a tissue cultured epidermal autograft was deemed most appropriate.  The graft was then trimmed to fit the size of the defect.  The graft was then placed in the primary defect and oriented appropriately.

## 2022-05-31 ENCOUNTER — OFFICE VISIT (OUTPATIENT)
Dept: SURGERY | Facility: CLINIC | Age: 72
End: 2022-05-31

## 2022-05-31 VITALS
HEIGHT: 66 IN | WEIGHT: 142.8 LBS | SYSTOLIC BLOOD PRESSURE: 124 MMHG | TEMPERATURE: 97.7 F | DIASTOLIC BLOOD PRESSURE: 66 MMHG | BODY MASS INDEX: 22.95 KG/M2 | HEART RATE: 75 BPM

## 2022-05-31 DIAGNOSIS — I71.40 AAA (ABDOMINAL AORTIC ANEURYSM) WITHOUT RUPTURE: Primary | ICD-10-CM

## 2022-05-31 DIAGNOSIS — I73.9 PVD (PERIPHERAL VASCULAR DISEASE): ICD-10-CM

## 2022-05-31 DIAGNOSIS — K51.00 PANCOLITIS: ICD-10-CM

## 2022-05-31 PROCEDURE — 99213 OFFICE O/P EST LOW 20 MIN: CPT | Performed by: SURGERY

## 2022-06-14 ENCOUNTER — OFFICE VISIT (OUTPATIENT)
Dept: SURGERY | Facility: CLINIC | Age: 72
End: 2022-06-14

## 2022-06-14 VITALS
WEIGHT: 141 LBS | TEMPERATURE: 97.6 F | DIASTOLIC BLOOD PRESSURE: 74 MMHG | HEIGHT: 66 IN | HEART RATE: 98 BPM | SYSTOLIC BLOOD PRESSURE: 120 MMHG | BODY MASS INDEX: 22.66 KG/M2

## 2022-06-14 DIAGNOSIS — I70.229 CRITICAL LOWER LIMB ISCHEMIA: Primary | ICD-10-CM

## 2022-06-14 DIAGNOSIS — I71.40 AAA (ABDOMINAL AORTIC ANEURYSM) WITHOUT RUPTURE: ICD-10-CM

## 2022-06-14 DIAGNOSIS — I71.40 ABDOMINAL AORTIC ANEURYSM, WITHOUT RUPTURE: ICD-10-CM

## 2022-06-14 PROCEDURE — 99213 OFFICE O/P EST LOW 20 MIN: CPT | Performed by: SURGERY

## 2022-06-14 RX ORDER — GABAPENTIN 300 MG/1
300 CAPSULE ORAL 3 TIMES DAILY
Qty: 270 CAPSULE | Refills: 3 | Status: SHIPPED | OUTPATIENT
Start: 2022-06-14

## 2022-06-14 NOTE — PROGRESS NOTES
Chief Complaint   Patient presents with   • Follow-up        HPI  Here for duplex check.  We are following his abdominal aortic aneurysm.  Aneurysm today is 3.73 cm essentially unchanged since last visit  Past Medical History:   Diagnosis Date   • Hyperlipidemia    • Hypertension    • Rectal abnormality     Rectum came out - er put back in.  9/18   • TIA (transient ischemic attack)    • UTI (urinary tract infection)        Past Surgical History:   Procedure Laterality Date   • COLON RESECTION N/A 10/26/2020    Procedure: SUBTOTAL COLECTOMY,  ILEOSTOMY, PLACEMENT OF GASTROSTOMY AND JEJEUNOSTOMY TUBES;  Surgeon: Nahum Herzog MD;  Location: [facility] OR;  Service: General;  Laterality: N/A;   • COLONOSCOPY N/A 12/10/2018    Procedure: COLONOSCOPY;  Surgeon: Jay Wilson DO;  Location: [facility] ENDOSCOPY;  Service: Gastroenterology   • COLONOSCOPY Left 10/25/2020    Procedure: COLONOSCOPY WITH ENDOSCOPIC FECAL MICROBIOTA TRANSPLANT;  Surgeon: Jay Wilson DO;  Location: [facility] OR;  Service: Gastroenterology;  Laterality: Left;   • COLONOSCOPY N/A 10/23/2020    Procedure: COLONOSCOPY WITH ENDOSCOPIC FECAL MICROBIOTA TRANSPLANT;  Surgeon: Jay Wilson DO;  Location: [facility] ENDOSCOPY;  Service: Gastroenterology;  Laterality: N/A;   • ILEOSTOMY CLOSURE N/A 1/25/2021    Procedure: ILEOSTOMY CLOSURE WITH LOOP ILEOSTOMY PLACEMENT;  Surgeon: Nahum Herzog MD;  Location: [facility] OR;  Service: General;  Laterality: N/A;   • KNEE MENISCAL REPAIR Left    • LEG THROMBECTOMY/EMBOLECTOMY N/A 1/26/2021    Procedure: femoral to femoral bypass, bi-femoral bypass;  Surgeon: Sukhwinder Steele MD;  Location: [facility];  Service: Vascular;  Laterality: N/A;   • OTHER SURGICAL HISTORY      Loop recorder         Current Outpatient Medications:   •  Bacillus Coagulans-Inulin (ALIGN PREBIOTIC-PROBIOTIC PO), Take 1 tablet by mouth Every Night., Disp: , Rfl:   •  clopidogrel (PLAVIX) 75 MG tablet, Take 1 tablet by mouth Daily.,  Disp: 60 tablet, Rfl: 6  •  famotidine (PEPCID) 40 MG tablet, Take 0.5 tablets by mouth 2 (Two) Times a Day., Disp: 60 tablet, Rfl: 3  •  gabapentin (NEURONTIN) 300 MG capsule, Take 1 capsule by mouth 3 (Three) Times a Day., Disp: 270 capsule, Rfl: 3  •  pravastatin (PRAVACHOL) 20 MG tablet, Take 1 tablet by mouth Every Night., Disp: 60 tablet, Rfl: 6  •  tamsulosin (FLOMAX) 0.4 MG capsule 24 hr capsule, Take 1 capsule by mouth Daily., Disp: 60 capsule, Rfl: 6  •  traZODone (DESYREL) 50 MG tablet, Daily., Disp: , Rfl:   •  vitamin B-12 (CYANOCOBALAMIN) 500 MCG tablet, Take 500 mcg by mouth Daily., Disp: , Rfl:   •  apixaban (ELIQUIS) 5 MG tablet tablet, Take 1 tablet by mouth Every 12 (Twelve) Hours., Disp: , Rfl:   •  chlorthalidone (HYGROTEN) 50 MG tablet, Take 50 mg by mouth Daily., Disp: , Rfl:   •  cholestyramine (Questran) 4 g packet, Take 1 packet by mouth 3 (Three) Times a Day With Meals for 60 days., Disp: 90 packet, Rfl: 1  •  furosemide (LASIX) 20 MG tablet, Take 20 mg by mouth 2 (Two) Times a Day., Disp: , Rfl:   •  magnesium oxide (MAGOX) 400 (241.3 Mg) MG tablet tablet, Take 800 mg by mouth 2 (Two) Times a Day., Disp: , Rfl:   •  ondansetron (ZOFRAN) 4 MG tablet, Take 1 tablet by mouth Every 6 (Six) Hours As Needed for Nausea or Vomiting., Disp: 20 tablet, Rfl: 0  •  oxyCODONE-acetaminophen (Percocet) 5-325 MG per tablet, Take 1 tablet by mouth Every 6 (Six) Hours As Needed for Severe Pain ., Disp: 20 tablet, Rfl: 0  •  triamcinolone (KENALOG) 0.1 % cream, APPLY TWICE DAILY TO ECZEMA/RASH FOR UP TO TWO WEEKS A MONTH as needed for ITCHING, Disp: , Rfl:   •  vitamin C (ASCORBIC ACID) 500 MG tablet, Take 500 mg by mouth Daily. 05/17/2021 - Patient states he only utilizes Vitamin C during Winter months., Disp: , Rfl:   •  vitamin D (ERGOCALCIFEROL) 1.25 MG (95864 UT) capsule capsule, Take 50,000 Units by mouth 1 (One) Time Per Week., Disp: , Rfl:     Current Facility-Administered Medications:   •   loperamide (IMODIUM) capsule 2 mg, 2 mg, Oral, TID PRN, Nahum Herzog MD  •  promethazine (PHENERGAN) tablet 12.5 mg, 12.5 mg, Oral, Q6H PRN, Nahum Herzog MD  •  promethazine (PHENERGAN) tablet 12.5 mg, 12.5 mg, Oral, Q6H PRN, Nahum Herzog MD    Allergies   Allergen Reactions   • Morphine Mental Status Change       Family History   Problem Relation Age of Onset   • Glaucoma Mother        Social History     Socioeconomic History   • Marital status:    Tobacco Use   • Smoking status: Former Smoker     Years: 62.00     Types: Cigarettes     Quit date:      Years since quittin.4   • Smokeless tobacco: Former User     Types: Chew, Snuff   • Tobacco comment: passive   Vaping Use   • Vaping Use: Former   • Substances: Patient can not recall if Electronic Cigarette contained Nicotine, THS, CBS, or Flavoring.   • Devices: Disposable   Substance and Sexual Activity   • Alcohol use: Yes     Comment: one drink per month   • Drug use: Never   • Sexual activity: Defer     Comment: .           Physical Exam  Ostomy in the right lower quadrant.    ASSESSMENT    Diagnoses and all orders for this visit:    1. Critical lower limb ischemia (HCC) (Primary)  -     Duplex Aorta IVC Iliac Graft Complete CAR; Future  -     gabapentin (NEURONTIN) 300 MG capsule; Take 1 capsule by mouth 3 (Three) Times a Day.  Dispense: 270 capsule; Refill: 3    2. AAA (abdominal aortic aneurysm) without rupture (HCC)    3. Abdominal aortic aneurysm, without rupture (HCC)   -     Duplex Aorta IVC Iliac Graft Complete CAR; Future        PLAN    1.  Duplex in 1 year  2.  Recheck 6 months              This document has been electronically signed by Nahum Herzog MD on 2022 13:09 CDT

## 2022-07-18 ENCOUNTER — OFFICE VISIT (OUTPATIENT)
Dept: CARDIAC SURGERY | Facility: CLINIC | Age: 72
End: 2022-07-18

## 2022-07-18 VITALS
SYSTOLIC BLOOD PRESSURE: 132 MMHG | DIASTOLIC BLOOD PRESSURE: 70 MMHG | WEIGHT: 138 LBS | HEART RATE: 57 BPM | BODY MASS INDEX: 22.18 KG/M2 | HEIGHT: 66 IN | OXYGEN SATURATION: 100 %

## 2022-07-18 DIAGNOSIS — I71.40 AAA (ABDOMINAL AORTIC ANEURYSM) WITHOUT RUPTURE: Primary | ICD-10-CM

## 2022-07-18 DIAGNOSIS — I73.9 PVD (PERIPHERAL VASCULAR DISEASE): ICD-10-CM

## 2022-07-18 DIAGNOSIS — E78.2 MIXED HYPERLIPIDEMIA: ICD-10-CM

## 2022-07-18 PROCEDURE — 99214 OFFICE O/P EST MOD 30 MIN: CPT | Performed by: NURSE PRACTITIONER

## 2022-07-18 NOTE — PATIENT INSTRUCTIONS
The results of your vascular studies of your right leg shows moderate disease with fair  circulation, in the left leg shows moderatedisease with fair circulation.      If signs and symptoms of ischemia should occur including but not limited to pale/blue discoloration of limb, increasing pain with ambulation or at rest, or a non-healing wound. Patient is to notify [facility] center for immediate evaluation.    Return in 6 months TAMMI

## 2022-07-18 NOTE — PROGRESS NOTES
7/8/2021    Bar Crockett  1950    Chief Complaint:    Chief Complaint   Patient presents with   • Peripheral Vascular Disease       HPI:      PCP:  Satnam Troncoso APRN  General Surgery: Dr. Herzog    70 y.o. male with HTN(stable, increased risk stroke, rupture), Hyperlipidemia(stable, increased risk cardiovascular events), COPD(stable, increased risk pulmonary complications) and Chronic Kidney Disease(stable, increased risk renal failure)  Prior TIA 2018, loop recorder placement 2018 [facility], right leg claudication x1 year prior to admit. former smoker and quit 2018.  Prior admit 10/2020 [facility] c-difficile colitis, underwent resection. Patient admitted electively 1/25/2021 for planned colostomy reversal, low UOP during surgery, protective ileostomy placed.  Approx. midnight last night developed loss of sensation to right foot, CTA run off obtained this morning for acute thrombus R iliac artery, with likely source from the infrarenal AAA with intramural thrombus.  No prior TAMMI or vascular evaluation, AAA seen on CT abd/pelvis in 10/2020 with large amount of thrombus, R iliac vessel appears to be patent on that exam.  Acute on chronic PVD.  Underwent emergent salvage endovascular approach, unsuccessful, transitioned to open fem-fem with restoration in RLE perfusion.  Prolonged hospital stay, transitioned to rehab facility.  Now home, overall doing well.  Residual paraesthesias and numbness RLE.      8/2018 TIA, [facility]   8/2018 MARCI with bubble: Negative for intracardiac thrombus/PFO     10/2020 CT Abd/Pelv w contrast: AAA infrarenal 32mm, large amount of thrombus distal aorta  1/2021 CTA Run-off:  Infrarenal AAA 32mm with thrombus, Extension into R YOEL, R EIA with complete occlusion.  Distal run-off through collateral flow, R SFA 70% soft plaque.  1/2021 Emergent salvage fem-fem bypass  3/2021 TAMMI: 0.85 Right triphasic, mixed bi/tri distally.  Left 0.72 biphasic.  3/2021 Bilateral Art US: Fem-fem graft patent, mPSV  140cm/s proximal anastamosis  6/2021 TAMMI: Right 0.63 biphasic.  Left 0.58 biphasic pop, monophasic distally.    6/2021 Bilateral Art US: Fem fem graft patent, prox anastamosis 312cm/s, turbulent flow distal L CFA 360cm/s  7/2021 CTA Aorta runoff:  Infrarenal AAA 35mm. LCIA 20mm.   Moderate LEFT iliac disease.  RIGHT iliac occlusion.  Fem-fem bypass patent.  Bilateral SFA moderate disease.  1/2022 TAMMI: Right 0.64 biphasic.  Left 0.61 biphasic  1/2022  Bilateral Art US: fem-fem graft patent, mPSV 210cm/s R native inflow  7/2022 US Aorta: Infrarenal AAA 38mm, R YOEL 15mm, L YOEL 15mm  7/2022  Bilateral Art US: fem-fem graft patent, mPSV 194cm/s L CFA anastamosis, biphasic waveform  7/2022 TAMMI: Right 0.76 biphasic.  Left 0.78 biphasic       10/2020 SUBTOTAL COLECTOMY,  ILEOSTOMY, PLACEMENT OF GASTROSTOMY AND JEJEUNOSTOMY TUBES, (Dr. Herzog)  1/2021 ILEOSTOMY CLOSURE WITH LOOP ILEOSTOMY PLACEMENT    The following portions of the patient's history were reviewed and updated as appropriate: allergies, current medications, past family history, past medical history, past social history, past surgical history and problem list.  Recent images independently reviewed.  Available laboratory values reviewed.    PMH:  Past Medical History:   Diagnosis Date   • Hyperlipidemia    • Hypertension    • Rectal abnormality     Rectum came out - er put back in.  9/18   • TIA (transient ischemic attack)    • UTI (urinary tract infection)      Past Surgical History:   Procedure Laterality Date   • COLON RESECTION N/A 10/26/2020    Procedure: SUBTOTAL COLECTOMY,  ILEOSTOMY, PLACEMENT OF GASTROSTOMY AND JEJEUNOSTOMY TUBES;  Surgeon: Nahum Herzog MD;  Location: [facility] OR;  Service: General;  Laterality: N/A;   • COLONOSCOPY N/A 12/10/2018    Procedure: COLONOSCOPY;  Surgeon: Jay Wilson DO;  Location: [facility] ENDOSCOPY;  Service: Gastroenterology   • COLONOSCOPY Left 10/25/2020    Procedure: COLONOSCOPY WITH ENDOSCOPIC FECAL MICROBIOTA  TRANSPLANT;  Surgeon: Jay Wilson DO;  Location: [facility] OR;  Service: Gastroenterology;  Laterality: Left;   • COLONOSCOPY N/A 10/23/2020    Procedure: COLONOSCOPY WITH ENDOSCOPIC FECAL MICROBIOTA TRANSPLANT;  Surgeon: Jay Wilson DO;  Location: [facility] ENDOSCOPY;  Service: Gastroenterology;  Laterality: N/A;   • ILEOSTOMY CLOSURE N/A 2021    Procedure: ILEOSTOMY CLOSURE WITH LOOP ILEOSTOMY PLACEMENT;  Surgeon: Nahum Herzog MD;  Location: [facility] OR;  Service: General;  Laterality: N/A;   • KNEE MENISCAL REPAIR Left    • LEG THROMBECTOMY/EMBOLECTOMY N/A 2021    Procedure: femoral to femoral bypass, bi-femoral bypass;  Surgeon: Sukhwinder Steele MD;  Location: [facility] OR;  Service: Vascular;  Laterality: N/A;   • OTHER SURGICAL HISTORY      Loop recorder     Family History   Problem Relation Age of Onset   • Glaucoma Mother      Social History     Tobacco Use   • Smoking status: Former Smoker     Years: 62.00     Types: Cigarettes     Quit date:      Years since quittin.5   • Smokeless tobacco: Former User     Types: Chew, Snuff   • Tobacco comment: passive   Vaping Use   • Vaping Use: Former   • Substances: Patient can not recall if Electronic Cigarette contained Nicotine, THS, CBS, or Flavoring.   • Devices: Disposable   Substance Use Topics   • Alcohol use: Yes     Comment: one drink per month   • Drug use: Never       ALLERGIES:  Allergies   Allergen Reactions   • Morphine Mental Status Change         MEDICATIONS:    Current Outpatient Medications:   •  Bacillus Coagulans-Inulin (ALIGN PREBIOTIC-PROBIOTIC PO), Take 1 tablet by mouth Every Night., Disp: , Rfl:   •  chlorthalidone (HYGROTEN) 50 MG tablet, Take 50 mg by mouth Daily., Disp: , Rfl:   •  clopidogrel (PLAVIX) 75 MG tablet, Take 1 tablet by mouth Daily., Disp: 60 tablet, Rfl: 6  •  famotidine (PEPCID) 40 MG tablet, Take 0.5 tablets by mouth 2 (Two) Times a Day., Disp: 60 tablet, Rfl: 3  •  furosemide (LASIX) 20 MG  tablet, Take 20 mg by mouth 2 (Two) Times a Day., Disp: , Rfl:   •  gabapentin (NEURONTIN) 300 MG capsule, Take 1 capsule by mouth 3 (Three) Times a Day., Disp: 270 capsule, Rfl: 3  •  magnesium oxide (MAGOX) 400 (241.3 Mg) MG tablet tablet, Take 800 mg by mouth 2 (Two) Times a Day., Disp: , Rfl:   •  ondansetron (ZOFRAN) 4 MG tablet, Take 1 tablet by mouth Every 6 (Six) Hours As Needed for Nausea or Vomiting., Disp: 20 tablet, Rfl: 0  •  oxyCODONE-acetaminophen (Percocet) 5-325 MG per tablet, Take 1 tablet by mouth Every 6 (Six) Hours As Needed for Severe Pain ., Disp: 20 tablet, Rfl: 0  •  pravastatin (PRAVACHOL) 20 MG tablet, Take 1 tablet by mouth Every Night., Disp: 60 tablet, Rfl: 6  •  tamsulosin (FLOMAX) 0.4 MG capsule 24 hr capsule, Take 1 capsule by mouth Daily., Disp: 60 capsule, Rfl: 6  •  traZODone (DESYREL) 50 MG tablet, Daily., Disp: , Rfl:   •  triamcinolone (KENALOG) 0.1 % cream, APPLY TWICE DAILY TO ECZEMA/RASH FOR UP TO TWO WEEKS A MONTH as needed for ITCHING, Disp: , Rfl:   •  vitamin B-12 (CYANOCOBALAMIN) 500 MCG tablet, Take 500 mcg by mouth Daily., Disp: , Rfl:   •  vitamin C (ASCORBIC ACID) 500 MG tablet, Take 500 mg by mouth Daily. 05/17/2021 - Patient states he only utilizes Vitamin C during Winter months., Disp: , Rfl:   •  vitamin D (ERGOCALCIFEROL) 1.25 MG (95471 UT) capsule capsule, Take 50,000 Units by mouth 1 (One) Time Per Week., Disp: , Rfl:   •  cholestyramine (Questran) 4 g packet, Take 1 packet by mouth 3 (Three) Times a Day With Meals for 60 days., Disp: 90 packet, Rfl: 1    Current Facility-Administered Medications:   •  loperamide (IMODIUM) capsule 2 mg, 2 mg, Oral, TID PRN, Nahum Herzog MD  •  promethazine (PHENERGAN) tablet 12.5 mg, 12.5 mg, Oral, Q6H PRN, Nahum Herzog MD  •  promethazine (PHENERGAN) tablet 12.5 mg, 12.5 mg, Oral, Q6H PRN, Nahum Herzog MD    Review of Systems   Review of Systems   Constitutional: Negative for malaise/fatigue and weight loss.  "  Cardiovascular: Positive for claudication and dyspnea on exertion. Negative for chest pain.   Respiratory: Negative for cough and shortness of breath.    Skin: Positive for unusual hair distribution. Negative for color change and poor wound healing.   Neurological: Positive for numbness and paresthesias. Negative for dizziness and weakness.       Physical Exam   Vitals:    07/18/22 0936   BP: 132/70   BP Location: Left arm   Patient Position: Sitting   Cuff Size: Adult   Pulse: 57   SpO2: 100%   Weight: 62.6 kg (138 lb)   Height: 167.6 cm (66\")     Body surface area is 1.71 meters squared.  Body mass index is 22.27 kg/m².  Physical Exam  Constitutional:       General: He is not in acute distress.     Appearance: He is not ill-appearing.   HENT:      Right Ear: Hearing normal.      Left Ear: Hearing normal.      Nose: No nasal deformity.      Mouth/Throat:      Dentition: Normal dentition. Does not have dentures.   Cardiovascular:      Rate and Rhythm: Normal rate and regular rhythm.      Pulses:           Carotid pulses are 2+ on the right side and 2+ on the left side.       Radial pulses are 2+ on the right side and 2+ on the left side.        Dorsalis pedis pulses are 1+ on the right side and 1+ on the left side.        Posterior tibial pulses are 1+ on the right side and 1+ on the left side.      Heart sounds: No murmur heard.  Pulmonary:      Effort: Pulmonary effort is normal.      Breath sounds: Normal breath sounds.   Abdominal:      General: There is no distension.      Palpations: Abdomen is soft. There is no mass.      Tenderness: There is no abdominal tenderness.      Comments: colostomy   Musculoskeletal:         General: No deformity.      Comments: Gait normal.    Skin:     General: Skin is warm and dry.      Coloration: Skin is not pale.      Findings: No erythema.      Comments: No venous staining   Neurological:      Mental Status: He is alert and oriented to person, place, and time. "   Psychiatric:         Speech: Speech normal.         Behavior: Behavior is cooperative.         Thought Content: Thought content normal.         Judgment: Judgment normal.         BUN   Date Value Ref Range Status   01/28/2022 21 8 - 23 mg/dL Final   02/11/2021 22 7 - 25 mg/dL Final     Creatinine   Date Value Ref Range Status   01/28/2022 1.90 (H) 0.76 - 1.27 mg/dL Final   02/11/2021 2.3 (H) 0.7 - 1.3 mg/dL Final     eGFR Non  Amer   Date Value Ref Range Status   01/28/2022 35 (L) >60 mL/min/1.73 Final     eGFR African Am   Date Value Ref Range Status   02/11/2021 34 mL/min Final     Comment:         GFR    WITH KIDNEY DAMAGE     W/O DAMAGE  >=90         STAGE 1            NORMAL  60-89        STAGE 2            DEC GFR  30-59        STAGE 3            STAGE 3  15-29        STAGE 4            STAGE 4  <15          STAGE 5            STAGE 5      The MDRD GFR formula is valid only for adults between age 18 and 70.       ASSESSMENT/PLAN:  7/2022 US Aorta: Infrarenal AAA 38mm, R YOEL 15mm, L YOEL 15mm  7/2022  Bilateral Art US: fem-fem graft patent, mPSV 194cm/s L CFA anastamosis, biphasic waveform  7/2022 TAMMI: Right 0.76 biphasic.  Left 0.78 biphasic    1. PVD (peripheral vascular disease) (HCC)  Moderate reduction to bilateral lower extremity perfusion.   Plavix, eliquis, Statin    Patent, fem-fem graft, moderate bilateral femoral disease.  Stable symptoms.     Repeat TAMMI in 6 months    2. AAA (abdominal aortic aneurysm) without rupture (HCC)  Detailed discussion with Bar Crockett regarding situation and options.  Aneurysm abdominal aorta.  Surgical intervention not indicated at this time.  Will plan to follow with interval imaging.  Smoking cessation, lipid management, BP control in 120 range advised.  Discussed symptoms of rupture, details of surgical repair including endovascular and open repair.  Risks, benefits discussed.  Understands and wishes to proceed with plan    Return in 6 months with US  Aorta    - Duplex Aorta IVC Iliac Graft Limited CAR; Future    3. Mixed hyperlipidemia  Lipid-lowering therapy has been proven beneficial in patients with cardio-vascular disease. Current guidelines recommend statin treatment for all patients with PAD,CAD and carotid stenosis. Statins are beneficial in preventing cardiovascular events, increasing functional capacity and lower the risk of adverse limb loss in PAD. Statins decrease the progression of plaque formation and may improve peripheral vessel lining, and aid in reversing atherosclerosis.          This document has been electronically signed by RUTHIE Mendieta @  On July 18, 2022 10:08 SEA

## 2022-10-24 ENCOUNTER — TRANSCRIBE ORDERS (OUTPATIENT)
Dept: LAB | Facility: HOSPITAL | Age: 72
End: 2022-10-24

## 2022-10-24 ENCOUNTER — LAB (OUTPATIENT)
Dept: LAB | Facility: HOSPITAL | Age: 72
End: 2022-10-24

## 2022-10-24 DIAGNOSIS — I77.9 DISEASE OF ARTERY: ICD-10-CM

## 2022-10-24 DIAGNOSIS — K51.00 PANCOLITIS: ICD-10-CM

## 2022-10-24 DIAGNOSIS — I48.0 PAROXYSMAL ATRIAL FIBRILLATION: ICD-10-CM

## 2022-10-24 DIAGNOSIS — N17.0: ICD-10-CM

## 2022-10-24 DIAGNOSIS — I48.0 PAROXYSMAL ATRIAL FIBRILLATION: Primary | ICD-10-CM

## 2022-10-24 DIAGNOSIS — K57.32 DIVERTICULITIS, COLON: ICD-10-CM

## 2022-10-24 DIAGNOSIS — N18.32 CHRONIC KIDNEY DISEASE (CKD) STAGE G3B/A1, MODERATELY DECREASED GLOMERULAR FILTRATION RATE (GFR) BETWEEN 30-44 ML/MIN/1.73 SQUARE METER AND ALBUMINURIA CREATININE RATIO LESS THAN 30 MG/G (CMS/H*: ICD-10-CM

## 2022-10-24 LAB
25(OH)D3 SERPL-MCNC: 64.1 NG/ML (ref 30–100)
ANION GAP SERPL CALCULATED.3IONS-SCNC: 9 MMOL/L (ref 5–15)
BUN SERPL-MCNC: 24 MG/DL (ref 8–23)
BUN/CREAT SERPL: 14.8 (ref 7–25)
CALCIUM SPEC-SCNC: 9.4 MG/DL (ref 8.6–10.5)
CHLORIDE SERPL-SCNC: 109 MMOL/L (ref 98–107)
CO2 SERPL-SCNC: 23 MMOL/L (ref 22–29)
CREAT SERPL-MCNC: 1.62 MG/DL (ref 0.76–1.27)
CREAT UR-MCNC: 129.5 MG/DL
DEPRECATED RDW RBC AUTO: 44.2 FL (ref 37–54)
EGFRCR SERPLBLD CKD-EPI 2021: 44.3 ML/MIN/1.73
ERYTHROCYTE [DISTWIDTH] IN BLOOD BY AUTOMATED COUNT: 12.3 % (ref 12.3–15.4)
GLUCOSE SERPL-MCNC: 77 MG/DL (ref 65–99)
HCT VFR BLD AUTO: 45.5 % (ref 37.5–51)
HGB BLD-MCNC: 15.2 G/DL (ref 13–17.7)
MAGNESIUM SERPL-MCNC: 1.9 MG/DL (ref 1.6–2.4)
MCH RBC QN AUTO: 32.8 PG (ref 26.6–33)
MCHC RBC AUTO-ENTMCNC: 33.4 G/DL (ref 31.5–35.7)
MCV RBC AUTO: 98.3 FL (ref 79–97)
PHOSPHATE SERPL-MCNC: 3 MG/DL (ref 2.5–4.5)
PLATELET # BLD AUTO: 157 10*3/MM3 (ref 140–450)
PMV BLD AUTO: 11.6 FL (ref 6–12)
POTASSIUM SERPL-SCNC: 4.5 MMOL/L (ref 3.5–5.2)
PROT ?TM UR-MCNC: 27.6 MG/DL
PROT/CREAT UR: 213.1 MG/G CREA (ref 0–200)
PTH-INTACT SERPL-MCNC: 80.5 PG/ML (ref 15–65)
RBC # BLD AUTO: 4.63 10*6/MM3 (ref 4.14–5.8)
SODIUM SERPL-SCNC: 141 MMOL/L (ref 136–145)
WBC NRBC COR # BLD: 7.46 10*3/MM3 (ref 3.4–10.8)

## 2022-10-24 PROCEDURE — 82306 VITAMIN D 25 HYDROXY: CPT

## 2022-10-24 PROCEDURE — 85007 BL SMEAR W/DIFF WBC COUNT: CPT

## 2022-10-24 PROCEDURE — 85027 COMPLETE CBC AUTOMATED: CPT

## 2022-10-24 PROCEDURE — 84100 ASSAY OF PHOSPHORUS: CPT

## 2022-10-24 PROCEDURE — 82570 ASSAY OF URINE CREATININE: CPT

## 2022-10-24 PROCEDURE — 83970 ASSAY OF PARATHORMONE: CPT

## 2022-10-24 PROCEDURE — 84156 ASSAY OF PROTEIN URINE: CPT

## 2022-10-24 PROCEDURE — 83735 ASSAY OF MAGNESIUM: CPT

## 2022-10-24 PROCEDURE — 80048 BASIC METABOLIC PNL TOTAL CA: CPT

## 2022-10-25 LAB
EOSINOPHIL # BLD MANUAL: 0.07 10*3/MM3 (ref 0–0.4)
EOSINOPHIL NFR BLD MANUAL: 1 % (ref 0.3–6.2)
LYMPHOCYTES # BLD MANUAL: 1.64 10*3/MM3 (ref 0.7–3.1)
LYMPHOCYTES NFR BLD MANUAL: 13 % (ref 5–12)
MONOCYTES # BLD: 0.97 10*3/MM3 (ref 0.1–0.9)
NEUTROPHILS # BLD AUTO: 4.77 10*3/MM3 (ref 1.7–7)
NEUTROPHILS NFR BLD MANUAL: 64 % (ref 42.7–76)
PLAT MORPH BLD: NORMAL
RBC MORPH BLD: NORMAL
VARIANT LYMPHS NFR BLD MANUAL: 22 % (ref 19.6–45.3)
WBC MORPH BLD: NORMAL

## 2022-12-20 ENCOUNTER — OFFICE VISIT (OUTPATIENT)
Dept: SURGERY | Facility: CLINIC | Age: 72
End: 2022-12-20

## 2022-12-20 VITALS
TEMPERATURE: 97.7 F | BODY MASS INDEX: 23.37 KG/M2 | WEIGHT: 145.4 LBS | OXYGEN SATURATION: 100 % | HEART RATE: 65 BPM | HEIGHT: 66 IN | SYSTOLIC BLOOD PRESSURE: 122 MMHG | DIASTOLIC BLOOD PRESSURE: 80 MMHG

## 2022-12-20 DIAGNOSIS — I71.43 INFRARENAL ABDOMINAL AORTIC ANEURYSM (AAA) WITHOUT RUPTURE: ICD-10-CM

## 2022-12-20 DIAGNOSIS — Z93.2 ILEOSTOMY IN PLACE: Primary | ICD-10-CM

## 2022-12-20 PROCEDURE — 99213 OFFICE O/P EST LOW 20 MIN: CPT | Performed by: SURGERY

## 2022-12-25 NOTE — PROGRESS NOTES
Chief Complaint   Patient presents with   • Follow-up     6 month follow-up        HPI  Complicated hx of a colectomy, ileostomy, attempted reversal and then AAA issues with distal embolization. Doing well currently. There are no plans for ostomy reversal. No sx from his aneurysm.  Past Medical History:   Diagnosis Date   • Hyperlipidemia    • Hypertension    • Rectal abnormality     Rectum came out - er put back in.  9/18   • TIA (transient ischemic attack)    • UTI (urinary tract infection)        Past Surgical History:   Procedure Laterality Date   • COLON RESECTION N/A 10/26/2020    Procedure: SUBTOTAL COLECTOMY,  ILEOSTOMY, PLACEMENT OF GASTROSTOMY AND JEJEUNOSTOMY TUBES;  Surgeon: Nahum Herzog MD;  Location: [facility] OR;  Service: General;  Laterality: N/A;   • COLONOSCOPY N/A 12/10/2018    Procedure: COLONOSCOPY;  Surgeon: Jay Wilson DO;  Location: [facility] ENDOSCOPY;  Service: Gastroenterology   • COLONOSCOPY Left 10/25/2020    Procedure: COLONOSCOPY WITH ENDOSCOPIC FECAL MICROBIOTA TRANSPLANT;  Surgeon: Jay Wilson DO;  Location: [facility] OR;  Service: Gastroenterology;  Laterality: Left;   • COLONOSCOPY N/A 10/23/2020    Procedure: COLONOSCOPY WITH ENDOSCOPIC FECAL MICROBIOTA TRANSPLANT;  Surgeon: Jay Wilson DO;  Location: [facility] ENDOSCOPY;  Service: Gastroenterology;  Laterality: N/A;   • ILEOSTOMY CLOSURE N/A 1/25/2021    Procedure: ILEOSTOMY CLOSURE WITH LOOP ILEOSTOMY PLACEMENT;  Surgeon: Nahum Herzog MD;  Location: [facility];  Service: General;  Laterality: N/A;   • KNEE MENISCAL REPAIR Left    • LEG THROMBECTOMY/EMBOLECTOMY N/A 1/26/2021    Procedure: femoral to femoral bypass, bi-femoral bypass;  Surgeon: Sukhwinder Steele MD;  Location: [facility];  Service: Vascular;  Laterality: N/A;   • OTHER SURGICAL HISTORY      Loop recorder         Current Outpatient Medications:   •  Bacillus Coagulans-Inulin (ALIGN PREBIOTIC-PROBIOTIC PO), Take 1 tablet by mouth Every Night., Disp:  , Rfl:   •  chlorthalidone (HYGROTEN) 50 MG tablet, Take 50 mg by mouth Daily., Disp: , Rfl:   •  clopidogrel (PLAVIX) 75 MG tablet, Take 1 tablet by mouth Daily., Disp: 60 tablet, Rfl: 6  •  famotidine (PEPCID) 40 MG tablet, Take 0.5 tablets by mouth 2 (Two) Times a Day., Disp: 60 tablet, Rfl: 3  •  gabapentin (NEURONTIN) 300 MG capsule, Take 1 capsule by mouth 3 (Three) Times a Day., Disp: 270 capsule, Rfl: 3  •  pravastatin (PRAVACHOL) 20 MG tablet, Take 1 tablet by mouth Every Night., Disp: 60 tablet, Rfl: 6  •  tamsulosin (FLOMAX) 0.4 MG capsule 24 hr capsule, Take 1 capsule by mouth Daily., Disp: 60 capsule, Rfl: 6  •  vitamin B-12 (CYANOCOBALAMIN) 500 MCG tablet, Take 500 mcg by mouth Daily., Disp: , Rfl:   •  vitamin C (ASCORBIC ACID) 500 MG tablet, Take 500 mg by mouth Daily. 05/17/2021 - Patient states he only utilizes Vitamin C during Winter months., Disp: , Rfl:   •  vitamin D (ERGOCALCIFEROL) 1.25 MG (18265 UT) capsule capsule, Take 50,000 Units by mouth 1 (One) Time Per Week., Disp: , Rfl:   •  cholestyramine (Questran) 4 g packet, Take 1 packet by mouth 3 (Three) Times a Day With Meals for 60 days., Disp: 90 packet, Rfl: 1  •  furosemide (LASIX) 20 MG tablet, Take 20 mg by mouth 2 (Two) Times a Day., Disp: , Rfl:   •  magnesium oxide (MAGOX) 400 (241.3 Mg) MG tablet tablet, Take 800 mg by mouth 2 (Two) Times a Day., Disp: , Rfl:   •  ondansetron (ZOFRAN) 4 MG tablet, Take 1 tablet by mouth Every 6 (Six) Hours As Needed for Nausea or Vomiting., Disp: 20 tablet, Rfl: 0  •  oxyCODONE-acetaminophen (Percocet) 5-325 MG per tablet, Take 1 tablet by mouth Every 6 (Six) Hours As Needed for Severe Pain ., Disp: 20 tablet, Rfl: 0  •  traZODone (DESYREL) 50 MG tablet, Daily., Disp: , Rfl:   •  triamcinolone (KENALOG) 0.1 % cream, APPLY TWICE DAILY TO ECZEMA/RASH FOR UP TO TWO WEEKS A MONTH as needed for ITCHING, Disp: , Rfl:     Current Facility-Administered Medications:   •  loperamide (IMODIUM) capsule 2 mg, 2  mg, Oral, TID PRN, Nahum Herzog MD  •  promethazine (PHENERGAN) tablet 12.5 mg, 12.5 mg, Oral, Q6H PRN, Nahum Herzog MD  •  promethazine (PHENERGAN) tablet 12.5 mg, 12.5 mg, Oral, Q6H PRN, Nahum Herzog MD    Allergies   Allergen Reactions   • Morphine Mental Status Change       Family History   Problem Relation Age of Onset   • Glaucoma Mother        Social History     Socioeconomic History   • Marital status:    Tobacco Use   • Smoking status: Former     Years: 62.00     Types: Cigarettes     Quit date:      Years since quittin.9   • Smokeless tobacco: Former     Types: Chew, Snuff   • Tobacco comments:     passive   Vaping Use   • Vaping Use: Former   • Substances: Patient can not recall if Electronic Cigarette contained Nicotine, THS, CBS, or Flavoring.   • Devices: Disposable   Substance and Sexual Activity   • Alcohol use: Yes     Comment: one drink per month   • Drug use: Never   • Sexual activity: Defer     Comment: .         Physical Exam  Vitals reviewed.   Abdominal:      General: Abdomen is flat. There is no distension.      Palpations: Abdomen is soft. There is no mass.      Tenderness: There is no abdominal tenderness. There is no guarding or rebound.      Hernia: No hernia is present.               ASSESSMENT    Diagnoses and all orders for this visit:    1. Ileostomy in place (HCC) (Primary)    2. Infrarenal abdominal aortic aneurysm (AAA) without rupture        PLAN    1. Duplex scan of his aorta os planned              This document has been electronically signed by Nahum Herzog MD on 2022 08:25 CST

## 2023-01-30 ENCOUNTER — OFFICE VISIT (OUTPATIENT)
Dept: CARDIAC SURGERY | Facility: CLINIC | Age: 73
End: 2023-01-30
Payer: MEDICARE

## 2023-01-30 VITALS
HEIGHT: 66 IN | BODY MASS INDEX: 23.91 KG/M2 | DIASTOLIC BLOOD PRESSURE: 78 MMHG | HEART RATE: 71 BPM | TEMPERATURE: 98.2 F | SYSTOLIC BLOOD PRESSURE: 138 MMHG | OXYGEN SATURATION: 98 % | WEIGHT: 148.8 LBS

## 2023-01-30 DIAGNOSIS — N18.31 STAGE 3A CHRONIC KIDNEY DISEASE: ICD-10-CM

## 2023-01-30 DIAGNOSIS — I71.43 INFRARENAL ABDOMINAL AORTIC ANEURYSM (AAA) WITHOUT RUPTURE: Primary | ICD-10-CM

## 2023-01-30 DIAGNOSIS — E78.2 MIXED HYPERLIPIDEMIA: ICD-10-CM

## 2023-01-30 DIAGNOSIS — I73.9 PVD (PERIPHERAL VASCULAR DISEASE) WITH CLAUDICATION: ICD-10-CM

## 2023-01-30 PROCEDURE — 99214 OFFICE O/P EST MOD 30 MIN: CPT | Performed by: NURSE PRACTITIONER

## 2023-01-30 NOTE — PATIENT INSTRUCTIONS
The results of your vascular studies of your right leg shows moderate disease with fair  circulation, in the left leg shows moderatedisease with fair  circulation.      If signs and symptoms of ischemia should occur including but not limited to pale/blue discoloration of limb, increasing pain with ambulation or at rest, or a non-healing wound. Patient is to notify [facility] V[facility] center for immediate evaluation.    Return 6 months fem fem graft ultrasound and TAMMI, aorta

## 2023-01-30 NOTE — PROGRESS NOTES
7/8/2021    Bar Crockett  1950    Chief Complaint:    Chief Complaint   Patient presents with   • Peripheral Vascular Disease       HPI:      PCP:  Satnam Troncoso APRN  General Surgery: Dr. Herzog    70 y.o. male with HTN(stable, increased risk stroke, rupture), Hyperlipidemia(stable, increased risk cardiovascular events), COPD(stable, increased risk pulmonary complications) and Chronic Kidney Disease(stable, increased risk renal failure)  Prior TIA 2018, loop recorder placement 2018 [facility], right leg claudication x1 year prior to admit. former smoker and quit 2018.  Prior admit 10/2020 [facility] c-difficile colitis, underwent resection. Patient admitted electively 1/25/2021 for planned colostomy reversal, low UOP during surgery, protective ileostomy placed.  Approx. midnight last night developed loss of sensation to right foot, CTA run off obtained this morning for acute thrombus R iliac artery, with likely source from the infrarenal AAA with intramural thrombus.  No prior TAMMI or vascular evaluation, AAA seen on CT abd/pelvis in 10/2020 with large amount of thrombus, R iliac vessel appears to be patent on that exam.  Acute on chronic PVD.  Underwent emergent salvage endovascular approach, unsuccessful, transitioned to open fem-fem with restoration in RLE perfusion.  Prolonged hospital stay, transitioned to rehab facility.  Has had continued improvement in neurological function of right lower extremity, ambulatory distance is not limited by claudication.    8/2018 TIA, [facility]   8/2018 MARCI with bubble: Negative for intracardiac thrombus/PFO     10/2020 CT Abd/Pelv w contrast: AAA infrarenal 32mm, large amount of thrombus distal aorta  1/2021 CTA Run-off:  Infrarenal AAA 32mm with thrombus, Extension into R YOEL, R EIA with complete occlusion.  Distal run-off through collateral flow, R SFA 70% soft plaque.  1/2021 Emergent salvage fem-fem bypass  3/2021 TAMMI: 0.85 Right triphasic, mixed bi/tri distally.  Left 0.72  biphasic.  3/2021 Bilateral Art US: Fem-fem graft patent, mPSV 140cm/s proximal anastamosis  6/2021 TAMMI: Right 0.63 biphasic.  Left 0.58 biphasic pop, monophasic distally.    6/2021 Bilateral Art US: Fem fem graft patent, prox anastamosis 312cm/s, turbulent flow distal L CFA 360cm/s  7/2021 CTA Aorta runoff:  Infrarenal AAA 35mm. LCIA 20mm.   Moderate LEFT iliac disease.  RIGHT iliac occlusion.  Fem-fem bypass patent.  Bilateral SFA moderate disease.  1/2022 TAMMI: Right 0.64 biphasic.  Left 0.61 biphasic  1/2022  Bilateral Art US: fem-fem graft patent, mPSV 210cm/s R native inflow  7/2022 US Aorta: Infrarenal AAA 38mm, R YOEL 15mm, L YOEL 15mm  7/2022  Bilateral Art US: fem-fem graft patent, mPSV 194cm/s L CFA anastamosis, biphasic waveform  7/2022 TAMMI: Right 0.76 biphasic.  Left 0.78 biphasic       10/2020 SUBTOTAL COLECTOMY,  ILEOSTOMY, PLACEMENT OF GASTROSTOMY AND JEJEUNOSTOMY TUBES, (Dr. Herzog)  1/2021 ILEOSTOMY CLOSURE WITH LOOP ILEOSTOMY PLACEMENT    The following portions of the patient's history were reviewed and updated as appropriate: allergies, current medications, past family history, past medical history, past social history, past surgical history and problem list.  Recent images independently reviewed.  Available laboratory values reviewed.    PMH:  Past Medical History:   Diagnosis Date   • Hyperlipidemia    • Hypertension    • Rectal abnormality     Rectum came out - er put back in.  9/18   • TIA (transient ischemic attack)    • UTI (urinary tract infection)      Past Surgical History:   Procedure Laterality Date   • COLON RESECTION N/A 10/26/2020    Procedure: SUBTOTAL COLECTOMY,  ILEOSTOMY, PLACEMENT OF GASTROSTOMY AND JEJEUNOSTOMY TUBES;  Surgeon: Nahum Herzog MD;  Location: [facility] OR;  Service: General;  Laterality: N/A;   • COLONOSCOPY N/A 12/10/2018    Procedure: COLONOSCOPY;  Surgeon: Jay Wilson DO;  Location: [facility] ENDOSCOPY;  Service: Gastroenterology   • COLONOSCOPY Left 10/25/2020     Procedure: COLONOSCOPY WITH ENDOSCOPIC FECAL MICROBIOTA TRANSPLANT;  Surgeon: Jay Wilson DO;  Location: [facility] OR;  Service: Gastroenterology;  Laterality: Left;   • COLONOSCOPY N/A 10/23/2020    Procedure: COLONOSCOPY WITH ENDOSCOPIC FECAL MICROBIOTA TRANSPLANT;  Surgeon: Jay Wilson DO;  Location: [facility] ENDOSCOPY;  Service: Gastroenterology;  Laterality: N/A;   • ILEOSTOMY CLOSURE N/A 2021    Procedure: ILEOSTOMY CLOSURE WITH LOOP ILEOSTOMY PLACEMENT;  Surgeon: Nahum Herzog MD;  Location: [facility] OR;  Service: General;  Laterality: N/A;   • KNEE MENISCAL REPAIR Left    • LEG THROMBECTOMY/EMBOLECTOMY N/A 2021    Procedure: femoral to femoral bypass, bi-femoral bypass;  Surgeon: Sukhwinder Steele MD;  Location: [facility] OR;  Service: Vascular;  Laterality: N/A;   • OTHER SURGICAL HISTORY      Loop recorder     Family History   Problem Relation Age of Onset   • Glaucoma Mother      Social History     Tobacco Use   • Smoking status: Former     Years: 62.00     Types: Cigarettes     Quit date:      Years since quittin.0   • Smokeless tobacco: Former     Types: Chew, Snuff   • Tobacco comments:     passive   Vaping Use   • Vaping Use: Former   • Substances: Patient can not recall if Electronic Cigarette contained Nicotine, THS, CBS, or Flavoring.   • Devices: Disposable   Substance Use Topics   • Alcohol use: Yes     Comment: one drink per month   • Drug use: Never       ALLERGIES:  Allergies   Allergen Reactions   • Morphine Mental Status Change         MEDICATIONS:    Current Outpatient Medications:   •  Bacillus Coagulans-Inulin (ALIGN PREBIOTIC-PROBIOTIC PO), Take 1 tablet by mouth Every Night., Disp: , Rfl:   •  clopidogrel (PLAVIX) 75 MG tablet, Take 1 tablet by mouth Daily., Disp: 60 tablet, Rfl: 6  •  famotidine (PEPCID) 40 MG tablet, Take 0.5 tablets by mouth 2 (Two) Times a Day., Disp: 60 tablet, Rfl: 3  •  gabapentin (NEURONTIN) 300 MG capsule, Take 1 capsule by mouth 3  (Three) Times a Day., Disp: 270 capsule, Rfl: 3  •  ondansetron (ZOFRAN) 4 MG tablet, Take 1 tablet by mouth Every 6 (Six) Hours As Needed for Nausea or Vomiting., Disp: 20 tablet, Rfl: 0  •  oxyCODONE-acetaminophen (Percocet) 5-325 MG per tablet, Take 1 tablet by mouth Every 6 (Six) Hours As Needed for Severe Pain ., Disp: 20 tablet, Rfl: 0  •  pravastatin (PRAVACHOL) 20 MG tablet, Take 1 tablet by mouth Every Night., Disp: 60 tablet, Rfl: 6  •  tamsulosin (FLOMAX) 0.4 MG capsule 24 hr capsule, Take 1 capsule by mouth Daily., Disp: 60 capsule, Rfl: 6  •  triamcinolone (KENALOG) 0.1 % cream, APPLY TWICE DAILY TO ECZEMA/RASH FOR UP TO TWO WEEKS A MONTH as needed for ITCHING, Disp: , Rfl:   •  vitamin B-12 (CYANOCOBALAMIN) 500 MCG tablet, Take 500 mcg by mouth Daily., Disp: , Rfl:   •  vitamin C (ASCORBIC ACID) 500 MG tablet, Take 500 mg by mouth Daily. 05/17/2021 - Patient states he only utilizes Vitamin C during Winter months., Disp: , Rfl:   •  vitamin D (ERGOCALCIFEROL) 1.25 MG (54040 UT) capsule capsule, Take 50,000 Units by mouth 1 (One) Time Per Week., Disp: , Rfl:   •  cholestyramine (Questran) 4 g packet, Take 1 packet by mouth 3 (Three) Times a Day With Meals for 60 days., Disp: 90 packet, Rfl: 1    Current Facility-Administered Medications:   •  loperamide (IMODIUM) capsule 2 mg, 2 mg, Oral, TID PRN, Nahum Herzog MD  •  promethazine (PHENERGAN) tablet 12.5 mg, 12.5 mg, Oral, Q6H PRN, Nahum Herzog MD  •  promethazine (PHENERGAN) tablet 12.5 mg, 12.5 mg, Oral, Q6H PRN, Nahum Herzog MD    Review of Systems   Review of Systems   Constitutional: Negative for malaise/fatigue and weight loss.   Cardiovascular: Positive for claudication (>50yds) and dyspnea on exertion. Negative for chest pain.   Respiratory: Negative for cough and shortness of breath.    Skin: Positive for unusual hair distribution. Negative for color change and poor wound healing.   Neurological: Negative for dizziness, numbness, paresthesias  "and weakness.       Physical Exam   Vitals:    01/30/23 1003   BP: 138/78   BP Location: Left arm   Pulse: 71   Temp: 98.2 °F (36.8 °C)   TempSrc: Infrared   SpO2: 98%   Weight: 67.5 kg (148 lb 12.8 oz)   Height: 167.6 cm (66\")     Body surface area is 1.76 meters squared.  Body mass index is 24.02 kg/m².  Physical Exam  Constitutional:       General: He is not in acute distress.     Appearance: He is not ill-appearing.   HENT:      Right Ear: Hearing normal.      Left Ear: Hearing normal.      Nose: No nasal deformity.      Mouth/Throat:      Dentition: Normal dentition. Does not have dentures.   Cardiovascular:      Rate and Rhythm: Normal rate and regular rhythm.      Pulses:           Carotid pulses are 2+ on the right side and 2+ on the left side.       Radial pulses are 2+ on the right side and 2+ on the left side.        Dorsalis pedis pulses are 1+ on the right side and 1+ on the left side.        Posterior tibial pulses are 1+ on the right side and 1+ on the left side.      Heart sounds: No murmur heard.  Pulmonary:      Effort: Pulmonary effort is normal.      Breath sounds: Normal breath sounds.   Abdominal:      General: There is no distension.      Palpations: Abdomen is soft. There is no mass.      Tenderness: There is no abdominal tenderness.   Musculoskeletal:         General: No deformity.      Comments: Gait normal.    Skin:     General: Skin is warm and dry.      Coloration: Skin is not pale.      Findings: No erythema.      Comments: No venous staining   Neurological:      Mental Status: He is alert and oriented to person, place, and time.   Psychiatric:         Speech: Speech normal.         Behavior: Behavior is cooperative.         Thought Content: Thought content normal.         Judgment: Judgment normal.         BUN   Date Value Ref Range Status   10/24/2022 24 (H) 8 - 23 mg/dL Final   02/11/2021 22 7 - 25 mg/dL Final     Creatinine   Date Value Ref Range Status   10/24/2022 1.62 (H) 0.76 - " 1.27 mg/dL Final   02/11/2021 2.3 (H) 0.7 - 1.3 mg/dL Final     eGFR Non  Amer   Date Value Ref Range Status   01/28/2022 35 (L) >60 mL/min/1.73 Final     eGFR African Am   Date Value Ref Range Status   02/11/2021 34 mL/min Final     Comment:         GFR    WITH KIDNEY DAMAGE     W/O DAMAGE  >=90         STAGE 1            NORMAL  60-89        STAGE 2            DEC GFR  30-59        STAGE 3            STAGE 3  15-29        STAGE 4            STAGE 4  <15          STAGE 5            STAGE 5      The MDRD GFR formula is valid only for adults between age 18 and 70.       ASSESSMENT/PLAN:    1. Infrarenal abdominal aortic aneurysm (AAA) without rupture  Detailed discussion with Bar Crockett regarding situation and options.  Aneurysm abdominal aorta.  Surgical intervention not indicated at this time.  Will plan to follow with interval imaging.  Smoking cessation, lipid management, BP control in 120 range advised.  Discussed symptoms of rupture, details of surgical repair including endovascular and open repair.  Risks, benefits discussed.  Understands and wishes to proceed with plan    Return in 6 months with US Aorta    - Duplex Aorta IVC Iliac Graft Limited CAR; Future    2. Mixed hyperlipidemia  Lipid-lowering therapy has been proven beneficial in patients with cardio-vascular disease. Current guidelines recommend statin treatment for all patients with PAD,CAD and carotid stenosis. Statins are beneficial in preventing cardiovascular events, increasing functional capacity and lower the risk of adverse limb loss in PAD. Statins decrease the progression of plaque formation and may improve peripheral vessel lining, and aid in reversing atherosclerosis.    3. PVD (peripheral vascular disease) with claudication (HCC)  Moderate reduction in bilateral lower extremity perfusion.  Waveforms are suspicious of proximal left iliac disease.    Lengthy discussion with patient regarding situation and options had, would  consider CTA runoff to evaluate inflow to graft with possible intervention of the left common iliac.  Patient has declined at this time as symptoms or not lifestyle limiting.    We will follow-up with interval imaging repeat TAMMI and graft ultrasound in approximately 6 months    Remains on aspirin, statin    - Duplex Lower Extremity Art / Grafts - Bilateral CAR; Future  - Doppler Ankle Brachial Index Single Level CAR; Future    4. Stage 3a chronic kidney disease (HCC)  Lab Results   Component Value Date    CREATININE 1.62 (H) 10/24/2022    BUN 24 (H) 10/24/2022     10/24/2022    K 4.5 10/24/2022     (H) 10/24/2022    CO2 23.0 10/24/2022                     This document has been electronically signed by RUTHIE Mendieta @  On January 30, 2023 10:29 CST

## 2023-02-07 ENCOUNTER — OFFICE VISIT (OUTPATIENT)
Dept: SURGERY | Facility: CLINIC | Age: 73
End: 2023-02-07
Payer: MEDICARE

## 2023-02-07 VITALS
TEMPERATURE: 97.7 F | WEIGHT: 150 LBS | HEIGHT: 66 IN | SYSTOLIC BLOOD PRESSURE: 154 MMHG | DIASTOLIC BLOOD PRESSURE: 89 MMHG | OXYGEN SATURATION: 98 % | BODY MASS INDEX: 24.11 KG/M2 | HEART RATE: 54 BPM

## 2023-02-07 DIAGNOSIS — I71.40 ABDOMINAL AORTIC ANEURYSM (AAA) WITHOUT RUPTURE, UNSPECIFIED PART: Primary | ICD-10-CM

## 2023-02-07 PROCEDURE — 99212 OFFICE O/P EST SF 10 MIN: CPT | Performed by: SURGERY

## 2023-02-08 NOTE — PROGRESS NOTES
Chief Complaint   Patient presents with   • Follow-up     Follow up after vascular U/S        HPI  For 72-year-old man who is abdominal aortic aneurysm 11 following duplex ultrasound.  His peripheral circulation appears to be adequate.  His ostomy is functioning very well.  Past Medical History:   Diagnosis Date   • Hyperlipidemia    • Hypertension    • Rectal abnormality     Rectum came out - er put back in.  9/18   • TIA (transient ischemic attack)    • UTI (urinary tract infection)        Past Surgical History:   Procedure Laterality Date   • COLON RESECTION N/A 10/26/2020    Procedure: SUBTOTAL COLECTOMY,  ILEOSTOMY, PLACEMENT OF GASTROSTOMY AND JEJEUNOSTOMY TUBES;  Surgeon: Nahum Herzog MD;  Location: [facility] OR;  Service: General;  Laterality: N/A;   • COLONOSCOPY N/A 12/10/2018    Procedure: COLONOSCOPY;  Surgeon: Jay Wilson DO;  Location: [facility] ENDOSCOPY;  Service: Gastroenterology   • COLONOSCOPY Left 10/25/2020    Procedure: COLONOSCOPY WITH ENDOSCOPIC FECAL MICROBIOTA TRANSPLANT;  Surgeon: Jay Wilson DO;  Location: [facility] OR;  Service: Gastroenterology;  Laterality: Left;   • COLONOSCOPY N/A 10/23/2020    Procedure: COLONOSCOPY WITH ENDOSCOPIC FECAL MICROBIOTA TRANSPLANT;  Surgeon: Jay Wilson DO;  Location: [facility] ENDOSCOPY;  Service: Gastroenterology;  Laterality: N/A;   • ILEOSTOMY CLOSURE N/A 1/25/2021    Procedure: ILEOSTOMY CLOSURE WITH LOOP ILEOSTOMY PLACEMENT;  Surgeon: Nahum Herzog MD;  Location: [facility] OR;  Service: General;  Laterality: N/A;   • KNEE MENISCAL REPAIR Left    • LEG THROMBECTOMY/EMBOLECTOMY N/A 1/26/2021    Procedure: femoral to femoral bypass, bi-femoral bypass;  Surgeon: Sukhwinder Stelee MD;  Location: [facility];  Service: Vascular;  Laterality: N/A;   • OTHER SURGICAL HISTORY      Loop recorder         Current Outpatient Medications:   •  Bacillus Coagulans-Inulin (ALIGN PREBIOTIC-PROBIOTIC PO), Take 1 tablet by mouth Every Night., Disp: , Rfl:    •  clopidogrel (PLAVIX) 75 MG tablet, Take 1 tablet by mouth Daily., Disp: 60 tablet, Rfl: 6  •  famotidine (PEPCID) 40 MG tablet, Take 0.5 tablets by mouth 2 (Two) Times a Day., Disp: 60 tablet, Rfl: 3  •  gabapentin (NEURONTIN) 300 MG capsule, Take 1 capsule by mouth 3 (Three) Times a Day., Disp: 270 capsule, Rfl: 3  •  ondansetron (ZOFRAN) 4 MG tablet, Take 1 tablet by mouth Every 6 (Six) Hours As Needed for Nausea or Vomiting., Disp: 20 tablet, Rfl: 0  •  oxyCODONE-acetaminophen (Percocet) 5-325 MG per tablet, Take 1 tablet by mouth Every 6 (Six) Hours As Needed for Severe Pain ., Disp: 20 tablet, Rfl: 0  •  pravastatin (PRAVACHOL) 20 MG tablet, Take 1 tablet by mouth Every Night., Disp: 60 tablet, Rfl: 6  •  tamsulosin (FLOMAX) 0.4 MG capsule 24 hr capsule, Take 1 capsule by mouth Daily., Disp: 60 capsule, Rfl: 6  •  triamcinolone (KENALOG) 0.1 % cream, APPLY TWICE DAILY TO ECZEMA/RASH FOR UP TO TWO WEEKS A MONTH as needed for ITCHING, Disp: , Rfl:   •  vitamin B-12 (CYANOCOBALAMIN) 500 MCG tablet, Take 500 mcg by mouth Daily., Disp: , Rfl:   •  vitamin C (ASCORBIC ACID) 500 MG tablet, Take 500 mg by mouth Daily. 05/17/2021 - Patient states he only utilizes Vitamin C during Winter months., Disp: , Rfl:   •  vitamin D (ERGOCALCIFEROL) 1.25 MG (48097 UT) capsule capsule, Take 50,000 Units by mouth 1 (One) Time Per Week., Disp: , Rfl:   •  cholestyramine (Questran) 4 g packet, Take 1 packet by mouth 3 (Three) Times a Day With Meals for 60 days., Disp: 90 packet, Rfl: 1    Current Facility-Administered Medications:   •  loperamide (IMODIUM) capsule 2 mg, 2 mg, Oral, TID PRN, Nahum Herzog MD  •  promethazine (PHENERGAN) tablet 12.5 mg, 12.5 mg, Oral, Q6H PRN, Nahum Herzog MD  •  promethazine (PHENERGAN) tablet 12.5 mg, 12.5 mg, Oral, Q6H PRN, Nahum Herzog MD    Allergies   Allergen Reactions   • Morphine Mental Status Change       Family History   Problem Relation Age of Onset   • Glaucoma Mother        Social  History     Socioeconomic History   • Marital status:    Tobacco Use   • Smoking status: Former     Years: 62.00     Types: Cigarettes     Quit date: 2018     Years since quittin.1   • Smokeless tobacco: Former     Types: Chew, Snuff   • Tobacco comments:     passive   Vaping Use   • Vaping Use: Former   • Substances: Patient can not recall if Electronic Cigarette contained Nicotine, THS, CBS, or Flavoring.   • Devices: Disposable   Substance and Sexual Activity   • Alcohol use: Yes     Comment: one drink per month   • Drug use: Never   • Sexual activity: Defer     Comment: .           Physical Exam  Right lower quadrant ileostomy is in place.  Abdomen is otherwise soft and nontender.    ASSESSMENT    Diagnoses and all orders for this visit:    1. Abdominal aortic aneurysm (AAA) without rupture, unspecified part (Primary)        PLAN    1.  We will contact the patient once the aortic ultrasound becomes available.              This document has been electronically signed by Nahum Herzog MD on 2023 20:19 CST

## 2023-05-10 DIAGNOSIS — I70.229 CRITICAL LOWER LIMB ISCHEMIA: ICD-10-CM

## 2023-05-11 RX ORDER — GABAPENTIN 300 MG/1
CAPSULE ORAL
Qty: 270 CAPSULE | Refills: 2 | Status: SHIPPED | OUTPATIENT
Start: 2023-05-11

## 2023-08-02 DIAGNOSIS — I73.9 PVD (PERIPHERAL VASCULAR DISEASE) WITH CLAUDICATION: Primary | ICD-10-CM

## 2023-09-08 DIAGNOSIS — I73.9 PVD (PERIPHERAL VASCULAR DISEASE) WITH CLAUDICATION: Primary | ICD-10-CM

## 2023-09-08 DIAGNOSIS — I71.43 INFRARENAL ABDOMINAL AORTIC ANEURYSM (AAA) WITHOUT RUPTURE: ICD-10-CM

## (undated) DEVICE — SUT PROLENE 7-0 BV175-7 24IN DB ETH8766H

## (undated) DEVICE — SUT PDS 3/0 SH 27IN DYED Z316H

## (undated) DEVICE — SOL IRR NACL 0.9PCT BT 1000ML

## (undated) DEVICE — SUT VIC 3/0 TIES 18IN J110T

## (undated) DEVICE — SUT VIC 2/0 SH 27IN

## (undated) DEVICE — SUT VIC 0 CT1 36IN J946H

## (undated) DEVICE — SUT PDS CLOSURE CT1 1/0 27IN Z341H

## (undated) DEVICE — PREP PVP-I 7.5P BT 4OZ

## (undated) DEVICE — EMBOLECTOMY/THROMBECTOMY BALLOON CATHETER: Brand: SYNTEL EMBOLECTOMY CATHETER (SPRING TIP)

## (undated) DEVICE — GLV SURG SENSICARE PI ORTHO PF SZ7 LF STRL

## (undated) DEVICE — PART NUMBER 108260, VTI 8 MHZ DISPOSABLE DOPPLER PROBE, STRAIGHT, BOX: Brand: VTI 8 MHZ DISPOSABLE DOPPLER PROBE, STRAIGHT, BOX

## (undated) DEVICE — WIPE BARR PROTECT SUREPREP NOSTING LF BX/50

## (undated) DEVICE — MARKR SKIN W/RULR AND LBL

## (undated) DEVICE — STERILE POLYISOPRENE POWDER-FREE SURGICAL GLOVES WITH EMOLLIENT COATING: Brand: PROTEXIS

## (undated) DEVICE — FOGARTY - HYDRAGRIP SURGICAL - CLAMP INSERTS: Brand: FOGARTY SOFTJAW

## (undated) DEVICE — GAUZE,SPONGE,4"X4",16PLY,XRAY,STRL,LF: Brand: MEDLINE

## (undated) DEVICE — RESERVOIR,SUCTION,100CC,SILICONE: Brand: MEDLINE

## (undated) DEVICE — CATH MALECOT 4WING 16F

## (undated) DEVICE — LUER-LOK 360°: Brand: CONNECTA, LUER-LOK

## (undated) DEVICE — ELECTRD BLD EZ CLN MOD 6.5IN

## (undated) DEVICE — SUT PROLN 3/0 SH D/A 36IN 8522H

## (undated) DEVICE — GOWN,NON-REINFORCED,SIRUS,SET IN SLV,XL: Brand: MEDLINE

## (undated) DEVICE — SUT VIC 3/0 SH 27IN J416H

## (undated) DEVICE — SUT GORE TT9 CV6 30IN 6M02A

## (undated) DEVICE — I-KNIFE CUTTING INSTRUMENT 15 DEGREE: Brand: I-KNIFE

## (undated) DEVICE — SYS PUMP PREVENA125 INCSN MNGT PP/DRSNG 45ML 1P/U

## (undated) DEVICE — PK MAJ PROC LF 60

## (undated) DEVICE — RADIFOCUS GLIDEWIRE: Brand: GLIDEWIRE

## (undated) DEVICE — COUNT NDL FOAM STRIP W/MAG 60CT

## (undated) DEVICE — ELECTRD BLD MEGADYNE 6.5IN SS

## (undated) DEVICE — SUT VICRYL 3-0 SH-1 PO 18IN J772D

## (undated) DEVICE — ST CVR PROB PULLUP ULTRASND 5X48IN

## (undated) DEVICE — COVER,MAYO STAND,STERILE: Brand: MEDLINE

## (undated) DEVICE — DRAPE,SPLIT,BILATERAL,STERILE: Brand: MEDLINE

## (undated) DEVICE — GLV SURG TRIUMPH PF LTX 6.5 STRL

## (undated) DEVICE — CAUTERY TIP POLISHER: Brand: DEVON

## (undated) DEVICE — SPNG GZ WOVN 4X4IN 12PLY 10/BX STRL

## (undated) DEVICE — CONTAINER,SPECIMEN,OR STERILE,4OZ: Brand: MEDLINE

## (undated) DEVICE — GLV SURG SENSICARE PI ORTHO SZ6.5 LF STRL

## (undated) DEVICE — TOWEL,OR,DSP,ST,BLUE,DLX,8/PK,10PK/CS: Brand: MEDLINE

## (undated) DEVICE — PLUG,CATHETER,DRAINAGE PROTECTOR,TUBE: Brand: MEDLINE

## (undated) DEVICE — CLTH CLENS READYCLEANSE PERI CARE PK/5

## (undated) DEVICE — TRAY,IRRIGATION,BULBSYRINGE,60ML,CSR,PVP: Brand: MEDLINE

## (undated) DEVICE — CANN SMPL SOFTECH BIFLO ETCO2 A/M 7FT

## (undated) DEVICE — Device

## (undated) DEVICE — PREVENA PEEL & PLACE SYSTEM KIT- 13 CM: Brand: PREVENA™ PEEL & PLACE™

## (undated) DEVICE — SPNG LAP 18X18IN LF STRL PK/5

## (undated) DEVICE — 3M™ STERI-STRIP™ REINFORCED ADHESIVE SKIN CLOSURES, R1547, 1/2 IN X 4 IN (12 MM X 100 MM), 6 STRIPS/ENVELOPE: Brand: 3M™ STERI-STRIP™

## (undated) DEVICE — 3M™ IOBAN™ 2 ANTIMICROBIAL INCISE DRAPE 6651EZ: Brand: IOBAN™ 2

## (undated) DEVICE — WOUND RETRACTOR AND PROTECTOR: Brand: ALEXIS O WOUND PROTECTOR-RETRACTOR

## (undated) DEVICE — 2-PIECE OSTOMY SKIN BARRIER, FORMAFLEX: Brand: NEW IMAGE

## (undated) DEVICE — TOWEL,OR,DSP,ST,BLUE,DLX,4/PK,20PK/CS: Brand: MEDLINE

## (undated) DEVICE — SUT VICRYL 4/0 SUTUPAK 18 J109T

## (undated) DEVICE — CATH MALECOT 4WING 22F

## (undated) DEVICE — SYR TB SLPTP 1CC NO NDL

## (undated) DEVICE — WRAP LEG 31X48X6IN STRL

## (undated) DEVICE — ULTRAVERSE 035 PTA DILATATION CATHETER 6.0MM X 20MM BALLOON, 130CM SHAFT: Brand: ULTRAVERSE® 035 PTA DILATATION CATHETER

## (undated) DEVICE — SUT VICYL 2/0 CR8 18IN DYED J726D

## (undated) DEVICE — GLV SURG SIGNATURE ESSENTIAL PF LTX SZ7.5

## (undated) DEVICE — PREP SOL POVIDONE/IODINE BT 4OZ

## (undated) DEVICE — GLV SURG SIGNATURE ESSENTIAL PF LTX SZ6.5

## (undated) DEVICE — STERILE POLYISOPRENE POWDER-FREE SURGICAL GLOVES: Brand: PROTEXIS

## (undated) DEVICE — INTENDED TO BE USED TO OCCLUDE, RETRACT AND IDENTIFY ARTERIES, VEINS, TENDONS AND NERVES IN SURGICAL PROCEDURES: Brand: STERION®  VESSEL LOOP

## (undated) DEVICE — ADHS LIQ MASTISOL 2/3ML

## (undated) DEVICE — CORONARY ARTERY BYPASS GRAFT MARKERS, STAINLESS STEEL, DISTAL, WITHOUT HOLDER: Brand: ANASTOMARK CORONARY ARTERY BYPASS GRAFT MARKERS, STAINLESS STEEL, DISTAL

## (undated) DEVICE — SUT VICRYL 0 TIES J112T

## (undated) DEVICE — SUT MONOCRYL 4/0 PS2 27IN Y426H ETY426H

## (undated) DEVICE — SPNG DRN AMD EXCILON 6PLY 4X4IN PK/2

## (undated) DEVICE — SHEET,DRAPE,53X77,STERILE: Brand: MEDLINE

## (undated) DEVICE — GLV SURG SENSICARE POLYISPRN W/ALOE PF LF 6.5 GRN STRL

## (undated) DEVICE — PINNACLE INTRODUCER SHEATH: Brand: PINNACLE

## (undated) DEVICE — LIGHT HANDLE: Brand: DEVON

## (undated) DEVICE — SUT VIC 2/0 TIES 18IN J111T

## (undated) DEVICE — SUT PROLN CARDIO BV1 6/0 24IN 8805H

## (undated) DEVICE — PACK,UNIVERSAL,NO GOWNS: Brand: MEDLINE

## (undated) DEVICE — MAD PERIPHERAL VASCULAR-LF: Brand: MEDLINE INDUSTRIES, INC.

## (undated) DEVICE — 2-PIECE DRAINABLE OSTOMY POUCH: Brand: NEW IMAGE

## (undated) DEVICE — SUT SILK 2/0 FS BLK 18IN 685G

## (undated) DEVICE — SYR LL 3CC

## (undated) DEVICE — APPL CHLORAPREP W/TINT 26ML ORNG

## (undated) DEVICE — CATHETER,URETHRAL,REDRUBBER,STRL,16FR: Brand: MEDLINE

## (undated) DEVICE — DEV TORQ GW SEADRAGON .018 TO .038IN

## (undated) DEVICE — SUT VICRYL 2-0 REEL 54IN J206G

## (undated) DEVICE — HARMONIC ACE +7 LAPAROSCOPIC SHEARS ADVANCED HEMOSTASIS 5MM DIAMETER 36CM SHAFT LENGTH  FOR USE WITH GRAY HAND PIECE ONLY: Brand: HARMONIC ACE

## (undated) DEVICE — ANTIBACTERIAL UNDYED BRAIDED (POLYGLACTIN 910), SYNTHETIC ABSORBABLE SUTURE: Brand: COATED VICRYL

## (undated) DEVICE — SUT ETHLN 2/0 FS 18IN 664H

## (undated) DEVICE — TOTAL TRAY, 16FR 10ML SIL FOLEY, URN: Brand: MEDLINE

## (undated) DEVICE — SUT GORE TTC12 CV6  6M10B

## (undated) DEVICE — CAPS PREP MICROBIOTA FMT DEL/LOWR FZ BT 250ML

## (undated) DEVICE — NDL HYPO PRECISIONGLIDE/REG 18G 1IN PNK

## (undated) DEVICE — SUT PROLENE CARDIO C1D 6/0 24IN 8726H